# Patient Record
Sex: FEMALE | Race: BLACK OR AFRICAN AMERICAN | Employment: OTHER | URBAN - METROPOLITAN AREA
[De-identification: names, ages, dates, MRNs, and addresses within clinical notes are randomized per-mention and may not be internally consistent; named-entity substitution may affect disease eponyms.]

---

## 2017-05-23 ENCOUNTER — HOSPITAL ENCOUNTER (OUTPATIENT)
Dept: LAB | Age: 62
Discharge: HOME | End: 2017-05-23
Payer: COMMERCIAL

## 2017-05-23 LAB
ALBUMIN SERPL BCP-MCNC: 3.9 G/DL (ref 3.4–5)
ALBUMIN/GLOB SERPL: 1.2 {RATIO} (ref 0.8–1.7)
ALP SERPL-CCNC: 97 U/L (ref 45–117)
ALT SERPL-CCNC: 22 U/L (ref 13–56)
ANION GAP BLD CALC-SCNC: 7 MMOL/L (ref 3–18)
AST SERPL W P-5'-P-CCNC: 18 U/L (ref 15–37)
BASOPHILS # BLD AUTO: 0 K/UL (ref 0–0.06)
BASOPHILS # BLD: 0 % (ref 0–2)
BILIRUB DIRECT SERPL-MCNC: 0.1 MG/DL (ref 0–0.2)
BILIRUB SERPL-MCNC: 0.6 MG/DL (ref 0.2–1)
BUN SERPL-MCNC: 16 MG/DL (ref 7–18)
BUN/CREAT SERPL: 28 (ref 12–20)
CALCIUM SERPL-MCNC: 9 MG/DL (ref 8.5–10.1)
CHLORIDE SERPL-SCNC: 102 MMOL/L (ref 100–108)
CO2 SERPL-SCNC: 31 MMOL/L (ref 21–32)
CREAT SERPL-MCNC: 0.58 MG/DL (ref 0.6–1.3)
DIFFERENTIAL METHOD BLD: NORMAL
EOSINOPHIL # BLD: 0.2 K/UL (ref 0–0.4)
EOSINOPHIL NFR BLD: 5 % (ref 0–5)
ERYTHROCYTE [DISTWIDTH] IN BLOOD BY AUTOMATED COUNT: 14.4 % (ref 11.6–14.5)
EST. AVERAGE GLUCOSE BLD GHB EST-MCNC: 126 MG/DL
FERRITIN SERPL-MCNC: 114 NG/ML (ref 8–388)
FOLATE SERPL-MCNC: 19.4 NG/ML (ref 3.1–17.5)
GLOBULIN SER CALC-MCNC: 3.3 G/DL (ref 2–4)
GLUCOSE SERPL-MCNC: 81 MG/DL (ref 74–99)
HBA1C MFR BLD: 6 % (ref 4.2–5.6)
HCT VFR BLD AUTO: 39.2 % (ref 35–45)
HGB BLD-MCNC: 12.7 G/DL (ref 12–16)
IRON SERPL-MCNC: 74 UG/DL (ref 50–175)
LYMPHOCYTES # BLD AUTO: 47 % (ref 21–52)
LYMPHOCYTES # BLD: 2.3 K/UL (ref 0.9–3.6)
MAGNESIUM SERPL-MCNC: 2.3 MG/DL (ref 1.6–2.6)
MCH RBC QN AUTO: 27.4 PG (ref 24–34)
MCHC RBC AUTO-ENTMCNC: 32.4 G/DL (ref 31–37)
MCV RBC AUTO: 84.7 FL (ref 74–97)
MONOCYTES # BLD: 0.3 K/UL (ref 0.05–1.2)
MONOCYTES NFR BLD AUTO: 6 % (ref 3–10)
NEUTS SEG # BLD: 2.1 K/UL (ref 1.8–8)
NEUTS SEG NFR BLD AUTO: 42 % (ref 40–73)
PLATELET # BLD AUTO: 290 K/UL (ref 135–420)
PMV BLD AUTO: 11 FL (ref 9.2–11.8)
POTASSIUM SERPL-SCNC: 3.7 MMOL/L (ref 3.5–5.5)
PROT SERPL-MCNC: 7.2 G/DL (ref 6.4–8.2)
RBC # BLD AUTO: 4.63 M/UL (ref 4.2–5.3)
SODIUM SERPL-SCNC: 140 MMOL/L (ref 136–145)
VIT B12 SERPL-MCNC: 479 PG/ML (ref 211–911)
WBC # BLD AUTO: 5 K/UL (ref 4.6–13.2)

## 2017-05-23 PROCEDURE — 85025 COMPLETE CBC W/AUTO DIFF WBC: CPT | Performed by: NURSE PRACTITIONER

## 2017-05-23 PROCEDURE — 82172 ASSAY OF APOLIPOPROTEIN: CPT | Performed by: NURSE PRACTITIONER

## 2017-05-23 PROCEDURE — 82248 BILIRUBIN DIRECT: CPT | Performed by: NURSE PRACTITIONER

## 2017-05-23 PROCEDURE — 80061 LIPID PANEL: CPT | Performed by: NURSE PRACTITIONER

## 2017-05-23 PROCEDURE — 83036 HEMOGLOBIN GLYCOSYLATED A1C: CPT | Performed by: NURSE PRACTITIONER

## 2017-05-23 PROCEDURE — 82607 VITAMIN B-12: CPT | Performed by: NURSE PRACTITIONER

## 2017-05-23 PROCEDURE — 83735 ASSAY OF MAGNESIUM: CPT | Performed by: NURSE PRACTITIONER

## 2017-05-23 PROCEDURE — 80053 COMPREHEN METABOLIC PANEL: CPT | Performed by: NURSE PRACTITIONER

## 2017-05-23 PROCEDURE — 82728 ASSAY OF FERRITIN: CPT | Performed by: NURSE PRACTITIONER

## 2017-05-23 PROCEDURE — 83540 ASSAY OF IRON: CPT | Performed by: NURSE PRACTITIONER

## 2017-05-23 PROCEDURE — 36415 COLL VENOUS BLD VENIPUNCTURE: CPT | Performed by: NURSE PRACTITIONER

## 2017-05-23 PROCEDURE — 82550 ASSAY OF CK (CPK): CPT | Performed by: NURSE PRACTITIONER

## 2017-05-24 LAB
APO B SERPL-MCNC: 116 MG/DL (ref 54–133)
CHOLEST SERPL-MCNC: 200 MG/DL (ref 100–199)
HDLC SERPL-MCNC: 54 MG/DL
LDLC SERPL CALC-MCNC: 125 MG/DL (ref 0–99)
LDLC/HDLC SERPL: 2.3 RATIO UNITS (ref 0–3.2)
NONHDLC SERPL-MCNC: 146 MG/DL (ref 0–129)
TRIGL SERPL-MCNC: 107 MG/DL (ref 0–149)

## 2017-05-25 LAB
CK BB CFR SERPL ELPH: 0 %
CK MACRO1 CFR SERPL: 0 %
CK MACRO2 CFR SERPL: 0 %
CK MB CFR SERPL ELPH: 0 % (ref 0–3)
CK MM CFR SERPL ELPH: 100 % (ref 97–100)
CK SERPL-CCNC: 140 U/L (ref 24–173)

## 2017-12-01 ENCOUNTER — HOSPITAL ENCOUNTER (OUTPATIENT)
Dept: MAMMOGRAPHY | Age: 62
Discharge: HOME | End: 2017-12-01
Attending: PHYSICIAN ASSISTANT
Payer: COMMERCIAL

## 2017-12-01 DIAGNOSIS — Z12.31 VISIT FOR SCREENING MAMMOGRAM: ICD-10-CM

## 2017-12-01 PROCEDURE — 77067 SCR MAMMO BI INCL CAD: CPT

## 2018-01-25 ENCOUNTER — HOSPITAL ENCOUNTER (OUTPATIENT)
Dept: BONE DENSITY | Age: 63
Discharge: HOME | End: 2018-01-25
Attending: PHYSICIAN ASSISTANT
Payer: COMMERCIAL

## 2018-01-25 DIAGNOSIS — Z13.820 SCREENING FOR OSTEOPOROSIS: ICD-10-CM

## 2018-01-25 PROCEDURE — 77080 DXA BONE DENSITY AXIAL: CPT

## 2018-02-19 ENCOUNTER — HOSPITAL ENCOUNTER (OUTPATIENT)
Dept: ULTRASOUND IMAGING | Age: 63
Discharge: HOME | End: 2018-02-19
Attending: PHYSICIAN ASSISTANT
Payer: COMMERCIAL

## 2018-02-19 DIAGNOSIS — R79.89 ELEVATED TSH: ICD-10-CM

## 2018-02-19 PROCEDURE — 76536 US EXAM OF HEAD AND NECK: CPT

## 2018-03-22 ENCOUNTER — HOSPITAL ENCOUNTER (OUTPATIENT)
Dept: GENERAL RADIOLOGY | Age: 63
Discharge: HOME | End: 2018-03-22
Payer: COMMERCIAL

## 2018-03-22 DIAGNOSIS — M95.8 ACQUIRED DEFORMITY OF CLAVICLE: ICD-10-CM

## 2018-03-22 PROCEDURE — 73000 X-RAY EXAM OF COLLAR BONE: CPT

## 2018-03-22 PROCEDURE — 71046 X-RAY EXAM CHEST 2 VIEWS: CPT

## 2018-12-11 ENCOUNTER — HOSPITAL ENCOUNTER (OUTPATIENT)
Dept: MAMMOGRAPHY | Age: 63
Discharge: HOME | End: 2018-12-11
Attending: PHYSICIAN ASSISTANT
Payer: COMMERCIAL

## 2018-12-11 DIAGNOSIS — Z12.31 VISIT FOR SCREENING MAMMOGRAM: ICD-10-CM

## 2018-12-11 PROCEDURE — 77063 BREAST TOMOSYNTHESIS BI: CPT

## 2019-10-24 ENCOUNTER — OFFICE VISIT (OUTPATIENT)
Dept: ORTHOPEDIC SURGERY | Facility: CLINIC | Age: 64
End: 2019-10-24

## 2019-10-24 DIAGNOSIS — M25.462 EFFUSION OF LEFT KNEE: ICD-10-CM

## 2019-10-24 DIAGNOSIS — M25.662 DECREASED RANGE OF MOTION (ROM) OF LEFT KNEE: ICD-10-CM

## 2019-10-24 DIAGNOSIS — M25.562 CHRONIC PAIN OF LEFT KNEE: Primary | ICD-10-CM

## 2019-10-24 DIAGNOSIS — M23.8X2 CREPITUS OF JOINT OF LEFT KNEE: ICD-10-CM

## 2019-10-24 DIAGNOSIS — G89.29 CHRONIC PAIN OF LEFT KNEE: Primary | ICD-10-CM

## 2019-10-24 DIAGNOSIS — M17.12 ARTHRITIS OF LEFT KNEE: ICD-10-CM

## 2019-10-24 RX ORDER — TRIAMCINOLONE ACETONIDE 40 MG/ML
40 INJECTION, SUSPENSION INTRA-ARTICULAR; INTRAMUSCULAR ONCE
Qty: 1 ML | Refills: 0
Start: 2019-10-24 | End: 2019-10-24

## 2019-10-24 NOTE — LETTER
NOTIFICATION RETURN TO WORK / SCHOOL 
 
10/24/2019 3:26 PM 
 
Ms. Gonzalez Reis [address] [id number] To Whom It May Concern: 
 
Gonzalez Reis is currently under the care of [address] [place] [address]. She will return to work on Monday 10-28-19. If there are questions or concerns please have the patient contact our office.  
 
 
 
Sincerely, 
 
 
David Hall PA-C

## 2019-10-24 NOTE — PROGRESS NOTES
Patient: Aruna Fisher                MRN: [id number]       SSN: [id number]  YOB: 1955        AGE: 59 y.o. SEX: female          PCP: Rei Salomon NP  10/24/19    No chief complaint on file. HISTORY:  Aruna Fisher is a 59 y.o. female who presents to the office with a multiple year history of left knee pain. She is been seen previously under the care of Dr. Vinod shelby and Dr. Joesph Perrin she has received previous cortisone injections and also quite a while ago an aspiration injection of her left knee. .    Pain is worse when she stands for short periods walk short distances and climbs and since stairs. 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       Pain Assessment  10/3/2016   Location of Pain Knee   Location Modifiers Left;Right   Severity of Pain 0   Quality of Pain Dull   Duration of Pain -   Frequency of Pain -   Aggravating Factors -   Relieving Factors -   Result of Injury -           Lab Results   Component Value Date/Time    Hemoglobin A1c 6.0 (H) 05/23/2017 11:23 AM     Weight Metrics 10/3/2016 9/26/2016 9/19/2016 4/20/2016 2/19/2016 11/11/2014   Weight 216 lb 216 lb 216 lb 216 lb 9.6 oz 210 lb 197 lb   BMI 31.9 kg/m2 31.9 kg/m2 31.9 kg/m2 31.97 kg/m2 31 kg/m2 29.08 kg/m2            Problem List Items Addressed This Visit     None      Visit Diagnoses     Chronic pain of left knee    -  Primary    Relevant Medications    triamcinolone acetonide (KENALOG) 40 mg/mL injection    Other Relevant Orders    AMB POC XRAY, ANKLE; COMPLETE, 3+ VIE (Completed)    TRIAMCINOLONE ACETONIDE INJ    DRAIN/INJECT LARGE JOINT/BURSA    US GUIDE INJ/ASP/ARTHRO LG JNT/BURSA    Decreased range of motion (ROM) of left knee        Relevant Medications    triamcinolone acetonide (KENALOG) 40 mg/mL injection    Other Relevant Orders    TRIAMCINOLONE ACETONIDE INJ    US GUIDE INJ/ASP/ARTHRO LG JNT/BURSA    Arthritis of left knee        Relevant Medications    triamcinolone acetonide (KENALOG) 40 mg/mL injection    Other Relevant Orders    TRIAMCINOLONE ACETONIDE INJ    DRAIN/INJECT LARGE JOINT/BURSA    Crepitus of joint of left knee        Relevant Medications    triamcinolone acetonide (KENALOG) 40 mg/mL injection    Other Relevant Orders    TRIAMCINOLONE ACETONIDE INJ    DRAIN/INJECT LARGE JOINT/BURSA    Effusion of left knee              PAST MEDICAL HISTORY:   Past Medical History:   Diagnosis Date    Arthritis     Calcific Achilles tendonitis     Right; insertional    Hypercholesterolemia     Hypertension     Left knee pain     Pain of right heel     Sprain of right ring finger     Wears glasses        PAST SURGICAL HISTORY:   Past Surgical History:   Procedure Laterality Date    HX HEENT      Eye surgery    HX HERNIA REPAIR         ALLERGIES: No Known Allergies     CURRENT MEDICATIONS:  A list of medications prior to the time of admission include:  Prior to Admission medications    Medication Sig Start Date End Date Taking? Authorizing Provider   triamcinolone acetonide (KENALOG) 40 mg/mL injection 1 mL by IntraBURSal route once for 1 dose. 10/24/19 10/24/19 Yes Jennifer Lemon PA-C   neomycin-polymyxin-dexamethasone (DEXACIDIN, MAXITROL) 3.5mg/mL-10,000 unit/mL-0.1 % ophthalmic suspension  4/14/16   Provider, Historical   diclofenac (VOLTAREN) 0.1 % ophthalmic solution  3/20/16   Provider, Historical   simvastatin (ZOCOR) 20 mg tablet Take  by mouth nightly. Provider, Historical   diclofenac (VOLTAREN) 1 % gel Apply  to affected area four (4) times daily. Apply 4 g to affected area four times daily 2/19/16   Price Olmstead MD   HYDROcodone-acetaminophen [facility]) 5-325 mg per tablet Take 1 tablet by mouth every four (4) hours as needed for Pain. 11/11/14   JAMES Oshea   ibuprofen (MOTRIN) 600 mg tablet Take 1 tablet by mouth every six (6) hours as needed for Pain.  11/11/14   JAMES Oshea   escitalopram oxalate (LEXAPRO) 20 mg tablet Take 20 mg by mouth daily. Provider, Historical   olmesartan-hydrochlorothiazide (BENICAR HCT) 40-25 mg per tablet Take 1 tablet by mouth daily. Provider, Historical       FAMILY HISTORY:   Family History   Problem Relation Age of Onset    Breast Cancer Other     Arthritis-osteo Mother     Hypertension Mother     Hypertension Other         Grandmother    Other Other         Heart problems: Mother, grandmother    Diabetes Other         Grandmother       SOCIAL HISTORY:   Social History     Socioeconomic History    Marital status:      Spouse name: Not on file    Number of children: Not on file    Years of education: Not on file    Highest education level: Not on file   Tobacco Use    Smoking status: Former Smoker     Years: 15.00    Smokeless tobacco: Never Used   Substance and Sexual Activity    Alcohol use: Yes     Comment: Occasional    Drug use: No       ROS:No CP, No SOB, No fever/chills nor night sweats. No headaches, vision abnormalities to include double and oral loss of vision. No hearing abnormalities. Musculoskeletal pain per HPI. Pain is exacerbated positionally. Pt denies h/o spinal surgery, injections, or PT/chiropractor. Self treated with less than adequate relief on oral antiinflammatories. . Pt denies change in bowel or bladder habits. Pt denies fever, weight loss, or skin changes. EXAM:  Patient alert and oriented x 3,   CN II-XII grossly intact  Sitting comfortably in the exam room, interacting with conversation with pleasant affect. Breathing appears regular effortless with no visible usage of accessory muscles  Distal cap refill intact at 2/2 Cesar UE / LE. Neuro intact Cesar UE/LE to noxious stimuli    Examination to the left knee while patient is supine reveals a 2+ effusion. She has a valgus deformity. Her extension is -10 degrees to full up. There is flexion noted while supine to 90 degrees.   No calf tenderness or evidence of DVT distal sensation intact fully left lower extremity. DIAGNOSTIC IMAGING:    X-ray of the bilateral knees to include AP tunnel and left lateral reveals severe patellofemoral osteoarthritis as well as narrowing of the lateral joint space more so than the medial joint space. IMPRESSION:  1. Left knee effusion  2. Decreased range of motion left knee  3. Left knee osteoarthritis  4. Crepitation in the left knee  5. Chronic pain of the left knee    Medical Decision Making with Comprehensive Data Review:    The patients presenting problems have been discussed, and they/their family are in agreement with the care plan formulated and outlined with them. Treatment option(s) discussed to include, but cannot be limited to Physical / Occupational outpatient therapy, arthrocentesis, elective and or urgent surgical intervention of aforementioned patient medical condition. I have encouraged the patient / family to ask questions as they arise throughout their visit. The patient elected after all options discussed to treat the left knee with aspiration and cortisone injection. Procedural: Using sterile technique and verbal and written consent were obtained appropriate timeout performed patient laying supine left knee flexed to 20 degrees on a bolster 3 cc 1% lidocaine used to anesthetize a superior lateral interarticular approach. To follow 62 cc of yellow  blood tinted aspirate removed from the same portal.  To follow 1 cc of Kenalog 40 mg per male re-instilled. The Kenalog was mixed with 7 mils of Sensorcaine 0.75%. Please Note:    The above patient was treated with the assistance of the [employer] Ultrasound device. Image(s) captured, saved, printed, and copied to chart. 1.) Please continue current medications as prescribed for pain recommend Tylenol / Motrin per manufactures recommendations  2.) Please maintain current level of activity as discussed at visit today, implement the discussed RICE regimen.   3.) Provider will initiate and/or consider initiating physical therapy to assist in patient's safe recoveries. 4.) Ok to apply ice or a cold pack with a towel over the skin for 15 min on and 45 minutes off and may repeat up to 4 times within 24 hours. 5.) Provider discussed the risk of falls , mobility assisted device questions discussed thoroughly. 6.) Treat the   Problem List Items Addressed This Visit     None      Visit Diagnoses     Chronic pain of left knee    -  Primary    Relevant Medications    triamcinolone acetonide (KENALOG) 40 mg/mL injection    Other Relevant Orders    AMB POC XRAY, ANKLE; COMPLETE, 3+ VIE (Completed)    TRIAMCINOLONE ACETONIDE INJ    DRAIN/INJECT LARGE JOINT/BURSA    US GUIDE INJ/ASP/ARTHRO LG JNT/BURSA    Decreased range of motion (ROM) of left knee        Relevant Medications    triamcinolone acetonide (KENALOG) 40 mg/mL injection    Other Relevant Orders    TRIAMCINOLONE ACETONIDE INJ    US GUIDE INJ/ASP/ARTHRO LG JNT/BURSA    Arthritis of left knee        Relevant Medications    triamcinolone acetonide (KENALOG) 40 mg/mL injection    Other Relevant Orders    TRIAMCINOLONE ACETONIDE INJ    DRAIN/INJECT LARGE JOINT/BURSA    Crepitus of joint of left knee        Relevant Medications    triamcinolone acetonide (KENALOG) 40 mg/mL injection    Other Relevant Orders    TRIAMCINOLONE ACETONIDE INJ    DRAIN/INJECT LARGE JOINT/BURSA    Effusion of left knee           presenting medical problem(s) per protocol established on \"evidence based methods\". 7.) Provider recommended to the patient future action(s) that could limit the medical condition from re occurring, and or continue to support their recoveries. 8.) Establish a comprehensive follow up plan that is logical, and accessible for patient to follow with all pertinent medical providers, and or facilities in a timely fashion in order to      continue continuity of care.       Patient provided a reminder for a \"due or due soon\" health maintenance. I have asked the patient to schedule an appointment with their primary care provider for follow-up on general health maintenance concerns. Today all the patient's questions were answered to their satisfaction. Copies of x-rays reviewed if obtained this visit, and provided to patient. Please note: This document has been produced using voice recognition software. Unrecognized errors in transcription may be present. Angelita HUSSEIN, APC, MPAS, PA-C  [facility]

## 2019-10-25 DIAGNOSIS — G89.29 CHRONIC PAIN OF LEFT KNEE: ICD-10-CM

## 2019-10-25 DIAGNOSIS — M25.562 CHRONIC PAIN OF LEFT KNEE: ICD-10-CM

## 2019-10-25 DIAGNOSIS — M25.662 DECREASED RANGE OF MOTION (ROM) OF LEFT KNEE: ICD-10-CM

## 2019-10-28 ENCOUNTER — OFFICE VISIT (OUTPATIENT)
Dept: ORTHOPEDIC SURGERY | Facility: CLINIC | Age: 64
End: 2019-10-28

## 2019-10-28 VITALS
WEIGHT: 223.2 LBS | TEMPERATURE: 97.5 F | HEIGHT: 69 IN | OXYGEN SATURATION: 99 % | RESPIRATION RATE: 18 BRPM | DIASTOLIC BLOOD PRESSURE: 89 MMHG | BODY MASS INDEX: 33.06 KG/M2 | HEART RATE: 67 BPM | SYSTOLIC BLOOD PRESSURE: 187 MMHG

## 2019-10-28 DIAGNOSIS — M17.12 PRIMARY OSTEOARTHRITIS OF LEFT KNEE: Primary | ICD-10-CM

## 2019-10-28 DIAGNOSIS — M25.662 DECREASED RANGE OF MOTION (ROM) OF LEFT KNEE: ICD-10-CM

## 2019-10-28 DIAGNOSIS — M17.12 ARTHRITIS OF LEFT KNEE: ICD-10-CM

## 2019-10-28 DIAGNOSIS — M25.462 EFFUSION OF LEFT KNEE: ICD-10-CM

## 2019-10-28 DIAGNOSIS — M23.8X2 CREPITUS OF JOINT OF LEFT KNEE: ICD-10-CM

## 2019-10-28 RX ORDER — ASPIRIN 81 MG/1
TABLET ORAL DAILY
COMMUNITY
End: 2020-07-08

## 2019-10-28 RX ORDER — LISINOPRIL 20 MG/1
40 TABLET ORAL DAILY
COMMUNITY
End: 2020-07-01 | Stop reason: SDUPTHER

## 2019-10-28 RX ORDER — ATORVASTATIN CALCIUM 20 MG/1
40 TABLET, FILM COATED ORAL DAILY
Status: ON HOLD | COMMUNITY
End: 2020-07-15 | Stop reason: SDUPTHER

## 2019-10-28 RX ORDER — AMLODIPINE BESYLATE 10 MG/1
10 TABLET ORAL DAILY
Status: ON HOLD | COMMUNITY
End: 2020-07-15 | Stop reason: SDUPTHER

## 2019-10-28 RX ORDER — TRIAMCINOLONE ACETONIDE 40 MG/ML
40 INJECTION, SUSPENSION INTRA-ARTICULAR; INTRAMUSCULAR ONCE
Qty: 1 ML | Refills: 0
Start: 2019-10-28 | End: 2019-10-28

## 2019-10-28 RX ORDER — LEVOTHYROXINE SODIUM 75 UG/1
TABLET ORAL
COMMUNITY
End: 2020-07-02

## 2019-10-28 NOTE — LETTER
NOTIFICATION RETURN TO WORK  
 
10/28/2019 10:57 AM 
 
Ms. Arpita Downing [address] [facility] [address] [place] [id number] To Whom It May Concern: 
 
Arpita Downing is currently under the care of [address] [place] [address]. She will return to work on 10/30/2019. If there are questions or concerns please have the patient contact our office.  
 
 
 
Sincerely, 
 
 
Saul Yeh PA-C 
 
                                
 

(M4) withdraws from pain

## 2019-10-28 NOTE — PROGRESS NOTES
Patient: Esteban Andrade                MRN: [id number]       SSN: [id number]  YOB: 1955        AGE: 59 y.o. SEX: female          PCP: Mahsa Darden NP  10/28/19    Chief Complaint   Patient presents with    Knee Pain     Left       HISTORY:  Esteban Andrade is a 59 y.o. female who presents to the office with a recurrent left knee pain and swelling with decreased ROM. She was seen on 10-24-19 and aspirated 62 cc synovial fluid. Pain is worse when she stands for short periods walk short distances and climbs and since stairs. 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 Pain Assessment  10/28/2019   Location of Pain Knee   Location Modifiers Left   Severity of Pain 4   Quality of Pain Aching; Throbbing   Duration of Pain Persistent   Frequency of Pain Intermittent   Aggravating Factors Standing   Limiting Behavior Yes   Relieving Factors Rest;Elevation   Result of Injury No           Lab Results   Component Value Date/Time    Hemoglobin A1c 6.0 (H) 05/23/2017 11:23 AM     Weight Metrics 10/28/2019 10/3/2016 9/26/2016 9/19/2016 4/20/2016 2/19/2016 11/11/2014   Weight 223 lb 3.2 oz 216 lb 216 lb 216 lb 216 lb 9.6 oz 210 lb 197 lb   BMI 32.96 kg/m2 31.9 kg/m2 31.9 kg/m2 31.9 kg/m2 31.97 kg/m2 31 kg/m2 29.08 kg/m2            Problem List Items Addressed This Visit     None      Visit Diagnoses     Primary osteoarthritis of left knee    -  Primary          PAST MEDICAL HISTORY:   Past Medical History:   Diagnosis Date    Arthritis     Calcific Achilles tendonitis     Right; insertional    Hypercholesterolemia     Hypertension     Left knee pain     Pain of right heel     Sprain of right ring finger     Wears glasses        PAST SURGICAL HISTORY:   Past Surgical History:   Procedure Laterality Date    HX HEENT      Eye surgery    HX HERNIA REPAIR         ALLERGIES: No Known Allergies     CURRENT MEDICATIONS:  A list of medications prior to the time of admission include:  Prior to Admission medications    Medication Sig Start Date End Date Taking? Authorizing Provider   levothyroxine (SYNTHROID) 75 mcg tablet Take  by mouth Daily (before breakfast). Yes Provider, Historical   lisinopril (PRINIVIL, ZESTRIL) 20 mg tablet Take  by mouth daily. Yes Provider, Historical   amLODIPine (NORVASC) 10 mg tablet Take  by mouth daily. Yes Provider, Historical   atorvastatin (LIPITOR) 20 mg tablet Take  by mouth daily. Yes Provider, Historical   aspirin delayed-release 81 mg tablet Take  by mouth daily. Yes Provider, Historical   escitalopram oxalate (LEXAPRO) 20 mg tablet Take 20 mg by mouth daily. Yes Provider, Historical   neomycin-polymyxin-dexamethasone (Mildred Kinds) 3.5mg/mL-10,000 unit/mL-0.1 % ophthalmic suspension  4/14/16   Provider, Historical   diclofenac (VOLTAREN) 0.1 % ophthalmic solution  3/20/16   Provider, Historical   simvastatin (ZOCOR) 20 mg tablet Take  by mouth nightly. Provider, Historical   diclofenac (VOLTAREN) 1 % gel Apply  to affected area four (4) times daily. Apply 4 g to affected area four times daily 2/19/16   Goldy Miguel MD   HYDROcodone-acetaminophen [facility]) 5-325 mg per tablet Take 1 tablet by mouth every four (4) hours as needed for Pain. 11/11/14   JAMES Grace   ibuprofen (MOTRIN) 600 mg tablet Take 1 tablet by mouth every six (6) hours as needed for Pain. 11/11/14   JAMES Grace   olmesartan-hydrochlorothiazide (BENICAR HCT) 40-25 mg per tablet Take 1 tablet by mouth daily. Provider, Historical       FAMILY HISTORY:   Family History   Problem Relation Age of Onset    Breast Cancer Other     Arthritis-osteo Mother     Hypertension Mother     Hypertension Other         Grandmother    Other Other         Heart problems:  Mother, grandmother    Diabetes Other         Grandmother       SOCIAL HISTORY:   Social History     Socioeconomic History    Marital status:      Spouse name: Not on file    Number of children: Not on file    Years of education: Not on file    Highest education level: Not on file   Tobacco Use    Smoking status: Former Smoker     Years: 15.00    Smokeless tobacco: Never Used   Substance and Sexual Activity    Alcohol use: Yes     Comment: Occasional    Drug use: No       ROS:No CP, No SOB, No fever/chills nor night sweats. No headaches, vision abnormalities to include double and oral loss of vision. No hearing abnormalities. Musculoskeletal pain per HPI. Pain is exacerbated positionally. Pt denies h/o spinal surgery, injections, or PT/chiropractor. Self treated with less than adequate relief on oral antiinflammatories. . Pt denies change in bowel or bladder habits. Pt denies fever, weight loss, or skin changes. EXAM:  Patient alert and oriented x 3,   CN II-XII grossly intact  Sitting comfortably in the exam room, interacting with conversation with pleasant affect. Breathing appears regular effortless with no visible usage of accessory muscles  Distal cap refill intact at 2/2 Cesar UE / LE. Neuro intact Cesar UE/LE to noxious stimuli    Examination to the left knee while patient is supine reveals a 2+ effusion. She has a valgus deformity. Her extension is -10 degrees to full up. There is flexion noted while supine to 90 degrees. No calf tenderness or evidence of DVT distal sensation intact fully left lower extremity. DIAGNOSTIC IMAGING:HISTORICAL  X-ray of the bilateral knees to include AP tunnel and left lateral reveals severe patellofemoral osteoarthritis as well as narrowing of the lateral joint space more so than the medial joint space. IMPRESSION:  1. Recurrent Left knee effusion  2. Decreased range of motion left knee  3. Left knee osteoarthritis  4. Crepitation in the left knee  5.   Chronic pain of the left knee    Medical Decision Making with Comprehensive Data Review:    The patients presenting problems have been discussed, and they/their family are in agreement with the care plan formulated and outlined with them. Treatment option(s) discussed to include, but cannot be limited to Physical / Occupational outpatient therapy, arthrocentesis, elective and or urgent surgical intervention of aforementioned patient medical condition. I have encouraged the patient / family to ask questions as they arise throughout their visit. The patient elected after all options discussed to treat the left knee with repeat aspiration and 1/2 cc Kenalog at 40mg/ml cortisone injection. Procedural: Using sterile technique and verbal and written consent were obtained appropriate timeout performed patient laying supine left knee flexed to 20 degrees on a bolster 3 cc 1% lidocaine used to anesthetize a superior lateral interarticular approach. To follow 40 cc of yellow  blood tinted aspirate removed from the same portal.  To follow 1/2 cc of Kenalog 40 mg per male re-instilled. The Kenalog was mixed with 7 mils of Sensorcaine 0.75%. Please Note:    The above patient was treated with the assistance of the [employer] Ultrasound device. Image(s) captured, saved, printed, and copied to chart. 1.) Please continue current medications as prescribed for pain recommend Tylenol / Motrin per manufactures recommendations  2.) Please maintain current level of activity as discussed at visit today, implement the discussed RICE regimen. 3.) Provider will initiate and/or consider initiating physical therapy to assist in patient's safe recoveries. 4.) Ok to apply ice or a cold pack with a towel over the skin for 15 min on and 45 minutes off and may repeat up to 4 times within 24 hours. 5.) Provider discussed the risk of falls , mobility assisted device questions discussed thoroughly.   6.) Treat the   Problem List Items Addressed This Visit     None      Visit Diagnoses     Primary osteoarthritis of left knee    -  Primary       presenting medical problem(s) per protocol established on \"evidence based methods\". 7.) Provider recommended to the patient future action(s) that could limit the medical condition from re occurring, and or continue to support their recoveries. 8.) Establish a comprehensive follow up plan that is logical, and accessible for patient to follow with all pertinent medical providers, and or facilities in a timely fashion in order to      continue continuity of care. Patient provided a reminder for a \"due or due soon\" health maintenance. I have asked the patient to schedule an appointment with their primary care provider for follow-up on general health maintenance concerns. Today all the patient's questions were answered to their satisfaction. Copies of x-rays reviewed if obtained this visit, and provided to patient. Please note: This document has been produced using voice recognition software. Unrecognized errors in transcription may be present. Jeff HUSSEIN, APC, MPAS, PA-C  [facility]

## 2019-11-13 ENCOUNTER — HOSPITAL ENCOUNTER (OUTPATIENT)
Dept: LAB | Age: 64
Discharge: HOME | End: 2019-11-13
Payer: COMMERCIAL

## 2019-11-13 ENCOUNTER — HOSPITAL ENCOUNTER (OUTPATIENT)
Dept: ULTRASOUND IMAGING | Age: 64
Discharge: HOME | End: 2019-11-13
Attending: NURSE PRACTITIONER
Payer: COMMERCIAL

## 2019-11-13 DIAGNOSIS — N85.2 ENLARGED UTERUS: ICD-10-CM

## 2019-11-13 LAB
ALBUMIN SERPL-MCNC: 3.6 G/DL (ref 3.4–5)
ALBUMIN/GLOB SERPL: 1 {RATIO} (ref 0.8–1.7)
ALP SERPL-CCNC: 139 U/L (ref 45–117)
ALT SERPL-CCNC: 27 U/L (ref 13–56)
ANION GAP SERPL CALC-SCNC: 7 MMOL/L (ref 3–18)
AST SERPL-CCNC: 16 U/L (ref 10–38)
BASOPHILS # BLD: 0 K/UL (ref 0–0.1)
BASOPHILS NFR BLD: 0 % (ref 0–2)
BILIRUB SERPL-MCNC: 0.5 MG/DL (ref 0.2–1)
BUN SERPL-MCNC: 13 MG/DL (ref 7–18)
BUN/CREAT SERPL: 21 (ref 12–20)
CALCIUM SERPL-MCNC: 8.7 MG/DL (ref 8.5–10.1)
CHLORIDE SERPL-SCNC: 103 MMOL/L (ref 100–111)
CHOLEST SERPL-MCNC: 270 MG/DL
CO2 SERPL-SCNC: 30 MMOL/L (ref 21–32)
CREAT SERPL-MCNC: 0.62 MG/DL (ref 0.6–1.3)
DIFFERENTIAL METHOD BLD: ABNORMAL
EOSINOPHIL # BLD: 0.1 K/UL (ref 0–0.4)
EOSINOPHIL NFR BLD: 2 % (ref 0–5)
ERYTHROCYTE [DISTWIDTH] IN BLOOD BY AUTOMATED COUNT: 14.7 % (ref 11.6–14.5)
GLOBULIN SER CALC-MCNC: 3.7 G/DL (ref 2–4)
GLUCOSE SERPL-MCNC: 85 MG/DL (ref 74–99)
HCT VFR BLD AUTO: 38.8 % (ref 35–45)
HDLC SERPL-MCNC: 58 MG/DL (ref 40–60)
HDLC SERPL: 4.7 {RATIO} (ref 0–5)
HGB BLD-MCNC: 12.7 G/DL (ref 12–16)
LDLC SERPL CALC-MCNC: 190 MG/DL (ref 0–100)
LIPID PROFILE,FLP: ABNORMAL
LYMPHOCYTES # BLD: 2 K/UL (ref 0.9–3.6)
LYMPHOCYTES NFR BLD: 26 % (ref 21–52)
MCH RBC QN AUTO: 27.7 PG (ref 24–34)
MCHC RBC AUTO-ENTMCNC: 32.7 G/DL (ref 31–37)
MCV RBC AUTO: 84.7 FL (ref 74–97)
MONOCYTES # BLD: 0.3 K/UL (ref 0.05–1.2)
MONOCYTES NFR BLD: 4 % (ref 3–10)
NEUTS SEG # BLD: 5.2 K/UL (ref 1.8–8)
NEUTS SEG NFR BLD: 68 % (ref 40–73)
PLATELET # BLD AUTO: 356 K/UL (ref 135–420)
PMV BLD AUTO: 10.2 FL (ref 9.2–11.8)
POTASSIUM SERPL-SCNC: 3.7 MMOL/L (ref 3.5–5.5)
PROT SERPL-MCNC: 7.3 G/DL (ref 6.4–8.2)
RBC # BLD AUTO: 4.58 M/UL (ref 4.2–5.3)
SODIUM SERPL-SCNC: 140 MMOL/L (ref 136–145)
TRIGL SERPL-MCNC: 110 MG/DL (ref ?–150)
TSH SERPL DL<=0.05 MIU/L-ACNC: 2.61 UIU/ML (ref 0.36–3.74)
VLDLC SERPL CALC-MCNC: 22 MG/DL
WBC # BLD AUTO: 7.6 K/UL (ref 4.6–13.2)

## 2019-11-13 PROCEDURE — 93975 VASCULAR STUDY: CPT

## 2019-11-13 PROCEDURE — 85025 COMPLETE CBC W/AUTO DIFF WBC: CPT

## 2019-11-13 PROCEDURE — 84443 ASSAY THYROID STIM HORMONE: CPT

## 2019-11-13 PROCEDURE — 36415 COLL VENOUS BLD VENIPUNCTURE: CPT

## 2019-11-13 PROCEDURE — 80053 COMPREHEN METABOLIC PANEL: CPT

## 2019-11-13 PROCEDURE — 80061 LIPID PANEL: CPT

## 2019-11-21 ENCOUNTER — OFFICE VISIT (OUTPATIENT)
Dept: ORTHOPEDIC SURGERY | Facility: CLINIC | Age: 64
End: 2019-11-21

## 2019-11-21 ENCOUNTER — HOSPITAL ENCOUNTER (OUTPATIENT)
Dept: LAB | Age: 64
Discharge: HOME | End: 2019-11-21
Payer: COMMERCIAL

## 2019-11-21 VITALS
WEIGHT: 217 LBS | RESPIRATION RATE: 16 BRPM | BODY MASS INDEX: 32.14 KG/M2 | HEART RATE: 75 BPM | TEMPERATURE: 98.1 F | HEIGHT: 69 IN | SYSTOLIC BLOOD PRESSURE: 147 MMHG | DIASTOLIC BLOOD PRESSURE: 65 MMHG

## 2019-11-21 DIAGNOSIS — M17.12 PRIMARY OSTEOARTHRITIS OF LEFT KNEE: Primary | ICD-10-CM

## 2019-11-21 LAB
APPEARANCE FLD: CLEAR
BODY FLD TYPE: NORMAL
COLOR FLD: YELLOW
CRYSTALS FLD MICRO: NORMAL
NUC CELL # FLD: 4 /CU MM
RBC # FLD: 511 /CU MM
SPECIMEN SOURCE FLD: ABNORMAL

## 2019-11-21 PROCEDURE — 89050 BODY FLUID CELL COUNT: CPT

## 2019-11-21 PROCEDURE — 89060 EXAM SYNOVIAL FLUID CRYSTALS: CPT

## 2019-11-21 NOTE — PROGRESS NOTES
1. Have you been to the ER, urgent care clinic since your last visit? Hospitalized since your last visit? NO    2. Have you seen or consulted any other health care providers outside of the [address] [place] [address] since your last visit? Include any pap smears or colon screening.    NO

## 2019-11-21 NOTE — PROGRESS NOTES
Patient: Sheng Connors                MRN: [id number]       SSN: [id number]  YOB: 1955        AGE: 59 y.o. SEX: female          PCP: Jackie Rinne, NP  11/21/19    Chief Complaint   Patient presents with    Knee Pain     left knee pain, f/u appt. HISTORY:  Sheng Connors is a 59 y.o. female who presents to the office with a recurrent left knee pain and swelling with decreased ROM. She was seen on 10-28-19 and aspirated 40cc synovial fluid. Pain is worse when she stands for short periods walk short distances and climbs and since stairs. 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 Pain Assessment  11/21/2019   Location of Pain Knee   Location Modifiers Left   Severity of Pain 2   Quality of Pain Sharp;Popping   Duration of Pain A few minutes   Frequency of Pain Intermittent   Aggravating Factors Standing; Other (Comment)   Aggravating Factors Comment stepping down the wrong way   Limiting Behavior No   Relieving Factors (No Data)   Relieving Factors Comment n/a   Result of Injury No           Lab Results   Component Value Date/Time    Hemoglobin A1c 6.0 (H) 05/23/2017 11:23 AM     Weight Metrics 11/21/2019 10/28/2019 10/3/2016 9/26/2016 9/19/2016 4/20/2016 2/19/2016   Weight 217 lb 223 lb 3.2 oz 216 lb 216 lb 216 lb 216 lb 9.6 oz 210 lb   BMI 32.05 kg/m2 32.96 kg/m2 31.9 kg/m2 31.9 kg/m2 31.9 kg/m2 31.97 kg/m2 31 kg/m2            Problem List Items Addressed This Visit     None      Visit Diagnoses     Primary osteoarthritis of left knee    -  Primary    Relevant Orders    US GUIDE INJ/ASP/ARTHRO LG JNT/BURSA          PAST MEDICAL HISTORY:   Past Medical History:   Diagnosis Date    Arthritis     Calcific Achilles tendonitis     Right; insertional    Hypercholesterolemia     Hypertension     Left knee pain     Pain of right heel     Sprain of right ring finger     Wears glasses        PAST SURGICAL HISTORY:   Past Surgical History:   Procedure Laterality Date    HX HEENT      Eye surgery    HX HERNIA REPAIR         ALLERGIES: No Known Allergies     CURRENT MEDICATIONS:  A list of medications prior to the time of admission include:  Prior to Admission medications    Medication Sig Start Date End Date Taking? Authorizing Provider   levothyroxine (SYNTHROID) 75 mcg tablet Take  by mouth Daily (before breakfast). Yes Provider, Historical   lisinopril (PRINIVIL, ZESTRIL) 20 mg tablet Take  by mouth daily. Yes Provider, Historical   amLODIPine (NORVASC) 10 mg tablet Take  by mouth daily. Yes Provider, Historical   atorvastatin (LIPITOR) 20 mg tablet Take  by mouth daily. Yes Provider, Historical   aspirin delayed-release 81 mg tablet Take  by mouth daily. Yes Provider, Historical   neomycin-polymyxin-dexamethasone (Minus Barer) 3.5mg/mL-10,000 unit/mL-0.1 % ophthalmic suspension  4/14/16  Yes Provider, Historical   diclofenac (VOLTAREN) 0.1 % ophthalmic solution  3/20/16  Yes Provider, Historical   simvastatin (ZOCOR) 20 mg tablet Take  by mouth nightly. Yes Provider, Historical   ibuprofen (MOTRIN) 600 mg tablet Take 1 tablet by mouth every six (6) hours as needed for Pain. 11/11/14  Yes JAMES Miguel   escitalopram oxalate (LEXAPRO) 20 mg tablet Take 20 mg by mouth daily. Yes Provider, Historical   olmesartan-hydrochlorothiazide (BENICAR HCT) 40-25 mg per tablet Take 1 tablet by mouth daily. Yes Provider, Historical   diclofenac (VOLTAREN) 1 % gel Apply  to affected area four (4) times daily. Apply 4 g to affected area four times daily 2/19/16   Abraham Damian MD   HYDROcodone-acetaminophen [facility]) 5-325 mg per tablet Take 1 tablet by mouth every four (4) hours as needed for Pain.  11/11/14   JAMES Miguel       FAMILY HISTORY:   Family History   Problem Relation Age of Onset    Breast Cancer Other    Wing Gess Arthritis-osteo Mother     Hypertension Mother     Hypertension Other         Grandmother    Other Other         Heart problems: Mother, grandmother    Diabetes Other         Grandmother       SOCIAL HISTORY:   Social History     Socioeconomic History    Marital status:      Spouse name: Not on file    Number of children: Not on file    Years of education: Not on file    Highest education level: Not on file   Tobacco Use    Smoking status: Former Smoker     Years: 15.00    Smokeless tobacco: Never Used   Substance and Sexual Activity    Alcohol use: Yes     Comment: Occasional    Drug use: No       ROS:No CP, No SOB, No fever/chills nor night sweats. No headaches, vision abnormalities to include double and oral loss of vision. No hearing abnormalities. Musculoskeletal pain per HPI. Pain is exacerbated positionally. Pt denies h/o spinal surgery, injections, or PT/chiropractor. Self treated with less than adequate relief on oral antiinflammatories. . Pt denies change in bowel or bladder habits. Pt denies fever, weight loss, or skin changes. EXAM:  Patient alert and oriented x 3,   CN II-XII grossly intact  Sitting comfortably in the exam room, interacting with conversation with pleasant affect. Breathing appears regular effortless with no visible usage of accessory muscles  Distal cap refill intact at 2/2 Cesar UE / LE. Neuro intact Cesar UE/LE to noxious stimuli    Examination to the left knee while patient is supine reveals a 2+ effusion. She has a valgus deformity. Her extension is -10 degrees to full up. There is flexion noted while supine to 90 degrees. No calf tenderness or evidence of DVT distal sensation intact fully left lower extremity. DIAGNOSTIC IMAGING:HISTORICAL: Historical  X-ray of the bilateral knees to include AP tunnel and left lateral reveals severe patellofemoral osteoarthritis as well as narrowing of the lateral joint space more so than the medial joint space. IMPRESSION:  1. Recurrent Left knee effusion  2. Decreased range of motion left knee  3.   Left knee osteoarthritis  4. Crepitation in the left knee  5. Chronic pain of the left knee    Medical Decision Making with Comprehensive Data Review:    The patients presenting problems have been discussed, and they/their family are in agreement with the care plan formulated and outlined with them. Treatment option(s) discussed to include, but cannot be limited to Physical / Occupational outpatient therapy, arthrocentesis, elective and or urgent surgical intervention of aforementioned patient medical condition. I have encouraged the patient / family to ask questions as they arise throughout their visit. The patient elected after all options discussed to treat the left knee with repeat aspiration and 1/2 cc Kenalog at 40mg/ml cortisone injection. Procedural: Using sterile technique and verbal and written consent were obtained appropriate timeout performed patient laying supine left knee flexed to 20 degrees on a bolster 3 cc 1% lidocaine used to anesthetize a superior lateral interarticular approach. To follow 68 cc of yellow  blood tinted aspirate with floating write crystals removed from the same portal.  To follow 7 mils of Sensorcaine 0.75% injected. Please Note:    The above patient was treated with the assistance of the [employer] Ultrasound device. Image(s) captured, saved, printed, and copied to chart. 1.) Please continue current medications as prescribed for pain recommend Tylenol / Motrin per manufactures recommendations  2.) Please maintain current level of activity as discussed at visit today, implement the discussed RICE regimen. 3.) Provider will initiate and/or consider initiating physical therapy to assist in patient's safe recoveries. 4.) Ok to apply ice or a cold pack with a towel over the skin for 15 min on and 45 minutes off and may repeat up to 4 times within 24 hours.   5.) Provider discussed the risk of falls , mobility assisted device questions discussed thoroughly. 6.) Treat the   Problem List Items Addressed This Visit     None      Visit Diagnoses     Primary osteoarthritis of left knee    -  Primary    Relevant Orders    US GUIDE INJ/ASP/ARTHRO LG JNT/BURSA       presenting medical problem(s) per protocol established on \"evidence based methods\". 7.) Provider recommended to the patient future action(s) that could limit the medical condition from re occurring, and or continue to support their recoveries. 8.) Establish a comprehensive follow up plan that is logical, and accessible for patient to follow with all pertinent medical providers, and or facilities in a timely fashion in order to      continue continuity of care. Patient provided a reminder for a \"due or due soon\" health maintenance. I have asked the patient to schedule an appointment with their primary care provider for follow-up on general health maintenance concerns. Today all the patient's questions were answered to their satisfaction. Copies of x-rays reviewed if obtained this visit, and provided to patient. Please note: This document has been produced using voice recognition software. Unrecognized errors in transcription may be present. Jeff HUSSEIN, APC, MPAS, PA-C  [facility]

## 2019-11-25 NOTE — PROGRESS NOTES
Patient: Glenny Butler                MRN: [id number]       SSN: [id number]  YOB: 1955        AGE: 59 y.o. SEX: female          PCP: Carolyn Nettles NP  11/25/19    Chief Complaint   Patient presents with    Knee Pain     left knee pain, f/u appt. HISTORY:  Glenny Butler is a 59 y.o. female who presents to the office with a recurrent left knee pain and swelling with decreased ROM. She was seen on 10-28-19 and aspirated 40cc synovial fluid. Pain is worse when she stands for short periods walk short distances and climbs and since stairs. 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 Pain Assessment  11/21/2019   Location of Pain Knee   Location Modifiers Left   Severity of Pain 2   Quality of Pain Sharp;Popping   Duration of Pain A few minutes   Frequency of Pain Intermittent   Aggravating Factors Standing; Other (Comment)   Aggravating Factors Comment stepping down the wrong way   Limiting Behavior No   Relieving Factors (No Data)   Relieving Factors Comment n/a   Result of Injury No           Lab Results   Component Value Date/Time    Hemoglobin A1c 6.0 (H) 05/23/2017 11:23 AM     Weight Metrics 11/21/2019 10/28/2019 10/3/2016 9/26/2016 9/19/2016 4/20/2016 2/19/2016   Weight 217 lb 223 lb 3.2 oz 216 lb 216 lb 216 lb 216 lb 9.6 oz 210 lb   BMI 32.05 kg/m2 32.96 kg/m2 31.9 kg/m2 31.9 kg/m2 31.9 kg/m2 31.97 kg/m2 31 kg/m2            Problem List Items Addressed This Visit     None      Visit Diagnoses     Primary osteoarthritis of left knee    -  Primary    Relevant Orders    US GUIDE INJ/ASP/ARTHRO LG JNT/BURSA    CELL COUNT AND DIFF, BODY FLUID    CRYSTALS, SYNOVIAL FLUID          PAST MEDICAL HISTORY:   Past Medical History:   Diagnosis Date    Arthritis     Calcific Achilles tendonitis     Right; insertional    Hypercholesterolemia     Hypertension     Left knee pain     Pain of right heel     Sprain of right ring finger     Wears glasses PAST SURGICAL HISTORY:   Past Surgical History:   Procedure Laterality Date    HX HEENT      Eye surgery    HX HERNIA REPAIR         ALLERGIES: No Known Allergies     CURRENT MEDICATIONS:  A list of medications prior to the time of admission include:  Prior to Admission medications    Medication Sig Start Date End Date Taking? Authorizing Provider   levothyroxine (SYNTHROID) 75 mcg tablet Take  by mouth Daily (before breakfast). Yes Provider, Historical   lisinopril (PRINIVIL, ZESTRIL) 20 mg tablet Take  by mouth daily. Yes Provider, Historical   amLODIPine (NORVASC) 10 mg tablet Take  by mouth daily. Yes Provider, Historical   atorvastatin (LIPITOR) 20 mg tablet Take  by mouth daily. Yes Provider, Historical   aspirin delayed-release 81 mg tablet Take  by mouth daily. Yes Provider, Historical   neomycin-polymyxin-dexamethasone (Tia Covert) 3.5mg/mL-10,000 unit/mL-0.1 % ophthalmic suspension  4/14/16  Yes Provider, Historical   diclofenac (VOLTAREN) 0.1 % ophthalmic solution  3/20/16  Yes Provider, Historical   simvastatin (ZOCOR) 20 mg tablet Take  by mouth nightly. Yes Provider, Historical   ibuprofen (MOTRIN) 600 mg tablet Take 1 tablet by mouth every six (6) hours as needed for Pain. 11/11/14  Yes JAMES Lopez   escitalopram oxalate (LEXAPRO) 20 mg tablet Take 20 mg by mouth daily. Yes Provider, Historical   olmesartan-hydrochlorothiazide (BENICAR HCT) 40-25 mg per tablet Take 1 tablet by mouth daily. Yes Provider, Historical   diclofenac (VOLTAREN) 1 % gel Apply  to affected area four (4) times daily. Apply 4 g to affected area four times daily 2/19/16   Reji Mott MD   HYDROcodone-acetaminophen [facility]) 5-325 mg per tablet Take 1 tablet by mouth every four (4) hours as needed for Pain.  11/11/14   JAMES Lopez       FAMILY HISTORY:   Family History   Problem Relation Age of Onset    Breast Cancer Other     Arthritis-osteo Mother     Hypertension Mother    [facility] Hypertension Other         Grandmother    Other Other         Heart problems: Mother, grandmother    Diabetes Other         Grandmother       SOCIAL HISTORY:   Social History     Socioeconomic History    Marital status:      Spouse name: Not on file    Number of children: Not on file    Years of education: Not on file    Highest education level: Not on file   Tobacco Use    Smoking status: Former Smoker     Years: 15.00    Smokeless tobacco: Never Used   Substance and Sexual Activity    Alcohol use: Yes     Comment: Occasional    Drug use: No       ROS:No CP, No SOB, No fever/chills nor night sweats. No headaches, vision abnormalities to include double and oral loss of vision. No hearing abnormalities. Musculoskeletal pain per HPI. Pain is exacerbated positionally. Pt denies h/o spinal surgery, injections, or PT/chiropractor. Self treated with less than adequate relief on oral antiinflammatories. . Pt denies change in bowel or bladder habits. Pt denies fever, weight loss, or skin changes. EXAM:  Patient alert and oriented x 3,   CN II-XII grossly intact  Sitting comfortably in the exam room, interacting with conversation with pleasant affect. Breathing appears regular effortless with no visible usage of accessory muscles  Distal cap refill intact at 2/2 Cesar UE / LE. Neuro intact Cesar UE/LE to noxious stimuli    Examination to the left knee while patient is supine reveals a 2+ effusion. She has a valgus deformity. Her extension is -10 degrees to full up. There is flexion noted while supine to 90 degrees. No calf tenderness or evidence of DVT distal sensation intact fully left lower extremity. DIAGNOSTIC IMAGING:HISTORICAL: Historical  X-ray of the bilateral knees to include AP tunnel and left lateral reveals severe patellofemoral osteoarthritis as well as narrowing of the lateral joint space more so than the medial joint space. IMPRESSION:  1.   Recurrent Left knee effusion  2. Decreased range of motion left knee  3. Left knee osteoarthritis  4. Crepitation in the left knee  5. Chronic pain of the left knee    Medical Decision Making with Comprehensive Data Review:    The patients presenting problems have been discussed, and they/their family are in agreement with the care plan formulated and outlined with them. Treatment option(s) discussed to include, but cannot be limited to Physical / Occupational outpatient therapy, arthrocentesis, elective and or urgent surgical intervention of aforementioned patient medical condition. I have encouraged the patient / family to ask questions as they arise throughout their visit. The patient elected after all options discussed to treat the left knee with repeat aspiration. Procedural: Using sterile technique and verbal and written consent were obtained appropriate timeout performed patient laying supine left knee flexed to 20 degrees on a bolster 3 cc 1% lidocaine used to anesthetize a superior lateral interarticular approach. To follow 68 cc of yellow  blood tinted aspirate with floating write crystals removed from the same portal.  To follow 7 mils of Sensorcaine 0.75% injected. Please Note:    The above patient was treated with the assistance of the [employer] Ultrasound device. Image(s) captured, saved, printed, and copied to chart. 1.) Please continue current medications as prescribed for pain recommend Tylenol / Motrin per manufactures recommendations  2.) Please maintain current level of activity as discussed at visit today, implement the discussed RICE regimen. 3.) Provider will initiate and/or consider initiating physical therapy to assist in patient's safe recoveries. 4.) Ok to apply ice or a cold pack with a towel over the skin for 15 min on and 45 minutes off and may repeat up to 4 times within 24 hours.   5.) Provider discussed the risk of falls , mobility assisted device questions discussed thoroughly. 6.) Treat the   Problem List Items Addressed This Visit     None      Visit Diagnoses     Primary osteoarthritis of left knee    -  Primary    Relevant Orders    US GUIDE INJ/ASP/ARTHRO LG JNT/BURSA    CELL COUNT AND DIFF, BODY FLUID    CRYSTALS, SYNOVIAL FLUID       presenting medical problem(s) per protocol established on \"evidence based methods\". 7.) Provider recommended to the patient future action(s) that could limit the medical condition from re occurring, and or continue to support their recoveries. 8.) Establish a comprehensive follow up plan that is logical, and accessible for patient to follow with all pertinent medical providers, and or facilities in a timely fashion in order to      continue continuity of care. Patient provided a reminder for a \"due or due soon\" health maintenance. I have asked the patient to schedule an appointment with their primary care provider for follow-up on general health maintenance concerns. Today all the patient's questions were answered to their satisfaction. Copies of x-rays reviewed if obtained this visit, and provided to patient. Please note: This document has been produced using voice recognition software. Unrecognized errors in transcription may be present. Chloe HUSSEIN, APC, MPAS, PA-C  [facility]

## 2019-12-26 ENCOUNTER — HOSPITAL ENCOUNTER (OUTPATIENT)
Dept: MAMMOGRAPHY | Age: 64
Discharge: HOME | End: 2019-12-26
Attending: PHYSICIAN ASSISTANT
Payer: COMMERCIAL

## 2019-12-26 DIAGNOSIS — Z12.31 VISIT FOR SCREENING MAMMOGRAM: ICD-10-CM

## 2019-12-26 PROCEDURE — 77063 BREAST TOMOSYNTHESIS BI: CPT

## 2020-02-20 DIAGNOSIS — M17.12 PRIMARY OSTEOARTHRITIS OF LEFT KNEE: Primary | ICD-10-CM

## 2020-02-20 RX ORDER — DICLOFENAC SODIUM 75 MG/1
75 TABLET, DELAYED RELEASE ORAL 2 TIMES DAILY WITH MEALS
Qty: 60 TAB | Refills: 1 | Status: SHIPPED | OUTPATIENT
Start: 2020-02-20 | End: 2020-04-10

## 2020-02-20 NOTE — TELEPHONE ENCOUNTER
Patient called and asked if she could be prescribed something for pain. Patient stated that she's in a lot of pain .  Last seen 11/21     Patient tel: [phone]

## 2020-04-10 DIAGNOSIS — M17.12 PRIMARY OSTEOARTHRITIS OF LEFT KNEE: ICD-10-CM

## 2020-04-10 RX ORDER — DICLOFENAC SODIUM 75 MG/1
TABLET, DELAYED RELEASE ORAL
Qty: 60 TAB | Refills: 1 | Status: SHIPPED | OUTPATIENT
Start: 2020-04-10 | End: 2020-07-08

## 2020-06-01 ENCOUNTER — OFFICE VISIT (OUTPATIENT)
Dept: ORTHOPEDIC SURGERY | Facility: CLINIC | Age: 65
End: 2020-06-01

## 2020-06-01 VITALS
SYSTOLIC BLOOD PRESSURE: 143 MMHG | BODY MASS INDEX: 31.84 KG/M2 | WEIGHT: 215 LBS | DIASTOLIC BLOOD PRESSURE: 89 MMHG | HEART RATE: 67 BPM | HEIGHT: 69 IN | TEMPERATURE: 97.3 F

## 2020-06-01 DIAGNOSIS — M17.12 PRIMARY OSTEOARTHRITIS OF LEFT KNEE: Primary | ICD-10-CM

## 2020-06-01 RX ORDER — TRIAMCINOLONE ACETONIDE 40 MG/ML
40 INJECTION, SUSPENSION INTRA-ARTICULAR; INTRAMUSCULAR ONCE
Qty: 1 ML | Refills: 0
Start: 2020-06-01 | End: 2020-06-01

## 2020-06-01 RX ORDER — ASPIRIN 325 MG
TABLET, DELAYED RELEASE (ENTERIC COATED) ORAL
COMMUNITY
End: 2021-01-26

## 2020-06-01 NOTE — PROGRESS NOTES
Pt reports pain only 3/10 today, but it has been as bad as 10/10 as recently as this weekend. States that knee gives way and she has fallen twice already because of knee. 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

## 2020-06-01 NOTE — PROGRESS NOTES
Patient: Leeanne Mehta                MRN: [id number]       SSN: [id number]  YOB: 1955        AGE: 59 y.o. SEX: female          PCP: JAMES Javed  06/01/20    Chief Complaint   Patient presents with    Knee Pain     left       HISTORY:  Leeanne Mehta is a 59 y.o. female who presents to the office with a recurrent left knee pain and swelling with decreased ROM. She was seen on 11-21-19 and aspirated 468cc synovial fluid. Pain is worse when she stands for short periods walk short distances and climbs and since stairs. 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 Pain Assessment  6/1/2020   Location of Pain Knee   Location Modifiers Left   Severity of Pain 3   Quality of Pain Aching; Throbbing; Other (Comment); Popping   Duration of Pain Persistent   Frequency of Pain Constant   Aggravating Factors Standing   Aggravating Factors Comment -   Limiting Behavior Yes   Relieving Factors Nothing   Relieving Factors Comment -   Result of Injury -           Lab Results   Component Value Date/Time    Hemoglobin A1c 6.0 (H) 05/23/2017 11:23 AM     Weight Metrics 6/1/2020 11/21/2019 10/28/2019 10/3/2016 9/26/2016 9/19/2016 4/20/2016   Weight 215 lb 217 lb 223 lb 3.2 oz 216 lb 216 lb 216 lb 216 lb 9.6 oz   BMI 31.75 kg/m2 32.05 kg/m2 32.96 kg/m2 31.9 kg/m2 31.9 kg/m2 31.9 kg/m2 31.97 kg/m2            Problem List Items Addressed This Visit     None      Visit Diagnoses     Primary osteoarthritis of left knee    -  Primary    Relevant Medications    cholecalciferol (VITAMIN D3) (50,000 UNITS /1250 MCG) capsule    Other Relevant Orders    AMB POC X-RAY KNEE 3 VIEW (Completed)          PAST MEDICAL HISTORY:   Past Medical History:   Diagnosis Date    Arthritis     Calcific Achilles tendonitis     Right; insertional    Hypercholesterolemia     Hypertension     Left knee pain     Pain of right heel     Sprain of right ring finger     Wears glasses        PAST SURGICAL HISTORY:   Past Surgical History:   Procedure Laterality Date    HX HEENT      Eye surgery    HX HERNIA REPAIR         ALLERGIES: No Known Allergies     CURRENT MEDICATIONS:  A list of medications prior to the time of admission include:  Prior to Admission medications    Medication Sig Start Date End Date Taking? Authorizing Provider   cholecalciferol (VITAMIN D3) (50,000 UNITS /1250 MCG) capsule Take  by mouth every seven (7) days. Yes Provider, Historical   diclofenac EC (VOLTAREN) 75 mg EC tablet TAKE 1 TAB BY MOUTH (2) TIMES DAILY (WITH MEALS). INDICATIONS: JOINT DAMAGE CAUSING PAIN AND LOSS OF FUNCTION 4/10/20  Yes Rashawn Gutierrez PA-C   levothyroxine (SYNTHROID) 75 mcg tablet Take  by mouth Daily (before breakfast). Yes Provider, Historical   lisinopril (PRINIVIL, ZESTRIL) 20 mg tablet Take  by mouth daily. Yes Provider, Historical   amLODIPine (NORVASC) 10 mg tablet Take  by mouth daily. Yes Provider, Historical   atorvastatin (LIPITOR) 20 mg tablet Take  by mouth daily. Yes Provider, Historical   aspirin delayed-release 81 mg tablet Take  by mouth daily. Yes Provider, Historical   ibuprofen (MOTRIN) 600 mg tablet Take 1 tablet by mouth every six (6) hours as needed for Pain. 11/11/14  Yes JAMES Muñoz   neomycin-polymyxin-dexamethasone (DEXACIDIN, MAXITROL) 3.5mg/mL-10,000 unit/mL-0.1 % ophthalmic suspension  4/14/16   Provider, Historical   diclofenac (VOLTAREN) 0.1 % ophthalmic solution  3/20/16   Provider, Historical   simvastatin (ZOCOR) 20 mg tablet Take  by mouth nightly. Provider, Historical   diclofenac (VOLTAREN) 1 % gel Apply  to affected area four (4) times daily. Apply 4 g to affected area four times daily 2/19/16   Jerod Birch MD   HYDROcodone-acetaminophen [facility]) 5-325 mg per tablet Take 1 tablet by mouth every four (4) hours as needed for Pain. 11/11/14   JAMES Muñoz   escitalopram oxalate (LEXAPRO) 20 mg tablet Take 20 mg by mouth daily.     Provider, Historical   olmesartan-hydrochlorothiazide (BENICAR HCT) 40-25 mg per tablet Take 1 tablet by mouth daily. Provider, Historical       FAMILY HISTORY:   Family History   Problem Relation Age of Onset    Breast Cancer Other     Arthritis-osteo Mother     Hypertension Mother     Hypertension Other         Grandmother    Other Other         Heart problems: Mother, grandmother    Diabetes Other         Grandmother       SOCIAL HISTORY:   Social History     Socioeconomic History    Marital status:      Spouse name: Not on file    Number of children: Not on file    Years of education: Not on file    Highest education level: Not on file   Tobacco Use    Smoking status: Former Smoker     Years: 15.00    Smokeless tobacco: Never Used   Substance and Sexual Activity    Alcohol use: Yes     Comment: Occasional    Drug use: No       ROS:No CP, No SOB, No fever/chills nor night sweats. No headaches, vision abnormalities to include double and oral loss of vision. No hearing abnormalities. Musculoskeletal pain per HPI. Pain is exacerbated positionally. Pt denies h/o spinal surgery, injections, or PT/chiropractor. Self treated with less than adequate relief on oral antiinflammatories. . Pt denies change in bowel or bladder habits. Pt denies fever, weight loss, or skin changes. EXAM:  Patient alert and oriented x 3,   CN II-XII grossly intact  Sitting comfortably in the exam room, interacting with conversation with pleasant affect. Breathing appears regular effortless with no visible usage of accessory muscles  Distal cap refill intact at 2/2 Cesar UE / LE. Neuro intact Cesar UE/LE to noxious stimuli    Examination to the left knee while patient is supine reveals a 2+ effusion. She has a valgus deformity. Her extension is -10 degrees to full up. There is flexion noted while supine to 90 degrees.   No calf tenderness or evidence of DVT distal sensation intact fully left lower extremity. DIAGNOSTIC IMAGING:HISTORICAL: Historical  X-ray of the bilateral knees to include AP tunnel and left lateral reveals severe patellofemoral osteoarthritis as well as narrowing of the lateral joint space more so than the medial joint space. IMPRESSION:  1. Recurrent Left knee effusion  2. Decreased range of motion left knee  3. Left knee osteoarthritis  4. Crepitation in the left knee  5. Chronic pain of the left knee    Medical Decision Making with Comprehensive Data Review:    The patients presenting problems have been discussed, and they/their family are in agreement with the care plan formulated and outlined with them. Treatment option(s) discussed to include, but cannot be limited to Physical / Occupational outpatient therapy, arthrocentesis, elective and or urgent surgical intervention of aforementioned patient medical condition. I have encouraged the patient / family to ask questions as they arise throughout their visit. The patient elected after all options discussed to treat the left knee with repeat aspiration. Procedural: Using sterile technique and verbal and written consent were obtained appropriate timeout performed patient laying supine left knee flexed to 20 degrees on a bolster 3 cc 1% lidocaine used to anesthetize a superior lateral interarticular approach. To follow 60 cc of yellow  blood tinted aspiratel. Fluid Discarded, To follow 7 mils of Sensorcaine 0.75% injected. Chart reviewed for the following:  Hipolito MCNALLY PA-C, have reviewed the History, Physical and updated the Allergic reactions for@      TIME OUT performed immediately prior to start of procedure:  Hipolito MCNALLY PA-C, have performed the following reviews onThere are other unrelated non-urgent complaints, but due to the busy schedule and the amount of time I've already spent with her, time does not permit me to address these routine issues at today's visit.  I've requested another appointment to review these additional issues. Date of procedure June 1, 2020     Procedure performed by:  Shukri Lopez PA-C     Provider assisted by: (see medication administration)     How tolerated by patient: tolerated the procedure well with no complications     Comments: none        Please Note:    The above patient was treated with the assistance of the [employer] Ultrasound device. Image(s) captured, saved, printed, and copied to chart. 1.) Please continue current medications as prescribed for pain recommend Tylenol / Motrin per manufactures recommendations  2.) Please maintain current level of activity as discussed at visit today, implement the discussed RICE regimen. 3.) Provider will initiate and/or consider initiating physical therapy to assist in patient's safe recoveries. 4.) Ok to apply ice or a cold pack with a towel over the skin for 15 min on and 45 minutes off and may repeat up to 4 times within 24 hours. 5.) Provider discussed the risk of falls , mobility assisted device questions discussed thoroughly. 6.) Treat the   Problem List Items Addressed This Visit     None      Visit Diagnoses     Primary osteoarthritis of left knee    -  Primary    Relevant Medications    cholecalciferol (VITAMIN D3) (50,000 UNITS /1250 MCG) capsule    Other Relevant Orders    AMB POC X-RAY KNEE 3 VIEW (Completed)       presenting medical problem(s) per protocol established on \"evidence based methods\". 7.) Provider recommended to the patient future action(s) that could limit the medical condition from re occurring, and or continue to support their recoveries. 8.) Establish a comprehensive follow up plan that is logical, and accessible for patient to follow with all pertinent medical providers, and or facilities in a timely fashion in order to      continue continuity of care. Patient provided a reminder for a \"due or due soon\" health maintenance.  I have asked the patient to schedule an appointment with their primary care provider for follow-up on general health maintenance concerns. Today all the patient's questions were answered to their satisfaction. Copies of x-rays reviewed if obtained this visit, and provided to patient. Please note: This document has been produced using voice recognition software. Unrecognized errors in transcription may be present. Tayler HUSSEIN, APC, MPAS, PAMARY  [facility]

## 2020-06-25 ENCOUNTER — OFFICE VISIT (OUTPATIENT)
Dept: ORTHOPEDIC SURGERY | Facility: CLINIC | Age: 65
End: 2020-06-25

## 2020-06-25 VITALS
HEIGHT: 69 IN | BODY MASS INDEX: 31.43 KG/M2 | SYSTOLIC BLOOD PRESSURE: 171 MMHG | HEART RATE: 84 BPM | TEMPERATURE: 97.2 F | DIASTOLIC BLOOD PRESSURE: 103 MMHG | WEIGHT: 212.2 LBS | OXYGEN SATURATION: 98 % | RESPIRATION RATE: 16 BRPM

## 2020-06-25 DIAGNOSIS — Z01.818 PREOP EXAMINATION: Primary | ICD-10-CM

## 2020-06-25 DIAGNOSIS — M17.12 PRIMARY OSTEOARTHRITIS OF LEFT KNEE: Primary | ICD-10-CM

## 2020-06-25 DIAGNOSIS — M17.12 PRIMARY OSTEOARTHRITIS OF LEFT KNEE: ICD-10-CM

## 2020-06-25 NOTE — PROGRESS NOTES
Patient: Maria Verma               MRN: [id number]   SSN: [id number]  YOB: 1955       AGE: 59 y.o. SEX: female  Body mass index is Body mass index is 31.34 kg/m². PCP: JAMES Abdalla  06/25/20      HISTORY OF PRESENT ILLNESS:  We had the pleasure of reviewing Ms. Penny Joel in the office today for evaluation of left knee pain. Today, she describes moderate to severe pain in the knee. The pain is non-radicular and worse with activity. She has trouble with stairs and kneeling. She has had previous cortisone injections in the knee, which have had diminishing effectiveness for her. REVIEW OF SYSTEMS:  Twelve point review of systems performed today. Pertinent positives noted, all other systems reviewed negative. PHYSICAL EXAMINATION:  On examination today both hips rotate well. The left knee has well preserved motion but significant patellofemoral arthritis through terminal extension. She does have a few degree FFD in the left knee. Calf is non-tender, both feet are warm and well perfused. No cyanosis, peripheral edema, or clubbing. RADIOGRAPHS:  Independent review of previous x-rays reveal end stage patellofemoral arthritis, with significant osteophyte formation. IMPRESSION:  Ms. Penny Joel does have significant arthritis in the left knee, especially in the patellofemoral compartment. Cortisone injections are no longer efficacious for her. I do believe that we have maximized nonoperative management, and that surgery is an appropriate option for her at this time. We discussed risks and benefits of surgery, including infection, DVT, blood loss, stiffness, instability, anesthetic complication, pain, and implant longevity. We discussed therapy necessary to have a good outcome with surgery, including bending and straightening the knee at least ten times a day after surgery.  She is ready for surgery, I am confident that she will be a diligent patient and am hopeful for a good outcome. She will meet with my surgery scheduler today to get a date for surgery. We will see her back for a preoperative visit. REVIEW OF SYSTEMS  Review of Systems   Constitutional: Negative. HENT: Negative. Eyes: Negative. Respiratory: Negative. Cardiovascular: Negative. Gastrointestinal: Negative. Genitourinary: Negative. Musculoskeletal: Positive for joint pain. Skin: Negative. Neurological: Negative. Psychiatric/Behavioral: Negative. MEDICAL HISTORY  Past Medical History:   Diagnosis Date    Arthritis     Calcific Achilles tendonitis     Right; insertional    Hypercholesterolemia     Hypertension     Left knee pain     Pain of right heel     Sprain of right ring finger     Wears glasses        SURGICAL HISTORY  Past Surgical History:   Procedure Laterality Date    HX HEENT      Eye surgery    HX HERNIA REPAIR         CURRENT MEDICATIONS  Current Outpatient Medications   Medication Sig Dispense Refill    cholecalciferol (VITAMIN D3) (50,000 UNITS /1250 MCG) capsule Take  by mouth every seven (7) days.  diclofenac EC (VOLTAREN) 75 mg EC tablet TAKE 1 TAB BY MOUTH (2) TIMES DAILY (WITH MEALS). INDICATIONS: JOINT DAMAGE CAUSING PAIN AND LOSS OF FUNCTION 60 Tab 1    levothyroxine (SYNTHROID) 75 mcg tablet Take  by mouth Daily (before breakfast).  lisinopril (PRINIVIL, ZESTRIL) 20 mg tablet Take  by mouth daily.  amLODIPine (NORVASC) 10 mg tablet Take  by mouth daily.  atorvastatin (LIPITOR) 20 mg tablet Take  by mouth daily.  aspirin delayed-release 81 mg tablet Take  by mouth daily.  neomycin-polymyxin-dexamethasone (DEXACIDIN, MAXITROL) 3.5mg/mL-10,000 unit/mL-0.1 % ophthalmic suspension   0    diclofenac (VOLTAREN) 0.1 % ophthalmic solution   0    simvastatin (ZOCOR) 20 mg tablet Take  by mouth nightly.  diclofenac (VOLTAREN) 1 % gel Apply  to affected area four (4) times daily.  Apply 4 g to affected area four times daily 5 Each 5    HYDROcodone-acetaminophen (NORCO) 5-325 mg per tablet Take 1 tablet by mouth every four (4) hours as needed for Pain. 12 tablet 0    ibuprofen (MOTRIN) 600 mg tablet Take 1 tablet by mouth every six (6) hours as needed for Pain. 20 tablet 0    escitalopram oxalate (LEXAPRO) 20 mg tablet Take 20 mg by mouth daily.  olmesartan-hydrochlorothiazide (BENICAR HCT) 40-25 mg per tablet Take 1 tablet by mouth daily.          ALLERGIES  No Known Allergies    FAMILHY HISTORY  [id number]      SOCIAL HISTORY  Social History     Socioeconomic History    Marital status:      Spouse name: Not on file    Number of children: Not on file    Years of education: Not on file    Highest education level: Not on file   Tobacco Use    Smoking status: Former Smoker     Years: 15.00    Smokeless tobacco: Never Used   Substance and Sexual Activity    Alcohol use: Yes     Comment: Occasional    Drug use: No       VISIT VITALS  Visit Vitals  BP (!) 171/103   Pulse 84   Temp 97.2 °F (36.2 °C) (Oral)   Resp 16   Ht 5' 9\" (1.753 m)   Wt 212 lb 3.2 oz (96.3 kg)   SpO2 98%   BMI 31.34 kg/m²       Pain Assessment  6/25/2020   Location of Pain Knee   Location Modifiers Left   Severity of Pain 0   Quality of Pain -   Duration of Pain -   Frequency of Pain -   Aggravating Factors -   Aggravating Factors Comment -   Limiting Behavior -   Relieving Factors -   Relieving Factors Comment -   Result of Injury -         Written by Chivo Ruano as dictated by Reilly Estes MD.

## 2020-06-25 NOTE — PROGRESS NOTES
1. Have you been to the ER, urgent care clinic since your last visit? Hospitalized since your last visit? No    2. Have you seen or consulted any other health care providers outside of the [address] [place] [address] since your last visit? Include any pap smears or colon screening.  No

## 2020-06-26 ENCOUNTER — HOSPITAL ENCOUNTER (OUTPATIENT)
Dept: PREADMISSION TESTING | Age: 65
Discharge: HOME | End: 2020-06-26
Payer: COMMERCIAL

## 2020-06-26 ENCOUNTER — HOSPITAL ENCOUNTER (OUTPATIENT)
Dept: GENERAL RADIOLOGY | Age: 65
Discharge: HOME | End: 2020-06-26
Payer: COMMERCIAL

## 2020-06-26 DIAGNOSIS — M17.12 PRIMARY OSTEOARTHRITIS OF LEFT KNEE: ICD-10-CM

## 2020-06-26 DIAGNOSIS — Z01.818 PREOP EXAMINATION: ICD-10-CM

## 2020-06-26 LAB
ABO + RH BLD: NORMAL
ALBUMIN SERPL-MCNC: 4 G/DL (ref 3.4–5)
ANION GAP SERPL CALC-SCNC: 5 MMOL/L (ref 3–18)
APPEARANCE UR: CLEAR
APTT PPP: 32 SEC (ref 23–36.4)
ATRIAL RATE: 66 BPM
BACTERIA URNS QL MICRO: ABNORMAL /HPF
BASOPHILS # BLD: 0 K/UL (ref 0–0.1)
BASOPHILS NFR BLD: 0 % (ref 0–2)
BILIRUB UR QL: NEGATIVE
BLOOD GROUP ANTIBODIES SERPL: NORMAL
BUN SERPL-MCNC: 14 MG/DL (ref 7–18)
BUN/CREAT SERPL: 21 (ref 12–20)
CALCIUM SERPL-MCNC: 9.1 MG/DL (ref 8.5–10.1)
CALCULATED P AXIS, ECG09: 10 DEGREES
CALCULATED R AXIS, ECG10: 4 DEGREES
CALCULATED T AXIS, ECG11: 24 DEGREES
CAOX CRY URNS QL MICRO: ABNORMAL
CHLORIDE SERPL-SCNC: 105 MMOL/L (ref 100–111)
CO2 SERPL-SCNC: 32 MMOL/L (ref 21–32)
COLOR UR: YELLOW
CREAT SERPL-MCNC: 0.66 MG/DL (ref 0.6–1.3)
DIAGNOSIS, 93000: NORMAL
DIFFERENTIAL METHOD BLD: ABNORMAL
EOSINOPHIL # BLD: 0.1 K/UL (ref 0–0.4)
EOSINOPHIL NFR BLD: 1 % (ref 0–5)
EPITH CASTS URNS QL MICRO: ABNORMAL /LPF (ref 0–5)
ERYTHROCYTE [DISTWIDTH] IN BLOOD BY AUTOMATED COUNT: 15.1 % (ref 11.6–14.5)
EST. AVERAGE GLUCOSE BLD GHB EST-MCNC: 126 MG/DL
GLUCOSE SERPL-MCNC: 74 MG/DL (ref 74–99)
GLUCOSE UR STRIP.AUTO-MCNC: NEGATIVE MG/DL
HBA1C MFR BLD: 6 % (ref 4.2–5.6)
HCT VFR BLD AUTO: 40.3 % (ref 35–45)
HGB BLD-MCNC: 13 G/DL (ref 12–16)
HGB UR QL STRIP: ABNORMAL
INR PPP: 1 (ref 0.8–1.2)
KETONES UR QL STRIP.AUTO: NEGATIVE MG/DL
LEUKOCYTE ESTERASE UR QL STRIP.AUTO: ABNORMAL
LYMPHOCYTES # BLD: 2.3 K/UL (ref 0.9–3.6)
LYMPHOCYTES NFR BLD: 28 % (ref 21–52)
MCH RBC QN AUTO: 27.4 PG (ref 24–34)
MCHC RBC AUTO-ENTMCNC: 32.3 G/DL (ref 31–37)
MCV RBC AUTO: 85 FL (ref 74–97)
MONOCYTES # BLD: 0.5 K/UL (ref 0.05–1.2)
MONOCYTES NFR BLD: 5 % (ref 3–10)
MUCOUS THREADS URNS QL MICRO: ABNORMAL /LPF
NEUTS SEG # BLD: 5.4 K/UL (ref 1.8–8)
NEUTS SEG NFR BLD: 66 % (ref 40–73)
NITRITE UR QL STRIP.AUTO: NEGATIVE
P-R INTERVAL, ECG05: 186 MS
PH UR STRIP: 5.5 [PH] (ref 5–8)
PLATELET # BLD AUTO: 373 K/UL (ref 135–420)
PMV BLD AUTO: 10.3 FL (ref 9.2–11.8)
POTASSIUM SERPL-SCNC: 3.6 MMOL/L (ref 3.5–5.5)
PROT UR STRIP-MCNC: NEGATIVE MG/DL
PROTHROMBIN TIME: 13 SEC (ref 11.5–15.2)
Q-T INTERVAL, ECG07: 388 MS
QRS DURATION, ECG06: 80 MS
QTC CALCULATION (BEZET), ECG08: 406 MS
RBC # BLD AUTO: 4.74 M/UL (ref 4.2–5.3)
RBC #/AREA URNS HPF: NEGATIVE /HPF (ref 0–5)
SODIUM SERPL-SCNC: 142 MMOL/L (ref 136–145)
SP GR UR REFRACTOMETRY: 1.03 (ref 1–1.03)
SPECIMEN EXP DATE BLD: NORMAL
UROBILINOGEN UR QL STRIP.AUTO: 1 EU/DL (ref 0.2–1)
VENTRICULAR RATE, ECG03: 66 BPM
WBC # BLD AUTO: 8.3 K/UL (ref 4.6–13.2)
WBC URNS QL MICRO: ABNORMAL /HPF (ref 0–4)

## 2020-06-26 PROCEDURE — 85025 COMPLETE CBC W/AUTO DIFF WBC: CPT

## 2020-06-26 PROCEDURE — 83036 HEMOGLOBIN GLYCOSYLATED A1C: CPT

## 2020-06-26 PROCEDURE — 81001 URINALYSIS AUTO W/SCOPE: CPT

## 2020-06-26 PROCEDURE — 82040 ASSAY OF SERUM ALBUMIN: CPT

## 2020-06-26 PROCEDURE — 87086 URINE CULTURE/COLONY COUNT: CPT

## 2020-06-26 PROCEDURE — 36415 COLL VENOUS BLD VENIPUNCTURE: CPT

## 2020-06-26 PROCEDURE — 93005 ELECTROCARDIOGRAM TRACING: CPT

## 2020-06-26 PROCEDURE — 85730 THROMBOPLASTIN TIME PARTIAL: CPT

## 2020-06-26 PROCEDURE — 85610 PROTHROMBIN TIME: CPT

## 2020-06-26 PROCEDURE — 80048 BASIC METABOLIC PNL TOTAL CA: CPT

## 2020-06-26 PROCEDURE — 86900 BLOOD TYPING SEROLOGIC ABO: CPT

## 2020-06-26 PROCEDURE — 71046 X-RAY EXAM CHEST 2 VIEWS: CPT

## 2020-06-27 LAB
BACTERIA SPEC CULT: NORMAL
CC UR VC: NORMAL
SERVICE CMNT-IMP: NORMAL

## 2020-06-29 ENCOUNTER — OFFICE VISIT (OUTPATIENT)
Dept: ORTHOPEDIC SURGERY | Facility: CLINIC | Age: 65
End: 2020-06-29

## 2020-06-29 DIAGNOSIS — Z01.818 ENCOUNTER FOR PREOPERATIVE EXAMINATION FOR GENERAL SURGICAL PROCEDURE: Primary | ICD-10-CM

## 2020-06-29 DIAGNOSIS — Z01.818 PRE-OP TESTING: Primary | ICD-10-CM

## 2020-06-29 DIAGNOSIS — M17.12 PRIMARY OSTEOARTHRITIS OF LEFT KNEE: ICD-10-CM

## 2020-07-01 ENCOUNTER — OFFICE VISIT (OUTPATIENT)
Dept: ORTHOPEDIC SURGERY | Facility: CLINIC | Age: 65
End: 2020-07-01

## 2020-07-01 ENCOUNTER — OFFICE VISIT (OUTPATIENT)
Dept: CARDIOLOGY CLINIC | Age: 65
End: 2020-07-01

## 2020-07-01 VITALS
SYSTOLIC BLOOD PRESSURE: 150 MMHG | HEART RATE: 67 BPM | DIASTOLIC BLOOD PRESSURE: 80 MMHG | HEIGHT: 69 IN | TEMPERATURE: 96.6 F | WEIGHT: 212.8 LBS | BODY MASS INDEX: 31.52 KG/M2 | OXYGEN SATURATION: 98 % | RESPIRATION RATE: 15 BRPM

## 2020-07-01 VITALS
SYSTOLIC BLOOD PRESSURE: 162 MMHG | WEIGHT: 212 LBS | HEART RATE: 70 BPM | HEIGHT: 69 IN | TEMPERATURE: 97.4 F | BODY MASS INDEX: 31.4 KG/M2 | DIASTOLIC BLOOD PRESSURE: 65 MMHG

## 2020-07-01 DIAGNOSIS — Z01.810 PRE-OPERATIVE CARDIOVASCULAR EXAMINATION: ICD-10-CM

## 2020-07-01 DIAGNOSIS — I10 ESSENTIAL HYPERTENSION: Primary | ICD-10-CM

## 2020-07-01 DIAGNOSIS — R94.31 ABNORMAL EKG: ICD-10-CM

## 2020-07-01 DIAGNOSIS — Z01.818 PRE-OPERATIVE EXAM: ICD-10-CM

## 2020-07-01 DIAGNOSIS — M17.12 ARTHRITIS OF LEFT KNEE: Primary | ICD-10-CM

## 2020-07-01 DIAGNOSIS — E78.2 MIXED HYPERLIPIDEMIA: ICD-10-CM

## 2020-07-01 RX ORDER — LISINOPRIL 40 MG/1
40 TABLET ORAL DAILY
Qty: 90 TAB | Refills: 1 | Status: SHIPPED | OUTPATIENT
Start: 2020-07-01 | End: 2020-07-01

## 2020-07-01 RX ORDER — CELECOXIB 100 MG/1
400 CAPSULE ORAL ONCE
Status: CANCELLED | OUTPATIENT
Start: 2020-07-01 | End: 2020-07-01

## 2020-07-01 RX ORDER — NITROFURANTOIN 25; 75 MG/1; MG/1
100 CAPSULE ORAL 2 TIMES DAILY
Qty: 10 CAP | Refills: 0 | Status: SHIPPED | OUTPATIENT
Start: 2020-07-01 | End: 2020-07-01 | Stop reason: ALTCHOICE

## 2020-07-01 RX ORDER — LISINOPRIL 20 MG/1
20 TABLET ORAL 2 TIMES DAILY
Qty: 180 TAB | Refills: 3 | Status: SHIPPED | OUTPATIENT
Start: 2020-07-01 | End: 2020-07-15

## 2020-07-01 RX ORDER — PREGABALIN 25 MG/1
75 CAPSULE ORAL ONCE
Status: CANCELLED | OUTPATIENT
Start: 2020-07-01 | End: 2020-07-01

## 2020-07-01 RX ORDER — ACETAMINOPHEN 325 MG/1
1000 TABLET ORAL ONCE
Status: CANCELLED | OUTPATIENT
Start: 2020-07-01 | End: 2020-07-01

## 2020-07-01 NOTE — PATIENT INSTRUCTIONS
Increase lisinopril to 40 mg / day Schedule stress test for Tomorrow in [place] Heart-Healthy Diet: Care Instructions Your Care Instructions A heart-healthy diet has lots of vegetables, fruits, nuts, beans, and whole grains, and is low in salt. It limits foods that are high in saturated fat, such as meats, cheeses, and fried foods. It may be hard to change your diet, but even small changes can lower your risk of heart attack and heart disease. Follow-up care is a key part of your treatment and safety. Be sure to make and go to all appointments, and call your doctor if you are having problems. It's also a good idea to know your test results and keep a list of the medicines you take. How can you care for yourself at home? Watch your portions · Learn what a serving is. A \"serving\" and a \"portion\" are not always the same thing. Make sure that you are not eating larger portions than are recommended. For example, a serving of pasta is ½ cup. A serving size of meat is 2 to 3 ounces. A 3-ounce serving is about the size of a deck of cards. Measure serving sizes until you are good at [place]" them. Keep in mind that restaurants often serve portions that are 2 or 3 times the size of one serving. · To keep your energy level up and keep you from feeling hungry, eat often but in smaller portions. · Eat only the number of calories you need to stay at a healthy weight. If you need to lose weight, eat fewer calories than your body burns (through exercise and other physical activity). Eat more fruits and vegetables · Eat a variety of fruit and vegetables every day. Dark green, deep orange, red, or yellow fruits and vegetables are especially good for you. Examples include spinach, carrots, peaches, and berries. · Keep carrots, celery, and other veggies handy for snacks. Buy fruit that is in season and store it where you can see it so that you will be tempted to eat it. · Cook dishes that have a lot of veggies in them, such as stir-fries and soups. Limit saturated and trans fat · Read food labels, and try to avoid saturated and trans fats. They increase your risk of heart disease. · Use olive or canola oil when you cook. · Bake, broil, grill, or steam foods instead of frying them. · Choose lean meats instead of high-fat meats such as hot dogs and sausages. Cut off all visible fat when you prepare meat. · Eat fish, skinless poultry, and meat alternatives such as soy products instead of high-fat meats. Soy products, such as tofu, may be especially good for your heart. · Choose low-fat or fat-free milk and dairy products. Eat foods high in fiber · Eat a variety of grain products every day. Include whole-grain foods that have lots of fiber and nutrients. Examples of whole-grain foods include oats, whole wheat bread, and brown rice. · Buy whole-grain breads and cereals, instead of white bread or pastries. Limit salt and sodium · Limit how much salt and sodium you eat to help lower your blood pressure. · Taste food before you salt it. Add only a little salt when you think you need it. With time, your taste buds will adjust to less salt. · Eat fewer snack items, fast foods, and other high-salt, processed foods. Check food labels for the amount of sodium in packaged foods. · Choose low-sodium versions of canned goods (such as soups, vegetables, and beans). Limit sugar · Limit drinks and foods with added sugar. These include candy, desserts, and soda pop. Limit alcohol · Limit alcohol to no more than 2 drinks a day for men and 1 drink a day for women. Too much alcohol can cause health problems. When should you call for help? Watch closely for changes in your health, and be sure to contact your doctor if: 
· You would like help planning heart-healthy meals. Where can you learn more? Go to http://quentin.info/ Enter V137 in the search box to learn more about \"Heart-Healthy Diet: Care Instructions. \" Current as of: August 22, 2019               Content Version: 12.5 © 5298-9401 [employer]. Care instructions adapted under license by [employer] (which disclaims liability or warranty for this information). If you have questions about a medical condition or this instruction, always ask your healthcare professional. [place] [address] any warranty or liability for your use of this information.

## 2020-07-01 NOTE — PATIENT INSTRUCTIONS
Patient: Cory Marroquin                MRN: [id number]       SSN: [id number]  YOB: 1955        AGE: 59 y.o. SEX: female  Body mass index is 31.43 kg/m². 07/01/20    DO:  1:  Sit with the leg out straight 5-10 min every hour while awake. Keep the toes pointed       up towards the ritu. 2.  Bend your knee 5-10 min every hour. Bend it to the point of pain and hold it for 5-10       Min. 3.  ICE your knee 20 min every hour    DO NOT:    1. Do not place anything under your knee to prop it up  2. Do not sit in the recliner chair.

## 2020-07-01 NOTE — PROGRESS NOTES
HISTORY OF PRESENT ILLNESS  Esteban Andrade is a 59 y.o. female. Patient referred for pre-op evaluation due to abnormal EKG. New Patient   The history is provided by the patient and medical records. This is a chronic problem. Pertinent negatives include no chest pain and no shortness of breath. Hypertension   The history is provided by the patient and medical records. This is a chronic problem. The current episode started more than 1 week ago. Pertinent negatives include no chest pain and no shortness of breath. The symptoms are relieved by medications. Review of Systems   Constitutional: Negative for malaise/fatigue. HENT: Negative for congestion. Eyes: Negative for double vision and redness. Respiratory: Negative for cough, shortness of breath and wheezing. Cardiovascular: Negative for chest pain, palpitations, orthopnea, claudication, leg swelling and PND. Gastrointestinal: Negative for nausea and vomiting. Musculoskeletal: Positive for joint pain. Negative for falls. Neurological: Negative for dizziness. Endo/Heme/Allergies: Does not bruise/bleed easily. Psychiatric/Behavioral: The patient is nervous/anxious. Family History   Problem Relation Age of Onset    Breast Cancer Other     Arthritis-osteo Mother     Hypertension Mother     Heart Disease Mother     Hypertension Other         Grandmother    Other Other         Heart problems:  Mother, grandmother    Diabetes Other         Grandmother       Past Medical History:   Diagnosis Date    Arthritis     Calcific Achilles tendonitis     Right; insertional    Hypercholesterolemia     Hypertension     Left knee pain     Pain of right heel     Sprain of right ring finger     Wears glasses        Past Surgical History:   Procedure Laterality Date    HX CHOLECYSTECTOMY  1990    HX HEENT      Eye surgery    HX HERNIA REPAIR         Social History     Tobacco Use    Smoking status: Former Smoker     Years: 15.00    Smokeless tobacco: Never Used   Substance Use Topics    Alcohol use: Yes     Frequency: Monthly or less     Comment: Occasional       No Known Allergies    Prior to Admission medications    Medication Sig Start Date End Date Taking? Authorizing Provider   lisinopriL (PRINIVIL, ZESTRIL) 20 mg tablet Take 1 Tab by mouth two (2) times a day. 7/1/20  Yes Oumou Soares MD   cholecalciferol (VITAMIN D3) (50,000 UNITS /1250 MCG) capsule Take  by mouth every seven (7) days. Yes Provider, Historical   diclofenac EC (VOLTAREN) 75 mg EC tablet TAKE 1 TAB BY MOUTH (2) TIMES DAILY (WITH MEALS). INDICATIONS: JOINT DAMAGE CAUSING PAIN AND LOSS OF FUNCTION 4/10/20  Yes Ivonne Hernandez PA-C   levothyroxine (SYNTHROID) 75 mcg tablet Take  by mouth Daily (before breakfast). Yes Provider, Historical   amLODIPine (NORVASC) 10 mg tablet Take  by mouth daily. Yes Provider, Historical   atorvastatin (LIPITOR) 20 mg tablet Take  by mouth daily. Yes Provider, Historical   aspirin delayed-release 81 mg tablet Take  by mouth daily. Yes Provider, Historical   ibuprofen (MOTRIN) 600 mg tablet Take 1 tablet by mouth every six (6) hours as needed for Pain. 11/11/14  Yes JAMES Gallegos   escitalopram oxalate (LEXAPRO) 20 mg tablet Take 20 mg by mouth daily. Yes Provider, Historical   nitrofurantoin, macrocrystal-monohydrate, (MACROBID) 100 mg capsule Take 1 Cap by mouth two (2) times a day. 7/1/20 7/1/20  Carroll Prader, PA-C   lisinopriL (PRINIVIL, ZESTRIL) 40 mg tablet Take 1 Tab by mouth daily. 7/1/20 7/1/20  Jie Jaime NP   lisinopril (PRINIVIL, ZESTRIL) 20 mg tablet Take 40 mg by mouth daily. 7/1/20  Provider, Historical   neomycin-polymyxin-dexamethasone ([place]) 3.5mg/mL-10,000 unit/mL-0.1 % ophthalmic suspension  4/14/16 7/1/20  Provider, Historical   diclofenac (VOLTAREN) 0.1 % ophthalmic solution  3/20/16 7/1/20  Provider, Historical   simvastatin (ZOCOR) 20 mg tablet Take  by mouth nightly.   7/1/20 Provider, Historical   diclofenac (VOLTAREN) 1 % gel Apply  to affected area four (4) times daily. Apply 4 g to affected area four times daily 2/19/16 7/1/20  Mj Abrams MD   HYDROcodone-acetaminophen [facility]) 5-325 mg per tablet Take 1 tablet by mouth every four (4) hours as needed for Pain. 11/11/14 7/1/20  JAMES Gallegos   olmesartan-hydrochlorothiazide (BENICAR HCT) 40-25 mg per tablet Take 1 tablet by mouth daily. 7/1/20  Provider, Historical         Visit Vitals  /65   Pulse 70   Temp 97.4 °F (36.3 °C) (Temporal)   Ht 5' 9\" (1.753 m)   Wt 96.2 kg (212 lb)   BMI 31.31 kg/m²       Physical Exam   Constitutional: She is oriented to person, place, and time. She appears well-developed and well-nourished. Neck: No JVD present. Cardiovascular: Normal rate, regular rhythm, normal heart sounds and intact distal pulses. Exam reveals no gallop and no friction rub. No murmur heard. Pulmonary/Chest: Effort normal and breath sounds normal. No respiratory distress. She has no wheezes. She has no rales. She exhibits no tenderness. Abdominal: Soft. She exhibits no distension and no mass. There is no abdominal tenderness. Musculoskeletal: Normal range of motion. General: No edema. Neurological: She is alert and oriented to person, place, and time. Skin: Skin is warm and dry. Psychiatric: She has a normal mood and affect. Nursing note and vitals reviewed. ASSESSMENT and PLAN    Ms. Letha Acevedo has a reminder for a \"due or due soon\" health maintenance. I have asked that she contact her primary care provider for follow-up on this health maintenance. No flowsheet data found. Assessment     I have personally reviewed patients ekg done at other facility. I Have personally reviewed recent relevant labs available and discussed with patient      ICD-10-CM ICD-9-CM    1. Essential hypertension I10 401.9     B/P elevated, will add   2.  Pre-operative cardiovascular examination Z01.810 V72.81 NUCLEAR CARDIAC STRESS TEST    Stress test in AM   3. Abnormal EKG R94.31 794.31 NUCLEAR CARDIAC STRESS TEST    SR with T wave abnormalities   4. Mixed hyperlipidemia E78.2 272.2     Continue statin, labs with PCP     7/2020   Patient referred for pre-op evaluation due to abnormal EKG. SR with T wave abnormalities. She has history of HTN, HLD, and has not been active due to   Left knee pain. Will evaluate with stress test in AM.    B/P is not controlled will increase lisinopril to 20 mg/ BID. Medications Discontinued During This Encounter   Medication Reason    diclofenac (VOLTAREN) 1 % gel     HYDROcodone-acetaminophen (NORCO) 5-325 mg per tablet     nitrofurantoin, macrocrystal-monohydrate, (MACROBID) 100 mg capsule Therapy Completed    diclofenac (VOLTAREN) 0.1 % ophthalmic solution     neomycin-polymyxin-dexamethasone (DEXACIDIN, MAXITROL) 3.5mg/mL-10,000 unit/mL-0.1 % ophthalmic suspension     olmesartan-hydrochlorothiazide (BENICAR HCT) 40-25 mg per tablet     simvastatin (ZOCOR) 20 mg tablet     lisinopril (PRINIVIL, ZESTRIL) 20 mg tablet Reorder    lisinopriL (PRINIVIL, ZESTRIL) 40 mg tablet        Orders Placed This Encounter    DISCONTD: lisinopriL (PRINIVIL, ZESTRIL) 40 mg tablet     Sig: Take 1 Tab by mouth daily. Dispense:  90 Tab     Refill:  1    lisinopriL (PRINIVIL, ZESTRIL) 20 mg tablet     Sig: Take 1 Tab by mouth two (2) times a day. Dispense:  180 Tab     Refill:  3       Follow-up and Dispositions    · Return in about 1 month (around 8/1/2020), or if symptoms worsen or fail to improve. I have independently evaluated taken history and examined the patient. All relevant labs and testing data's are reviewed. Care plan discussed and updated after review.   Radha Wells MD

## 2020-07-01 NOTE — H&P
[address] [facility], [address] [facility]           HISTORY & PHYSICAL      Patient: Ewelina Bucio                MRN: [id number]       SSN: [id number]  YOB: 1955        AGE: 59 y.o. SEX: female  Body mass index is 31.43 kg/m². PCP: JAMES Daniels  07/01/20      CC: left knee end stage OA  Problem List Items Addressed This Visit     None      Visit Diagnoses     Arthritis of left knee    -  Primary    Pre-operative exam                HPI:  The patient is a pleasant 59 y.o. whom has end stage OA of their Left knee and has failed conservative treatment including but not limited to NSAIDS, cortisone injections, viscosupplementation, PT, and pain medicine. Due to the current findings and affected activities of daily living, surgical intervention is indicated. The alternatives, risks, complications, as well as expected outcome were discussed. These include but are not limited to infection, blood loss, need for blood transfusion, neurovascular damage, DVT, PE,  post-op stiffness and pain, leg length discrepancy, dislocation, anesthetic complications, prothesis longevity, need for more surgery, MI, stroke, and even death. The patient understands and wishes to proceed with surgery. Past Medical History:   Diagnosis Date    Arthritis     Calcific Achilles tendonitis     Right; insertional    Hypercholesterolemia     Hypertension     Left knee pain     Pain of right heel     Sprain of right ring finger     Wears glasses          Current Outpatient Medications:     nitrofurantoin, macrocrystal-monohydrate, (MACROBID) 100 mg capsule, Take 1 Cap by mouth two (2) times a day., Disp: 10 Cap, Rfl: 0    cholecalciferol (VITAMIN D3) (50,000 UNITS /1250 MCG) capsule, Take  by mouth every seven (7) days. , Disp: , Rfl:     diclofenac EC (VOLTAREN) 75 mg EC tablet, TAKE 1 TAB BY MOUTH (2) TIMES DAILY (WITH MEALS). INDICATIONS: JOINT DAMAGE CAUSING PAIN AND LOSS OF FUNCTION, Disp: 60 Tab, Rfl: 1    levothyroxine (SYNTHROID) 75 mcg tablet, Take  by mouth Daily (before breakfast). , Disp: , Rfl:     lisinopril (PRINIVIL, ZESTRIL) 20 mg tablet, Take  by mouth daily. , Disp: , Rfl:     amLODIPine (NORVASC) 10 mg tablet, Take  by mouth daily. , Disp: , Rfl:     atorvastatin (LIPITOR) 20 mg tablet, Take  by mouth daily. , Disp: , Rfl:     aspirin delayed-release 81 mg tablet, Take  by mouth daily. , Disp: , Rfl:     neomycin-polymyxin-dexamethasone (DEXACIDIN, MAXITROL) 3.5mg/mL-10,000 unit/mL-0.1 % ophthalmic suspension, , Disp: , Rfl: 0    diclofenac (VOLTAREN) 0.1 % ophthalmic solution, , Disp: , Rfl: 0    simvastatin (ZOCOR) 20 mg tablet, Take  by mouth nightly., Disp: , Rfl:     diclofenac (VOLTAREN) 1 % gel, Apply  to affected area four (4) times daily. Apply 4 g to affected area four times daily, Disp: 5 Each, Rfl: 5    HYDROcodone-acetaminophen (NORCO) 5-325 mg per tablet, Take 1 tablet by mouth every four (4) hours as needed for Pain., Disp: 12 tablet, Rfl: 0    ibuprofen (MOTRIN) 600 mg tablet, Take 1 tablet by mouth every six (6) hours as needed for Pain., Disp: 20 tablet, Rfl: 0    escitalopram oxalate (LEXAPRO) 20 mg tablet, Take 20 mg by mouth daily. , Disp: , Rfl:     olmesartan-hydrochlorothiazide (BENICAR HCT) 40-25 mg per tablet, Take 1 tablet by mouth daily. , Disp: , Rfl:     No Known Allergies    Social History     Socioeconomic History    Marital status:      Spouse name: Not on file    Number of children: Not on file    Years of education: Not on file    Highest education level: Not on file   Occupational History    Not on file   Social Needs    Financial resource strain: Not on file    Food insecurity     Worry: Not on file     Inability: Not on file    Transportation needs     Medical: Not on file     Non-medical: Not on file   Tobacco Use    Smoking status: Former Smoker     Years: 15.00    Smokeless tobacco: Never Used   Substance and Sexual Activity    Alcohol use: Yes     Comment: Occasional    Drug use: No    Sexual activity: Not on file   Lifestyle    Physical activity     Days per week: Not on file     Minutes per session: Not on file    Stress: Not on file   Relationships    Social connections     Talks on phone: Not on file     Gets together: Not on file     Attends [employer] service: Not on file     Active member of club or organization: Not on file     Attends meetings of clubs or organizations: Not on file     Relationship status: Not on file    Intimate partner violence     Fear of current or ex partner: Not on file     Emotionally abused: Not on file     Physically abused: Not on file     Forced sexual activity: Not on file   Other Topics Concern    Not on file   Social History Narrative    Not on file       Past Surgical History:   Procedure Laterality Date    HX HEENT      Eye surgery    HX HERNIA REPAIR         Family History:  Non-contributory. PE:  Visit Vitals  /80 (BP 1 Location: Left arm, BP Patient Position: Sitting)   Pulse 67   Temp (!) 96.6 °F (35.9 °C) (Oral)   Resp 15   Ht 5' 9\" (1.753 m)   Wt 212 lb 12.8 oz (96.5 kg)   SpO2 98%   BMI 31.43 kg/m²     A&O X3, NAD, well develop, well nourished  Heart: S1-S2, rrr  Lungs: CTA bilat  Abd: soft, nt, nt, + bs in all quadrants  Ext:  Pos distal pulses to DP, PT      X-ray: left knee shows end stage OA    Labs: labs were reviewed and wnl.  ua pos, treated with macrobid    A:  Left  knee end stage OA    P:  At this point we will move forward with surgery. Again, the alternatives, risks, complications, as well as expected outcome were discussed and the patient wishes to proceed with surgery. Pt has been instructed to stop aspirin, nsaids, rheumatologic medications and blood thinners.   They have also been instructed to continue on any heart and bp meds and to take them the morning of surgery with sips of water.   The patient will require in-patient admission. Admission as an in-patient is reasonable and necessary due to increased risk of surgery due to the factors indicated as well as the possible need for prolonged in-hospital or skilled post-acute care in order to improve this patient's functional ability. The patient was counseled at length about the risks of estevan Covid-19 during their perioperative period and any recovery window from their procedure. The patient was made aware that estevan Covid-19  may worsen their prognosis for recovering from their procedure and lend to a higher morbidity and/or mortality risk. All material risks, benefits, and reasonable alternatives including postponing the procedure were discussed. The patient does  wish to proceed with the procedure at this time.           Dariela Fernando

## 2020-07-01 NOTE — H&P (VIEW-ONLY)
[address] [facility], [address] [facility] [address] 
[phone] HISTORY & PHYSICAL Patient: Bella Sales                MRN: [id number]       SSN: [id number] YOB: 1955        AGE: 59 y.o. SEX: female Body mass index is 31.43 kg/m². PCP: JAMES Urban 
07/01/20 CC: left knee end stage OA Problem List Items Addressed This Visit None Visit Diagnoses Arthritis of left knee    -  Primary Pre-operative exam      
  
 
 
 
HPI:  The patient is a pleasant 59 y.o. whom has end stage OA of their Left knee and has failed conservative treatment including but not limited to NSAIDS, cortisone injections, viscosupplementation, PT, and pain medicine. Due to the current findings and affected activities of daily living, surgical intervention is indicated. The alternatives, risks, complications, as well as expected outcome were discussed. These include but are not limited to infection, blood loss, need for blood transfusion, neurovascular damage, DVT, PE,  post-op stiffness and pain, leg length discrepancy, dislocation, anesthetic complications, prothesis longevity, need for more surgery, MI, stroke, and even death. The patient understands and wishes to proceed with surgery. Past Medical History:  
Diagnosis Date  Arthritis  Calcific Achilles tendonitis Right; insertional  
 Hypercholesterolemia  Hypertension  Left knee pain  Pain of right heel  Sprain of right ring finger  Wears glasses Current Outpatient Medications:  
  nitrofurantoin, macrocrystal-monohydrate, (MACROBID) 100 mg capsule, Take 1 Cap by mouth two (2) times a day., Disp: 10 Cap, Rfl: 0 
  cholecalciferol (VITAMIN D3) (50,000 UNITS /1250 MCG) capsule, Take  by mouth every seven (7) days. , Disp: , Rfl:  
  diclofenac EC (VOLTAREN) 75 mg EC tablet, TAKE 1 TAB BY MOUTH (2) TIMES DAILY (WITH MEALS). INDICATIONS: JOINT DAMAGE CAUSING PAIN AND LOSS OF FUNCTION, Disp: 60 Tab, Rfl: 1 
  levothyroxine (SYNTHROID) 75 mcg tablet, Take  by mouth Daily (before breakfast). , Disp: , Rfl:  
  lisinopril (PRINIVIL, ZESTRIL) 20 mg tablet, Take  by mouth daily. , Disp: , Rfl:  
  amLODIPine (NORVASC) 10 mg tablet, Take  by mouth daily. , Disp: , Rfl:  
  atorvastatin (LIPITOR) 20 mg tablet, Take  by mouth daily. , Disp: , Rfl:  
  aspirin delayed-release 81 mg tablet, Take  by mouth daily. , Disp: , Rfl:  
  neomycin-polymyxin-dexamethasone (DEXACIDIN, MAXITROL) 3.5mg/mL-10,000 unit/mL-0.1 % ophthalmic suspension, , Disp: , Rfl: 0 
  diclofenac (VOLTAREN) 0.1 % ophthalmic solution, , Disp: , Rfl: 0 
  simvastatin (ZOCOR) 20 mg tablet, Take  by mouth nightly., Disp: , Rfl:  
  diclofenac (VOLTAREN) 1 % gel, Apply  to affected area four (4) times daily. Apply 4 g to affected area four times daily, Disp: 5 Each, Rfl: 5 
  HYDROcodone-acetaminophen (NORCO) 5-325 mg per tablet, Take 1 tablet by mouth every four (4) hours as needed for Pain., Disp: 12 tablet, Rfl: 0 
  ibuprofen (MOTRIN) 600 mg tablet, Take 1 tablet by mouth every six (6) hours as needed for Pain., Disp: 20 tablet, Rfl: 0 
  escitalopram oxalate (LEXAPRO) 20 mg tablet, Take 20 mg by mouth daily. , Disp: , Rfl:  
  olmesartan-hydrochlorothiazide (BENICAR HCT) 40-25 mg per tablet, Take 1 tablet by mouth daily. , Disp: , Rfl:  
 
No Known Allergies Social History Socioeconomic History  Marital status:  Spouse name: Not on file  Number of children: Not on file  Years of education: Not on file  Highest education level: Not on file Occupational History  Not on file Social Needs  Financial resource strain: Not on file  Food insecurity Worry: Not on file Inability: Not on file  Transportation needs Medical: Not on file Non-medical: Not on file Tobacco Use  
  Smoking status: Former Smoker Years: 15.00  Smokeless tobacco: Never Used Substance and Sexual Activity  Alcohol use: Yes Comment: Occasional  
 Drug use: No  
 Sexual activity: Not on file Lifestyle  Physical activity Days per week: Not on file Minutes per session: Not on file  Stress: Not on file Relationships  Social connections Talks on phone: Not on file Gets together: Not on file Attends Anabaptism service: Not on file Active member of club or organization: Not on file Attends meetings of clubs or organizations: Not on file Relationship status: Not on file  Intimate partner violence Fear of current or ex partner: Not on file Emotionally abused: Not on file Physically abused: Not on file Forced sexual activity: Not on file Other Topics Concern  Not on file Social History Narrative  Not on file Past Surgical History:  
Procedure Laterality Date  HX HEENT Eye surgery  HX HERNIA REPAIR Family History:  Non-contributory. PE: 
Visit Vitals /80 (BP 1 Location: Left arm, BP Patient Position: Sitting) Pulse 67 Temp (!) 96.6 °F (35.9 °C) (Oral) Resp 15 Ht 5' 9\" (1.753 m) Wt 212 lb 12.8 oz (96.5 kg) SpO2 98% BMI 31.43 kg/m² A&O X3, NAD, well develop, well nourished Heart: S1-S2, rrr 
Lungs: CTA bilat Abd: soft, nt, nt, + bs in all quadrants Ext:  Pos distal pulses to DP, PT 
 
 
X-ray: left knee shows end stage OA Labs: labs were reviewed and wnl.  ua pos, treated with macrobid A:  Left  knee end stage OA 
 
P:  At this point we will move forward with surgery. Again, the alternatives, risks, complications, as well as expected outcome were discussed and the patient wishes to proceed with surgery. Pt has been instructed to stop aspirin, nsaids, rheumatologic medications and blood thinners.   They have also been instructed to continue on any heart and bp meds and to take them the morning of surgery with sips of water. The patient will require in-patient admission. Admission as an in-patient is reasonable and necessary due to increased risk of surgery due to the factors indicated as well as the possible need for prolonged in-hospital or skilled post-acute care in order to improve this patient's functional ability. The patient was counseled at length about the risks of estevan Covid-19 during their perioperative period and any recovery window from their procedure. The patient was made aware that estevan Covid-19  may worsen their prognosis for recovering from their procedure and lend to a higher morbidity and/or mortality risk. All material risks, benefits, and reasonable alternatives including postponing the procedure were discussed. The patient does  wish to proceed with the procedure at this time. Jose Shepard

## 2020-07-02 ENCOUNTER — DOCUMENTATION ONLY (OUTPATIENT)
Dept: ORTHOPEDIC SURGERY | Age: 65
End: 2020-07-02

## 2020-07-02 ENCOUNTER — HOSPITAL ENCOUNTER (OUTPATIENT)
Dept: PREADMISSION TESTING | Age: 65
Discharge: HOME | End: 2020-07-02
Payer: COMMERCIAL

## 2020-07-02 ENCOUNTER — TELEPHONE (OUTPATIENT)
Dept: CARDIOLOGY CLINIC | Age: 65
End: 2020-07-02

## 2020-07-02 DIAGNOSIS — M17.12 PRIMARY OSTEOARTHRITIS OF LEFT KNEE: ICD-10-CM

## 2020-07-02 DIAGNOSIS — Z01.818 PRE-OP TESTING: ICD-10-CM

## 2020-07-02 PROCEDURE — 87635 SARS-COV-2 COVID-19 AMP PRB: CPT

## 2020-07-02 NOTE — PROGRESS NOTES
Jennifer Menendez \"Certification of Employee Medical Condition\" received and placed in the forms bin at [facility] on 7/2/20

## 2020-07-02 NOTE — TELEPHONE ENCOUNTER
Patient is cleared from cardiac standpoint for orthopedic surgery as planned with low cardiac risk patient had negative nuclear stress test

## 2020-07-03 LAB — SARS-COV-2, COV2NT: NOT DETECTED

## 2020-07-06 ENCOUNTER — ANESTHESIA EVENT (OUTPATIENT)
Dept: SURGERY | Age: 65
DRG: 470 | End: 2020-07-06
Payer: COMMERCIAL

## 2020-07-07 ENCOUNTER — APPOINTMENT (OUTPATIENT)
Dept: GENERAL RADIOLOGY | Age: 65
DRG: 470 | End: 2020-07-07
Attending: PHYSICIAN ASSISTANT
Payer: COMMERCIAL

## 2020-07-07 ENCOUNTER — ANESTHESIA (OUTPATIENT)
Dept: SURGERY | Age: 65
DRG: 470 | End: 2020-07-07
Payer: COMMERCIAL

## 2020-07-07 ENCOUNTER — APPOINTMENT (OUTPATIENT)
Dept: GENERAL RADIOLOGY | Age: 65
DRG: 470 | End: 2020-07-07
Attending: ORTHOPAEDIC SURGERY
Payer: COMMERCIAL

## 2020-07-07 ENCOUNTER — HOSPITAL ENCOUNTER (INPATIENT)
Age: 65
LOS: 1 days | Discharge: HOME HEALTH | DRG: 470 | End: 2020-07-08
Attending: ORTHOPAEDIC SURGERY | Admitting: ORTHOPAEDIC SURGERY
Payer: COMMERCIAL

## 2020-07-07 DIAGNOSIS — M17.10 ARTHRITIS OF KNEE: Primary | ICD-10-CM

## 2020-07-07 PROCEDURE — 76010000153 HC OR TIME 1.5 TO 2 HR: Performed by: ORTHOPAEDIC SURGERY

## 2020-07-07 PROCEDURE — C1776 JOINT DEVICE (IMPLANTABLE): HCPCS | Performed by: ORTHOPAEDIC SURGERY

## 2020-07-07 PROCEDURE — 0SRD0J9 REPLACEMENT OF LEFT KNEE JOINT WITH SYNTHETIC SUBSTITUTE, CEMENTED, OPEN APPROACH: ICD-10-PCS | Performed by: ORTHOPAEDIC SURGERY

## 2020-07-07 PROCEDURE — 65270000029 HC RM PRIVATE

## 2020-07-07 PROCEDURE — 77030003666 HC NDL SPINAL BD -A: Performed by: ORTHOPAEDIC SURGERY

## 2020-07-07 PROCEDURE — 97161 PT EVAL LOW COMPLEX 20 MIN: CPT

## 2020-07-07 PROCEDURE — 77030018836 HC SOL IRR NACL ICUM -A: Performed by: ORTHOPAEDIC SURGERY

## 2020-07-07 PROCEDURE — C9290 INJ, BUPIVACAINE LIPOSOME: HCPCS | Performed by: ORTHOPAEDIC SURGERY

## 2020-07-07 PROCEDURE — 74011000258 HC RX REV CODE- 258: Performed by: PHYSICIAN ASSISTANT

## 2020-07-07 PROCEDURE — 74011000258 HC RX REV CODE- 258: Performed by: ORTHOPAEDIC SURGERY

## 2020-07-07 PROCEDURE — 76060000034 HC ANESTHESIA 1.5 TO 2 HR: Performed by: ORTHOPAEDIC SURGERY

## 2020-07-07 PROCEDURE — 74011250636 HC RX REV CODE- 250/636: Performed by: NURSE ANESTHETIST, CERTIFIED REGISTERED

## 2020-07-07 PROCEDURE — 74011000250 HC RX REV CODE- 250: Performed by: ORTHOPAEDIC SURGERY

## 2020-07-07 PROCEDURE — 77030006812 HC BLD SAW RECIP STRY -B: Performed by: ORTHOPAEDIC SURGERY

## 2020-07-07 PROCEDURE — 77030000032 HC CUF TRNQT ZIMM -B: Performed by: ORTHOPAEDIC SURGERY

## 2020-07-07 PROCEDURE — 74011250637 HC RX REV CODE- 250/637: Performed by: NURSE ANESTHETIST, CERTIFIED REGISTERED

## 2020-07-07 PROCEDURE — 77030003029 HC SUT VCRL J&J -B: Performed by: ORTHOPAEDIC SURGERY

## 2020-07-07 PROCEDURE — 74011000250 HC RX REV CODE- 250: Performed by: NURSE ANESTHETIST, CERTIFIED REGISTERED

## 2020-07-07 PROCEDURE — 77030027138 HC INCENT SPIROMETER -A

## 2020-07-07 PROCEDURE — 77030008462 HC STPLR SKN PROX J&J -A: Performed by: ORTHOPAEDIC SURGERY

## 2020-07-07 PROCEDURE — 76942 ECHO GUIDE FOR BIOPSY: CPT | Performed by: ANESTHESIOLOGY

## 2020-07-07 PROCEDURE — 77030002933 HC SUT MCRYL J&J -A: Performed by: ORTHOPAEDIC SURGERY

## 2020-07-07 PROCEDURE — 74011250636 HC RX REV CODE- 250/636: Performed by: ANESTHESIOLOGY

## 2020-07-07 PROCEDURE — 77030002922 HC SUT FBRWRE ARTH -B: Performed by: ORTHOPAEDIC SURGERY

## 2020-07-07 PROCEDURE — 77030012890: Performed by: ORTHOPAEDIC SURGERY

## 2020-07-07 PROCEDURE — 64450 NJX AA&/STRD OTHER PN/BRANCH: CPT | Performed by: ANESTHESIOLOGY

## 2020-07-07 PROCEDURE — 74011250636 HC RX REV CODE- 250/636

## 2020-07-07 PROCEDURE — 74011250637 HC RX REV CODE- 250/637: Performed by: PHYSICIAN ASSISTANT

## 2020-07-07 PROCEDURE — 77030019557 HC ELECTRD VES SEAL MEDT -F: Performed by: ORTHOPAEDIC SURGERY

## 2020-07-07 PROCEDURE — 73560 X-RAY EXAM OF KNEE 1 OR 2: CPT

## 2020-07-07 PROCEDURE — 77030010785: Performed by: ORTHOPAEDIC SURGERY

## 2020-07-07 PROCEDURE — 77030013708 HC HNDPC SUC IRR PULS STRY –B: Performed by: ORTHOPAEDIC SURGERY

## 2020-07-07 PROCEDURE — 97116 GAIT TRAINING THERAPY: CPT

## 2020-07-07 PROCEDURE — 77030040922 HC BLNKT HYPOTHRM STRY -A: Performed by: ORTHOPAEDIC SURGERY

## 2020-07-07 PROCEDURE — 77030019605: Performed by: ORTHOPAEDIC SURGERY

## 2020-07-07 PROCEDURE — 77030031139 HC SUT VCRL2 J&J -A: Performed by: ORTHOPAEDIC SURGERY

## 2020-07-07 PROCEDURE — 74011000250 HC RX REV CODE- 250: Performed by: PHYSICIAN ASSISTANT

## 2020-07-07 PROCEDURE — 74011250637 HC RX REV CODE- 250/637: Performed by: ORTHOPAEDIC SURGERY

## 2020-07-07 PROCEDURE — 76210000016 HC OR PH I REC 1 TO 1.5 HR: Performed by: ORTHOPAEDIC SURGERY

## 2020-07-07 PROCEDURE — 77030006835 HC BLD SAW SAG STRY -B: Performed by: ORTHOPAEDIC SURGERY

## 2020-07-07 PROCEDURE — 74011250636 HC RX REV CODE- 250/636: Performed by: PHYSICIAN ASSISTANT

## 2020-07-07 PROCEDURE — 77030012935 HC DRSG AQUACEL BMS -B: Performed by: ORTHOPAEDIC SURGERY

## 2020-07-07 PROCEDURE — 74011250636 HC RX REV CODE- 250/636: Performed by: ORTHOPAEDIC SURGERY

## 2020-07-07 PROCEDURE — 77030027138 HC INCENT SPIROMETER -A: Performed by: ORTHOPAEDIC SURGERY

## 2020-07-07 DEVICE — CEMENT BNE 20ML 41GM FULL DOSE PMMA W/ TOBRA M VISC RADPQ: Type: IMPLANTABLE DEVICE | Site: KNEE | Status: FUNCTIONAL

## 2020-07-07 DEVICE — COMPNT FEM PS CEM TRIATHLN 4 L -- TRIATHLON: Type: IMPLANTABLE DEVICE | Site: KNEE | Status: FUNCTIONAL

## 2020-07-07 DEVICE — INSERT TIB PS SZ 4 9MM -- TRIATHLON X3: Type: IMPLANTABLE DEVICE | Site: KNEE | Status: FUNCTIONAL

## 2020-07-07 DEVICE — BASEPLT TIB UNIV TRIATHLN 4 --: Type: IMPLANTABLE DEVICE | Site: KNEE | Status: FUNCTIONAL

## 2020-07-07 DEVICE — PAT ASYM TRITHLON X3 32X10MM -- TRIATHLON ASYMMETRIC X3: Type: IMPLANTABLE DEVICE | Site: KNEE | Status: FUNCTIONAL

## 2020-07-07 RX ORDER — SODIUM CHLORIDE, SODIUM LACTATE, POTASSIUM CHLORIDE, CALCIUM CHLORIDE 600; 310; 30; 20 MG/100ML; MG/100ML; MG/100ML; MG/100ML
50 INJECTION, SOLUTION INTRAVENOUS CONTINUOUS
Status: DISCONTINUED | OUTPATIENT
Start: 2020-07-07 | End: 2020-07-07 | Stop reason: HOSPADM

## 2020-07-07 RX ORDER — ONDANSETRON 2 MG/ML
INJECTION INTRAMUSCULAR; INTRAVENOUS AS NEEDED
Status: DISCONTINUED | OUTPATIENT
Start: 2020-07-07 | End: 2020-07-07 | Stop reason: HOSPADM

## 2020-07-07 RX ORDER — HYDROMORPHONE HYDROCHLORIDE 2 MG/ML
INJECTION, SOLUTION INTRAMUSCULAR; INTRAVENOUS; SUBCUTANEOUS AS NEEDED
Status: DISCONTINUED | OUTPATIENT
Start: 2020-07-07 | End: 2020-07-07 | Stop reason: HOSPADM

## 2020-07-07 RX ORDER — SODIUM CHLORIDE, SODIUM LACTATE, POTASSIUM CHLORIDE, CALCIUM CHLORIDE 600; 310; 30; 20 MG/100ML; MG/100ML; MG/100ML; MG/100ML
INJECTION, SOLUTION INTRAVENOUS
Status: DISCONTINUED | OUTPATIENT
Start: 2020-07-07 | End: 2020-07-07 | Stop reason: HOSPADM

## 2020-07-07 RX ORDER — ASPIRIN 325 MG
325 TABLET, DELAYED RELEASE (ENTERIC COATED) ORAL 2 TIMES DAILY
Status: DISCONTINUED | OUTPATIENT
Start: 2020-07-08 | End: 2020-07-08 | Stop reason: HOSPADM

## 2020-07-07 RX ORDER — CEFAZOLIN SODIUM 2 G/50ML
2 SOLUTION INTRAVENOUS
Status: COMPLETED | OUTPATIENT
Start: 2020-07-07 | End: 2020-07-07

## 2020-07-07 RX ORDER — AMLODIPINE BESYLATE 10 MG/1
10 TABLET ORAL DAILY
Status: DISCONTINUED | OUTPATIENT
Start: 2020-07-07 | End: 2020-07-08 | Stop reason: HOSPADM

## 2020-07-07 RX ORDER — PROPOFOL 10 MG/ML
INJECTION, EMULSION INTRAVENOUS AS NEEDED
Status: DISCONTINUED | OUTPATIENT
Start: 2020-07-07 | End: 2020-07-07 | Stop reason: HOSPADM

## 2020-07-07 RX ORDER — ASPIRIN 325 MG
325 TABLET ORAL 2 TIMES DAILY
Qty: 60 TAB | Refills: 0 | Status: SHIPPED | OUTPATIENT
Start: 2020-07-07 | End: 2020-07-15

## 2020-07-07 RX ORDER — PROMETHAZINE HYDROCHLORIDE 25 MG/ML
INJECTION, SOLUTION INTRAMUSCULAR; INTRAVENOUS
Status: COMPLETED
Start: 2020-07-07 | End: 2020-07-07

## 2020-07-07 RX ORDER — NEOSTIGMINE METHYLSULFATE 1 MG/ML
INJECTION INTRAVENOUS AS NEEDED
Status: DISCONTINUED | OUTPATIENT
Start: 2020-07-07 | End: 2020-07-07 | Stop reason: HOSPADM

## 2020-07-07 RX ORDER — ZOLPIDEM TARTRATE 5 MG/1
5 TABLET ORAL
Status: DISCONTINUED | OUTPATIENT
Start: 2020-07-07 | End: 2020-07-08 | Stop reason: HOSPADM

## 2020-07-07 RX ORDER — VANCOMYCIN HYDROCHLORIDE 1 G/20ML
INJECTION, POWDER, LYOPHILIZED, FOR SOLUTION INTRAVENOUS AS NEEDED
Status: DISCONTINUED | OUTPATIENT
Start: 2020-07-07 | End: 2020-07-07 | Stop reason: HOSPADM

## 2020-07-07 RX ORDER — FENTANYL CITRATE 50 UG/ML
INJECTION, SOLUTION INTRAMUSCULAR; INTRAVENOUS AS NEEDED
Status: DISCONTINUED | OUTPATIENT
Start: 2020-07-07 | End: 2020-07-07 | Stop reason: HOSPADM

## 2020-07-07 RX ORDER — SODIUM CHLORIDE 9 MG/ML
100 INJECTION, SOLUTION INTRAVENOUS CONTINUOUS
Status: DISPENSED | OUTPATIENT
Start: 2020-07-07 | End: 2020-07-08

## 2020-07-07 RX ORDER — PROMETHAZINE HYDROCHLORIDE 25 MG/ML
12.5 INJECTION, SOLUTION INTRAMUSCULAR; INTRAVENOUS ONCE
Status: COMPLETED | OUTPATIENT
Start: 2020-07-07 | End: 2020-07-07

## 2020-07-07 RX ORDER — ACETAMINOPHEN 500 MG
1000 TABLET ORAL EVERY 6 HOURS
Status: DISCONTINUED | OUTPATIENT
Start: 2020-07-07 | End: 2020-07-08 | Stop reason: HOSPADM

## 2020-07-07 RX ORDER — DIPHENHYDRAMINE HYDROCHLORIDE 50 MG/ML
12.5 INJECTION, SOLUTION INTRAMUSCULAR; INTRAVENOUS
Status: DISCONTINUED | OUTPATIENT
Start: 2020-07-07 | End: 2020-07-07 | Stop reason: HOSPADM

## 2020-07-07 RX ORDER — ROPIVACAINE HYDROCHLORIDE 2 MG/ML
INJECTION, SOLUTION EPIDURAL; INFILTRATION; PERINEURAL
Status: COMPLETED | OUTPATIENT
Start: 2020-07-07 | End: 2020-07-07

## 2020-07-07 RX ORDER — POVIDONE-IODINE 10 %
SOLUTION, NON-ORAL TOPICAL AS NEEDED
Status: DISCONTINUED | OUTPATIENT
Start: 2020-07-07 | End: 2020-07-07 | Stop reason: HOSPADM

## 2020-07-07 RX ORDER — FENTANYL CITRATE 50 UG/ML
100 INJECTION, SOLUTION INTRAMUSCULAR; INTRAVENOUS
Status: DISCONTINUED | OUTPATIENT
Start: 2020-07-07 | End: 2020-07-07 | Stop reason: HOSPADM

## 2020-07-07 RX ORDER — CELECOXIB 200 MG/1
200 CAPSULE ORAL 2 TIMES DAILY
Qty: 60 CAP | Refills: 2 | Status: ON HOLD | OUTPATIENT
Start: 2020-07-07 | End: 2020-07-13 | Stop reason: SINTOL

## 2020-07-07 RX ORDER — DOCUSATE SODIUM 100 MG/1
100 CAPSULE, LIQUID FILLED ORAL 2 TIMES DAILY
Qty: 60 CAP | Refills: 2 | Status: SHIPPED | OUTPATIENT
Start: 2020-07-07 | End: 2020-09-11

## 2020-07-07 RX ORDER — CEFAZOLIN SODIUM 2 G/50ML
2 SOLUTION INTRAVENOUS EVERY 8 HOURS
Status: COMPLETED | OUTPATIENT
Start: 2020-07-07 | End: 2020-07-08

## 2020-07-07 RX ORDER — SODIUM CHLORIDE 0.9 % (FLUSH) 0.9 %
5-40 SYRINGE (ML) INJECTION EVERY 8 HOURS
Status: DISCONTINUED | OUTPATIENT
Start: 2020-07-07 | End: 2020-07-07 | Stop reason: HOSPADM

## 2020-07-07 RX ORDER — POLYMYXIN B 500000 [USP'U]/1
INJECTION, POWDER, LYOPHILIZED, FOR SOLUTION INTRAMUSCULAR; INTRATHECAL; INTRAVENOUS; OPHTHALMIC AS NEEDED
Status: DISCONTINUED | OUTPATIENT
Start: 2020-07-07 | End: 2020-07-07 | Stop reason: HOSPADM

## 2020-07-07 RX ORDER — LIDOCAINE HYDROCHLORIDE 10 MG/ML
0.1 INJECTION, SOLUTION EPIDURAL; INFILTRATION; INTRACAUDAL; PERINEURAL AS NEEDED
Status: DISCONTINUED | OUTPATIENT
Start: 2020-07-07 | End: 2020-07-07 | Stop reason: HOSPADM

## 2020-07-07 RX ORDER — OXYCODONE AND ACETAMINOPHEN 7.5; 325 MG/1; MG/1
1-2 TABLET ORAL
Qty: 56 TAB | Refills: 0 | Status: ON HOLD | OUTPATIENT
Start: 2020-07-07 | End: 2020-07-15

## 2020-07-07 RX ORDER — NALOXONE HYDROCHLORIDE 0.4 MG/ML
0.1 INJECTION, SOLUTION INTRAMUSCULAR; INTRAVENOUS; SUBCUTANEOUS AS NEEDED
Status: DISCONTINUED | OUTPATIENT
Start: 2020-07-07 | End: 2020-07-07 | Stop reason: HOSPADM

## 2020-07-07 RX ORDER — HYDROMORPHONE HYDROCHLORIDE 1 MG/ML
0.5 INJECTION, SOLUTION INTRAMUSCULAR; INTRAVENOUS; SUBCUTANEOUS
Status: DISCONTINUED | OUTPATIENT
Start: 2020-07-07 | End: 2020-07-07 | Stop reason: HOSPADM

## 2020-07-07 RX ORDER — ONDANSETRON 2 MG/ML
INJECTION INTRAMUSCULAR; INTRAVENOUS
Status: COMPLETED
Start: 2020-07-07 | End: 2020-07-07

## 2020-07-07 RX ORDER — DEXAMETHASONE SODIUM PHOSPHATE 4 MG/ML
INJECTION, SOLUTION INTRA-ARTICULAR; INTRALESIONAL; INTRAMUSCULAR; INTRAVENOUS; SOFT TISSUE AS NEEDED
Status: DISCONTINUED | OUTPATIENT
Start: 2020-07-07 | End: 2020-07-07 | Stop reason: HOSPADM

## 2020-07-07 RX ORDER — ACETAMINOPHEN 500 MG
1000 TABLET ORAL ONCE
Status: COMPLETED | OUTPATIENT
Start: 2020-07-07 | End: 2020-07-07

## 2020-07-07 RX ORDER — SODIUM CHLORIDE 0.9 % (FLUSH) 0.9 %
5-40 SYRINGE (ML) INJECTION EVERY 8 HOURS
Status: DISCONTINUED | OUTPATIENT
Start: 2020-07-07 | End: 2020-07-08 | Stop reason: HOSPADM

## 2020-07-07 RX ORDER — METOPROLOL TARTRATE 5 MG/5ML
INJECTION INTRAVENOUS AS NEEDED
Status: DISCONTINUED | OUTPATIENT
Start: 2020-07-07 | End: 2020-07-07 | Stop reason: HOSPADM

## 2020-07-07 RX ORDER — KETOROLAC TROMETHAMINE 15 MG/ML
INJECTION, SOLUTION INTRAMUSCULAR; INTRAVENOUS AS NEEDED
Status: DISCONTINUED | OUTPATIENT
Start: 2020-07-07 | End: 2020-07-07 | Stop reason: HOSPADM

## 2020-07-07 RX ORDER — FENTANYL CITRATE 50 UG/ML
50 INJECTION, SOLUTION INTRAMUSCULAR; INTRAVENOUS AS NEEDED
Status: DISCONTINUED | OUTPATIENT
Start: 2020-07-07 | End: 2020-07-07 | Stop reason: HOSPADM

## 2020-07-07 RX ORDER — NALOXONE HYDROCHLORIDE 0.4 MG/ML
0.4 INJECTION, SOLUTION INTRAMUSCULAR; INTRAVENOUS; SUBCUTANEOUS AS NEEDED
Status: DISCONTINUED | OUTPATIENT
Start: 2020-07-07 | End: 2020-07-08 | Stop reason: HOSPADM

## 2020-07-07 RX ORDER — MIDAZOLAM HYDROCHLORIDE 1 MG/ML
2 INJECTION, SOLUTION INTRAMUSCULAR; INTRAVENOUS
Status: DISCONTINUED | OUTPATIENT
Start: 2020-07-07 | End: 2020-07-07 | Stop reason: HOSPADM

## 2020-07-07 RX ORDER — OXYCODONE HYDROCHLORIDE 5 MG/1
5-15 TABLET ORAL
Status: DISCONTINUED | OUTPATIENT
Start: 2020-07-07 | End: 2020-07-08 | Stop reason: HOSPADM

## 2020-07-07 RX ORDER — FAMOTIDINE 20 MG/1
20 TABLET, FILM COATED ORAL ONCE
Status: COMPLETED | OUTPATIENT
Start: 2020-07-07 | End: 2020-07-07

## 2020-07-07 RX ORDER — ROPIVACAINE HYDROCHLORIDE 2 MG/ML
30 INJECTION, SOLUTION EPIDURAL; INFILTRATION; PERINEURAL
Status: COMPLETED | OUTPATIENT
Start: 2020-07-07 | End: 2020-07-07

## 2020-07-07 RX ORDER — LISINOPRIL 20 MG/1
20 TABLET ORAL 2 TIMES DAILY
Status: DISCONTINUED | OUTPATIENT
Start: 2020-07-07 | End: 2020-07-08

## 2020-07-07 RX ORDER — LANOLIN ALCOHOL/MO/W.PET/CERES
1 CREAM (GRAM) TOPICAL 2 TIMES DAILY WITH MEALS
Status: DISCONTINUED | OUTPATIENT
Start: 2020-07-07 | End: 2020-07-08 | Stop reason: HOSPADM

## 2020-07-07 RX ORDER — ROCURONIUM BROMIDE 10 MG/ML
INJECTION, SOLUTION INTRAVENOUS AS NEEDED
Status: DISCONTINUED | OUTPATIENT
Start: 2020-07-07 | End: 2020-07-07 | Stop reason: HOSPADM

## 2020-07-07 RX ORDER — PREGABALIN 25 MG/1
25 CAPSULE ORAL 2 TIMES DAILY
Status: DISCONTINUED | OUTPATIENT
Start: 2020-07-07 | End: 2020-07-08 | Stop reason: HOSPADM

## 2020-07-07 RX ORDER — EPHEDRINE SULFATE/0.9% NACL/PF 50 MG/5 ML
SYRINGE (ML) INTRAVENOUS AS NEEDED
Status: DISCONTINUED | OUTPATIENT
Start: 2020-07-07 | End: 2020-07-07 | Stop reason: HOSPADM

## 2020-07-07 RX ORDER — FLUMAZENIL 0.1 MG/ML
0.2 INJECTION INTRAVENOUS AS NEEDED
Status: DISCONTINUED | OUTPATIENT
Start: 2020-07-07 | End: 2020-07-08 | Stop reason: HOSPADM

## 2020-07-07 RX ORDER — SODIUM CHLORIDE 0.9 % (FLUSH) 0.9 %
5-40 SYRINGE (ML) INJECTION AS NEEDED
Status: DISCONTINUED | OUTPATIENT
Start: 2020-07-07 | End: 2020-07-07 | Stop reason: HOSPADM

## 2020-07-07 RX ORDER — LIDOCAINE HYDROCHLORIDE 20 MG/ML
INJECTION, SOLUTION EPIDURAL; INFILTRATION; INTRACAUDAL; PERINEURAL AS NEEDED
Status: DISCONTINUED | OUTPATIENT
Start: 2020-07-07 | End: 2020-07-07 | Stop reason: HOSPADM

## 2020-07-07 RX ORDER — SODIUM CHLORIDE 0.9 % (FLUSH) 0.9 %
5-40 SYRINGE (ML) INJECTION AS NEEDED
Status: DISCONTINUED | OUTPATIENT
Start: 2020-07-07 | End: 2020-07-08 | Stop reason: HOSPADM

## 2020-07-07 RX ORDER — ONDANSETRON 2 MG/ML
4 INJECTION INTRAMUSCULAR; INTRAVENOUS ONCE
Status: COMPLETED | OUTPATIENT
Start: 2020-07-07 | End: 2020-07-07

## 2020-07-07 RX ORDER — GLYCOPYRROLATE 0.2 MG/ML
INJECTION INTRAMUSCULAR; INTRAVENOUS AS NEEDED
Status: DISCONTINUED | OUTPATIENT
Start: 2020-07-07 | End: 2020-07-07 | Stop reason: HOSPADM

## 2020-07-07 RX ORDER — ONDANSETRON 2 MG/ML
4 INJECTION INTRAMUSCULAR; INTRAVENOUS
Status: DISCONTINUED | OUTPATIENT
Start: 2020-07-07 | End: 2020-07-08 | Stop reason: HOSPADM

## 2020-07-07 RX ORDER — CEPHALEXIN 500 MG/1
500 CAPSULE ORAL 4 TIMES DAILY
Qty: 12 CAP | Refills: 0 | Status: SHIPPED | OUTPATIENT
Start: 2020-07-07 | End: 2020-07-15

## 2020-07-07 RX ORDER — CELECOXIB 400 MG/1
400 CAPSULE ORAL ONCE
Status: COMPLETED | OUTPATIENT
Start: 2020-07-07 | End: 2020-07-07

## 2020-07-07 RX ORDER — ESCITALOPRAM OXALATE 20 MG/1
20 TABLET ORAL DAILY
Status: DISCONTINUED | OUTPATIENT
Start: 2020-07-07 | End: 2020-07-08 | Stop reason: HOSPADM

## 2020-07-07 RX ORDER — AMOXICILLIN 250 MG
1 CAPSULE ORAL 2 TIMES DAILY
Status: DISCONTINUED | OUTPATIENT
Start: 2020-07-07 | End: 2020-07-08 | Stop reason: HOSPADM

## 2020-07-07 RX ORDER — CELECOXIB 100 MG/1
200 CAPSULE ORAL 2 TIMES DAILY
Status: DISCONTINUED | OUTPATIENT
Start: 2020-07-07 | End: 2020-07-08 | Stop reason: HOSPADM

## 2020-07-07 RX ORDER — PREGABALIN 75 MG/1
75 CAPSULE ORAL ONCE
Status: COMPLETED | OUTPATIENT
Start: 2020-07-07 | End: 2020-07-07

## 2020-07-07 RX ADMIN — LIDOCAINE HYDROCHLORIDE 40 MG: 20 INJECTION, SOLUTION EPIDURAL; INFILTRATION; INTRACAUDAL; PERINEURAL at 07:32

## 2020-07-07 RX ADMIN — LIDOCAINE HYDROCHLORIDE 2 ML: 10 INJECTION, SOLUTION EPIDURAL; INFILTRATION; INTRACAUDAL; PERINEURAL at 07:02

## 2020-07-07 RX ADMIN — GLYCOPYRROLATE 0.4 MG: 0.2 INJECTION INTRAMUSCULAR; INTRAVENOUS at 08:56

## 2020-07-07 RX ADMIN — OXYCODONE HYDROCHLORIDE 5 MG: 5 TABLET ORAL at 23:10

## 2020-07-07 RX ADMIN — CELECOXIB 200 MG: 100 CAPSULE ORAL at 12:45

## 2020-07-07 RX ADMIN — Medication 10 ML: at 23:12

## 2020-07-07 RX ADMIN — CELECOXIB 400 MG: 400 CAPSULE ORAL at 06:53

## 2020-07-07 RX ADMIN — PROMETHAZINE HYDROCHLORIDE 12.5 MG: 25 INJECTION, SOLUTION INTRAMUSCULAR; INTRAVENOUS at 10:10

## 2020-07-07 RX ADMIN — PREGABALIN 25 MG: 25 CAPSULE ORAL at 20:37

## 2020-07-07 RX ADMIN — ONDANSETRON 4 MG: 2 INJECTION INTRAMUSCULAR; INTRAVENOUS at 08:56

## 2020-07-07 RX ADMIN — PROPOFOL 150 MG: 10 INJECTION, EMULSION INTRAVENOUS at 07:32

## 2020-07-07 RX ADMIN — ACETAMINOPHEN 1000 MG: 500 TABLET, FILM COATED ORAL at 06:53

## 2020-07-07 RX ADMIN — METOPROLOL TARTRATE 5 MG: 5 INJECTION, SOLUTION INTRAVENOUS at 08:01

## 2020-07-07 RX ADMIN — ROPIVACAINE HYDROCHLORIDE 60 MG: 2 INJECTION, SOLUTION EPIDURAL; INFILTRATION; PERINEURAL at 07:05

## 2020-07-07 RX ADMIN — PREGABALIN 75 MG: 75 CAPSULE ORAL at 06:53

## 2020-07-07 RX ADMIN — Medication 10 ML: at 15:32

## 2020-07-07 RX ADMIN — TRANEXAMIC ACID 1 G: 1 INJECTION, SOLUTION INTRAVENOUS at 07:41

## 2020-07-07 RX ADMIN — HYDROMORPHONE HYDROCHLORIDE 0.5 MG: 2 INJECTION, SOLUTION INTRAMUSCULAR; INTRAVENOUS; SUBCUTANEOUS at 08:01

## 2020-07-07 RX ADMIN — FERROUS SULFATE TAB 325 MG (65 MG ELEMENTAL FE) 325 MG: 325 (65 FE) TAB at 16:26

## 2020-07-07 RX ADMIN — HYDROMORPHONE HYDROCHLORIDE 0.5 MG: 2 INJECTION, SOLUTION INTRAMUSCULAR; INTRAVENOUS; SUBCUTANEOUS at 08:18

## 2020-07-07 RX ADMIN — CEFAZOLIN SODIUM 2 G: 2 SOLUTION INTRAVENOUS at 23:10

## 2020-07-07 RX ADMIN — MIDAZOLAM HYDROCHLORIDE 2 MG: 2 INJECTION, SOLUTION INTRAMUSCULAR; INTRAVENOUS at 06:59

## 2020-07-07 RX ADMIN — SODIUM CHLORIDE 100 ML/HR: 9 INJECTION, SOLUTION INTRAVENOUS at 12:49

## 2020-07-07 RX ADMIN — TRANEXAMIC ACID 1 G: 1 INJECTION, SOLUTION INTRAVENOUS at 08:57

## 2020-07-07 RX ADMIN — ONDANSETRON 4 MG: 2 INJECTION INTRAMUSCULAR; INTRAVENOUS at 09:50

## 2020-07-07 RX ADMIN — LISINOPRIL 20 MG: 20 TABLET ORAL at 20:37

## 2020-07-07 RX ADMIN — DOCUSATE SODIUM 50 MG AND SENNOSIDES 8.6 MG 1 TABLET: 8.6; 5 TABLET, FILM COATED ORAL at 20:37

## 2020-07-07 RX ADMIN — CELECOXIB 200 MG: 100 CAPSULE ORAL at 20:37

## 2020-07-07 RX ADMIN — ROCURONIUM BROMIDE 50 MG: 50 INJECTION INTRAVENOUS at 07:32

## 2020-07-07 RX ADMIN — Medication 30 MG: at 07:41

## 2020-07-07 RX ADMIN — SODIUM CHLORIDE, SODIUM LACTATE, POTASSIUM CHLORIDE, AND CALCIUM CHLORIDE: 600; 310; 30; 20 INJECTION, SOLUTION INTRAVENOUS at 07:20

## 2020-07-07 RX ADMIN — FENTANYL CITRATE 100 MCG: 50 INJECTION, SOLUTION INTRAMUSCULAR; INTRAVENOUS at 06:59

## 2020-07-07 RX ADMIN — Medication 20 MG: at 07:38

## 2020-07-07 RX ADMIN — ACETAMINOPHEN 1000 MG: 500 TABLET, FILM COATED ORAL at 12:45

## 2020-07-07 RX ADMIN — CEFAZOLIN 2 G: 10 INJECTION, POWDER, FOR SOLUTION INTRAVENOUS at 07:41

## 2020-07-07 RX ADMIN — ACETAMINOPHEN 1000 MG: 500 TABLET, FILM COATED ORAL at 17:51

## 2020-07-07 RX ADMIN — FENTANYL CITRATE 100 MCG: 50 INJECTION, SOLUTION INTRAMUSCULAR; INTRAVENOUS at 07:25

## 2020-07-07 RX ADMIN — PREGABALIN 25 MG: 25 CAPSULE ORAL at 12:46

## 2020-07-07 RX ADMIN — DOCUSATE SODIUM 50 MG AND SENNOSIDES 8.6 MG 1 TABLET: 8.6; 5 TABLET, FILM COATED ORAL at 12:46

## 2020-07-07 RX ADMIN — ONDANSETRON HYDROCHLORIDE 4 MG: 2 SOLUTION INTRAMUSCULAR; INTRAVENOUS at 12:55

## 2020-07-07 RX ADMIN — HYDROMORPHONE HYDROCHLORIDE 1 MG: 2 INJECTION, SOLUTION INTRAMUSCULAR; INTRAVENOUS; SUBCUTANEOUS at 07:59

## 2020-07-07 RX ADMIN — CEFAZOLIN SODIUM 2 G: 2 SOLUTION INTRAVENOUS at 15:32

## 2020-07-07 RX ADMIN — FAMOTIDINE 20 MG: 20 TABLET ORAL at 06:53

## 2020-07-07 RX ADMIN — GLYCOPYRROLATE 0.2 MG: 0.2 INJECTION INTRAMUSCULAR; INTRAVENOUS at 07:41

## 2020-07-07 RX ADMIN — ACETAMINOPHEN 1000 MG: 500 TABLET, FILM COATED ORAL at 23:10

## 2020-07-07 RX ADMIN — NEOSTIGMINE METHYLSULFATE 3 MG: 1 INJECTION, SOLUTION INTRAVENOUS at 08:56

## 2020-07-07 RX ADMIN — ESCITALOPRAM OXALATE 20 MG: 20 TABLET ORAL at 12:46

## 2020-07-07 RX ADMIN — KETOROLAC TROMETHAMINE 15 MG: 15 INJECTION, SOLUTION INTRAMUSCULAR; INTRAVENOUS at 08:56

## 2020-07-07 RX ADMIN — SODIUM CHLORIDE, SODIUM LACTATE, POTASSIUM CHLORIDE, AND CALCIUM CHLORIDE 50 ML/HR: 600; 310; 30; 20 INJECTION, SOLUTION INTRAVENOUS at 06:53

## 2020-07-07 RX ADMIN — DEXAMETHASONE SODIUM PHOSPHATE 8 MG: 4 INJECTION, SOLUTION INTRAMUSCULAR; INTRAVENOUS at 07:48

## 2020-07-07 RX ADMIN — SODIUM CHLORIDE, SODIUM LACTATE, POTASSIUM CHLORIDE, AND CALCIUM CHLORIDE: 600; 310; 30; 20 INJECTION, SOLUTION INTRAVENOUS at 08:29

## 2020-07-07 RX ADMIN — ROPIVACAINE HYDROCHLORIDE 40 MG: 2 INJECTION, SOLUTION EPIDURAL; INFILTRATION at 07:06

## 2020-07-07 NOTE — PROGRESS NOTES
Reason for Admission:  Osteoarthritis of left knee, unspecified osteoarthritis type [M17.12]  Arthritis of knee [M17.10]                 RRAT Score:    6%            Plan for utilizing home health:    Yes, freedom of choice signed for [facility]                      Likelihood of Readmission:   LOW                         Transition of Care Plan:       Return home       Initial assessment completed with patient. Cognitive status of patient: oriented to time, place, person and situation. Face sheet information confirmed:  yes. The patient designates spouse, Juliana Vega to participate in her discharge plan and to receive any needed information. This patient lives in a apartment with spouse. Patient is not able to navigate steps as needed. Prior to hospitalization, patient was considered to be independent with ADLs/IADLS : yes . Patient has a current ACP document on file: no  The spouse will be available to transport patient home upon discharge. The patient already has Himanshu Pandy, and shower chair available in the home. Patient is not currently active with home health. Patient has not stayed in a skilled nursing facility or rehab. This patient is on dialysis :no    List of available Home Health agencies were provided and reviewed with the patient prior to discharge. Freedom of choice signed: yes, for [facility]. Currently, the discharge plan is Home with [address] [place] [address] Brian Aguero. The patient states that she can obtain her medications from the pharmacy, and take her medications as directed. Patient's current insurance is BCBS       Care Management Interventions  PCP Verified by CM:  Yes  Mode of Transport at Discharge: Self  Transition of Care Consult (CM Consult): Discharge Planning, [address]: Yes  Current Support Network: Lives with Spouse  Confirm Follow Up Transport: Family  The Patient and/or Patient Representative was Provided with a Choice of Provider and Agrees with the Discharge Plan?: Yes  Freedom of Choice List was Provided with Basic Dialogue that Supports the Patient's Individualized Plan of Care/Goals, Treatment Preferences and Shares the Quality Data Associated with the Providers?: Yes  Discharge Location  Discharge Placement: Home with home health        Gautam Ramírez RN - Outcomes Manager  [phone]

## 2020-07-07 NOTE — OP NOTES
[address] [place] [address]   OPERATIVE REPORT    Name:  Mesfin Correa  MR#:   [id number]  :  1955  ACCOUNT #:  [de-identified]  DATE OF SERVICE:  2020    PREOPERATIVE DIAGNOSIS:  End-stage arthritis of the left knee with very severe end-stage patellofemoral disease with significant wear. POSTOPERATIVE DIAGNOSIS:  End-stage arthritis of the left knee with very severe end-stage patellofemoral disease with significant wear. PROCEDURE PERFORMED:  Left total knee replacement using the [place] Triathlon system, size 4 left posterior stabilized femoral component, size 4 tibia, size 4 9 tibial bearing insert posterior stabilized and a 32 asymmetric patella. SURGEON:  Michele Jensen MD    FIRST ASSISTANT:  Naren Barbosa    SECOND ASSISTANT:  Elidia Guevara    ANESTHESIOLOGIST:  Dr. Britton Freeman:  Preoperative femoral nerve block with light general.    COMPLICATIONS:  No complication. SPECIMENS REMOVED:  No specimen. IMPLANTS:  As above mentioned. ESTIMATED BLOOD LOSS:  Less than 50 mL. Naren Barbosa was the first assistant who assisted with all phases of surgery commencing with patient positioning, patient prep, patient drape, leg positioning during surgery, retracting, assisting with the surgery itself, closure, dressing placement, and transfer. DESCRIPTION OF PROCEDURE:  After the anesthetic was successfully induced, it was confirmed the patient did receive her antibiotics and a time-out was performed, midline incision, knee debrided in the usual fashion. Femoral canal aspirated, lavaged, and re-aspirated prior to instrumentation, cut for 5 degrees for the appropriate size. The crab claw was utilized to prevent undercutting. All cuts checked for trueness and squareness and all soft tissue structures including neurovascular bundle protected at all times. A modified gap balancing technique would be performed and a fairly conservative distal cut initially.   After making preliminary femoral cuts, we switched our attention to the tibia protecting the neurovascular bundle with a bent blunt Hohmann and subluxed the tibia anteriorly, modest medial release, used the external alignment guide with appropriate landmarks and resected enough to get a decent clean-up cut ensuring no scar. Removed posterior osteophytes and used  the Aquamantys and EXPAREL cocktail posteriorly while protecting neurovascular bundle. Modified gap balancing technique thus confirmed correct femoral rotation, we were nicely balanced between medial and lateral flexion and extension. We then did our femoral finishing followed by placement of the trial components to set our tibial rotation which was marked and later re-punched. We then had a good look at the patella. There were some areas that she was worn down to about 10 or 11 mm, especially inferiorly distally. With this in mind, I felt we had adequate bone to resurface the patella and applied appropriate jig and aimed for about 13.5 or so. I got a good clean-up cut and very stable and used a rongeur to debride the edges and take off some of lateral facet as well. With all the trial components in place, we checked the overall alignment, range of motion, soft tissue balance, patellar tracking, stability, alignment, all of which we were delighted with. Fashioned a bone plug for the femoral canal.  Further control of hemostasis. Cemented in the knee removing all extraneous cement, held the knee in full extension until the cement was fully cured. Further cement removal, pulse lavage, and trialing, I was happiest with the 9, locked in place again reducing the knee, let the tourniquet down, routine closure and we checked that to make sure the patella was tracking nice and smooth at the end. At the end of the case, instrument, sponge, and needle count was correct. No complications. The patient tolerated the procedure well. Blood loss less than 50.       Ulisses Bowden 45., MD [id number]  D:  07/07/2020 9:04  T:  07/07/2020 11:57  JOB #:  [id number]

## 2020-07-07 NOTE — PERIOP NOTES
TRANSFER - OUT REPORT:    Verbal report given to [facility]) on [employer]  being transferred to 2 Surgical(unit) for routine post - op       Report consisted of patients Situation, Background, Assessment and   Recommendations(SBAR). Information from the following report(s) SBAR, OR Summary, Procedure Summary, Intake/Output and MAR was reviewed with the receiving nurse. Lines:   Peripheral IV 07/07/20 Left Arm (Active)   Site Assessment Clean, dry, & intact 7/7/2020  9:21 AM   Phlebitis Assessment 0 7/7/2020  9:21 AM   Infiltration Assessment 0 7/7/2020  9:21 AM   Dressing Status Clean, dry, & intact 7/7/2020  9:21 AM   Dressing Type Transparent;Tape 7/7/2020  9:21 AM   Hub Color/Line Status Pink; Infusing 7/7/2020  9:21 AM        Opportunity for questions and clarification was provided.       Patient transported with:   O2 @ 3 liters  Tech

## 2020-07-07 NOTE — INTERVAL H&P NOTE
Update History & Physical 
 
The Patient's History and Physical of July 7, It was reviewed with the patient and I examined the patient. There was no change. The surgical site was confirmed by the patient and me. Plan:  The risk, benefits, expected outcome, and alternative to the recommended procedure have been discussed with the patient. Patient understands and wants to proceed with the procedure.  
 
Electronically signed by Paulo Hannon MD on 7/7/2020 at 6:52 AM

## 2020-07-07 NOTE — PROGRESS NOTES
conducted a pre-surgery visit with Gamal Casiano, who is a 59 y.o.,female. The  provided the following Interventions:  Initiated a relationship of care and support. Plan:  Chaplains will continue to follow and will provide pastoral care on an as needed/requested basis.  recommends bedside caregivers page  on duty if patient shows signs of acute spiritual or emotional distress.     [address]  [phone]

## 2020-07-07 NOTE — PROGRESS NOTES
End of Shift Note      Bedside and verbal shift change report given to Kylah RN (On coming nurse) by Pati Ragsdale RN (Off going nurse).

## 2020-07-07 NOTE — BRIEF OP NOTE
Brief Postoperative Note    Patient: Khris Pérez  YOB: 1955  MRN: [id number]    Date of Procedure: 7/7/2020     Pre-Op Diagnosis: Osteoarthritis of left knee, unspecified osteoarthritis type [M17.12]    Post-Op Diagnosis: Same as preoperative diagnosis. Procedure(s):  LEFT TOTAL KNEE ARTHROPLASTY    Surgeon(s):  Shalini Marin MD    Surgical Assistant: Physician Assistant: Karo Jose PA-C    Anesthesia: General     Estimated Blood Loss (mL): less than 50     Complications: None    Specimens: * No specimens in log *     Implants:   Implant Name Type Inv.  Item Serial No.  Lot No. LRB No. Used Action   CEMENT BONE SIMPLEX P 1/PACK - [id number]  CEMENT BONE SIMPLEX P 1/PACK  [employer] HOW [id number] Left 2 Implanted   COMPNT FEM PS NUZHAT TRIATHLN 4 L -- TRIATHLON - [id number]  COMPNT FEM PS NUZHAT TRIATHLN 4 L -- TRIATHLON  [employer] HOW [id number] Left 1 Implanted   BASEPLT TIB UNIV TRIATHLN 4 --  - [id number]  BASEPLT TIB UNIV TRIATHLN 4 --   [employer] HOW [id number] Left 1 Implanted   PAT ASYM TRITHLON X3 [id number] -- TRIATHLON ASYMMETRIC X3 - [id number]  PAT ASYM TRITHLON X3 [id number] -- TRIATHLON ASYMMETRIC X3  [employer] HOW [id number] Left 1 Implanted   INSERT TIB PS SZ 4 9MM -- TRIATHLON X3 - [id number]  INSERT TIB PS SZ 4 9MM -- TRIATHLON X3  [employer] HOW [id number] Left 1 Implanted       Drains: * No LDAs found *    Findings: same    Electronically Signed by Caren Wolf MD on 7/7/2020 at 8:54 AM

## 2020-07-07 NOTE — ANESTHESIA PREPROCEDURE EVALUATION
Relevant Problems   No relevant active problems       Anesthetic History   No history of anesthetic complications            Review of Systems / Medical History  Patient summary reviewed and pertinent labs reviewed    Pulmonary  Within defined limits                 Neuro/Psych   Within defined limits           Cardiovascular    Hypertension: well controlled              Exercise tolerance: >4 METS     GI/Hepatic/Renal                Endo/Other      Hypothyroidism: well controlled  Arthritis     Other Findings              Physical Exam    Airway  Mallampati: II  TM Distance: 4 - 6 cm  Neck ROM: normal range of motion   Mouth opening: Normal     Cardiovascular  Regular rate and rhythm,  S1 and S2 normal,  no murmur, click, rub, or gallop             Dental  No notable dental hx       Pulmonary  Breath sounds clear to auscultation               Abdominal  GI exam deferred       Other Findings            Anesthetic Plan    ASA: 2  Anesthesia type: general          Induction: Intravenous  Anesthetic plan and risks discussed with: Patient

## 2020-07-07 NOTE — DISCHARGE SUMMARY
7/7/2020  5:36 AM    7/7/2020, 9:23 AM    Primary Dx:left Orthopedic / Rheumatologic: Total Knee Replacement  Secondary Dx: Etiological Diagnoses: none    HPI:  Pt has end stage OA and had failed conservative treatment. Due to the current findings and affected activity of daily living surgical intervention is indicated. The alternatives, risks, complications as well as expected outcome were discussed, the patient understands and wishes to proceed with surgery    Past Medical History:   Diagnosis Date    Arthritis     Calcific Achilles tendonitis     Right; insertional    Hypercholesterolemia     Hypertension     Left knee pain     Pain of right heel     Sprain of right ring finger     Thyroid disease     Wears glasses        No current facility-administered medications for this encounter. Patient has no known allergies. Physical Exam:  General A&O x3 NAD, well developed, well nourished, normal affect  Heart: S1-S2, RRR  Lungs: CTA Bilat  Abd: soft NT, ND  Ext: n/v intact    Hospital Course:    Pt. Had leftOrthopedic / Rheumatologic: Total Knee Replacement    Post -op Course: The patient tolerated the procedure well. They were followed by internal medicine for help with medical management. Pt. Was place on Abx pre and post-op for prophylaxis against infection as well as coumadin pre and post-op for prophylaxis against DVT. Vitals signs remained stable, remained af. The wound wasclean, dry, no drainage. Pain was well controlled. Pt. Had negative calf tenderness or swelling, no evidence for DVT. Patient had PT/OT consult for evaluation and treatment.     CBC  Lab Results   Component Value Date/Time    WBC 8.3 06/26/2020 12:32 PM    RBC 4.74 06/26/2020 12:32 PM    HCT 40.3 06/26/2020 12:32 PM    MCV 85.0 06/26/2020 12:32 PM    MCH 27.4 06/26/2020 12:32 PM    MCHC 32.3 06/26/2020 12:32 PM    RDW 15.1 (H) 06/26/2020 12:32 PM     Coagulation  Lab Results   Component Value Date    INR 1.0 06/26/2020    APTT 32.0 06/26/2020      Basic Metabolic Profile  Lab Results   Component Value Date     06/26/2020    CO2 32 06/26/2020    BUN 14 06/26/2020       Discharge Meds:  Current Discharge Medication List      START taking these medications    Details   oxyCODONE-acetaminophen (Percocet) 7.5-325 mg per tablet Take 1-2 Tabs by mouth every six (6) hours as needed for Pain for up to 7 days. Max Daily Amount: 8 Tabs. Qty: 56 Tab, Refills: 0    Associated Diagnoses: Arthritis of knee      docusate sodium (Colace) 100 mg capsule Take 1 Cap by mouth two (2) times a day for 90 days. Qty: 60 Cap, Refills: 2    Associated Diagnoses: Arthritis of knee      cephALEXin (Keflex) 500 mg capsule Take 1 Cap by mouth four (4) times daily. Qty: 12 Cap, Refills: 0    Associated Diagnoses: Arthritis of knee      aspirin (ASPIRIN) 325 mg tablet Take 1 Tab by mouth two (2) times a day. Qty: 60 Tab, Refills: 0    Associated Diagnoses: Arthritis of knee      celecoxib (CeleBREX) 200 mg capsule Take 1 Cap by mouth two (2) times a day for 90 days. Qty: 60 Cap, Refills: 2    Associated Diagnoses: Arthritis of knee         CONTINUE these medications which have NOT CHANGED    Details   lisinopriL (PRINIVIL, ZESTRIL) 20 mg tablet Take 1 Tab by mouth two (2) times a day. Qty: 180 Tab, Refills: 3      amLODIPine (NORVASC) 10 mg tablet Take  by mouth daily. atorvastatin (LIPITOR) 20 mg tablet Take  by mouth daily. cholecalciferol (VITAMIN D3) (50,000 UNITS /1250 MCG) capsule Take  by mouth every seven (7) days. escitalopram oxalate (LEXAPRO) 20 mg tablet Take 20 mg by mouth daily.          STOP taking these medications       diclofenac EC (VOLTAREN) 75 mg EC tablet Comments:   Reason for Stopping:         aspirin delayed-release 81 mg tablet Comments:   Reason for Stopping:         ibuprofen (MOTRIN) 600 mg tablet Comments:   Reason for Stopping:               Discharge Plan:  The patient will be d/c'd to home, total knee protocol, WBAT. She will have [facility] PT and nursing. Total joint protocol. Pt safe for homebound transfer, sp Total joint replacement. A walker, bedside commode, and shower chair will be utilized for ADL's. Follow up with Dr. Juan R Reyes in 10-12 days. Call with any questions or concerns.

## 2020-07-07 NOTE — PROGRESS NOTES
Problem: Mobility Impaired (Adult and Pediatric)  Goal: *Acute Goals and Plan of Care (Insert Text)  Description: Physical Therapy Goals  Initiated 7/7/2020 and to be accomplished within 7 day(s)  1. Patient will move from supine to sit and sit to supine , scoot up and down and roll side to side in bed with modified independence. 2.  Patient will transfer from bed to chair and chair to bed with modified independence using the least restrictive device. 3.  Patient will perform sit to stand with modified independence. 4.  Patient will ambulate with modified independence for 250 feet with the least restrictive device. Prior Level of Function:   Patient was modified independence for all mobility including gait using single point cane. Patient lives with  in a 2nd floor apartment with an elevator. Pt states she has a rolling walker and a bedside commode to use at home. Outcome: Progressing Towards Goal   PHYSICAL THERAPY EVALUATION    Patient: Rick Castrejon (21 y.o. female)  Date: 7/7/2020  Primary Diagnosis: Osteoarthritis of left knee, unspecified osteoarthritis type [M17.12]  Arthritis of knee [M17.10]  Procedure(s) (LRB):  LEFT TOTAL KNEE ARTHROPLASTY (Left) Day of Surgery   Precautions:   Fall, WBAT    ASSESSMENT :  PT orders received and patient cleared by nursing to participate with therapy. Patient is a 59 y.o. female admitted to the hospital due to Left total knee arthroplasty Dr. Chandra Echeverria 7/7/2020. Patient consents to PT evaluation and treatment. Pt educated on safety, mobility, therex, towel under ankle only, and OOB 3-5 times with nursing assistance. Performed supine to sit with head of bed elevated standby assistance. Sit to stands from bed contact guard assistance and from toilet minimal assistance. Gait in room 10' x2 with rolling walker contact guard assistance/minimal assistance for balance and safety. Pt needing constant cues to stay closer to the walker and sequencing.  Pt having nausea throughout session with vomiting at end of session, nursing informed. Pt ended therapy sitting in recliner with all needs met and towel under ankle. Patient will benefit from skilled intervention to address the above impairments. Patient's rehabilitation potential is considered to be Good  Factors which may influence rehabilitation potential include:    []         None noted  []         Mental ability/status  [x]         Medical condition  []         Home/family situation and support systems  []         Safety awareness  []         Pain tolerance/management  []         Other:      PLAN :  Recommendations and Planned Interventions:    [x]           Bed Mobility Training             [x]    Neuromuscular Re-Education  [x]           Transfer Training                   []    Orthotic/Prosthetic Training  [x]           Gait Training                          [x]    Modalities  [x]           Therapeutic Exercises           [x]    Edema Management/Control  [x]           Therapeutic Activities            [x]    Family Training/Education  [x]           Patient Education  []           Other (comment):    Frequency/Duration: Patient will be followed by physical therapy 1-2 times per day/4-7 days per week to address goals. Discharge Recommendations: Home Health  Further Equipment Recommendations for Discharge: N/A     SUBJECTIVE:   Patient stated [place].     OBJECTIVE DATA SUMMARY:     Past Medical History:   Diagnosis Date    Arthritis     Calcific Achilles tendonitis     Right; insertional    Hypercholesterolemia     Hypertension     Left knee pain     Pain of right heel     Sprain of right ring finger     Thyroid disease     Wears glasses      Past Surgical History:   Procedure Laterality Date    HX CHOLECYSTECTOMY  1990    HX HEENT      Eye surgery    HX HERNIA REPAIR       Barriers to Learning/Limitations: yes;  altered mental status (i.e.Sedation, Confusion)  Compensate with: Visual Cues, Verbal Cues, and Tactile Cues  Home Situation:  Home Situation  Home Environment: Apartment  # Steps to Enter: (2nd floor with an elevator)  One/Two Story Residence: One story  Living Alone: No  Support Systems: Spouse/Significant Other/Partner  Patient Expects to be Discharged to[de-identified] Private residence  Current DME Used/Available at Home: Elias Yoder, straight, Walker, rolling, Commode, bedside  Critical Behavior:  Neurologic State: Alert; Anesthetized;Drowsy  Orientation Level: Oriented X4  Cognition: Follows commands  Safety/Judgement: Fall prevention  Psychosocial  Patient Behaviors: Calm; Cooperative  Purposeful Interaction: Yes                 B LE Strength:    Strength: Generally decreased, functional              B LE Tone & Sensation:   Tone: Normal          Sensation: Intact           B LE Range Of Motion:  AROM: (WFL except L knee 5-80 degrees)                 Posture:  Posture (WDL): Exceptions to WDL  Posture Assessment: Forward head  Functional Mobility:  Bed Mobility:     Supine to Sit: Stand-by assistance     Scooting: Stand-by assistance  Transfers:  Sit to Stand: Contact guard assistance(min A from low toilet)        Balance:   Sitting: Impaired; With support  Sitting - Static: Good (unsupported)  Sitting - Dynamic: Good (unsupported); Fair (occasional)  Standing: Impaired; With support  Standing - Static: Fair  Standing - Dynamic : Fair    Ambulation/Gait Training:  Distance (ft): 20 Feet (ft)(10'x2)  Assistive Device: Walker, rolling  Ambulation - Level of Assistance: Contact guard assistance;Minimal assistance  Gait Abnormalities: Decreased step clearance  Base of Support: Center of gravity altered  Speed/Yamilet: Slow  Step Length: Right shortened;Left shortened          Therapeutic Exercises:   Reviewed and performed ankle pumps, SLR, quad sets, and heel slides. Pain:  Pain level pre-treatment: 2/10 L knee  Pain level post-treatment: same  Pain Intervention(s) : Medication (see MAR);  Rest, Ice, Repositioning  Response to intervention: Nurse notified, See doc flow    Activity Tolerance:   fair  Please refer to the flowsheet for vital signs taken during this treatment. After treatment:   [x]         Patient left in no apparent distress sitting up in chair  []         Patient left in no apparent distress in bed  [x]         Call bell left within reach  [x]   Personal items in reach   [x]         Nursing notified Avery Lawrence   []         Caregiver present  []         Bed/chair alarm activated  []         SCDs applied     COMMUNICATION/EDUCATION:   [x]         Role of Physical Therapy in the acute care setting. [x]         Fall prevention education was provided and the patient/caregiver indicated understanding. [x]         Patient/family have participated as able in goal setting and plan of care. [x]         Patient/family agree to work toward stated goals and plan of care. []         Patient understands intent and goals of therapy, but is neutral about his/her participation. []         Patient is unable to participate in goal setting/plan of care: ongoing with therapy staff. [x]         Out of bed with nursing assistance 3-5 times a day. []         Other:     Thank you for this referral.  Poli Carney, PT, DPT   Time Calculation: 31 mins      Eval Complexity: History: MEDIUM  Complexity : 1-2 comorbidities / personal factors will impact the outcome/ POC Exam:HIGH Complexity : 4+ Standardized tests and measures addressing body structure, function, activity limitation and / or participation in recreation  Presentation: LOW Complexity : Stable, uncomplicated  Clinical Decision Making:Low Complexity    Overall Complexity:LOW

## 2020-07-07 NOTE — PROGRESS NOTES
Problem: Knee Replacement: Day of Surgery/Unit  Goal: Off Pathway (Use only if patient is Off Pathway)  Outcome: Progressing Towards Goal  Goal: Activity/Safety  Outcome: Progressing Towards Goal  Goal: Consults, if ordered  Outcome: Progressing Towards Goal  Goal: Diagnostic Test/Procedures  Outcome: Progressing Towards Goal  Goal: Nutrition/Diet  Outcome: Progressing Towards Goal  Goal: Medications  Outcome: Progressing Towards Goal  Goal: Respiratory  Outcome: Progressing Towards Goal  Goal: Treatments/Interventions/Procedures  Outcome: Progressing Towards Goal  Goal: Psychosocial  Outcome: Progressing Towards Goal  Goal: *Initiate mobility  Outcome: Progressing Towards Goal  Goal: *Optimal pain control at patient's stated goal  Outcome: Progressing Towards Goal  Goal: *Hemodynamically stable  Outcome: Progressing Towards Goal

## 2020-07-07 NOTE — ANESTHESIA POSTPROCEDURE EVALUATION
Procedure(s):  LEFT TOTAL KNEE ARTHROPLASTY. general    Anesthesia Post Evaluation      Multimodal analgesia: multimodal analgesia used between 6 hours prior to anesthesia start to PACU discharge  Patient location during evaluation: bedside  Patient participation: complete - patient participated  Level of consciousness: awake  Pain management: adequate  Airway patency: patent  Anesthetic complications: no  Cardiovascular status: stable  Respiratory status: acceptable  Hydration status: acceptable  Post anesthesia nausea and vomiting:  controlled      INITIAL Post-op Vital signs:   Vitals Value Taken Time   /54 7/7/2020 10:30 AM   Temp 36.8 °C (98.3 °F) 7/7/2020 10:30 AM   Pulse 61 7/7/2020 10:32 AM   Resp 13 7/7/2020 10:32 AM   SpO2 100 % 7/7/2020 10:32 AM   Vitals shown include unvalidated device data.

## 2020-07-07 NOTE — ANESTHESIA PROCEDURE NOTES
Peripheral Block    Start time: 7/7/2020 6:58 AM  End time: 7/7/2020 7:07 AM  Performed by: Dalila Montenegro MD  Authorized by: Dalila Montenegro MD       Pre-procedure: Indications: at surgeon's request, post-op pain management and procedure for pain    Preanesthetic Checklist: patient identified, risks and benefits discussed, site marked, timeout performed, anesthesia consent given and patient being monitored    Timeout Time: 06:58          Block Type:   Block Type: Adductor canal  Laterality:  Left  Monitoring:  Standard ASA monitoring, continuous pulse ox, frequent vital sign checks, oxygen, heart rate and responsive to questions  Injection Technique:  Single shot  Procedures: ultrasound guided and nerve stimulator    Patient Position: supine  Prep: chlorhexidine    Location:  Mid thigh  Needle Type:  Stimuplex  Needle Gauge:  21 G  Needle Localization:  Ultrasound guidance and nerve stimulator    Assessment:  Number of attempts:  1  Injection Assessment:  No paresthesia, incremental injection every 5 mL, ultrasound image on chart, no intravascular symptoms, negative aspiration for blood and local visualized surrounding nerve on ultrasound  Patient tolerance:  Patient tolerated the procedure well with no immediate complications  Location:  PREOP HOLDING    Patient given 2 mg IV Versed and 100 mcg IV Fentanyl for sedation.     7/7/2020     7:08 AM     Willy Garcia MD

## 2020-07-08 ENCOUNTER — DOCUMENTATION ONLY (OUTPATIENT)
Dept: ORTHOPEDIC SURGERY | Age: 65
End: 2020-07-08

## 2020-07-08 ENCOUNTER — HOME HEALTH ADMISSION (OUTPATIENT)
Dept: HOME HEALTH SERVICES | Facility: HOME HEALTH | Age: 65
End: 2020-07-08
Payer: COMMERCIAL

## 2020-07-08 VITALS
SYSTOLIC BLOOD PRESSURE: 170 MMHG | RESPIRATION RATE: 18 BRPM | OXYGEN SATURATION: 100 % | WEIGHT: 212.38 LBS | HEIGHT: 69 IN | TEMPERATURE: 97 F | HEART RATE: 62 BPM | DIASTOLIC BLOOD PRESSURE: 84 MMHG | BODY MASS INDEX: 31.45 KG/M2

## 2020-07-08 PROCEDURE — 74011250637 HC RX REV CODE- 250/637: Performed by: ORTHOPAEDIC SURGERY

## 2020-07-08 PROCEDURE — 97116 GAIT TRAINING THERAPY: CPT

## 2020-07-08 PROCEDURE — 97535 SELF CARE MNGMENT TRAINING: CPT

## 2020-07-08 PROCEDURE — 97165 OT EVAL LOW COMPLEX 30 MIN: CPT

## 2020-07-08 PROCEDURE — 74011250636 HC RX REV CODE- 250/636: Performed by: ORTHOPAEDIC SURGERY

## 2020-07-08 PROCEDURE — 74011250637 HC RX REV CODE- 250/637: Performed by: PHYSICIAN ASSISTANT

## 2020-07-08 RX ORDER — LISINOPRIL 40 MG/1
40 TABLET ORAL DAILY
Status: DISCONTINUED | OUTPATIENT
Start: 2020-07-08 | End: 2020-07-08 | Stop reason: HOSPADM

## 2020-07-08 RX ADMIN — CEFAZOLIN SODIUM 2 G: 2 SOLUTION INTRAVENOUS at 06:09

## 2020-07-08 RX ADMIN — PREGABALIN 25 MG: 25 CAPSULE ORAL at 09:10

## 2020-07-08 RX ADMIN — ACETAMINOPHEN 1000 MG: 500 TABLET, FILM COATED ORAL at 11:43

## 2020-07-08 RX ADMIN — ESCITALOPRAM OXALATE 20 MG: 20 TABLET ORAL at 09:09

## 2020-07-08 RX ADMIN — OXYCODONE HYDROCHLORIDE 10 MG: 5 TABLET ORAL at 09:10

## 2020-07-08 RX ADMIN — Medication 10 ML: at 06:09

## 2020-07-08 RX ADMIN — DOCUSATE SODIUM 50 MG AND SENNOSIDES 8.6 MG 1 TABLET: 8.6; 5 TABLET, FILM COATED ORAL at 09:10

## 2020-07-08 RX ADMIN — AMLODIPINE BESYLATE 10 MG: 10 TABLET ORAL at 09:10

## 2020-07-08 RX ADMIN — FERROUS SULFATE TAB 325 MG (65 MG ELEMENTAL FE) 325 MG: 325 (65 FE) TAB at 09:10

## 2020-07-08 RX ADMIN — ASPIRIN 325 MG: 325 TABLET, DELAYED RELEASE ORAL at 09:10

## 2020-07-08 RX ADMIN — LISINOPRIL 40 MG: 40 TABLET ORAL at 09:10

## 2020-07-08 RX ADMIN — CELECOXIB 200 MG: 100 CAPSULE ORAL at 09:10

## 2020-07-08 NOTE — DISCHARGE INSTRUCTIONS
Patient Education        Total Knee Replacement: What to Expect at the Hospital  What care can you expect in the hospital?     After knee replacement surgery, you will be taken to the intensive care unit or recovery room. In a few hours, you will go to your hospital room. You may see a metal triangle called a trapeze over your bed. You can use this to help move yourself around in bed. You will be very tired and will want to rest. Your nurse may also help turn you as you rest.  You will probably still have a tube that drains urine from your bladder (urinary catheter). And you will be getting fluids into your vein through an IV tube. You may also have a tube called a drain near the cut (incision) in your knee. You may not feel hungry. You may feel sick to your stomach or constipated for a couple of days. This is common. Your nurse may give you stool softeners or laxatives to help with constipation. You may have stockings that put pressure on your legs to prevent blood clots. Your nurse will also give you medicine and exercise instructions to help prevent clots. Most people get out of bed with help on the day of surgery or the next day. Your doctor will let you know if you will stay in the hospital or if you can go home the day of surgery. This care sheet gives you a general idea about how your recovery will begin in the hospital. Each person has a different experience and recovers at a different pace. What will happen in the hospital?  Pain and pain medicine  · You will receive medicine to help control pain. Some pain medicines are given through an IV. Others are taken by mouth. · Take it as needed, and remember that it is easier to prevent pain before it starts than to stop it after it has started. · If you are still in pain after you take your medicine, tell your nurse. You may need new medicine or to get the medicine in a different form.   Other medicines  · Your doctor will tell you if and when you can restart your medicines. He or she will also give you instructions about taking any new medicines. · If you take aspirin or some other blood thinner, ask your doctor if and when to start taking it again. Make sure that you understand exactly what your doctor wants you to do. · Your doctor will probably give you blood thinners to prevent blood clots in your leg. You take this medicine during your hospital stay and when you go home. Rehabilitation  · Your rehabilitation (rehab) will probably begin the day of your surgery. Your physical therapist will get you started. It may be painful to exercise at first, but your nurse will give you pain medicine if you need it. · Your physical therapist will help you walk, go up and down stairs, and get in and out of bed and chairs. He or she will help improve the movement (range of motion) and strength in your knee. You will learn positions and motions that will help prevent your knee from popping out (dislocating). This is a very important part of your therapy. · How quickly you regain strength and motion and do things on your own depends on how well you follow your physical therapy. Your physical therapist will teach you the exercises, but you must do them yourself. · An occupational therapist will work with you. He or she will teach you how to bathe, dress, and do daily activities. You may need tools to help with everyday activities. Tools include shower stools, shoehorns, and long-handled sponges. Diet  · You will get liquids at first, but you can start to eat your normal diet when you feel like it. If your stomach is upset, your nurse will probably bring you bland, low-fat foods like plain rice, broiled chicken, toast, and yogurt. · You may have more fiber added to your meals to prevent constipation. Incision care  · You will have a bandage over your incision. Your nurse will care for this.   Other instructions  · Your nurse or respiratory therapist will have you do breathing and coughing exercises to prevent problems such as pneumonia. Breathe in deeply through your nose, and slowly breathe out through your mouth. Do this 3 times, and then cough 2 times. · You may have a device (incentive spirometer) that you suck air through to help keep your lungs healthy. Use this as your nurse or therapist tells you to. Follow-up care is a key part of your treatment and safety. Be sure to make and go to all appointments, and call your doctor if you are having problems. It's also a good idea to know your test results and keep a list of the medicines you take. When should you call for help? · You have severe trouble breathing. · You have a cough, shortness of breath, or chest pain. · You are sick to your stomach or cannot keep fluids down. · You have signs of a blood clot, such as:  ? Pain in your calf, back of the knee, thigh, or groin. ? Redness and swelling in your leg or groin. · You are in pain or your pain does not get better after you take pain medicine. · Bright red blood has soaked through the bandage over your incision. · You have signs of infection, such as:  ? Increased pain, swelling, warmth, or redness. ? Red streaks leading from the incision. ? Pus draining from the incision. ? A fever. Where can you learn more? Go to http://sam-yelena.info/  Enter G695 in the search box to learn more about \"Total Knee Replacement: What to Expect at the Hospital.\"  Current as of: March 2, 2020               Content Version: 12.5  © 3838-3691 [employer], [employer]. Care instructions adapted under license by [employer] (which disclaims liability or warranty for this information). If you have questions about a medical condition or this instruction, always ask your healthcare professional. [place] [address] any warranty or liability for your use of this information.        MyChart Activation    Thank you for requesting access to [facility]. Please follow the instructions below to securely access and download your online medical record. [facility] allows you to send messages to your doctor, view your test results, renew your prescriptions, schedule appointments, and more. How Do I Sign Up? 1. In your internet browser, go to [email or web address]  2. Click on the First Time User? Click Here link in the Sign In box. You will be redirect to the New Member Sign Up page. 3. Enter your [employer] Access Code exactly as it appears below. You will not need to use this code after youve completed the sign-up process. If you do not sign up before the expiration date, you must request a new code. [employer] Access Code: [email or web address]  Expires: 2020  9:31 AM (This is the date your [employer] access code will )    4. Enter the last four digits of your Social Security Number (xxxx) and Date of Birth (mm/dd/yyyy) as indicated and click Submit. You will be taken to the next sign-up page. 5. Create a [facility] ID. This will be your [employer] login ID and cannot be changed, so think of one that is secure and easy to remember. 6. Create a [employer] [facility] password. You can change your password at any time. 7. Enter your Password Reset Question and Answer. This can be used at a later time if you forget your password. 8. Enter your e-mail address. You will receive e-mail notification when new information is available in [address] 9. Click Sign Up. You can now view and download portions of your medical record. 10. Click the Download Summary menu link to download a portable copy of your medical information. Additional Information    If you have questions, please visit the Frequently Asked Questions section of the [employer] website at [email or web address] Remember, [employer] [facility] is NOT to be used for urgent needs. For medical emergencies, dial 911.       Patient armband removed and shredded    DISCHARGE SUMMARY from Nurse    PATIENT INSTRUCTIONS:    After general anesthesia or intravenous sedation, for 24 hours or while taking prescription Narcotics:  · Limit your activities  · Do not drive and operate hazardous machinery  · Do not make important personal or business decisions  · Do  not drink alcoholic beverages  · If you have not urinated within 8 hours after discharge, please contact your surgeon on call. Report the following to your surgeon:  · Excessive pain, swelling, redness or odor of or around the surgical area  · Temperature over 100.5  · Nausea and vomiting lasting longer than 4 hours or if unable to take medications  · Any signs of decreased circulation or nerve impairment to extremity: change in color, persistent  numbness, tingling, coldness or increase pain  · Any questions    What to do at Home:  Recommended activity: Activity as tolerated, DO NOT PUT ANYTHING UNDER KNEE, walk as tolerated    If you experience any of the following symptoms nausea, vomiting, chest pain, shortness of breath, fever greater than 100.5, please follow up with PCP. *  Please give a list of your current medications to your Primary Care Provider. *  Please update this list whenever your medications are discontinued, doses are      changed, or new medications (including over-the-counter products) are added. *  Please carry medication information at all times in case of emergency situations. These are general instructions for a healthy lifestyle:    No smoking/ No tobacco products/ Avoid exposure to second hand smoke  Surgeon General's Warning:  Quitting smoking now greatly reduces serious risk to your health.     Obesity, smoking, and sedentary lifestyle greatly increases your risk for illness    A healthy diet, regular physical exercise & weight monitoring are important for maintaining a healthy lifestyle    You may be retaining fluid if you have a history of heart failure or if you experience any of the following symptoms:  Weight gain of 3 pounds or more overnight or 5 pounds in a week, increased swelling in our hands or feet or shortness of breath while lying flat in bed. Please call your doctor as soon as you notice any of these symptoms; do not wait until your next office visit. The discharge information has been reviewed with the patient. The patient verbalized understanding. Discharge medications reviewed with the patient and appropriate educational materials and side effects teaching were provided.   ___________________________________________________________________________________________________________________________________

## 2020-07-08 NOTE — PROGRESS NOTES
Problem: Self Care Deficits Care Plan (Adult)  Goal: *Acute Goals and Plan of Care (Insert Text)  Outcome: Resolved/Met     OCCUPATIONAL THERAPY EVALUATION/DISCHARGE    Patient: Emmie Colón (8 y.o. female)  Date: 7/8/2020  Primary Diagnosis: Osteoarthritis of left knee, unspecified osteoarthritis type [M17.12]  Arthritis of knee [M17.10]  Procedure(s) (LRB):  LEFT TOTAL KNEE ARTHROPLASTY (Left) 1 Day Post-Op   Precautions:  Fall, WBAT(LLE)  PLOF: Patient was independent with self-care and functional mobility PTA. ASSESSMENT AND RECOMMENDATIONS:  Upon entering the room, patient was in bathroom, alert, and agreeable to participate in OT evaluation with RN briefly present. Patient educated on weight-bearing status, importance of ice, and safety around the house/during this admission. Patient issued adaptive equipment (long handled sponge and reacher) this session to assist with self-care tasks at home. Patient is modified independent - independent with basic self-care, stand by assist with bed mobility and stand by assist with functional transfers. The patient presents with good static standing and fair+ dynamic standing balance, however will defer to PT for functional balance and functional mobility tasks. . Based on the objective data described below, the patient presents with no deficits that impede pt function with ADLs, functional transfers, and functional mobility. At this time patient is safe to d/c home with family support. OT to d/c from caseload at this time. Skilled occupational therapy is not indicated at this time.   Discharge Recommendations: Home Health  Further Equipment Recommendations for Discharge: shower chair to decrease the risk of falls     SUBJECTIVE:   Patient stated I want to be as independent as possible    OBJECTIVE DATA SUMMARY:     Past Medical History:   Diagnosis Date    Arthritis     Calcific Achilles tendonitis     Right; insertional    Hypercholesterolemia     Hypertension Left knee pain     Pain of right heel     Sprain of right ring finger     Thyroid disease     Wears glasses      Past Surgical History:   Procedure Laterality Date    HX CHOLECYSTECTOMY  1990    HX HEENT      Eye surgery    HX HERNIA REPAIR       Barriers to Learning/Limitations: None  Compensate with: visual, verbal, tactile, kinesthetic cues/model    Home Situation:   Home Situation  Home Environment: Apartment  # Steps to Enter: (2nd floor with an elevator)  One/Two Story Residence: One story  Living Alone: No  Support Systems: Spouse/Significant Other/Partner  Patient Expects to be Discharged to[de-identified] Private residence  Current DME Used/Available at Home: Cane, straight, Walker, rolling, Commode, bedside  [x]     Right hand dominant   []     Left hand dominant    Cognitive/Behavioral Status:  Neurologic State: Alert  Orientation Level: Oriented X4  Cognition: Follows commands  Safety/Judgement: Fall prevention    Skin: Intact  Edema: None noted    Vision/Perceptual:    Acuity: Within Defined Limits      Coordination: BUE  Fine Motor Skills-Upper: Left Intact; Right Intact    Gross Motor Skills-Upper: Left Intact; Right Intact    Balance:  Sitting: Intact  Sitting - Static: Good (unsupported)  Sitting - Dynamic: Good (unsupported)  Standing: Impaired; With support  Standing - Static: Good  Standing - Dynamic : Fair(+)    Strength: BUE  Strength:  Within functional limits    Tone & Sensation: BUE  Tone: Normal  Sensation: Intact    Range of Motion: BUE  AROM: Within functional limits    Functional Mobility and Transfers for ADLs:  Bed Mobility:  Sit to Supine: Stand-by assistance  Scooting: Stand-by assistance    Transfers:  Sit to Stand: Stand-by assistance  Stand to Sit: Stand-by assistance   Toilet Transfer : Stand-by assistance    ADL Assessment:  Feeding: Independent    Oral Facial Hygiene/Grooming: Independent    Bathing: Modified independent    Upper Body Dressing: Independent    Lower Body Dressing: Modified independent    Toileting: Modified independent    ADL Intervention:  Grooming  Grooming Assistance: Independent  Position Performed: Standing  Washing Hands: Independent    Upper Body Dressing Assistance  Dressing Assistance: [id number]: Independent    Lower Body Dressing Assistance  Dressing Assistance: Modified independent  Pants With Elastic Waist: Modified independent; Compensatory technique training(simulstion using gait belt; donning LLE first)  Socks: Modified independent  Leg Crossed Method Used: No  Position Performed: Seated edge of bed  Adaptive Equipment Used: (issued reacher to decrease time for doffing)    Toileting  Toileting Assistance: Modified independent  Bladder Hygiene: Modified independent  Clothing Management: Modified independent    Cognitive Retraining  Safety/Judgement: Fall prevention    Pain:  Pain level pre-treatment:4/10 , LLE  Pain level post-treatment: 4/10   Pain Intervention(s): Medication (see MAR); Response to intervention: Nurse notified, See doc flow    Activity Tolerance:   Good    Please refer to the flowsheet for vital signs taken during this treatment. After treatment:   []  Patient left in no apparent distress sitting up in chair  [x]  Patient left in no apparent distress in bed  [x]  Call bell left within reach  [x]  Nursing notified  []  Caregiver present  []  Bed alarm activated    COMMUNICATION/EDUCATION:   [x]      Role of Occupational Therapy in the acute care setting  [x]      Home safety education was provided and the patient/caregiver indicated understanding. [x]      Patient/family have participated as able and agree with findings and recommendations. []      Patient is unable to participate in plan of care at this time. Thank you for this referral.  Coretta Diaz OTR/L  Time Calculation: 23 mins      Eval Complexity: History: LOW Complexity : Brief history review ;    Examination: LOW Complexity : 1-3 performance deficits relating to physical, cognitive , or psychosocial skils that result in activity limitations and / or participation restrictions ;    Decision Making:LOW Complexity : No comorbidities that affect functional and no verbal or physical assistance needed to complete eval tasks

## 2020-07-08 NOTE — ROUTINE PROCESS
Bedside shift change report given to Angelo Parker (oncoming nurse) by Ewa Pollard RN (offgoing nurse). Report included the following information SBAR, Kardex, Procedure Summary, Intake/Output, MAR and Recent Results.

## 2020-07-08 NOTE — PROGRESS NOTES
Discharge instructions given and explained. Prescriptions given. Declined using pharmacy here at hospital. All questions and concerns addressed.

## 2020-07-08 NOTE — PROGRESS NOTES
Discharge order noted for today. Pt has been accepted to [facility] agency. Met with patient and she is agreeable to the transition plan today. Transport has been arranged through her family. Patient's discharge summary and home health  orders have been forwarded to [facility] home health  agency via [facility]. Updated bedside RN, Virginie Escalera,  to the transition plan.   Discharge information has been documented on the AVS.       Aura Lewis RN - Outcomes Manager  [phone]

## 2020-07-08 NOTE — HOME CARE
Received HH referral, Discharge order noted for today, [facility] will follow for SN,PT, Emir Knee protocol; pt states she has RW ,BSC and plans to purchase a shower chair ; [place] referral processed to [facility] central intake, [facility] will follow . JEANINE VERDE.

## 2020-07-08 NOTE — PROGRESS NOTES
Ortho    Pt. Seen and evaluated. Doing well, pain well controlled, progressed well with PT  Denies cp, sob, abd pain    Blood pressure 170/84, pulse 62, temperature 97 °F (36.1 °C), resp. rate 18, height 5' 8.5\" (1.74 m), weight 212 lb 6 oz (96.3 kg), SpO2 100 %. leftKnee woundclean, dry, no drainage  Sensory intact to LT  Motor intact  nv intact  Neg calf tenderness    Labs:  CBC  @  CBC:   Lab Results   Component Value Date/Time    WBC 8.3 06/26/2020 12:32 PM    RBC 4.74 06/26/2020 12:32 PM    HGB 13.0 06/26/2020 12:32 PM    HCT 40.3 06/26/2020 12:32 PM    PLATELET 620 39/26/4838 12:32 PM     BMP:   Lab Results   Component Value Date/Time    Glucose 74 06/26/2020 12:32 PM    Sodium 142 06/26/2020 12:32 PM    Potassium 3.6 06/26/2020 12:32 PM    Chloride 105 06/26/2020 12:32 PM    CO2 32 06/26/2020 12:32 PM    BUN 14 06/26/2020 12:32 PM    Creatinine 0.66 06/26/2020 12:32 PM    Calcium 9.1 06/26/2020 12:32 PM   @  Coagulation  Lab Results   Component Value Date    INR 1.0 06/26/2020    APTT 32.0 06/26/2020      Basic Metabolic Profile  Lab Results   Component Value Date     06/26/2020    CO2 32 06/26/2020    BUN 14 06/26/2020       Assesment: leftOrthopedic / Rheumatologic: Total Knee Replacement  Past Medical History:   Diagnosis Date    Arthritis     Calcific Achilles tendonitis     Right; insertional    Hypercholesterolemia     Hypertension     Left knee pain     Pain of right heel     Sprain of right ring finger     Thyroid disease     Wears glasses      ASA: 2    Status post joint replacement pt with risk of bleeding, blood clots, and infection. Plan: aspirin, PT, DC to home if cleared by PT and ok with medicine.

## 2020-07-08 NOTE — PROGRESS NOTES
Problem: Mobility Impaired (Adult and Pediatric)  Goal: *Acute Goals and Plan of Care (Insert Text)  Description: Physical Therapy Goals  Initiated 7/7/2020 and to be accomplished within 7 day(s)  1. Patient will move from supine to sit and sit to supine , scoot up and down and roll side to side in bed with modified independence. 2.  Patient will transfer from bed to chair and chair to bed with modified independence using the least restrictive device. 3.  Patient will perform sit to stand with modified independence. 4.  Patient will ambulate with modified independence for 250 feet with the least restrictive device. Prior Level of Function:   Patient was modified independence for all mobility including gait using single point cane. Patient lives with  in a 2nd floor apartment with an elevator. Pt states she has a rolling walker and a bedside commode to use at home. Outcome: Progressing Towards Goal     PHYSICAL THERAPY TREATMENT    Patient: Esteban Andrade (16 y.o. female)  Date: 7/8/2020  Diagnosis: Osteoarthritis of left knee, unspecified osteoarthritis type [M17.12]  Arthritis of knee [M17.10]   <principal problem not specified>  Procedure(s) (LRB):  LEFT TOTAL KNEE ARTHROPLASTY (Left) 1 Day Post-Op  Precautions: Fall, WBAT(LLE)    ASSESSMENT:  Pt received in bed, agreeable to PT tx session. Pt reports 4/10 pain and had towel roll under ankle at this time. Pt utilizes towel to help lift her leg on/off bed and once sitting EOB she is able to stand up to RW with SBA/sup. Pt amb approx 150 ft around unit this date exhibiting step through gait pattern and knee flexion during left swing phase, safe management of RW and no LOB noted. Pt also performed stair training x 4 steps with step to step pattern, despite not having steps at home. Pt returned to room and was educated on HEP per hand book, exhibited 7-103* of AAROM to the L knee at this time.  Pt left in bed with towel roll under ankle and all needs met, all questions answered. Pt to benefit from skilled PT in the [facility] setting upon discharge, no other needs noted at this time. Progression toward goals:   [x]      Improving appropriately and progressing toward goals  []      Improving slowly and progressing toward goals  []      Not making progress toward goals and plan of care will be adjusted     PLAN:  Patient continues to benefit from skilled intervention to address the above impairments. Continue treatment per established plan of care. Discharge Recommendations:  Home Health  Further Equipment Recommendations for Discharge:  N/A     SUBJECTIVE:   Patient stated did I get a good report? [place]    OBJECTIVE DATA SUMMARY:   Critical Behavior:  Neurologic State: Alert  Orientation Level: Oriented X4  Cognition: Follows commands  Safety/Judgement: Fall prevention  Functional Mobility Training:  Bed Mobility:     Supine to Sit: Supervision  Sit to Supine: Supervision  Scooting: Supervision         Transfers:  Sit to Stand: Supervision  Stand to Sit: Supervision       Balance:  Sitting: Intact  Sitting - Static: Good (unsupported)  Sitting - Dynamic: Good (unsupported)  Standing: Intact; With support  Standing - Static: Good  Standing - Dynamic : Fair(+)   Range Of Motion:   AROM: Within functional limits    7-103* AAROM L knee      Ambulation/Gait Training:  Distance (ft): 150 Feet (ft)  Assistive Device: Walker, rolling  Ambulation - Level of Assistance: Supervision        Gait Abnormalities: Decreased step clearance              Speed/Yamilet: Pace decreased (<100 feet/min)  Step Length: Right shortened;Left shortened    Stairs:  Number of Stairs Trained: 4  Stairs - Level of Assistance: Stand-by assistance  Rail Use: Both    Therapeutic Exercises:   Seated EOB and in bed, educated on HEP per total knee handbook         Pain:  Pain level pre-treatment: 4/10  Pain level post-treatment: 4/10   Pain Intervention(s): Medication (see MAR);  Rest, Ice, Repositioning  Response to intervention: Nurse notified    Activity Tolerance:   Good  Please refer to the flowsheet for vital signs taken during this treatment. After treatment:   [] Patient left in no apparent distress sitting up in chair  [x] Patient left in no apparent distress in bed  [x] Call bell left within reach  [x] Nursing notified  [] Caregiver present  [] Bed alarm activated  [] SCDs applied      COMMUNICATION/EDUCATION:   [x]         Role of Physical Therapy in the acute care setting. [x]         Fall prevention education was provided and the patient/caregiver indicated understanding. [x]         Patient/family have participated as able in working toward goals and plan of care. [x]         Patient/family agree to work toward stated goals and plan of care. []         Patient understands intent and goals of therapy, but is neutral about his/her participation.   []         Patient is unable to participate in stated goals/plan of care: ongoing with therapy staff.  []         Other:        Rivka Hernández   Time Calculation: 17 mins

## 2020-07-08 NOTE — PROGRESS NOTES
Entered referral in queue for [facility] via [facility]. Spoke with Valente Bernard to verify receipt.    Sissy Stoner RN - Outcomes Manager  [phone]

## 2020-07-09 ENCOUNTER — HOME CARE VISIT (OUTPATIENT)
Dept: HOME HEALTH SERVICES | Facility: HOME HEALTH | Age: 65
End: 2020-07-09
Payer: COMMERCIAL

## 2020-07-09 ENCOUNTER — HOME CARE VISIT (OUTPATIENT)
Dept: SCHEDULING | Facility: HOME HEALTH | Age: 65
End: 2020-07-09
Payer: COMMERCIAL

## 2020-07-09 ENCOUNTER — TELEPHONE (OUTPATIENT)
Dept: ORTHOPEDIC SURGERY | Age: 65
End: 2020-07-09

## 2020-07-09 VITALS
DIASTOLIC BLOOD PRESSURE: 60 MMHG | HEART RATE: 71 BPM | SYSTOLIC BLOOD PRESSURE: 98 MMHG | OXYGEN SATURATION: 98 % | TEMPERATURE: 98.7 F

## 2020-07-09 PROCEDURE — G0151 HHCP-SERV OF PT,EA 15 MIN: HCPCS

## 2020-07-09 PROCEDURE — 400013 HH SOC

## 2020-07-09 NOTE — TELEPHONE ENCOUNTER
Sterling Davies from [facility] is requesting we reduce patients therapy to 3x weekly due to a high copay. Sterling Davies feels patient is capable of doing exercises at home. Please advise Sterling Davies at [phone].

## 2020-07-10 ENCOUNTER — HOME CARE VISIT (OUTPATIENT)
Dept: SCHEDULING | Facility: HOME HEALTH | Age: 65
End: 2020-07-10
Payer: COMMERCIAL

## 2020-07-10 ENCOUNTER — HOME CARE VISIT (OUTPATIENT)
Dept: HOME HEALTH SERVICES | Facility: HOME HEALTH | Age: 65
End: 2020-07-10
Payer: COMMERCIAL

## 2020-07-10 VITALS
DIASTOLIC BLOOD PRESSURE: 68 MMHG | HEART RATE: 77 BPM | TEMPERATURE: 98.4 F | SYSTOLIC BLOOD PRESSURE: 128 MMHG | OXYGEN SATURATION: 99 %

## 2020-07-10 PROCEDURE — G0299 HHS/HOSPICE OF RN EA 15 MIN: HCPCS

## 2020-07-10 PROCEDURE — G0151 HHCP-SERV OF PT,EA 15 MIN: HCPCS

## 2020-07-10 PROCEDURE — A6213 FOAM DRG >16<=48 SQ IN W/BDR: HCPCS

## 2020-07-11 ENCOUNTER — HOME CARE VISIT (OUTPATIENT)
Dept: SCHEDULING | Facility: HOME HEALTH | Age: 65
End: 2020-07-11
Payer: COMMERCIAL

## 2020-07-11 VITALS
DIASTOLIC BLOOD PRESSURE: 72 MMHG | SYSTOLIC BLOOD PRESSURE: 144 MMHG | OXYGEN SATURATION: 100 % | TEMPERATURE: 97.9 F | HEART RATE: 97 BPM | RESPIRATION RATE: 18 BRPM

## 2020-07-11 PROCEDURE — G0157 HHC PT ASSISTANT EA 15: HCPCS

## 2020-07-11 NOTE — PROGRESS NOTES
Patient has been admitted to [facility] for PT and SN. PT frequency 3w3 has been confirmed.   Thank you for your referral.

## 2020-07-13 ENCOUNTER — HOME CARE VISIT (OUTPATIENT)
Dept: SCHEDULING | Facility: HOME HEALTH | Age: 65
End: 2020-07-13
Payer: COMMERCIAL

## 2020-07-13 ENCOUNTER — HOSPITAL ENCOUNTER (INPATIENT)
Age: 65
LOS: 2 days | Discharge: HOME HEALTH | DRG: 247 | End: 2020-07-15
Attending: EMERGENCY MEDICINE | Admitting: HOSPITALIST
Payer: COMMERCIAL

## 2020-07-13 ENCOUNTER — APPOINTMENT (OUTPATIENT)
Dept: GENERAL RADIOLOGY | Age: 65
DRG: 247 | End: 2020-07-13
Attending: EMERGENCY MEDICINE
Payer: COMMERCIAL

## 2020-07-13 VITALS
TEMPERATURE: 99 F | SYSTOLIC BLOOD PRESSURE: 112 MMHG | OXYGEN SATURATION: 97 % | DIASTOLIC BLOOD PRESSURE: 84 MMHG | HEART RATE: 67 BPM

## 2020-07-13 DIAGNOSIS — I21.3 ST ELEVATION (STEMI) MYOCARDIAL INFARCTION (HCC): ICD-10-CM

## 2020-07-13 DIAGNOSIS — M17.10 ARTHRITIS OF KNEE: ICD-10-CM

## 2020-07-13 LAB
ALBUMIN SERPL-MCNC: 3.2 G/DL (ref 3.4–5)
ALBUMIN/GLOB SERPL: 0.8 {RATIO} (ref 0.8–1.7)
ALP SERPL-CCNC: 124 U/L (ref 45–117)
ALT SERPL-CCNC: 41 U/L (ref 13–56)
ANION GAP SERPL CALC-SCNC: 5 MMOL/L (ref 3–18)
AST SERPL-CCNC: 47 U/L (ref 10–38)
BASOPHILS # BLD: 0 K/UL (ref 0–0.1)
BASOPHILS NFR BLD: 0 % (ref 0–2)
BILIRUB SERPL-MCNC: 0.7 MG/DL (ref 0.2–1)
BUN SERPL-MCNC: 15 MG/DL (ref 7–18)
BUN/CREAT SERPL: 26 (ref 12–20)
CALCIUM SERPL-MCNC: 9 MG/DL (ref 8.5–10.1)
CHLORIDE SERPL-SCNC: 103 MMOL/L (ref 100–111)
CO2 SERPL-SCNC: 31 MMOL/L (ref 21–32)
CREAT SERPL-MCNC: 0.58 MG/DL (ref 0.6–1.3)
DIFFERENTIAL METHOD BLD: ABNORMAL
EOSINOPHIL # BLD: 0.1 K/UL (ref 0–0.4)
EOSINOPHIL NFR BLD: 1 % (ref 0–5)
ERYTHROCYTE [DISTWIDTH] IN BLOOD BY AUTOMATED COUNT: 14.8 % (ref 11.6–14.5)
GLOBULIN SER CALC-MCNC: 3.9 G/DL (ref 2–4)
GLUCOSE SERPL-MCNC: 147 MG/DL (ref 74–99)
HCT VFR BLD AUTO: 32 % (ref 35–45)
HGB BLD-MCNC: 10.4 G/DL (ref 12–16)
LIPASE SERPL-CCNC: 46 U/L (ref 73–393)
LYMPHOCYTES # BLD: 2.3 K/UL (ref 0.9–3.6)
LYMPHOCYTES NFR BLD: 22 % (ref 21–52)
MAGNESIUM SERPL-MCNC: 2.2 MG/DL (ref 1.6–2.6)
MCH RBC QN AUTO: 27.5 PG (ref 24–34)
MCHC RBC AUTO-ENTMCNC: 32.5 G/DL (ref 31–37)
MCV RBC AUTO: 84.7 FL (ref 74–97)
MONOCYTES # BLD: 0.6 K/UL (ref 0.05–1.2)
MONOCYTES NFR BLD: 6 % (ref 3–10)
NEUTS SEG # BLD: 7.1 K/UL (ref 1.8–8)
NEUTS SEG NFR BLD: 71 % (ref 40–73)
PLATELET # BLD AUTO: 325 K/UL (ref 135–420)
PMV BLD AUTO: 9.9 FL (ref 9.2–11.8)
POTASSIUM SERPL-SCNC: 3.8 MMOL/L (ref 3.5–5.5)
PROT SERPL-MCNC: 7.1 G/DL (ref 6.4–8.2)
RBC # BLD AUTO: 3.78 M/UL (ref 4.2–5.3)
SODIUM SERPL-SCNC: 139 MMOL/L (ref 136–145)
TROPONIN I SERPL-MCNC: 2.43 NG/ML (ref 0–0.04)
WBC # BLD AUTO: 10.1 K/UL (ref 4.6–13.2)

## 2020-07-13 PROCEDURE — 99283 EMERGENCY DEPT VISIT LOW MDM: CPT

## 2020-07-13 PROCEDURE — C1887 CATHETER, GUIDING: HCPCS | Performed by: INTERNAL MEDICINE

## 2020-07-13 PROCEDURE — C1894 INTRO/SHEATH, NON-LASER: HCPCS | Performed by: INTERNAL MEDICINE

## 2020-07-13 PROCEDURE — 74011250636 HC RX REV CODE- 250/636: Performed by: INTERNAL MEDICINE

## 2020-07-13 PROCEDURE — C1760 CLOSURE DEV, VASC: HCPCS | Performed by: INTERNAL MEDICINE

## 2020-07-13 PROCEDURE — B2111ZZ FLUOROSCOPY OF MULTIPLE CORONARY ARTERIES USING LOW OSMOLAR CONTRAST: ICD-10-PCS | Performed by: INTERNAL MEDICINE

## 2020-07-13 PROCEDURE — C1725 CATH, TRANSLUMIN NON-LASER: HCPCS | Performed by: INTERNAL MEDICINE

## 2020-07-13 PROCEDURE — 84484 ASSAY OF TROPONIN QUANT: CPT

## 2020-07-13 PROCEDURE — 99153 MOD SED SAME PHYS/QHP EA: CPT | Performed by: INTERNAL MEDICINE

## 2020-07-13 PROCEDURE — 65620000000 HC RM CCU GENERAL

## 2020-07-13 PROCEDURE — 92928 PRQ TCAT PLMT NTRAC ST 1 LES: CPT | Performed by: INTERNAL MEDICINE

## 2020-07-13 PROCEDURE — G0300 HHS/HOSPICE OF LPN EA 15 MIN: HCPCS

## 2020-07-13 PROCEDURE — 74011250636 HC RX REV CODE- 250/636: Performed by: EMERGENCY MEDICINE

## 2020-07-13 PROCEDURE — 74011250637 HC RX REV CODE- 250/637: Performed by: INTERNAL MEDICINE

## 2020-07-13 PROCEDURE — 77030013797 HC KT TRNSDUC PRSSR EDWD -A: Performed by: INTERNAL MEDICINE

## 2020-07-13 PROCEDURE — 83735 ASSAY OF MAGNESIUM: CPT

## 2020-07-13 PROCEDURE — 85347 COAGULATION TIME ACTIVATED: CPT

## 2020-07-13 PROCEDURE — 77030004558 HC CATH ANGI DX SUPR TORQ CARD -A: Performed by: INTERNAL MEDICINE

## 2020-07-13 PROCEDURE — 74011636320 HC RX REV CODE- 636/320: Performed by: INTERNAL MEDICINE

## 2020-07-13 PROCEDURE — 74011000250 HC RX REV CODE- 250: Performed by: INTERNAL MEDICINE

## 2020-07-13 PROCEDURE — 83690 ASSAY OF LIPASE: CPT

## 2020-07-13 PROCEDURE — 77030012468 HC VLV BLEEDBK CNTRL ABBT -B: Performed by: INTERNAL MEDICINE

## 2020-07-13 PROCEDURE — 77030013519 HC DEV INFL BASIX MRTM -B: Performed by: INTERNAL MEDICINE

## 2020-07-13 PROCEDURE — 77030013744: Performed by: INTERNAL MEDICINE

## 2020-07-13 PROCEDURE — 80053 COMPREHEN METABOLIC PANEL: CPT

## 2020-07-13 PROCEDURE — 93454 CORONARY ARTERY ANGIO S&I: CPT

## 2020-07-13 PROCEDURE — C1769 GUIDE WIRE: HCPCS | Performed by: INTERNAL MEDICINE

## 2020-07-13 PROCEDURE — C1874 STENT, COATED/COV W/DEL SYS: HCPCS | Performed by: INTERNAL MEDICINE

## 2020-07-13 PROCEDURE — 4A023N7 MEASUREMENT OF CARDIAC SAMPLING AND PRESSURE, LEFT HEART, PERCUTANEOUS APPROACH: ICD-10-PCS | Performed by: INTERNAL MEDICINE

## 2020-07-13 PROCEDURE — B2151ZZ FLUOROSCOPY OF LEFT HEART USING LOW OSMOLAR CONTRAST: ICD-10-PCS | Performed by: INTERNAL MEDICINE

## 2020-07-13 PROCEDURE — 93005 ELECTROCARDIOGRAM TRACING: CPT

## 2020-07-13 PROCEDURE — 85025 COMPLETE CBC W/AUTO DIFF WBC: CPT

## 2020-07-13 PROCEDURE — 99152 MOD SED SAME PHYS/QHP 5/>YRS: CPT | Performed by: INTERNAL MEDICINE

## 2020-07-13 PROCEDURE — 027034Z DILATION OF CORONARY ARTERY, ONE ARTERY WITH DRUG-ELUTING INTRALUMINAL DEVICE, PERCUTANEOUS APPROACH: ICD-10-PCS | Performed by: INTERNAL MEDICINE

## 2020-07-13 DEVICE — XIENCE SIERRA™ EVEROLIMUS ELUTING CORONARY STENT SYSTEM 3.00 MM X 28 MM / RAPID-EXCHANGE
Type: IMPLANTABLE DEVICE | Site: CORONARY | Status: FUNCTIONAL
Brand: XIENCE SIERRA™

## 2020-07-13 RX ORDER — HEPARIN SODIUM 1000 [USP'U]/ML
4000 INJECTION, SOLUTION INTRAVENOUS; SUBCUTANEOUS ONCE
Status: COMPLETED | OUTPATIENT
Start: 2020-07-13 | End: 2020-07-13

## 2020-07-13 RX ORDER — LIDOCAINE HYDROCHLORIDE 10 MG/ML
INJECTION, SOLUTION EPIDURAL; INFILTRATION; INTRACAUDAL; PERINEURAL AS NEEDED
Status: DISCONTINUED | OUTPATIENT
Start: 2020-07-13 | End: 2020-07-13 | Stop reason: HOSPADM

## 2020-07-13 RX ORDER — DOPAMINE HYDROCHLORIDE 160 MG/100ML
INJECTION, SOLUTION INTRAVENOUS
Status: COMPLETED | OUTPATIENT
Start: 2020-07-13 | End: 2020-07-13

## 2020-07-13 RX ORDER — GUAIFENESIN 100 MG/5ML
LIQUID (ML) ORAL
Status: DISPENSED
Start: 2020-07-13 | End: 2020-07-14

## 2020-07-13 RX ORDER — HEPARIN SODIUM 5000 [USP'U]/ML
4000 INJECTION, SOLUTION INTRAVENOUS; SUBCUTANEOUS ONCE
Status: DISCONTINUED | OUTPATIENT
Start: 2020-07-13 | End: 2020-07-13 | Stop reason: CLARIF

## 2020-07-13 RX ORDER — HEPARIN SODIUM 1000 [USP'U]/ML
INJECTION, SOLUTION INTRAVENOUS; SUBCUTANEOUS AS NEEDED
Status: DISCONTINUED | OUTPATIENT
Start: 2020-07-13 | End: 2020-07-13 | Stop reason: HOSPADM

## 2020-07-13 RX ORDER — ONDANSETRON 2 MG/ML
INJECTION INTRAMUSCULAR; INTRAVENOUS AS NEEDED
Status: DISCONTINUED | OUTPATIENT
Start: 2020-07-13 | End: 2020-07-13 | Stop reason: HOSPADM

## 2020-07-13 RX ORDER — ONDANSETRON 2 MG/ML
4 INJECTION INTRAMUSCULAR; INTRAVENOUS
Status: COMPLETED | OUTPATIENT
Start: 2020-07-13 | End: 2020-07-13

## 2020-07-13 RX ORDER — FENTANYL CITRATE 50 UG/ML
INJECTION, SOLUTION INTRAMUSCULAR; INTRAVENOUS AS NEEDED
Status: DISCONTINUED | OUTPATIENT
Start: 2020-07-13 | End: 2020-07-13 | Stop reason: HOSPADM

## 2020-07-13 RX ORDER — GUAIFENESIN 100 MG/5ML
324 LIQUID (ML) ORAL
Status: DISPENSED | OUTPATIENT
Start: 2020-07-13 | End: 2020-07-14

## 2020-07-13 RX ORDER — EPTIFIBATIDE 2 MG/ML
INJECTION, SOLUTION INTRAVENOUS AS NEEDED
Status: DISCONTINUED | OUTPATIENT
Start: 2020-07-13 | End: 2020-07-13 | Stop reason: HOSPADM

## 2020-07-13 RX ORDER — MIDAZOLAM HYDROCHLORIDE 1 MG/ML
INJECTION, SOLUTION INTRAMUSCULAR; INTRAVENOUS AS NEEDED
Status: DISCONTINUED | OUTPATIENT
Start: 2020-07-13 | End: 2020-07-13 | Stop reason: HOSPADM

## 2020-07-13 RX ORDER — PHENYLEPHRINE HYDROCHLORIDE 10 MG/ML
INJECTION INTRAVENOUS AS NEEDED
Status: DISCONTINUED | OUTPATIENT
Start: 2020-07-13 | End: 2020-07-13 | Stop reason: HOSPADM

## 2020-07-13 RX ORDER — SODIUM CHLORIDE 9 MG/ML
INJECTION, SOLUTION INTRAVENOUS
Status: COMPLETED | OUTPATIENT
Start: 2020-07-13 | End: 2020-07-13

## 2020-07-13 RX ORDER — ATROPINE SULFATE 0.1 MG/ML
INJECTION INTRAVENOUS AS NEEDED
Status: DISCONTINUED | OUTPATIENT
Start: 2020-07-13 | End: 2020-07-13 | Stop reason: HOSPADM

## 2020-07-13 RX ORDER — DOPAMINE HYDROCHLORIDE 160 MG/100ML
0-20 INJECTION, SOLUTION INTRAVENOUS
Status: DISCONTINUED | OUTPATIENT
Start: 2020-07-13 | End: 2020-07-15

## 2020-07-13 RX ORDER — FAMOTIDINE 10 MG/ML
20 INJECTION INTRAVENOUS
Status: COMPLETED | OUTPATIENT
Start: 2020-07-13 | End: 2020-07-13

## 2020-07-13 RX ADMIN — ONDANSETRON 4 MG: 2 INJECTION INTRAMUSCULAR; INTRAVENOUS at 21:09

## 2020-07-13 RX ADMIN — HEPARIN SODIUM 4000 UNITS: 1000 INJECTION, SOLUTION INTRAVENOUS; SUBCUTANEOUS at 21:09

## 2020-07-13 RX ADMIN — FAMOTIDINE 20 MG: 10 INJECTION INTRAVENOUS at 21:09

## 2020-07-13 NOTE — Clinical Note
Bilateral groin prepped with ChloraPrep and draped. Wet prep solution applied at: 2125. Wet prep solution dried at: 2128. Wet prep elapsed drying time: 3 mins.

## 2020-07-13 NOTE — Clinical Note
Balloon balloon re-inflated. Balloon inflated using multiple inflations inflation technique. Lesion #1: Pressure = 17 alejandra; Duration = 40 sec. Inflation 2: Pressure = 12 alejandra; Duration = 22 sec.

## 2020-07-13 NOTE — Clinical Note
Balloon inserted. Balloon inflated using multiple inflations inflation technique. Lesion #1: Pressure = 9 alejandra; Duration = 19 sec. Inflation 2: Pressure = 18 alejandra; Duration = 16 sec.

## 2020-07-13 NOTE — Clinical Note
TRANSFER - OUT REPORT:     Verbal report given to: Yue Barron RN. Report consisted of patient's Situation, Background, Assessment and   Recommendations(SBAR). Opportunity for questions and clarification was provided. Patient transported with a Registered Nurse. Patient transported to: CVT ICU.

## 2020-07-13 NOTE — Clinical Note
Aspirin Registry Question:   Aspirin was given prior to the procedure.  [id number]

## 2020-07-13 NOTE — Clinical Note
TRANSFER - IN REPORT:     Verbal report received from: [address] [place] [address] , rn. Report consisted of patient's Situation, Background, Assessment and   Recommendations(SBAR). Opportunity for questions and clarification was provided. Assessment completed upon patient's arrival to unit and care assumed. Patient transported with a Registered Nurse and Monitor.

## 2020-07-13 NOTE — Clinical Note
Balloon inserted. Balloon inflated using multiple inflations inflation technique. Lesion #1: Pressure = 8 alejandra; Duration = 17 sec. Inflation 2: Pressure = 12 alejandra; Duration = 17 sec.

## 2020-07-14 ENCOUNTER — APPOINTMENT (OUTPATIENT)
Dept: NON INVASIVE DIAGNOSTICS | Age: 65
DRG: 247 | End: 2020-07-14
Attending: INTERNAL MEDICINE
Payer: COMMERCIAL

## 2020-07-14 PROBLEM — I10 ESSENTIAL HYPERTENSION: Chronic | Status: ACTIVE | Noted: 2020-07-13

## 2020-07-14 LAB
ACT BLD: 213 SECS (ref 79–138)
ANION GAP SERPL CALC-SCNC: 5 MMOL/L (ref 3–18)
ATRIAL RATE: 57 BPM
ATRIAL RATE: 58 BPM
ATRIAL RATE: 68 BPM
BUN SERPL-MCNC: 13 MG/DL (ref 7–18)
BUN/CREAT SERPL: 22 (ref 12–20)
CALCIUM SERPL-MCNC: 8.9 MG/DL (ref 8.5–10.1)
CALCULATED P AXIS, ECG09: 51 DEGREES
CALCULATED P AXIS, ECG09: 59 DEGREES
CALCULATED P AXIS, ECG09: 62 DEGREES
CALCULATED R AXIS, ECG10: 20 DEGREES
CALCULATED R AXIS, ECG10: 3 DEGREES
CALCULATED R AXIS, ECG10: 44 DEGREES
CALCULATED T AXIS, ECG11: -19 DEGREES
CALCULATED T AXIS, ECG11: 28 DEGREES
CALCULATED T AXIS, ECG11: 87 DEGREES
CHLORIDE SERPL-SCNC: 104 MMOL/L (ref 100–111)
CHOLEST SERPL-MCNC: 239 MG/DL
CO2 SERPL-SCNC: 30 MMOL/L (ref 21–32)
CREAT SERPL-MCNC: 0.6 MG/DL (ref 0.6–1.3)
DIAGNOSIS, 93000: NORMAL
GLUCOSE SERPL-MCNC: 116 MG/DL (ref 74–99)
HDLC SERPL-MCNC: 56 MG/DL (ref 40–60)
HDLC SERPL: 4.3 {RATIO} (ref 0–5)
LDLC SERPL CALC-MCNC: 163.6 MG/DL (ref 0–100)
LIPID PROFILE,FLP: ABNORMAL
P-R INTERVAL, ECG05: 192 MS
P-R INTERVAL, ECG05: 208 MS
P-R INTERVAL, ECG05: 216 MS
POTASSIUM SERPL-SCNC: 4.1 MMOL/L (ref 3.5–5.5)
Q-T INTERVAL, ECG07: 414 MS
Q-T INTERVAL, ECG07: 420 MS
Q-T INTERVAL, ECG07: 428 MS
QRS DURATION, ECG06: 80 MS
QRS DURATION, ECG06: 86 MS
QRS DURATION, ECG06: 90 MS
QTC CALCULATION (BEZET), ECG08: 408 MS
QTC CALCULATION (BEZET), ECG08: 420 MS
QTC CALCULATION (BEZET), ECG08: 440 MS
SODIUM SERPL-SCNC: 139 MMOL/L (ref 136–145)
TRIGL SERPL-MCNC: 97 MG/DL (ref ?–150)
TROPONIN I SERPL-MCNC: 12 NG/ML (ref 0–0.04)
TROPONIN I SERPL-MCNC: 22.9 NG/ML (ref 0–0.04)
VENTRICULAR RATE, ECG03: 57 BPM
VENTRICULAR RATE, ECG03: 58 BPM
VENTRICULAR RATE, ECG03: 68 BPM
VLDLC SERPL CALC-MCNC: 19.4 MG/DL

## 2020-07-14 PROCEDURE — 74011250637 HC RX REV CODE- 250/637: Performed by: INTERNAL MEDICINE

## 2020-07-14 PROCEDURE — 36415 COLL VENOUS BLD VENIPUNCTURE: CPT

## 2020-07-14 PROCEDURE — 77030037878 HC DRSG MEPILEX >48IN BORD MOLN -B

## 2020-07-14 PROCEDURE — 84484 ASSAY OF TROPONIN QUANT: CPT

## 2020-07-14 PROCEDURE — 94762 N-INVAS EAR/PLS OXIMTRY CONT: CPT

## 2020-07-14 PROCEDURE — 80061 LIPID PANEL: CPT

## 2020-07-14 PROCEDURE — 80048 BASIC METABOLIC PNL TOTAL CA: CPT

## 2020-07-14 PROCEDURE — 65660000004 HC RM CVT STEPDOWN

## 2020-07-14 PROCEDURE — 93321 DOPPLER ECHO F-UP/LMTD STD: CPT

## 2020-07-14 PROCEDURE — 93306 TTE W/DOPPLER COMPLETE: CPT

## 2020-07-14 PROCEDURE — 74011250636 HC RX REV CODE- 250/636: Performed by: INTERNAL MEDICINE

## 2020-07-14 PROCEDURE — 93005 ELECTROCARDIOGRAM TRACING: CPT

## 2020-07-14 RX ORDER — TEMAZEPAM 15 MG/1
15 CAPSULE ORAL
Status: DISPENSED | OUTPATIENT
Start: 2020-07-14 | End: 2020-07-15

## 2020-07-14 RX ORDER — ATROPINE SULFATE 1 MG/ML
0.5 INJECTION, SOLUTION INTRAVENOUS AS NEEDED
Status: DISCONTINUED | OUTPATIENT
Start: 2020-07-14 | End: 2020-07-15 | Stop reason: HOSPADM

## 2020-07-14 RX ORDER — AMLODIPINE BESYLATE 2.5 MG/1
2.5 TABLET ORAL DAILY
Status: DISCONTINUED | OUTPATIENT
Start: 2020-07-14 | End: 2020-07-15 | Stop reason: HOSPADM

## 2020-07-14 RX ORDER — CLOPIDOGREL BISULFATE 75 MG/1
75 TABLET ORAL DAILY
Status: DISCONTINUED | OUTPATIENT
Start: 2020-07-14 | End: 2020-07-15 | Stop reason: HOSPADM

## 2020-07-14 RX ORDER — SODIUM CHLORIDE 0.9 % (FLUSH) 0.9 %
5-40 SYRINGE (ML) INJECTION EVERY 8 HOURS
Status: DISCONTINUED | OUTPATIENT
Start: 2020-07-14 | End: 2020-07-15 | Stop reason: HOSPADM

## 2020-07-14 RX ORDER — SODIUM CHLORIDE 9 MG/ML
100 INJECTION, SOLUTION INTRAVENOUS CONTINUOUS
Status: DISPENSED | OUTPATIENT
Start: 2020-07-14 | End: 2020-07-14

## 2020-07-14 RX ORDER — CLOPIDOGREL BISULFATE 75 MG/1
75 TABLET ORAL DAILY
Status: DISCONTINUED | OUTPATIENT
Start: 2020-07-15 | End: 2020-07-14

## 2020-07-14 RX ORDER — HYDROMORPHONE HYDROCHLORIDE 1 MG/ML
0.5 INJECTION, SOLUTION INTRAMUSCULAR; INTRAVENOUS; SUBCUTANEOUS
Status: DISPENSED | OUTPATIENT
Start: 2020-07-14 | End: 2020-07-15

## 2020-07-14 RX ORDER — ACETAMINOPHEN 325 MG/1
650 TABLET ORAL
Status: DISCONTINUED | OUTPATIENT
Start: 2020-07-14 | End: 2020-07-15 | Stop reason: HOSPADM

## 2020-07-14 RX ORDER — ASPIRIN 81 MG/1
81 TABLET ORAL DAILY
Status: DISCONTINUED | OUTPATIENT
Start: 2020-07-14 | End: 2020-07-15 | Stop reason: HOSPADM

## 2020-07-14 RX ORDER — ATORVASTATIN CALCIUM 40 MG/1
80 TABLET, FILM COATED ORAL
Status: DISCONTINUED | OUTPATIENT
Start: 2020-07-14 | End: 2020-07-15 | Stop reason: HOSPADM

## 2020-07-14 RX ORDER — ONDANSETRON 2 MG/ML
4 INJECTION INTRAMUSCULAR; INTRAVENOUS
Status: ACTIVE | OUTPATIENT
Start: 2020-07-14 | End: 2020-07-15

## 2020-07-14 RX ORDER — SODIUM CHLORIDE 0.9 % (FLUSH) 0.9 %
5-40 SYRINGE (ML) INJECTION AS NEEDED
Status: DISCONTINUED | OUTPATIENT
Start: 2020-07-14 | End: 2020-07-15 | Stop reason: HOSPADM

## 2020-07-14 RX ADMIN — ASPIRIN 81 MG: 81 TABLET, COATED ORAL at 11:18

## 2020-07-14 RX ADMIN — CLOPIDOGREL BISULFATE 75 MG: 75 TABLET ORAL at 11:26

## 2020-07-14 RX ADMIN — SODIUM CHLORIDE 100 ML/HR: 900 INJECTION, SOLUTION INTRAVENOUS at 06:00

## 2020-07-14 RX ADMIN — ATORVASTATIN CALCIUM 80 MG: 40 TABLET, FILM COATED ORAL at 22:23

## 2020-07-14 RX ADMIN — Medication 10 ML: at 14:00

## 2020-07-14 RX ADMIN — AMLODIPINE BESYLATE 2.5 MG: 2.5 TABLET ORAL at 11:18

## 2020-07-14 RX ADMIN — Medication 10 ML: at 05:12

## 2020-07-14 RX ADMIN — Medication 10 ML: at 22:27

## 2020-07-14 NOTE — PROGRESS NOTES
Patient active with [facility] for [place] knee protocol, will need MICHAEL home health orders stating \"resume home health, skilled nursing and PT per [place] knee protocol\" prior to d/c.       Reason for Readmission:    STEMI (ST elevation myocardial infarction) ([facility] [address] [I21.3]         RUR Score and Risk Level:      10%     Level of Readmission:    1   (1-3)   (1 - First Readmission, 2- Second Readmission within 30 days or multiple admissions over previous 90 days, 3- Greater than two readmissions within 30 days or multiple admissions over previous 90 days)      Care Conference scheduled:   (consider for all patient's with readmission level of 2, should definitely be scheduled for all patients with readmission level of 3)       Resources/supports as identified by patient/family:         Top Challenges facing patient (as identified by patient/family and CM):     Home health co-pays are high with insurance. Medical complications post-op. Current Advanced Directive/Advance Care Plan:  No,  is NOK           Plan for utilizing home health:   Yes active with [facility]. Likelihood of additional readmission:                Transition of Care Plan:    Based on readmission, the patient's previous Plan of Care   has been evaluated and/or modified. The current Transition of Care Plan is:            Initial assessment completed with patient. Cognitive status of patient: oriented to time, place, person and situation. Face sheet information confirmed:  yes. The patient designates  to participate in her discharge plan and to receive any needed information. This patient lives in a single family home with . Patient is able to navigate steps as needed. Prior to hospitalization, patient was considered to be independent with ADLs/IADLS : yes .     Patient has a current ACP document on file: no,  NOK  The  will be available to transport patient home upon discharge. The patient already has [facility] [place],  medical equipment available in the home. Patient is currently active with home health. If active, agency name is riana caruso. Patient has not stayed in a skilled nursing facility or rehab. This patient is on dialysis :no      List of available Home Health agencies were provided and reviewed with the patient prior to discharge. Freedom of choice signed: yes, for riana caruso. Currently, the discharge plan is Home with [address] [place] [address] Brian Aguero. The patient states that she can obtain her medications from the pharmacy, and take her medications as directed. Patient's current insurance is [employer]. Care Management Interventions  PCP Verified by CM:  Yes  Palliative Care Criteria Met (RRAT>21 & CHF Dx)?: No  Mode of Transport at Discharge: Self  Transition of Care Consult (CM Consult): [address]: Yes  Discharge Durable Medical Equipment: No  Physical Therapy Consult: Yes  Occupational Therapy Consult: Yes  Speech Therapy Consult: No  Current Support Network: Lives with Spouse  Confirm Follow Up Transport: Family  The Patient and/or Patient Representative was Provided with a Choice of Provider and Agrees with the Discharge Plan?: Yes  Freedom of Choice List was Provided with Basic Dialogue that Supports the Patient's Individualized Plan of Care/Goals, Treatment Preferences and Shares the Quality Data Associated with the Providers?: Yes  Discharge Location  Discharge Placement: Home with home health        PAUL Cook  Case Management  [phone]

## 2020-07-14 NOTE — H&P
History & Physical    Patient: Dax Ramos MRN: [id number]  CSN: [id number]    YOB: 1955  Age: 59 y.o. Sex: female      DOA: 7/13/2020       HPI:     Dax Ramos is a 59 y.o. female with a history of hypertension and hyperlipidemia. She is postop from 7/7/2020 left total knee arthroplasty. On 7/13/2020 the patient developed increased abdominal fullness and nausea with diaphoresis and presented to [facility] emergency room. In the ED the triage EKG showed an acute inferior lateral STEMI. Troponin was 2.43. Creatinine 0.58, GFR greater than 60. Vital signs were stable with a heart rate of 66, /68, respiratory rate 18. Patient received aspirin and heparin in the emergency room was taken by Dr. Marlen Pagan to the Cath Lab. Past Medical History:   Diagnosis Date    Arthritis     Calcific Achilles tendonitis     Right; insertional    Hypercholesterolemia     Hypertension     Left knee pain     Pain of right heel     Sprain of right ring finger     Thyroid disease     Wears glasses        Past Surgical History:   Procedure Laterality Date    HX CHOLECYSTECTOMY  1990    HX HEENT      Eye surgery    HX HERNIA REPAIR         Family History   Problem Relation Age of Onset    Breast Cancer Other     Arthritis-osteo Mother     Hypertension Mother     Heart Disease Mother     Hypertension Other         Grandmother    Other Other         Heart problems:  Mother, grandmother    Diabetes Other         Grandmother       Social History     Socioeconomic History    Marital status:      Spouse name: Not on file    Number of children: Not on file    Years of education: Not on file    Highest education level: Not on file   Tobacco Use    Smoking status: Former Smoker     Years: 15.00    Smokeless tobacco: Never Used   Substance and Sexual Activity    Alcohol use: Yes     Frequency: Monthly or less     Comment: Occasional    Drug use: No       Prior to Admission medications    Medication Sig Start Date End Date Taking? Authorizing Provider   oxyCODONE-acetaminophen (Percocet) 7.5-325 mg per tablet Take 1-2 Tabs by mouth every six (6) hours as needed for Pain for up to 7 days. Max Daily Amount: 8 Tabs. 7/7/20 7/14/20  Freddie Douglass PA-C   docusate sodium (Colace) 100 mg capsule Take 1 Cap by mouth two (2) times a day for 90 days. 7/7/20 10/5/20  Freddie Douglass PA-C   cephALEXin (Keflex) 500 mg capsule Take 1 Cap by mouth four (4) times daily. 7/7/20   Freddie Douglass PA-C   aspirin (ASPIRIN) 325 mg tablet Take 1 Tab by mouth two (2) times a day. 7/7/20   Freddie Douglass PA-C   lisinopriL (PRINIVIL, ZESTRIL) 20 mg tablet Take 1 Tab by mouth two (2) times a day. Patient taking differently: Take 40 mg by mouth daily. 7/1/20   Jerod Mendez MD   cholecalciferol (VITAMIN D3) (50,000 UNITS /1250 MCG) capsule Take  by mouth every seven (7) days. Provider, Historical   amLODIPine (NORVASC) 10 mg tablet Take 10 mg by mouth daily. Provider, Historical   atorvastatin (LIPITOR) 20 mg tablet Take 40 mg by mouth daily. Provider, Historical   escitalopram oxalate (LEXAPRO) 20 mg tablet Take 20 mg by mouth daily.     Provider, Historical       No Known Allergies    Review of systems  9 point review of systems was performed and as stated in the HPI  Additionally patient states she had a stress test for preop clearance that was normal    Physical Exam:      Visit Vitals  /68 (BP 1 Location: Left arm, BP Patient Position: At rest)   Pulse 66   Temp 97.8 °F (36.6 °C)   Resp 18   SpO2 100%       Physical Exam:  GENERAL: fatigued, cooperative, mild distress  HEENT head is atraumatic, anicteric, conjunctiva clear, facial symmetry, neck is supple, negative bruits  Chest heart rate is regular, no murmur, lungs clear  Abdomen soft, bowel sounds positive, no tenderness elicited  Extremities left lower extremity with anterior dressing, calf is soft negative Homans positive pulses      Lab/Data Review:  Labs: Results:       Chemistry Recent Labs     07/13/20 2045   *      K 3.8      CO2 31   BUN 15   CREA 0.58*   CA 9.0   AGAP 5   BUCR 26*   *   TP 7.1   ALB 3.2*   GLOB 3.9   AGRAT 0.8      CBC w/Diff Recent Labs     07/13/20 2045   WBC 10.1   RBC 3.78*   HGB 10.4*   HCT 32.0*      GRANS 71   LYMPH 22   EOS 1      Coagulation No results for input(s): PTP, INR, APTT, INREXT in the last 72 hours. Iron/Ferritin No results for input(s): IRON in the last 72 hours. No lab exists for component: TIBCCALC   BNP No results for input(s): BNPP in the last 72 hours. Cardiac Enzymes No results for input(s): CPK, CKND1, TOSHIA in the last 72 hours.     No lab exists for component: CKRMB, TROIP   Liver Enzymes Recent Labs     07/13/20 2045   TP 7.1   ALB 3.2*   *      Thyroid Studies Lab Results   Component Value Date/Time    TSH 2.61 11/13/2019 08:50 AM          All Micro Results     None            Assessment:   Active Problems:    STEMI (ST elevation myocardial infarction) ([facility] [address] (7/13/2020)      Hypertension  Hyperlipidemia  Status post left knee arthroplasty 1 week      Plan:   Admit to CV CCU  Patient in Cath Lab with Dr. Enrrique Sorensen  Monitor vital signs control blood pressure  Trend troponin, EKG    Discussed advanced directives with the patient who wants full measures full code    Carol Heard DO  7/13/2020, 11:22 PM

## 2020-07-14 NOTE — PROGRESS NOTES
[facility]  Progress Note    Patient: Esteban Andrade Age: 59 y.o. : 1955 MR#: [id number] SSN: [id number]  Date/Time: 2020     Subjective:     Review of systems    No CP   NO NVD  No SOB  NO Cough     Assessment/Plan:   Patient with h/o recent TKR left knee , h/o HTN , Obesity , HLD admitted for STEMI , now s/o s/p PCI to prox/mid RCA with SAIGE    1. STEMI     - asymptomatic   Follow cardiology recs     2 Left knee TKR     - Ortho consulted , Post ACS meds - places patient on high risk of bleeding - On ASA + Plavix   - ? Suggestions regarding Post TKR care in current situation or continue Rehab ordered as before     3 HTN   - Optimal control desired   - BB( Not yet on it )  - defer to cardiology   - On CCB per card      4  Morbid ovbesity - Body mass index is 31.31 kg/m². - weight reduction with Lifestyle modification     Case discussed with:  [x]Patient  [] Family ( In room with patient )    []Nursing  []Case Management  DVT Prophylaxis:  []Lovenox  []Hep SQ  []SCDs  []Coumadin   []On Heparin gtt          Objective:   VS:   Visit Vitals  /74   Pulse 66   Temp 98.4 °F (36.9 °C)   Resp 15   Ht 5' 9\" (1.753 m)   Wt 96.2 kg (212 lb)   SpO2 95%   Breastfeeding Unknown   BMI 31.31 kg/m²      Tmax/24hrs: Temp (24hrs), Av °F (36.7 °C), Min:97.7 °F (36.5 °C), Max:98.4 °F (36.9 °C)  IOBRIEF    Intake/Output Summary (Last 24 hours) at 2020 1306  Last data filed at 2020 2667  Gross per 24 hour   Intake 630 ml   Output 300 ml   Net 330 ml       General:  Alert, cooperative, no acute distress   Cardiovascular: S1S2 - regular , No Murmur   Pulmonary: Equal expansion , No Use of accessory muscles , No Rales No Rhonchi    GI:  +BS in all four quadrants, soft, non-tender  Extremities:  left knee dressing clean   Neuro: Alert and oriented X 2.        Medications:   Current Facility-Administered Medications   Medication Dose Route Frequency    0.9% sodium chloride infusion  100 mL/hr IntraVENous CONTINUOUS    sodium chloride (NS) flush 5-40 mL  5-40 mL IntraVENous Q8H    sodium chloride (NS) flush 5-40 mL  5-40 mL IntraVENous PRN    HYDROmorphone (DILAUDID) syringe 0.5 mg  0.5 mg IntraVENous Q6H PRN    acetaminophen (TYLENOL) tablet 650 mg  650 mg Oral Q4H PRN    temazepam (RESTORIL) capsule 15 mg  15 mg Oral QHS PRN    ondansetron (ZOFRAN) injection 4 mg  4 mg IntraVENous Q4H PRN    atropine 1 mg/mL injection 0.5 mg  0.5 mg IntraVENous PRN    aspirin delayed-release tablet 81 mg  81 mg Oral DAILY    atorvastatin (LIPITOR) tablet 80 mg  80 mg Oral QHS    amLODIPine (NORVASC) tablet 2.5 mg  2.5 mg Oral DAILY    clopidogreL (PLAVIX) tablet 75 mg  75 mg Oral DAILY    DOPamine (INTROPIN) 800 mg in dextrose 5% 500 mL infusion  0-20 mcg/kg/min IntraVENous TITRATE       Labs:    Recent Labs     07/13/20  2045   WBC 10.1   HGB 10.4*   HCT 32.0*        Recent Labs     07/14/20  1108 07/13/20  2045    139   K 4.1 3.8    103   CO2 30 31   * 147*   BUN 13 15   CREA 0.60 0.58*   CA 8.9 9.0   MG  --  2.2   ALB  --  3.2*   ALT  --  41         Disclaimer: Sections of this note are dictated utilizing voice recognition software, which may have resulted in some phonetic based errors in grammar and contents. Even though attempts were made to correct all the mistakes, some may have been missed, and remained in the body of the document. If questions arise, please contact our department.     Signed By: Lynda Quesada MD     July 14, 2020

## 2020-07-14 NOTE — PROGRESS NOTES
2310 Pt arrived unit from cath lab via bed. Alert and oriented x4. Denies pain. VSS. On 2 liters of oxygen via NC. Right groin clean, dry and intact. Honey comb dressing left knee clean, dry and intact. Dopamine drip infusing as ordered. Rate verified. CHG bath complete. Mouth care performed by patient. Bed locked and in low position. Oriented to call bell,  room, and surrounding. Call bell in reach. Instruct to call for assistance. 0000 Asleep with no acute distress noted. 0400 Resting with eyes closed. No distress noted. Bedside shift change report given to Colt Puente RN (oncoming nurse) by Ryan Mcnally RN (offgoing nurse).  Report included the following information Sbar, mar, kardex and recent results

## 2020-07-14 NOTE — ED PROVIDER NOTES
EMERGENCY DEPARTMENT HISTORY AND PHYSICAL EXAM    8:47 PM  Date: 7/13/2020  Patient Name: Saurav Hoskins    History of Presenting Illness     Chief Complaint   Patient presents with    Dizziness    Nausea    Post OP Complication        History Provided By: Patient    HPI: Saurav Hoskins is a 59 y.o. female with history of hypertension and hypercholesteremia status post left knee replacement last week. Patient is presenting with epigastric pain and nausea that started around 9 AM.  She had an intense episode of the pain around 4 PM associated with diaphoresis. Denies chest pain or shortness of breath. No history of cough or fever. She has been taking a full dose aspirin after her knee replacement. She had a stress test 10 days ago as a screening process of her surgery which was negative. Location:  Severity:  Timing/course: Onset/Duration:     PCP: JAMES Tong    Past History     Past Medical History:  Past Medical History:   Diagnosis Date    Arthritis     Calcific Achilles tendonitis     Right; insertional    Hypercholesterolemia     Hypertension     Left knee pain     Pain of right heel     Sprain of right ring finger     Thyroid disease     Wears glasses        Past Surgical History:  Past Surgical History:   Procedure Laterality Date    HX CHOLECYSTECTOMY  1990    HX HEENT      Eye surgery    HX HERNIA REPAIR         Family History:  Family History   Problem Relation Age of Onset    Breast Cancer Other     Arthritis-osteo Mother     Hypertension Mother     Heart Disease Mother     Hypertension Other         Grandmother    Other Other         Heart problems: Mother, grandmother    Diabetes Other         Grandmother       Social History:  Social History     Tobacco Use    Smoking status: Former Smoker     Years: 15.00    Smokeless tobacco: Never Used   Substance Use Topics    Alcohol use: Yes     Frequency: Monthly or less     Comment: Occasional    Drug use:  No Allergies:  No Known Allergies    Review of Systems   Review of Systems   Constitutional: Positive for diaphoresis. Gastrointestinal: Positive for abdominal pain, nausea and vomiting. All other systems reviewed and are negative. Physical Exam     Patient Vitals for the past 12 hrs:   Temp Pulse Resp BP SpO2   07/13/20 2019 97.8 °F (36.6 °C) 66 18 107/68 100 %       Physical Exam  Vitals signs and nursing note reviewed. Constitutional:       Appearance: She is diaphoretic. HENT:      Head: Normocephalic and atraumatic. Eyes:      Extraocular Movements: Extraocular movements intact. Neck:      Musculoskeletal: Normal range of motion and neck supple. Cardiovascular:      Rate and Rhythm: Normal rate. Pulses: Normal pulses. Pulmonary:      Effort: Pulmonary effort is normal. No respiratory distress. Abdominal:      General: There is no distension. Palpations: Abdomen is soft. Tenderness: There is no abdominal tenderness. Musculoskeletal: Normal range of motion. Neurological:      General: No focal deficit present. Mental Status: She is alert and oriented to person, place, and time. Psychiatric:         Mood and Affect: Mood normal.         Behavior: Behavior normal.         Diagnostic Study Results     Labs -  No results found for this or any previous visit (from the past 12 hour(s)). Radiologic Studies -   No results found. Medical Decision Making     ED Course: Progress Notes, Reevaluation, and Consults:    8:47 PM Initial assessment performed. The patients presenting problems have been discussed, and they/their family are in agreement with the care plan formulated and outlined with them. I have encouraged them to ask questions as they arise throughout their visit.     8:48 PM  Dr Valorie Toussaint on the way    Provider Notes (Medical Decision Making): 27-year-old female history of hypertension and high cholesterol status post knee replacement presenting with epigastric pain and nausea and an episode of diaphoresis. Screening EKG was done in triage and shows inferior lateral STEMI. Patient was immediately brought back and STEMI code was activated. She had equal distal pulses. Screening labs were ordered and the patient received a full dose aspirin and a dose of heparin. Dr. Henri Chen came and escorted the patient to the Cath Lab for an angiogram.  Patient did not show signs of fluid overload on exam.    Case was also discussed with the hospitalist Dr. Rakesh Packer. Procedures:     Critical Care Time: Upon my evaluation, this patient had a high probability of imminent or life-threatening deterioration due to STEMI, which required my direct attention, intervention, and personal management. I have personally provided 30 minutes of critical care time exclusive of time spent on separately billable procedures. Time includes review of laboratory data, radiology results, discussion with consultants, and monitoring for potential decompensation. Interventions were performed as documented above. Karl Kitchen MD    7:24 AM        Vital Signs-Reviewed the patient's vital signs. Reviewed pt's pulse ox reading. EKG: Interpreted by the EP. Time Interpreted:    Rate:    Rhythm: Sinus bradycardia at 58 with first-degree AV block. Interpretation: ST elevation in the inferior lateral leads. Comparison: New ST elevations. Records Reviewed: Nursing Notes, Old Medical Records and Previous electrocardiograms (Time of Review: 8:47 PM)  -I am the first provider for this patient.  -I reviewed the vital signs, available nursing notes, past medical history, past surgical history, family history and social history.     Current Facility-Administered Medications   Medication Dose Route Frequency Provider Last Rate Last Dose    aspirin chewable tablet 324 mg  324 mg Oral NOW Karl Kitchen MD        famotidine (PF) (PEPCID) injection 20 mg  20 mg IntraVENous NOW Karl Kitchen MD  ondansetron (ZOFRAN) injection 4 mg  4 mg IntraVENous NOW Karl Kitchen MD         Current Outpatient Medications   Medication Sig Dispense Refill    oxyCODONE-acetaminophen (Percocet) 7.5-325 mg per tablet Take 1-2 Tabs by mouth every six (6) hours as needed for Pain for up to 7 days. Max Daily Amount: 8 Tabs. 56 Tab 0    docusate sodium (Colace) 100 mg capsule Take 1 Cap by mouth two (2) times a day for 90 days. 60 Cap 2    cephALEXin (Keflex) 500 mg capsule Take 1 Cap by mouth four (4) times daily. 12 Cap 0    aspirin (ASPIRIN) 325 mg tablet Take 1 Tab by mouth two (2) times a day. 60 Tab 0    celecoxib (CeleBREX) 200 mg capsule Take 1 Cap by mouth two (2) times a day for 90 days. 60 Cap 2    lisinopriL (PRINIVIL, ZESTRIL) 20 mg tablet Take 1 Tab by mouth two (2) times a day. (Patient taking differently: Take 40 mg by mouth daily. ) 180 Tab 3    cholecalciferol (VITAMIN D3) (50,000 UNITS /1250 MCG) capsule Take  by mouth every seven (7) days.  amLODIPine (NORVASC) 10 mg tablet Take 10 mg by mouth daily.  atorvastatin (LIPITOR) 20 mg tablet Take 40 mg by mouth daily.  escitalopram oxalate (LEXAPRO) 20 mg tablet Take 20 mg by mouth daily. Clinical Impression     Clinical Impression: No diagnosis found. Disposition: Admit      This note was dictated utilizing voice recognition software which may lead to typographical errors. I apologize in advance if the situation occurs. If questions arise please do not hesitate to contact me or call our department.     Jose C Beck MD  8:47 PM

## 2020-07-14 NOTE — PROGRESS NOTES
0800:  Report received, care assumed. Complete assessment performed. AAOx4. Denies CP, pressure, SOB, nausea or discomforts. NSR. VSS. Right groin c/d/i, no oozing, no hematoma. Reinforced post cardiac cath instructions; patient verbalizes understanding all info. 0830:  Dr. Vikas Bermeo on rounds. New order for transfer to [facility]T Stepdown noted. Patient aware and agreeable to plan of care. 0900:  Breakfast served. 1200:  Reassessed. No acute changes noted. 1230:  Critical Troponin level received by coworker RN, cardiology provider was notified, see critical result flowsheet. 1245:  Dr. Viral Summers attending provider on rounds. Ortho service to be consulted by attending provider. 1300:  Lunch served. Monitor. 1600: Bathed. Ambulated in halls using rollator, with RN and monitor at side. Tolerated well. Returned to recliner chair, legs elevated. 1700:  Dinner served. Talking on phone. Denies pain. NSR. VSS. Call bell and phone at side. Monitor. 1840:  Returned to bed with minimal assist. Denies pain or discomfort. NSR. Right groin and left knee incision remain c/d/i.   1900:  Bedside and Verbal shift change report given to [address] (oncoming nurse) by Delon Elliott RN(offgoing nurse). Report included the following information SBAR, Kardex, Procedure Summary, Intake/Output, MAR, Accordion, Recent Results, Cardiac Rhythm NSR. and Alarm Parameters .

## 2020-07-14 NOTE — PROGRESS NOTES
[facility]      CARDIOLOGY PROGRESS NOTE  RECS:  1. Acute inferior STEMI: Very atypical presentation with symptoms of nausea and uneasiness starting at 9 AM.s/p PCI to prox/mid RCA with SAIGE. 2. Hypertension: Controlled. Start low dose norvasc. .  3. Hyperlipidemia: Will increase the dose of atorvastatin. 4. Obesity with BMI of 31.8: Will reinforce the importance of weight loss and change of lifestyle. 5. Recent total knee replacement surgery 6 days ago. Will need to watch for any bleeding in the knee would request orthopedic follow-up. .    Transfer to [facility].   [facility] tomorrow if stable    EKG Results     Procedure 720 Value Units Date/Time    EKG, 12 LEAD, INITIAL [[id number]] Collected:  07/14/20 0456    Order Status:  Completed Updated:  07/14/20 0823     Ventricular Rate 57 BPM      Atrial Rate 57 BPM      P-R Interval 192 ms      QRS Duration 80 ms      Q-T Interval 420 ms      QTC Calculation (Bezet) 408 ms      Calculated P Axis 59 degrees      Calculated R Axis 3 degrees      Calculated T Axis -19 degrees      Diagnosis --     Sinus bradycardia  T wave abnormality, consider inferior ischemia  T wave abnormality, consider anterolateral ischemia  Abnormal ECG  When compared with ECG of 13-JUL-2020 21:09,  No significant change was found  Confirmed by Batsheva Villagran MD, Nancy Steele ([id number]) on 7/14/2020 8:23:16 AM      EKG, 12 LEAD, REPEAT [[id number]] Collected:  07/13/20 2109    Order Status:  Completed Updated:  07/14/20 6626     Ventricular Rate 68 BPM      Atrial Rate 68 BPM      P-R Interval 208 ms      QRS Duration 90 ms      Q-T Interval 414 ms      QTC Calculation (Bezet) 440 ms      Calculated P Axis 62 degrees      Calculated R Axis 44 degrees      Calculated T Axis 28 degrees      Diagnosis --     Normal sinus rhythm  T wave abnormality, consider inferior ischemia  T wave abnormality, consider anterolateral ischemia  Abnormal ECG    Confirmed by Batsheva Villagran MD, Nancy Steele ([id number]) on 7/14/2020 8:21:53 AM      EKG, 12 LEAD, INITIAL [[id number]] Collected:  07/13/20 2031    Order Status:  Completed Updated:  07/14/20 0821     Ventricular Rate 58 BPM      Atrial Rate 58 BPM      P-R Interval 216 ms      QRS Duration 86 ms      Q-T Interval 428 ms      QTC Calculation (Bezet) 420 ms      Calculated P Axis 51 degrees      Calculated R Axis 20 degrees      Calculated T Axis 87 degrees      Diagnosis --     Sinus bradycardia with 1st degree AV block  Possible Left atrial enlargement  ST & T wave abnormality, consider inferolateral ischemia  Abnormal ECG    Confirmed by Donald Gaitan MD, Dolph Rack ([id number]) on 7/14/2020 8:21:25 AM      EKG, 12 LEAD, SUBSEQUENT [[id number]]     Order Status:  Sent         XR Results (most recent):  Results from [place] encounter on 07/07/20   XR KNEE LT MAX 2 VWS    Narrative EXAM: KNEE LIMITED Two Views    CLINICAL HISTORY/INDICATION: s/p TKA    COMPARISON: None. TECHNIQUE: AP and lateral views obtained. FINDINGS:    Skin staples are demonstrated. A surgical drain projects in the region of the  suprapatellar bursa. Droplets of gas are present within the soft tissues  secondary to the recent surgery. A tricompartmental replacement is  demonstrated. There is no acute fracture or dislocation. Impression IMPRESSION:    Satisfactory appearance of the total knee replacement. 07/02/20   NUCLEAR CARDIAC STRESS TEST 07/02/2020 7/7/2020    Narrative · Baseline ECG: Normal sinus rhythm, non-specific ST-T wave abnormalities. · Negative stress test.  · Gated SPECT: Left ventricular function post-stress was normal.   Calculated ejection fraction is 90%. There is no evidence of transient   ischemic dilation (TID). The TID ratio is 0.95. · Left ventricular perfusion is normal.  · Negative myocardial perfusion imaging.  Myocardial perfusion imaging   supports a low risk stress test.        Signed by: Sally Fernández MD     07/13/20   CARDIAC PROCEDURE 07/13/2020 7/13/2020 Addendum  · Critical one-vessel coronary artery disease with 99% stenosis and  interval coronary thrombus at proximal/mid RCA. · Noncritical 50% mid LAD stenosis and diffuse luminal irregularities  throughout the coronary vasculature with moderate proximal calcification · Successful PCI of proximal/mid RCA deploying a drug-eluting stent with a  residual 0% stenosis and a very tortuous right coronary artery. · Procedure done via right femoral artery and 6 [place] Angio-Seal at the  end. Patient has diffuse luminal irregularities and now had a acute event. Aggressive medical treatment and risk factor modification should be done. Because of extensive thrombus burden, I used 2 doses of IV Integrilin  during the procedure and a bolus of ticagrelor postprocedure. Considering recent knee replacement we will follow it with Plavix instead  of Brilinta. Patient had very atypical presentation with primarily nausea and uneasy  feeling and so there was significant delay in presentation. Her symptoms  started at 9 AM and STEMI code was called around 9 PM.  She came by car  with her  to the emergency room. Jaden Monroy MD 7/13/2020 11:33 PM          Narrative · Critical one-vessel coronary artery disease with 99% stenosis and   interval coronary thrombus at proximal/mid RCA. · Noncritical 50% mid LAD stenosis and diffuse luminal irregularities   throughout the coronary vasculature with moderate proximal calcification  · Successful PCI of proximal/mid RCA deploying a drug-eluting stent with a   residual 0% stenosis and a very tortuous right coronary artery. · Procedure done via right femoral artery and 6 [place] Angio-Seal at the   end. Patient has diffuse luminal irregularities and now had a acute event. Aggressive medical treatment and risk factor modification should be done.     Because of extensive thrombus burden, I used 2 doses of IV Integrilin   during the procedure and a bolus of ticagrelor postprocedure. Considering recent knee replacement we will follow it with Plavix instead   of Brilinta. Patient had very atypical presentation with primarily nausea and uneasy   feeling and so there was significant delay in presentation. Her symptoms   started at 9 AM and STEMI code was called around 9 PM.  She came by car   with her  to the emergency room. Signed by: Balbina Massey MD         ASSESSMENT:  Hospital Problems  Date Reviewed: 7/1/2020          Codes Class Noted POA    STEMI (ST elevation myocardial infarction) [facility]) ICD-10-CM: I21.3  ICD-9-CM: 410.90  7/13/2020 Unknown                SUBJECTIVE:  No chest pain  No shortness of breath    OBJECTIVE:    VS:   Visit Vitals  /62   Pulse 61   Temp 98.4 °F (36.9 °C)   Resp 19   Ht 5' 9\" (1.753 m)   Wt 96.2 kg (212 lb)   SpO2 99%   Breastfeeding Unknown   BMI 31.31 kg/m²         Intake/Output Summary (Last 24 hours) at 7/14/2020 0847  Last data filed at 7/14/2020 0659  Gross per 24 hour   Intake 630 ml   Output 300 ml   Net 330 ml     TELE: normal sinus rhythm    General: alert and in no apparent distress  HENT: Normocephalic, atraumatic. Normal external eye. Neck :  no bruit, no JVD, JVD difficult to assess due to obesity  Cardiac:  regular rate and rhythm, S1, S2 normal, no murmur, click, rub or gallop  Chest/Lungs:chest clear, no wheezing, rales, normal symmetric air entry  Abdomen: Soft, nontender, no masses  Extremities:  No c/c/edema, peripheral pulses present. Groin exam does not reveal any hematoma or bruits post cath. Distal pulse is present.  No definite knee hematoma/bleed        Labs: Results:       Chemistry Recent Labs     07/13/20 2045   *      K 3.8      CO2 31   BUN 15   CREA 0.58*   CA 9.0   MG 2.2   AGAP 5   BUCR 26*   *   TP 7.1   ALB 3.2*   GLOB 3.9   AGRAT 0.8      CBC w/Diff Recent Labs     07/13/20 2045   WBC 10.1   RBC 3.78*   HGB 10.4*   HCT 32.0*      GRANS 71 LYMPH 22   EOS 1      Cardiac Enzymes No results for input(s): CPK, CKND1, TOSHIA in the last 72 hours. No lab exists for component: CKRMB, TROIP   Coagulation No results for input(s): PTP, INR, APTT, INREXT in the last 72 hours. Lipid Panel Lab Results   Component Value Date/Time    Cholesterol, total 270 (H) 11/13/2019 08:50 AM    HDL Cholesterol 58 11/13/2019 08:50 AM    LDL, calculated 190 (H) 11/13/2019 08:50 AM    VLDL, calculated 22 11/13/2019 08:50 AM    Triglyceride 110 11/13/2019 08:50 AM    CHOL/HDL Ratio 4.7 11/13/2019 08:50 AM      BNP No results for input(s): BNPP in the last 72 hours. Liver Enzymes Recent Labs     07/13/20 2045   TP 7.1   ALB 3.2*   *      Digoxin    Thyroid Studies Lab Results   Component Value Date/Time    TSH 2.61 11/13/2019 08:50 AM              Kinza Reyes MD     Contact numbers:   5 PM to 9 AM: Answering service at [phone]   9 AM to 5 PM: Pager [phone]; if no response, please call through answering service or office.   Office number is [phone] or [phone]

## 2020-07-14 NOTE — CONSULTS
[facility]      CARDIOLOGY CONSULT NOTE    Date of  Admission: 7/13/2020  8:14 PM   Primary Care Physician:  JAMES Tong  Consult requested by : Emergency room physician  Reason for consult: Inferior STEMI     Plan:     1. Acute inferior STEMI: Very atypical presentation with symptoms of nausea and uneasiness starting at 9 AM.  Late presentation to ER due to absence of significant chest pain. Will need emergent cardiac catheterization and coronary intervention as needed. 2. Hypertension: Controlled on present medications present BPs in the low 100s and will need to be watched closely. IV fluid bolus started as lungs are clear. 3. Hyperlipidemia: Will increase the dose of atorvastatin. 4. Obesity with BMI of 31.8: Will reinforce the importance of weight loss and change of lifestyle. 5. Recent total knee replacement surgery 6 days ago. Will need to watch for any bleeding in the knee would request orthopedic follow-up. .       Assessment:     Hospital Problems  Date Reviewed: 7/1/2020          Codes Class Noted POA    STEMI (ST elevation myocardial infarction) [facility]) ICD-10-CM: I21.3  ICD-9-CM: 410.90  7/13/2020 Unknown                   History of Present Illness: This is a 59 y.o. female admitted for STEMI (ST elevation myocardial infarction) ([facility] [address] [I21.3]. Moderately obese 69-year-old -American female with hypertension hyperlipidemia presenting with atypical symptoms and inferior STEMI. Heparin aspirin given in the emergency room and patient brought to Cath Lab for emergent cardiac catheterization and possible coronary intervention. Procedure explained along with risks including bleeding hematoma infection blood clotting stroke heart attack and mortality. Patient understands and is willing to proceed. No previous cardiac history. Had a stress test prior to recent knee replacement surgery which was reportedly normal.  She did have uneventful knee surgery.      Past Medical History:     Past Medical History:   Diagnosis Date    Arthritis     Calcific Achilles tendonitis     Right; insertional    Hypercholesterolemia     Hypertension     Left knee pain     Pain of right heel     Sprain of right ring finger     Thyroid disease     Wears glasses       Past Surgical History:   Procedure Laterality Date    HX CHOLECYSTECTOMY  1990    HX HEENT      Eye surgery    HX HERNIA REPAIR          Social History:     Social History     Socioeconomic History    Marital status:      Spouse name: Not on file    Number of children: Not on file    Years of education: Not on file    Highest education level: Not on file   Tobacco Use    Smoking status: Former Smoker     Years: 15.00    Smokeless tobacco: Never Used   Substance and Sexual Activity    Alcohol use: Yes     Frequency: Monthly or less     Comment: Occasional    Drug use: No        Family History:     Family History   Problem Relation Age of Onset    Breast Cancer Other     Arthritis-osteo Mother     Hypertension Mother     Heart Disease Mother     Hypertension Other         Grandmother    Other Other         Heart problems: Mother, grandmother    Diabetes Other         Grandmother        Medications:   No Known Allergies     Current Facility-Administered Medications   Medication Dose Route Frequency    aspirin chewable tablet 324 mg  324 mg Oral NOW    aspirin 81 mg chewable tablet        sodium chloride 0.9 % bolus infusion 500 mL  500 mL IntraVENous ONCE        Review Of Systems:     No stroke seizures bleeding disorders. No chronic lung liver or kidney disease. No recent fever cough cold vomiting diarrhea hematuria dysuria.      Physical Exam:     Visit Vitals  /68 (BP 1 Location: Left arm, BP Patient Position: At rest)   Pulse 66   Temp 97.8 °F (36.6 °C)   Resp 18   SpO2 100%     BP Readings from Last 3 Encounters:   07/13/20 107/68   07/11/20 112/84   07/10/20 128/68     Pulse Readings from Last 3 Encounters:   07/13/20 66   07/11/20 67   07/10/20 77     Wt Readings from Last 3 Encounters:   07/07/20 96.3 kg (212 lb 6 oz)   07/02/20 96.2 kg (212 lb 1.3 oz)   07/01/20 96.2 kg (212 lb)         No intake or output data in the 24 hours ending 07/13/20 2122      TELE: Sinus rhythm/sinus bradycardia    General: alert and in no apparent distress  HENT: Normocephalic, atraumatic. Normal external eye. Neck :  no bruit, JVD difficult to assess due to obesity  Cardiac:  regular rate and rhythm, S1, S2 normal, no murmur, click, rub or gallop  Chest/Lungs:chest clear, no wheezing, rales, normal symmetric air entry  Abdomen: Soft, nontender, no masses  Extremities:  No c/c/e, peripheral pulses present  Neurological: grossly intact. No focal abnormalities, moves all extremities well. Psychiatric : The patient is awake, alert and oriented x3. Anice [place] is interactive and appropriate.      Data Review:     Basic Metabolic Profile   @KKWAOKZY(UL:3,EJCPMNMXX:2,ZDGQYRBL:8,VC1:3,NDE:1,ZWTIC:8,WELKSDD:7,AMXRGRD:3,ALDZUXIZN:3,RGGHMSUPKA:3)@     CBC w/Diff    @LABRCNTR(WBC:3,HEMOGLOBIN:3,HCT:3,PLATELET:3)@ @WLXFWAFR(LPXJY:2,JKCC:1,YJQUGCFITQB:2)@     Cardiac Enzymes   @LABRCNTR(cpk:3,ckmb:3,ckmbindx:3,tropquant:3)@     Coagulation   @LABRCNTR(pt:3,inr:3,aptt:3)@     Recent Results (from the past 48 hour(s))   EKG, 12 LEAD, INITIAL    Collection Time: 07/13/20  8:31 PM   Result Value Ref Range    Ventricular Rate 58 BPM    Atrial Rate 58 BPM    P-R Interval 216 ms    QRS Duration 86 ms    Q-T Interval 428 ms    QTC Calculation (Bezet) 420 ms    Calculated P Axis 51 degrees    Calculated R Axis 20 degrees    Calculated T Axis 87 degrees    Diagnosis       Sinus bradycardia with 1st degree AV block  Possible Left atrial enlargement  ST & T wave abnormality, consider inferolateral ischemia  Abnormal ECG  When compared with ECG of 26-JUN-2020 13:02,  ID interval has increased  ST elevation now present in Inferior leads     CBC WITH AUTOMATED DIFF    Collection Time: 07/13/20  8:45 PM   Result Value Ref Range    WBC 10.1 4.6 - 13.2 K/uL    RBC 3.78 (L) 4.20 - 5.30 M/uL    HGB 10.4 (L) 12.0 - 16.0 g/dL    HCT 32.0 (L) 35.0 - 45.0 %    MCV 84.7 74.0 - 97.0 FL    MCH 27.5 24.0 - 34.0 PG    MCHC 32.5 31.0 - 37.0 g/dL    RDW 14.8 (H) 11.6 - 14.5 %    PLATELET 521 594 - 957 K/uL    MPV 9.9 9.2 - 11.8 FL    NEUTROPHILS 71 40 - 73 %    LYMPHOCYTES 22 21 - 52 %    MONOCYTES 6 3 - 10 %    EOSINOPHILS 1 0 - 5 %    BASOPHILS 0 0 - 2 %    ABS. NEUTROPHILS 7.1 1.8 - 8.0 K/UL    ABS. LYMPHOCYTES 2.3 0.9 - 3.6 K/UL    ABS. MONOCYTES 0.6 0.05 - 1.2 K/UL    ABS. EOSINOPHILS 0.1 0.0 - 0.4 K/UL    ABS. BASOPHILS 0.0 0.0 - 0.1 K/UL    DF AUTOMATED     METABOLIC PANEL, COMPREHENSIVE    Collection Time: 07/13/20  8:45 PM   Result Value Ref Range    Sodium 139 136 - 145 mmol/L    Potassium 3.8 3.5 - 5.5 mmol/L    Chloride 103 100 - 111 mmol/L    CO2 31 21 - 32 mmol/L    Anion gap 5 3.0 - 18 mmol/L    Glucose 147 (H) 74 - 99 mg/dL    BUN 15 7.0 - 18 MG/DL    Creatinine 0.58 (L) 0.6 - 1.3 MG/DL    BUN/Creatinine ratio 26 (H) 12 - 20      GFR est AA >60 >60 ml/min/1.73m2    GFR est non-AA >60 >60 ml/min/1.73m2    Calcium 9.0 8.5 - 10.1 MG/DL    Bilirubin, total PENDING MG/DL    ALT (SGPT) 41 13 - 56 U/L    AST (SGOT) 47 (H) 10 - 38 U/L    Alk.  phosphatase PENDING U/L    Protein, total PENDING g/dL    Albumin 3.2 (L) 3.4 - 5.0 g/dL    Globulin PENDING g/dL    A-G Ratio PENDING     MAGNESIUM    Collection Time: 07/13/20  8:45 PM   Result Value Ref Range    Magnesium 2.2 1.6 - 2.6 mg/dL   LIPASE    Collection Time: 07/13/20  8:45 PM   Result Value Ref Range    Lipase 46 (L) 73 - 393 U/L   EKG, 12 LEAD, SUBSEQUENT    Collection Time: 07/13/20  9:09 PM   Result Value Ref Range    Ventricular Rate 68 BPM    Atrial Rate 68 BPM    P-R Interval 208 ms    QRS Duration 90 ms    Q-T Interval 414 ms    QTC Calculation (Bezet) 440 ms    Calculated P Axis 62 degrees Calculated R Axis 44 degrees    Calculated T Axis 28 degrees    Diagnosis       Normal sinus rhythm  T wave abnormality, consider inferior ischemia  T wave abnormality, consider anterolateral ischemia  Abnormal ECG  When compared with ECG of 13-JUL-2020 20:31,  T wave inversion more evident in Inferior leads         No intake or output data in the 24 hours ending 07/13/20 2122      Cardiographics:     EKG Results     Procedure 720 Value Units Date/Time    EKG, 12 LEAD, REPEAT [[id number]] Collected:  07/13/20 2109    Order Status:  Completed Updated:  07/13/20 2111     Ventricular Rate 68 BPM      Atrial Rate 68 BPM      P-R Interval 208 ms      QRS Duration 90 ms      Q-T Interval 414 ms      QTC Calculation (Bezet) 440 ms      Calculated P Axis 62 degrees      Calculated R Axis 44 degrees      Calculated T Axis 28 degrees      Diagnosis --     Normal sinus rhythm  T wave abnormality, consider inferior ischemia  T wave abnormality, consider anterolateral ischemia  Abnormal ECG  When compared with ECG of 13-JUL-2020 20:31,  T wave inversion more evident in Inferior leads      EKG, 12 LEAD, INITIAL [[id number]] Collected:  07/13/20 2031    Order Status:  Completed Updated:  07/13/20 2051     Ventricular Rate 58 BPM      Atrial Rate 58 BPM      P-R Interval 216 ms      QRS Duration 86 ms      Q-T Interval 428 ms      QTC Calculation (Bezet) 420 ms      Calculated P Axis 51 degrees      Calculated R Axis 20 degrees      Calculated T Axis 87 degrees      Diagnosis --     Sinus bradycardia with 1st degree AV block  Possible Left atrial enlargement  ST & T wave abnormality, consider inferolateral ischemia  Abnormal ECG  When compared with ECG of 26-JUN-2020 13:02,  WV interval has increased  ST elevation now present in Inferior leads          XR Results (most recent):  Results from Hospital Encounter encounter on 07/07/20   XR KNEE LT MAX 2 VWS    Narrative EXAM: KNEE LIMITED Two Views    CLINICAL HISTORY/INDICATION: s/p TKA    COMPARISON: None. TECHNIQUE: AP and lateral views obtained. FINDINGS:    Skin staples are demonstrated. A surgical drain projects in the region of the  suprapatellar bursa. Droplets of gas are present within the soft tissues  secondary to the recent surgery. A tricompartmental replacement is  demonstrated. There is no acute fracture or dislocation. Impression IMPRESSION:    Satisfactory appearance of the total knee replacement. 07/02/20   NUCLEAR CARDIAC STRESS TEST 07/02/2020 7/7/2020    Narrative · Baseline ECG: Normal sinus rhythm, non-specific ST-T wave abnormalities. · Negative stress test.  · Gated SPECT: Left ventricular function post-stress was normal.   Calculated ejection fraction is 90%. There is no evidence of transient   ischemic dilation (TID). The TID ratio is 0.95. · Left ventricular perfusion is normal.  · Negative myocardial perfusion imaging.  Myocardial perfusion imaging   supports a low risk stress test.        Signed by: Tom Rider MD              Signed By: Nasir Conley MD     July 13, 2020

## 2020-07-14 NOTE — PROGRESS NOTES
responded to Code S for  JOSE Schultz, who is a 59 y.o.,female,     The  provided the following Interventions:  Provided crisis pastoral care and pastoral support. Offered prayers on behalf for the patient. Chart reviewed. The following outcomes were achieved:      Assessment:  There are no spiritual or Anglican issues which require intervention at this time. Plan:  Chaplains will continue to follow and will provide pastoral care on an as needed/requested basis.  recommends bedside caregivers page  on duty if patient shows signs of acute spiritual or emotional distress. Chaplain Viola MARSHALL  [address] [facility]   [phone]

## 2020-07-14 NOTE — ED TRIAGE NOTES
Pt had knee surgery, started feeling nauseated on Saturday and again today, had 2 episodes today where she felt dizzy and became diaphoretic with nausea, no vomiting, no CP, no SOB, pt has been having normal BM, last one today.

## 2020-07-15 ENCOUNTER — HOME CARE VISIT (OUTPATIENT)
Dept: HOME HEALTH SERVICES | Facility: HOME HEALTH | Age: 65
End: 2020-07-15
Payer: COMMERCIAL

## 2020-07-15 VITALS
DIASTOLIC BLOOD PRESSURE: 61 MMHG | BODY MASS INDEX: 31.4 KG/M2 | RESPIRATION RATE: 22 BRPM | SYSTOLIC BLOOD PRESSURE: 128 MMHG | TEMPERATURE: 98.2 F | HEART RATE: 64 BPM | OXYGEN SATURATION: 94 % | WEIGHT: 212 LBS | HEIGHT: 69 IN

## 2020-07-15 VITALS
RESPIRATION RATE: 20 BRPM | HEART RATE: 69 BPM | DIASTOLIC BLOOD PRESSURE: 72 MMHG | SYSTOLIC BLOOD PRESSURE: 127 MMHG | TEMPERATURE: 97.8 F | OXYGEN SATURATION: 97 %

## 2020-07-15 LAB
ECHO AO ROOT DIAM: 2.91 CM
ECHO LA AREA 4C: 13.43 CM2
ECHO LA VOL 2C: 67.24 ML (ref 22–52)
ECHO LA VOL 4C: 26.1 ML (ref 22–52)
ECHO LA VOL BP: 46.88 ML (ref 22–52)
ECHO LA VOL/BSA BIPLANE: 22.14 ML/M2 (ref 16–28)
ECHO LA VOLUME INDEX A2C: 31.76 ML/M2 (ref 16–28)
ECHO LA VOLUME INDEX A4C: 12.33 ML/M2 (ref 16–28)
ECHO LV E' LATERAL VELOCITY: 8.81 CM/S
ECHO LV E' SEPTAL VELOCITY: 6.64 CM/S
ECHO LV EDV TEICHHOLZ: 0.48 ML
ECHO LV ESV TEICHHOLZ: 0.15 ML
ECHO LV GLOBAL LONGITUDINAL STRAIN (GLS): -19.7 %
ECHO LV INTERNAL DIMENSION DIASTOLIC: 4.31 CM (ref 3.9–5.3)
ECHO LV INTERNAL DIMENSION SYSTOLIC: 2.67 CM
ECHO LV IVSD: 1.28 CM (ref 0.6–0.9)
ECHO LV MASS 2D: 201.4 G (ref 67–162)
ECHO LV MASS INDEX 2D: 95.1 G/M2 (ref 43–95)
ECHO LV POSTERIOR WALL DIASTOLIC: 1.26 CM (ref 0.6–0.9)
ECHO LVOT CARDIAC OUTPUT: 5.74 LITER/MINUTE
ECHO LVOT DIAM: 2.09 CM
ECHO LVOT PEAK GRADIENT: 7.37 MMHG
ECHO LVOT PEAK VELOCITY: 135.75 CM/S
ECHO LVOT SV: 87.3 ML
ECHO LVOT VTI: 25.53 CM
ECHO MV A VELOCITY: 95.39 CM/S
ECHO MV E DECELERATION TIME (DT): 0.2 S
ECHO MV E VELOCITY: 110.62 CM/S
ECHO MV E/A RATIO: 1.16
ECHO MV E/E' LATERAL: 12.56
ECHO MV E/E' RATIO (AVERAGED): 14.61
ECHO MV E/E' SEPTAL: 16.66
ECHO PVEIN A DURATION: 0.11 S
ECHO PVEIN A VELOCITY: 37.64 CM/S
ECHO RV TAPSE: 2.49 CM (ref 1.5–2)
ERYTHROCYTE [DISTWIDTH] IN BLOOD BY AUTOMATED COUNT: 15.1 % (ref 11.6–14.5)
HCT VFR BLD AUTO: 28.1 % (ref 35–45)
HGB BLD-MCNC: 9.1 G/DL (ref 12–16)
LVFS 2D: 37.96 %
LVOT MG: 3.37 MMHG
LVOT MV: 0.83 CM/S
LVSV (TEICH): 26.38 ML
MCH RBC QN AUTO: 27.4 PG (ref 24–34)
MCHC RBC AUTO-ENTMCNC: 32.4 G/DL (ref 31–37)
MCV RBC AUTO: 84.6 FL (ref 74–97)
MV DEC SLOPE: 5.45
PLATELET # BLD AUTO: 323 K/UL (ref 135–420)
PMV BLD AUTO: 9.7 FL (ref 9.2–11.8)
RBC # BLD AUTO: 3.32 M/UL (ref 4.2–5.3)
WBC # BLD AUTO: 9.5 K/UL (ref 4.6–13.2)

## 2020-07-15 PROCEDURE — 85027 COMPLETE CBC AUTOMATED: CPT

## 2020-07-15 PROCEDURE — 74011250637 HC RX REV CODE- 250/637: Performed by: HOSPITALIST

## 2020-07-15 PROCEDURE — 74011250637 HC RX REV CODE- 250/637: Performed by: NURSE PRACTITIONER

## 2020-07-15 PROCEDURE — 74011250636 HC RX REV CODE- 250/636: Performed by: INTERNAL MEDICINE

## 2020-07-15 PROCEDURE — 94762 N-INVAS EAR/PLS OXIMTRY CONT: CPT

## 2020-07-15 PROCEDURE — 74011250637 HC RX REV CODE- 250/637: Performed by: INTERNAL MEDICINE

## 2020-07-15 PROCEDURE — 36415 COLL VENOUS BLD VENIPUNCTURE: CPT

## 2020-07-15 RX ORDER — OXYCODONE AND ACETAMINOPHEN 7.5; 325 MG/1; MG/1
1-2 TABLET ORAL
Qty: 56 TAB | Refills: 0 | Status: SHIPPED
Start: 2020-07-15 | End: 2020-07-22

## 2020-07-15 RX ORDER — ASPIRIN 81 MG/1
81 TABLET ORAL DAILY
Qty: 30 TAB | Refills: 11 | Status: SHIPPED
Start: 2020-07-16 | End: 2020-07-15

## 2020-07-15 RX ORDER — OXYCODONE AND ACETAMINOPHEN 5; 325 MG/1; MG/1
1 TABLET ORAL
Status: DISCONTINUED | OUTPATIENT
Start: 2020-07-15 | End: 2020-07-15 | Stop reason: HOSPADM

## 2020-07-15 RX ORDER — CARVEDILOL 6.25 MG/1
6.25 TABLET ORAL 2 TIMES DAILY WITH MEALS
Status: DISCONTINUED | OUTPATIENT
Start: 2020-07-15 | End: 2020-07-15 | Stop reason: HOSPADM

## 2020-07-15 RX ORDER — CLOPIDOGREL BISULFATE 75 MG/1
75 TABLET ORAL DAILY
Qty: 30 TAB | Refills: 11 | Status: SHIPPED | OUTPATIENT
Start: 2020-07-16 | End: 2021-06-21 | Stop reason: SDUPTHER

## 2020-07-15 RX ORDER — ATORVASTATIN CALCIUM 80 MG/1
80 TABLET, FILM COATED ORAL DAILY
Qty: 30 TAB | Refills: 2 | Status: SHIPPED
Start: 2020-07-15 | End: 2020-07-15 | Stop reason: SDUPTHER

## 2020-07-15 RX ORDER — ATORVASTATIN CALCIUM 80 MG/1
80 TABLET, FILM COATED ORAL DAILY
Qty: 30 TAB | Refills: 2 | Status: SHIPPED | OUTPATIENT
Start: 2020-07-15

## 2020-07-15 RX ORDER — CARVEDILOL 6.25 MG/1
6.25 TABLET ORAL EVERY 12 HOURS
Qty: 60 TAB | Refills: 2 | Status: SHIPPED | OUTPATIENT
Start: 2020-07-15 | End: 2020-09-30 | Stop reason: SDUPTHER

## 2020-07-15 RX ORDER — ASPIRIN 81 MG/1
81 TABLET ORAL DAILY
Qty: 30 TAB | Refills: 11 | Status: SHIPPED | OUTPATIENT
Start: 2020-07-16 | End: 2022-02-24

## 2020-07-15 RX ORDER — AMLODIPINE BESYLATE 2.5 MG/1
2.5 TABLET ORAL DAILY
Qty: 30 TAB | Refills: 2 | Status: SHIPPED | OUTPATIENT
Start: 2020-07-15

## 2020-07-15 RX ORDER — CARVEDILOL 6.25 MG/1
6.25 TABLET ORAL EVERY 12 HOURS
Status: DISCONTINUED | OUTPATIENT
Start: 2020-07-15 | End: 2020-07-15

## 2020-07-15 RX ORDER — CLOPIDOGREL BISULFATE 75 MG/1
75 TABLET ORAL DAILY
Qty: 30 TAB | Refills: 11 | Status: SHIPPED
Start: 2020-07-16 | End: 2020-07-15 | Stop reason: SDUPTHER

## 2020-07-15 RX ORDER — AMLODIPINE BESYLATE 2.5 MG/1
2.5 TABLET ORAL DAILY
Qty: 30 TAB | Refills: 2 | Status: SHIPPED
Start: 2020-07-15 | End: 2020-07-15 | Stop reason: SDUPTHER

## 2020-07-15 RX ADMIN — ASPIRIN 81 MG: 81 TABLET, COATED ORAL at 09:20

## 2020-07-15 RX ADMIN — OXYCODONE HYDROCHLORIDE AND ACETAMINOPHEN 1 TABLET: 5; 325 TABLET ORAL at 09:37

## 2020-07-15 RX ADMIN — CARVEDILOL 6.25 MG: 6.25 TABLET, FILM COATED ORAL at 12:09

## 2020-07-15 RX ADMIN — AMLODIPINE BESYLATE 2.5 MG: 2.5 TABLET ORAL at 09:20

## 2020-07-15 RX ADMIN — CLOPIDOGREL BISULFATE 75 MG: 75 TABLET ORAL at 09:20

## 2020-07-15 RX ADMIN — HYDROMORPHONE HYDROCHLORIDE 0.5 MG: 1 INJECTION, SOLUTION INTRAMUSCULAR; INTRAVENOUS; SUBCUTANEOUS at 02:46

## 2020-07-15 RX ADMIN — ACETAMINOPHEN 650 MG: 325 TABLET ORAL at 02:46

## 2020-07-15 RX ADMIN — Medication 10 ML: at 07:17

## 2020-07-15 NOTE — PROGRESS NOTES
[facility]      CARDIOLOGY PROGRESS NOTE  RECS:      1. Acute inferior STEMI: Very atypical presentation with symptoms of nausea and uneasiness starting at 9 AM.s/p PCI to prox/mid RCA with SAIGE. Continue asa and Plavix. Echo report pending    2. Hypertension: Controlled. Continue  low dose norvasc. added low dose bb  3. Hyperlipidemia: continue  atorvastatin. 4. Obesity with BMI of 31.8: Will reinforce the importance of weight loss and change of lifestyle. 5. Recent total knee replacement surgery 6 days ago. Will need to watch for any bleeding in the knee would request orthopedic follow-up. .  6.   Patient is requiring lower doses of blood pressure medicines. I advised her to check her blood pressure twice a day at home as if it increases, medicines will need to be adjusted. Cardiac rehab will also be a good idea but she may not be able to do much exercises due to her knee surgery recently. We discussed diet food and exercise in detail. Stable hemodynamically.   Okay for discharge and follow in office 1-2 weeks with Dr. Louann Vargas     EKG Results     Procedure 720 Value Units Date/Time    EKG, 12 LEAD, INITIAL [[id number]] Collected:  07/14/20 0456    Order Status:  Completed Updated:  07/14/20 0823     Ventricular Rate 57 BPM      Atrial Rate 57 BPM      P-R Interval 192 ms      QRS Duration 80 ms      Q-T Interval 420 ms      QTC Calculation (Bezet) 408 ms      Calculated P Axis 59 degrees      Calculated R Axis 3 degrees      Calculated T Axis -19 degrees      Diagnosis --     Sinus bradycardia  T wave abnormality, consider inferior ischemia  T wave abnormality, consider anterolateral ischemia  Abnormal ECG  When compared with ECG of 13-JUL-2020 21:09,  No significant change was found  Confirmed by Nba Sanchez MD, Lashonda Perez [id number]) on 7/14/2020 8:23:16 AM      EKG, 12 LEAD, REPEAT [[id number]] Collected:  07/13/20 2109    Order Status:  Completed Updated:  07/14/20 0150     Ventricular Rate 68 BPM      Atrial Rate 68 BPM      P-R Interval 208 ms      QRS Duration 90 ms      Q-T Interval 414 ms      QTC Calculation (Bezet) 440 ms      Calculated P Axis 62 degrees      Calculated R Axis 44 degrees      Calculated T Axis 28 degrees      Diagnosis --     Normal sinus rhythm  T wave abnormality, consider inferior ischemia  T wave abnormality, consider anterolateral ischemia  Abnormal ECG    Confirmed by Birdie Alejandre MD, Kate Mott ([id number]) on 7/14/2020 8:21:53 AM      EKG, 12 LEAD, INITIAL [[id number]] Collected:  07/13/20 2031    Order Status:  Completed Updated:  07/14/20 0821     Ventricular Rate 58 BPM      Atrial Rate 58 BPM      P-R Interval 216 ms      QRS Duration 86 ms      Q-T Interval 428 ms      QTC Calculation (Bezet) 420 ms      Calculated P Axis 51 degrees      Calculated R Axis 20 degrees      Calculated T Axis 87 degrees      Diagnosis --     Sinus bradycardia with 1st degree AV block  Possible Left atrial enlargement  ST & T wave abnormality, consider inferolateral ischemia  Abnormal ECG    Confirmed by Birdie Alejandre MD, Kate Mott ([id number]) on 7/14/2020 8:21:25 AM      EKG, 12 LEAD, SUBSEQUENT [[id number]]     Order Status:  Sent         XR Results (most recent):  Results from [place] encounter on 07/07/20   XR KNEE LT MAX 2 VWS    Narrative EXAM: KNEE LIMITED Two Views    CLINICAL HISTORY/INDICATION: s/p TKA    COMPARISON: None. TECHNIQUE: AP and lateral views obtained. FINDINGS:    Skin staples are demonstrated. A surgical drain projects in the region of the  suprapatellar bursa. Droplets of gas are present within the soft tissues  secondary to the recent surgery. A tricompartmental replacement is  demonstrated. There is no acute fracture or dislocation. Impression IMPRESSION:    Satisfactory appearance of the total knee replacement.              07/02/20   NUCLEAR CARDIAC STRESS TEST 07/02/2020 7/7/2020    Narrative · Baseline ECG: Normal sinus rhythm, non-specific ST-T wave abnormalities. · Negative stress test.  · Gated SPECT: Left ventricular function post-stress was normal.   Calculated ejection fraction is 90%. There is no evidence of transient   ischemic dilation (TID). The TID ratio is 0.95. · Left ventricular perfusion is normal.  · Negative myocardial perfusion imaging. Myocardial perfusion imaging   supports a low risk stress test.        Signed by: Kim Allen MD     07/13/20   CARDIAC PROCEDURE 07/13/2020 7/13/2020    Addendum  · Critical one-vessel coronary artery disease with 99% stenosis and  interval coronary thrombus at proximal/mid RCA. · Noncritical 50% mid LAD stenosis and diffuse luminal irregularities  throughout the coronary vasculature with moderate proximal calcification · Successful PCI of proximal/mid RCA deploying a drug-eluting stent with a  residual 0% stenosis and a very tortuous right coronary artery. · Procedure done via right femoral artery and 6 Finnish Angio-Seal at the  end. Patient has diffuse luminal irregularities and now had a acute event. Aggressive medical treatment and risk factor modification should be done. Because of extensive thrombus burden, I used 2 doses of IV Integrilin  during the procedure and a bolus of ticagrelor postprocedure. Considering recent knee replacement we will follow it with Plavix instead  of Brilinta. Patient had very atypical presentation with primarily nausea and uneasy  feeling and so there was significant delay in presentation. Her symptoms  started at 9 AM and STEMI code was called around 9 PM.  She came by car  with her  to the emergency room. Gladis Lim MD 7/13/2020 11:33 PM          Narrative · Critical one-vessel coronary artery disease with 99% stenosis and   interval coronary thrombus at proximal/mid RCA.   · Noncritical 50% mid LAD stenosis and diffuse luminal irregularities   throughout the coronary vasculature with moderate proximal calcification  · Successful PCI of proximal/mid RCA deploying a drug-eluting stent with a   residual 0% stenosis and a very tortuous right coronary artery. · Procedure done via right femoral artery and 6 Nepali Angio-Seal at the   end. Patient has diffuse luminal irregularities and now had a acute event. Aggressive medical treatment and risk factor modification should be done. Because of extensive thrombus burden, I used 2 doses of IV Integrilin   during the procedure and a bolus of ticagrelor postprocedure. Considering recent knee replacement we will follow it with Plavix instead   of Brilinta. Patient had very atypical presentation with primarily nausea and uneasy   feeling and so there was significant delay in presentation. Her symptoms   started at 9 AM and STEMI code was called around 9 PM.  She came by car   with her  to the emergency room. Signed by: Kaylen Vivas MD         ASSESSMENT:  Hospital Problems  Date Reviewed: 7/14/2020          Codes Class Noted POA    * (Principal) STEMI (ST elevation myocardial infarction) ([facility] [address] ICD-10-CM: I21.3  ICD-9-CM: 410.90  7/13/2020 Yes        Essential hypertension (Chronic) ICD-10-CM: I10  ICD-9-CM: 401.9  7/13/2020 Yes                SUBJECTIVE:  No chest pain  No shortness of breath  Knee pain left post knee replacement as expected    OBJECTIVE:    VS:   Visit Vitals  /62 (BP 1 Location: Right arm, BP Patient Position: At rest)   Pulse 66   Temp 97.9 °F (36.6 °C)   Resp 18   Ht 5' 9\" (1.753 m)   Wt 96.2 kg (212 lb)   SpO2 94%   Breastfeeding Unknown   BMI 31.31 kg/m²         Intake/Output Summary (Last 24 hours) at 7/15/2020 0909  Last data filed at 7/15/2020 0419  Gross per 24 hour   Intake 490 ml   Output 2000 ml   Net -1510 ml     TELE: normal sinus rhythm    General: alert and in no apparent distress  HENT: Normocephalic, atraumatic. Normal external eye.   Neck :  no bruit, no JVD, JVD difficult to assess due to obesity  Cardiac:  regular rate and rhythm, S1, S2 normal, no murmur, click, rub or gallop  Chest/Lungs:chest clear, no wheezing, rales, normal symmetric air entry  Abdomen: Soft, nontender, no masses  Extremities:  No c/c/edema, peripheral pulses present. Groin exam does not reveal any hematoma or bruits post cath. Distal pulse is present. No definite knee hematoma/bleed        Labs: Results:       Chemistry Recent Labs     07/14/20  1108 07/13/20 2045   * 147*    139   K 4.1 3.8    103   CO2 30 31   BUN 13 15   CREA 0.60 0.58*   CA 8.9 9.0   MG  --  2.2   AGAP 5 5   BUCR 22* 26*   AP  --  124*   TP  --  7.1   ALB  --  3.2*   GLOB  --  3.9   AGRAT  --  0.8      CBC w/Diff Recent Labs     07/15/20  0553 07/13/20 2045   WBC 9.5 10.1   RBC 3.32* 3.78*   HGB 9.1* 10.4*   HCT 28.1* 32.0*    325   GRANS  --  71   LYMPH  --  22   EOS  --  1      Cardiac Enzymes No results for input(s): CPK, CKND1, TOSHIA in the last 72 hours. No lab exists for component: CKRMB, TROIP   Coagulation No results for input(s): PTP, INR, APTT, INREXT, INREXT in the last 72 hours. Lipid Panel Lab Results   Component Value Date/Time    Cholesterol, total 239 (H) 07/14/2020 11:08 AM    HDL Cholesterol 56 07/14/2020 11:08 AM    LDL, calculated 163.6 (H) 07/14/2020 11:08 AM    VLDL, calculated 19.4 07/14/2020 11:08 AM    Triglyceride 97 07/14/2020 11:08 AM    CHOL/HDL Ratio 4.3 07/14/2020 11:08 AM      BNP No results for input(s): BNPP in the last 72 hours. Liver Enzymes Recent Labs     07/13/20 2045   TP 7.1   ALB 3.2*   *      Digoxin    Thyroid Studies Lab Results   Component Value Date/Time    TSH 2.61 11/13/2019 08:50 AM              Elinor E Halecki, NP -C  I have independently evaluated and examined the patient. All relevant labs and testing data are reviewed. Care plan discussed and updated after review.   Nicole Castro MD

## 2020-07-15 NOTE — PROGRESS NOTES
Discharge order noted for today. Pt has been accepted to [facility] agency. Met with patient who is agreeable to the transition plan today. Transport has been arranged through family. Patient's discharge summary and home health  orders have been forwarded to [facility] home health  agency via que. Updated bedside RN Jhon Romero,  to the transition plan. Discharge information has been documented on the AVS.  Has all needed DME. Follow up appts scheduled.       Steph Alves, MSW  Case Management  [phone]

## 2020-07-15 NOTE — PROGRESS NOTES
0800: Assumed care of patient resting in bed. Breakfast provided, patient did not want to get OOB for breakfast. Denies chest pain or shortness of breath. R groin cath site C/D/I. Will continue to monitor patient. 0920: MOODY Alcantar NP at bedside, [facility] for patient to be discharged today. 4088: No pain medicine available other than Tylenol, patient reports she was taking Percocet at home for knee pain s/p knee replacement surgery. Called Dr Reece Newsome. MD also aware patient is OK for discharge. 1100: Dr Lorenzo Hicks at bedside to see patient.

## 2020-07-15 NOTE — DISCHARGE SUMMARY
Discharge Summary    Patient: Esteban Andrade MRN: [id number]  CSN: [id number]    YOB: 1955  Age: 59 y.o. Sex: female    DOA: 7/13/2020 LOS:  LOS: 2 days   Discharge Date:      Admission Diagnoses: STEMI (ST elevation myocardial infarction) ([facility].) [I21.3]    Discharge Diagnoses:    Problem List as of 7/15/2020 Date Reviewed: 7/14/2020          Codes Class Noted - Resolved    * (Principal) STEMI (ST elevation myocardial infarction) ([facility].) ICD-10-CM: I21.3  ICD-9-CM: 410.90  7/13/2020 - Present        Essential hypertension (Chronic) ICD-10-CM: I10  ICD-9-CM: 401.9  7/13/2020 - Present        Arthritis of knee ICD-10-CM: M17.10  ICD-9-CM: 716.96  7/7/2020 - Present              Reason for Admission  59 y.o. female admitted for STEMI. Moderately obese 66-year-old -American female with hypertension, hyperlipidemia presenting with atypical symptoms and inferior STEMI. Heparin, aspirin given in the emergency room and patient brought to Cath Lab for emergent cardiac catheterization and possible coronary intervention. Patient denies previous cardiac history. Had a stress test prior to recent knee replacement surgery which was reportedly normal.  She did have uneventful knee surgery. Discharge Condition: Good    PHYSICAL EXAM at discharge. Visit Vitals  /62 (BP 1 Location: Right arm, BP Patient Position: At rest)   Pulse 66   Temp 97.9 °F (36.6 °C)   Resp 18   Ht 5' 9\" (1.753 m)   Wt 96.2 kg (212 lb)   SpO2 94%   Breastfeeding Unknown   BMI 31.31 kg/m²     Obese, awake alert and oriented. Lying in bed nad  [place]. Perrl. RRR  cta b.l  Obese soft nt nd nabs  No edema. dp 2+ b.l  No focal defecit  No rash      Hospital Course:   1. Acute inferior STEMI: Very atypical presentation with symptoms of nausea and uneasiness starting at 9 AM.  Late presentation to ER due to absence of significant chest pain. s/p cath / stent. Okay for discharge per cardiology. On asa, plavix, beta blocker, statin. Patient denies tobacco or 2nd hand smoke exposure. 2. Hypertension - discharge on coreg and norvasc,  3. Dyslipidemia: atorvastatin dose increased. 3. Obesity Body mass index is 31.31 kg/m². importance of weight loss and change of lifestyle. 4. Recent total knee replacement surgery 6 days ago. per ortho surgery, doing well, outpatient f/u has been arranged. 5. Anemia normocytic normochromic  6. dvt prophylaxis was given in the form of heparin gtt. 7. Full code. Discharge to home with hh . Patient agrees, all questions answered to the best of my ability. I discussed the case with JAMES Robertson, and Dr. Kirstie Peacock.       Consults: Cardiology Dr. Kirstie Peacock    Procedures  Cardiac cath / Successful PCI of proximal/mid RCA deploying a drug-eluting stent Shweta Sanchez MD : 7/13/20      Significant Diagnostic Studies: labs:   Recent Results (from the past 24 hour(s))   METABOLIC PANEL, BASIC    Collection Time: 07/14/20 11:08 AM   Result Value Ref Range    Sodium 139 136 - 145 mmol/L    Potassium 4.1 3.5 - 5.5 mmol/L    Chloride 104 100 - 111 mmol/L    CO2 30 21 - 32 mmol/L    Anion gap 5 3.0 - 18 mmol/L    Glucose 116 (H) 74 - 99 mg/dL    BUN 13 7.0 - 18 MG/DL    Creatinine 0.60 0.6 - 1.3 MG/DL    BUN/Creatinine ratio 22 (H) 12 - 20      GFR est AA >60 >60 ml/min/1.73m2    GFR est non-AA >60 >60 ml/min/1.73m2    Calcium 8.9 8.5 - 10.1 MG/DL   LIPID PANEL    Collection Time: 07/14/20 11:08 AM   Result Value Ref Range    LIPID PROFILE          Cholesterol, total 239 (H) <200 MG/DL    Triglyceride 97 <150 MG/DL    HDL Cholesterol 56 40 - 60 MG/DL    LDL, calculated 163.6 (H) 0 - 100 MG/DL    VLDL, calculated 19.4 MG/DL    CHOL/HDL Ratio 4.3 0 - 5.0     TROPONIN I    Collection Time: 07/14/20 11:08 AM   Result Value Ref Range    Troponin-I, QT 22.90 () 0.0 - 0.045 NG/ML   TROPONIN I    Collection Time: 07/14/20  8:39 PM   Result Value Ref Range    Troponin-I, QT 12.00 () 0.0 - 0.045 NG/ML   CBC W/O DIFF    Collection Time: 07/15/20  5:53 AM   Result Value Ref Range    WBC 9.5 4.6 - 13.2 K/uL    RBC 3.32 (L) 4.20 - 5.30 M/uL    HGB 9.1 (L) 12.0 - 16.0 g/dL    HCT 28.1 (L) 35.0 - 45.0 %    MCV 84.6 74.0 - 97.0 FL    MCH 27.4 24.0 - 34.0 PG    MCHC 32.4 31.0 - 37.0 g/dL    RDW 15.1 (H) 11.6 - 14.5 %    PLATELET 952 091 - 425 K/uL    MPV 9.7 9.2 - 11.8 FL       IMAGING  XR Results (most recent):  Results from Hospital Encounter encounter on 07/07/20   XR KNEE LT MAX 2 VWS    Narrative EXAM: KNEE LIMITED Two Views    CLINICAL HISTORY/INDICATION: s/p TKA    COMPARISON: None. TECHNIQUE: AP and lateral views obtained. FINDINGS:    Skin staples are demonstrated. A surgical drain projects in the region of the  suprapatellar bursa. Droplets of gas are present within the soft tissues  secondary to the recent surgery. A tricompartmental replacement is  demonstrated. There is no acute fracture or dislocation. Impression IMPRESSION:    Satisfactory appearance of the total knee replacement.       EKG Results     Procedure 720 Value Units Date/Time    EKG, 12 LEAD, INITIAL [[id number]] Collected:  07/14/20 0456    Order Status:  Completed Updated:  07/14/20 0823     Ventricular Rate 57 BPM      Atrial Rate 57 BPM      P-R Interval 192 ms      QRS Duration 80 ms      Q-T Interval 420 ms      QTC Calculation (Bezet) 408 ms      Calculated P Axis 59 degrees      Calculated R Axis 3 degrees      Calculated T Axis -19 degrees      Diagnosis --     Sinus bradycardia  T wave abnormality, consider inferior ischemia  T wave abnormality, consider anterolateral ischemia  Abnormal ECG  When compared with ECG of 13-JUL-2020 21:09,  No significant change was found  Confirmed by Birdie Alejandre MD, Kate Mott ([id number]) on 7/14/2020 8:23:16 AM      EKG, 12 LEAD, REPEAT [[id number]] Collected:  07/13/20 2109    Order Status:  Completed Updated:  07/14/20 5643     Ventricular Rate 68 BPM      Atrial Rate 68 BPM      P-R Interval 208 ms      QRS Duration 90 ms      Q-T Interval 414 ms      QTC Calculation (Bezet) 440 ms      Calculated P Axis 62 degrees      Calculated R Axis 44 degrees      Calculated T Axis 28 degrees      Diagnosis --     Normal sinus rhythm  T wave abnormality, consider inferior ischemia  T wave abnormality, consider anterolateral ischemia  Abnormal ECG    Confirmed by Jamir Elkins MD, Rc Kennedy [id number]) on 7/14/2020 8:21:53 AM      EKG, 12 LEAD, INITIAL [[id number]] Collected:  07/13/20 2031    Order Status:  Completed Updated:  07/14/20 0821     Ventricular Rate 58 BPM      Atrial Rate 58 BPM      P-R Interval 216 ms      QRS Duration 86 ms      Q-T Interval 428 ms      QTC Calculation (Bezet) 420 ms      Calculated P Axis 51 degrees      Calculated R Axis 20 degrees      Calculated T Axis 87 degrees      Diagnosis --     Sinus bradycardia with 1st degree AV block  Possible Left atrial enlargement  ST & T wave abnormality, consider inferolateral ischemia  Abnormal ECG    Confirmed by Jamir Elkins MD, Rc Kennedy ([id number]) on 7/14/2020 8:21:25 AM      EKG, 12 LEAD, SUBSEQUENT [[id number]]     Order Status:  Sent             Discharge Medications:     Current Discharge Medication List      START taking these medications    Details   aspirin delayed-release 81 mg tablet Take 1 Tab by mouth daily. Qty: 30 Tab, Refills: 11      clopidogreL (PLAVIX) 75 mg tab Take 1 Tab by mouth daily. Qty: 30 Tab, Refills: 11   coreg 6.25 mg. 1 tab po twice daily with meals. #60. Refill: 2. CONTINUE these medications which have CHANGED    Details   amLODIPine (NORVASC) 2.5 mg tablet Take 1 Tab by mouth daily. Qty: 30 Tab, Refills: 2      atorvastatin (LIPITOR) 80 mg tablet Take 1 Tab by mouth daily. Qty: 30 Tab, Refills: 2      oxyCODONE-acetaminophen (Percocet) 7.5-325 mg per tablet Take 1-2 Tabs by mouth every six (6) hours as needed for Pain for up to 7 days. Max Daily Amount: 8 Tabs.   Qty: 56 Tab, Refills: 0    Associated Diagnoses: Arthritis of knee         CONTINUE these medications which have NOT CHANGED    Details   docusate sodium (Colace) 100 mg capsule Take 1 Cap by mouth two (2) times a day for 90 days. Qty: 60 Cap, Refills: 2    Associated Diagnoses: Arthritis of knee      cholecalciferol (VITAMIN D3) (50,000 UNITS /1250 MCG) capsule Take  by mouth every seven (7) days. escitalopram oxalate (LEXAPRO) 20 mg tablet Take 20 mg by mouth daily. STOP taking these medications       cephALEXin (Keflex) 500 mg capsule Comments:   Reason for Stopping:         aspirin (ASPIRIN) 325 mg tablet Comments:   Reason for Stopping:         celecoxib (CeleBREX) 200 mg capsule Comments:   Reason for Stopping:         lisinopriL (PRINIVIL, ZESTRIL) 20 mg tablet Comments:   Reason for Stopping:               Activity: PT/OT Eval and Treat    Diet: Cardiac Diet, low fat. Wound Care: As directed    Follow-up:   1. NP Celestina 1 week  2. Dr. Dominic Whyte 10 - 14 days. 3. Dr. Vernell Spangler 5 days (already scheduled).     Minutes spent on discharge: greater than 30

## 2020-07-15 NOTE — PROGRESS NOTES
1915-Received report/assumed care of pt.  2000-Pt assessment done. VS stable. Pt denies pain at this time but states left knee is sore when she moves it. Dressing to left knee intact. Pt denies nausea and denies needs at this time. 2100-Manny Hawkins (hospitalist) notified of troponin level, no new orders given. 2223-Pt resting in bed looking at TV. Scheduled med given. Pt denies need for pain med. 0000-Pt requested ice pack for left knee, states she uses ice at home to help with swelling and to keep pain down. Ice pack made and given to pt with towel to lay between knee and ice.  0246-Pt ambulated to toilet and complains of pain to left knee when she got back to bed. Pt requesting pain med. Pt visually very uncomfortable, states pain is about a 6/10. Pt sitting up in bed rubbing left knee, unable to lay down. Tylenol and Dilaudid given for pain. 0415-Pt resting in bed, states left knee feels so much better. 0600-Pt offered bath and refused, states she will do it later in the day.

## 2020-07-15 NOTE — DISCHARGE INSTRUCTIONS
Patient Education       DISCHARGE SUMMARY from Nurse    PATIENT INSTRUCTIONS:    After general anesthesia or intravenous sedation, for 24 hours or while taking prescription Narcotics:  · Limit your activities  · Do not drive and operate hazardous machinery  · Do not make important personal or business decisions  · Do  not drink alcoholic beverages  · If you have not urinated within 8 hours after discharge, please contact your surgeon on call. Report the following to your surgeon:  · Excessive pain, swelling, redness or odor of or around the surgical area  · Temperature over 100.5  · Nausea and vomiting lasting longer than 4 hours or if unable to take medications  · Any signs of decreased circulation or nerve impairment to extremity: change in color, persistent  numbness, tingling, coldness or increase pain  · Any questions    What to do at Home:  Recommended activity: Activity as tolerated, No driving while on analgesics and PT/OT per Home Health    If you experience any of the following symptoms chest pain, shortness of breath, dizziness, recurrence of symptoms please follow up with Dr Bernard Vicente. *  Please give a list of your current medications to your Primary Care Provider. *  Please update this list whenever your medications are discontinued, doses are      changed, or new medications (including over-the-counter products) are added. *  Please carry medication information at all times in case of emergency situations. These are general instructions for a healthy lifestyle:    No smoking/ No tobacco products/ Avoid exposure to second hand smoke  Surgeon General's Warning:  Quitting smoking now greatly reduces serious risk to your health.     Obesity, smoking, and sedentary lifestyle greatly increases your risk for illness    A healthy diet, regular physical exercise & weight monitoring are important for maintaining a healthy lifestyle    You may be retaining fluid if you have a history of heart failure or if you experience any of the following symptoms:  Weight gain of 3 pounds or more overnight or 5 pounds in a week, increased swelling in our hands or feet or shortness of breath while lying flat in bed. Please call your doctor as soon as you notice any of these symptoms; do not wait until your next office visit. The discharge information has been reviewed with the patient. The patient verbalized understanding. Discharge medications reviewed with the patient and appropriate educational materials and side effects teaching were provided. ___________________________________________________________________________________________________________________________________   Heart Attack: Care Instructions  Your Care Instructions     A heart attack (myocardial infarction, or MI) occurs when one or more of the coronary arteries, which supply the heart with oxygen-rich blood, is blocked. A blockage usually occurs when plaque inside the artery breaks open and a blood clot forms in the artery. After a heart attack, you may be worried about your future. Over the next several weeks, your heart will start to heal. Though it can be hard to break old habits, you can prevent another heart attack by making some lifestyle changes and by taking medicines. You may use this information for ideas about what to do at home to speed your recovery. Follow-up care is a key part of your treatment and safety. Be sure to make and go to all appointments, and call your doctor if you are having problems. It's also a good idea to know your test results and keep a list of the medicines you take. How can you care for yourself at home? Activity  · Until your doctor says it is okay, do not do strenuous exercise. And do not lift, pull, or push anything heavy. Ask your doctor what types of activities are safe for you.   · If your doctor has not set you up with a cardiac rehabilitation (rehab) program, talk to him or her about whether that is right for you. Cardiac rehab includes supervised exercise. It also includes help with diet and lifestyle changes and emotional support. It may reduce your risk of future heart problems. · Increase your activities slowly. Take short rest breaks when you get tired. · If your doctor recommends it, get more exercise. Walking is a good choice. Bit by bit, increase the amount you walk every day. Try for at least 30 minutes on most days of the week. You also may want to swim, bike, or do other activities. Talk with your doctor before you start an exercise program to make sure it is safe for you. · Ask your doctor when you can drive, go back to work, and do other daily activities again. · You can have sex as soon as you feel ready for it. Often this means when you can easily walk around or climb stairs. Talk with your doctor if you have any concerns. If you are taking nitroglycerin, do not take erection-enhancing medicine such as sildenafil (Viagra), tadalafil (Cialis), or vardenafil (Levitra) . Lifestyle changes  · Do not smoke. Smoking increases your risk of another heart attack. If you need help quitting, talk to your doctor about stop-smoking programs and medicines. These can increase your chances of quitting for good. · Eat a heart-healthy diet that is low in saturated fat and salt, and is full of fruits, vegetables and whole-grains. You may get more details about how to eat healthy. But these tips can help you get started. · Stay at a healthy weight, or lose weight if you need to. Medicines  · Be safe with medicines. Take your medicines exactly as prescribed. Call your doctor if you think you are having a problem with your medicine. You will get more details on the specific medicines your doctor prescribes. Do not stop taking your medicine unless your doctor tells you to. Not taking your medicine might raise your risk of having another heart attack.   · You may need several medicines to help lower your risk of another heart attack. These include:  ? Blood pressure medicines such as angiotensin-converting enzyme (ACE) inhibitors, ARBs (angiotensin II receptor blockers), and beta-blockers. ? Cholesterol medicine called statins. ? Aspirin and other blood thinners. These prevent blood clots that can cause a heart attack. · If your doctor has given you nitroglycerin, keep it with you at all times. If you have angina symptoms, such as chest pain or pressure, sit down and rest. Take the first dose of nitroglycerin as directed. If symptoms get worse or are not getting better within 5 minutes, call 911 right away. Stay on the phone. The emergency  will tell you what to do. · Do not take any over-the-counter medicines, vitamins, or herbal products without talking to your doctor first.  Staying healthy  · Manage other health conditions such as high blood pressure and diabetes. · Avoid colds and flu. Get a pneumococcal vaccine shot. If you have had one before, ask your doctor whether you need another dose. Get the flu vaccine every year. If you must be around people with colds or flu, wash your hands often. · Be sure to tell your doctor about any angina symptoms you have had, even if they went away. Pay attention to your angina symptoms. Know what is typical for you and learn how to control it. Know when to call for help. · Talk to your family, friends, or a counselor about your feelings. It is normal to feel frightened, angry, hopeless, helpless, and even guilty. Talking openly about bad feelings can help you cope. If you have symptoms of depression, talk to your doctor. When should you call for help? [id number] anytime you think you may need emergency care. For example, call if:  · You have symptoms of a heart attack. These may include:  ? Chest pain or pressure, or a strange feeling in the chest.  ? Sweating. ? Shortness of breath. ? Nausea or vomiting.   ? Pain, pressure, or a strange feeling in the back, neck, jaw, or upper belly or in one or both shoulders or arms. ? Lightheadedness or sudden weakness. ? A fast or irregular heartbeat. After you call 911, the  may tell you to chew 1 adult-strength or 2 to 4 low-dose aspirin. Wait for an ambulance. Do not try to drive yourself. · You have angina symptoms (such as chest pain or pressure) that do not go away with rest or are not getting better within 5 minutes after you take a dose of nitroglycerin. · You passed out (lost consciousness). · You feel like you are having another heart attack. Call your doctor now or seek immediate medical care if:  · You are having angina symptoms, such as chest pain or pressure, more often than usual, or the symptoms are different or worse than usual.  · You have new or increased shortness of breath. · You are dizzy or lightheaded, or you feel like you may faint. Watch closely for changes in your health, and be sure to contact your doctor if you have any problems. Where can you learn more? Go to http://sam-yelena.info/  Enter H564 in the search box to learn more about \"Heart Attack: Care Instructions. \"  Current as of: December 16, 2019               Content Version: 12.5  © 4519-9389 Healthwise, Inc[employer]. Care instructions adapted under license by [employer] (which disclaims liability or warranty for this information). If you have questions about a medical condition or this instruction, always ask your healthcare professional. [place] [id number] any warranty or liability for your use of this information. Patient Education        Percutaneous Coronary Intervention: What to Expect at Home  Your Recovery    Percutaneous coronary intervention (PCI) is the name for procedures that are used to open a narrowed or blocked coronary artery. The two most common PCI procedures are coronary angioplasty and coronary stent placement.   Your groin or arm may have a bruise and feel sore for a day or two after a percutaneous coronary intervention (PCI). You can do light activities around the house, but nothing strenuous for several days. This care sheet gives you a general idea about how long it will take for you to recover. But each person recovers at a different pace. Follow the steps below to get better as quickly as possible. How can you care for yourself at home? Activity  · If the doctor gave you a sedative:  ? For 24 hours, don't do anything that requires attention to detail, such as going to work, making important decisions, or signing any legal documents. It takes time for the medicine's effects to completely wear off.  ? For your safety, do not drive or operate any machinery that could be dangerous. Wait until the medicine wears off and you can think clearly and react easily. · Do not do strenuous exercise and do not lift, pull, or push anything heavy until your doctor says it is okay. This may be for a day or two. You can walk around the house and do light activity, such as cooking. · If the catheter was placed in your groin, try not to walk up stairs for the first couple of days. · If the catheter was placed in your arm near your wrist, do not bend your wrist deeply for the first couple of days. Be careful using your hand to get into and out of a chair or bed. · Carry your stent identification card with you at all times. · If your doctor recommends it, get more exercise. Walking is a good choice. Bit by bit, increase the amount you walk every day. Try for at least 30 minutes on most days of the week. Diet  · Drink plenty of fluids to help your body flush out the dye. If you have kidney, heart, or liver disease and have to limit fluids, talk with your doctor before you increase the amount of fluids you drink. · Keep eating a heart-healthy diet that has lots of fruits, vegetables, and whole grains. If you have not been eating this way, talk to your doctor.  You also may want to talk to a dietitian. This expert can help you to learn about healthy foods and plan meals. Medicines  · Your doctor will tell you if and when you can restart your medicines. He or she will also give you instructions about taking any new medicines. · If you take aspirin or some other blood thinner, ask your doctor if and when to start taking it again. Make sure that you understand exactly what your doctor wants you to do. · Your doctor will prescribe blood-thinning medicines. You will likely take aspirin plus another antiplatelet, such as clopidogrel (Plavix). It is very important that you take these medicines exactly as directed. These medicines help keep the coronary artery open and reduce your risk of a heart attack. · Call your doctor if you think you are having a problem with your medicine. Care of the catheter site  · For 1 or 2 days, keep a bandage over the spot where the catheter was inserted. The bandage probably will fall off in this time. · Put ice or a cold pack on the area for 10 to 20 minutes at a time to help with soreness or swelling. Put a thin cloth between the ice and your skin. · You may shower 24 to 48 hours after the procedure, if your doctor okays it. Pat the incision dry. · Do not soak the catheter site until it is healed. Don't take a bath for 1 week, or until your doctor tells you it is okay. · Watch for bleeding from the site. A small amount of blood (up to the size of a quarter) on the bandage can be normal.  · If you are bleeding, lie down and press on the area for 15 minutes to try to make it stop. If the bleeding does not stop, call your doctor or seek immediate medical care. Follow-up care is a key part of your treatment and safety. Be sure to make and go to all appointments, and call your doctor if you are having problems. It's also a good idea to know your test results and keep a list of the medicines you take. When should you call for help?    [id number] anytime you think you may need emergency care. For example, call if:  · You passed out (lost consciousness). · You have severe trouble breathing. · You have sudden chest pain and shortness of breath, or you cough up blood. · You have symptoms of a heart attack, such as:  ? Chest pain or pressure. ? Sweating. ? Shortness of breath. ? Nausea or vomiting. ? Pain that spreads from the chest to the neck, jaw, or one or both shoulders or arms. ? Dizziness or lightheadedness. ? A fast or uneven pulse. After calling 911, chew 1 adult-strength aspirin. Wait for an ambulance. Do not try to drive yourself. · You have been diagnosed with angina, and you have angina symptoms that do not go away with rest or are not getting better within 5 minutes after you take one dose of nitroglycerin. Call your doctor now or seek immediate medical care if:  · You are bleeding from the area where the catheter was put in your artery. · You have a fast-growing, painful lump at the catheter site. · You have signs of infection, such as:  ? Increased pain, swelling, warmth, or redness. ? Red streaks leading from the catheter site. ? Pus draining from the catheter site. ? A fever. · Your leg, arm, or hand is painful, looks blue, or feels cold, numb, or tingly. Watch closely for changes in your health, and be sure to contact your doctor if you have any problems. Where can you learn more? Go to http://sam-yelena.info/  Enter G017 in the search box to learn more about \"Percutaneous Coronary Intervention: What to Expect at Home. \"  Current as of: December 16, 2019               Content Version: 12.5  © 2547-4055 Healthwise, Incorporated. Care instructions adapted under license by [employer] (which disclaims liability or warranty for this information).  If you have questions about a medical condition or this instruction, always ask your healthcare professional. Edm[employer] Ma[employer] disclaims any warranty or liability for your use of this information. Patient Education        Total Knee Replacement: What to Expect at  [address] had a total knee replacement. The doctor replaced the worn ends of the bones that connect to your knee (thighbone and lower leg bone) with plastic and metal parts. When you leave the hospital, you should be able to move around with a walker or crutches. But you will need someone to help you at home for the next few weeks or until you have more energy and can move around better. If you need more extensive rehab, you may go to a specialized rehab center for more treatment. You will go home with a bandage and stitches, staples, tissue glue, or tape strips. Change the bandage as your doctor tells you to. If you have stitches or staples, your doctor will remove them 10 to 21 days after your surgery. Glue or tape strips will fall off on their own over time. You may still have some mild pain, and the area may be swollen for 3 to 6 months after surgery. Your knee will continue to improve for 6 to 12 months. You will probably use a walker for 1 to 3 weeks and then use crutches. When you are ready, you can use a cane. You will probably be able to walk on your own in 4 to 8 weeks. You will need to do months of physical rehabilitation (rehab) after a knee replacement. Rehab will help you strengthen the muscles of the knee and help you regain movement. After you recover, your artificial knee will allow you to do normal daily activities with less pain or no pain at all. You may be able to hike, dance, ride a bike, and play golf. Talk to your doctor about whether you can do more strenuous activities. Always tell your caregivers that you have an artificial knee. How long it will take to walk on your own, return to normal activities, and go back to work depends on your health and how well your rehabilitation (rehab) program goes.  The better you do with your rehab exercises, the quicker you will get your strength and movement back. This care sheet gives you a general idea about how long it will take for you to recover. But each person recovers at a different pace. Follow the steps below to get better as quickly as possible. How can you care for yourself at home? Activity  · Rest when you feel tired. You may take a nap, but don't stay in bed all day. When you sit, use a chair with arms. You can use the arms to help you stand up. · Work with your physical therapist to find the best way to exercise. What you can do as your knee heals will depend on whether your new knee is cemented or uncemented. You may not be able to do certain things for a while if your new knee is uncemented. · After your knee has healed enough, you can do more strenuous activities with caution. ? You can golf, but use a golf cart. And don't wear shoes with spikes. ? You can bike on a flat road or on a stationary bike. Avoid biking up hills. ? Your doctor may suggest that you stay away from activities that put stress on your knee. These include tennis, badminton, squash, racquetball, contact sports like football, jumping (such as in basketball), jogging, and running. ? Avoid activities where you might fall. These include horseback riding, skiing, and mountain biking. · Do not sit for more than 1 hour at a time. Get up and walk around for a while before you sit again. If you must sit for a long time, prop up your leg with a chair or footstool. This will help you avoid swelling. · Ask your doctor when you can drive again. It may take up to 8 weeks after knee replacement surgery before it's safe for you to drive. · When you get into a car, sit on the edge of the seat. Then pull in your legs, and turn to face the front. · You should be able to do many everyday activities 3 to 6 weeks after your surgery. You will probably need to take 4 to 16 weeks off from work.  When you can go back to work depends on the type of work you do and how you feel. · Ask your doctor when it is okay for you to have sex. · For 12 weeks, do not lift anything heavier than 10 pounds and do not lift weights. Diet  · By the time you leave the hospital, you should be eating your normal diet. If your stomach is upset, try bland, low-fat foods like plain rice, broiled chicken, toast, and yogurt. Your doctor may suggest that you take iron and vitamin supplements. · Drink plenty of fluids (unless your doctor tells you not to). · Eat healthy foods, and watch your portion sizes. Try to stay at your ideal weight. Too much weight puts more stress on your new knee. · You may notice that your bowel movements are not regular right after your surgery. This is common. Try to avoid constipation and straining with bowel movements. You may want to take a fiber supplement every day. If you have not had a bowel movement after a couple of days, ask your doctor about taking a mild laxative. Medicines  · Your doctor will tell you if and when you can restart your medicines. He or she will also give you instructions about taking any new medicines. · If you take aspirin or some other blood thinner, ask your doctor if and when to start taking it again. Make sure that you understand exactly what your doctor wants you to do. · Your doctor may give you a blood-thinning medicine to prevent blood clots. If you take a blood thinner, be sure you get instructions about how to take your medicine safely. Blood thinners can cause serious bleeding problems. This medicine could be in pill form or as a shot (injection). If a shot is needed, your doctor will tell you how to do this. · Be safe with medicines. Take pain medicines exactly as directed. ? If the doctor gave you a prescription medicine for pain, take it as prescribed. ? If you are not taking a prescription pain medicine, ask your doctor if you can take an over-the-counter medicine.   ? Plan to take your pain medicine 30 minutes before exercises. It is easier to prevent pain before it starts than to stop it after it has started. · If you think your pain medicine is making you sick to your stomach:  ? Take your medicine after meals (unless your doctor has told you not to). ? Ask your doctor for a different pain medicine. · If your doctor prescribed antibiotics, take them as directed. Do not stop taking them just because you feel better. You need to take the full course of antibiotics. Incision care  · If your doctor told you how to care for your cut (incision), follow your doctor's instructions. You will have a dressing over the cut. A dressing helps the incision heal and protects it. Your doctor will tell you how to take care of this. · If you did not get instructions, follow this general advice:  ? If you have strips of tape on the cut the doctor made, leave the tape on for a week or until it falls off.  ? If you have stitches or staples, your doctor will tell you when to come back to have them removed. ? If you have skin adhesive on the cut, leave it on until it falls off. Skin adhesive is also called glue or liquid stitches. ? Change the bandage every day. ? Wash the area daily with warm water, and pat it dry. Don't use hydrogen peroxide or alcohol. They can slow healing. ? You may cover the area with a gauze bandage if it oozes fluid or rubs against clothing. ? You may shower 24 to 48 hours after surgery. Pat the incision dry. Don't swim or take a bath for the first 2 weeks, or until your doctor tells you it is okay. Exercise  · Your rehab program will give you a number of exercises to do to help you get back your knee's range of motion and strength. Always do them as your therapist tells you. Ice  · For pain and swelling, put ice or a cold pack on the area for 10 to 20 minutes at a time. Put a thin cloth between the ice and your skin. Other instructions  · Keep wearing your support stockings as your doctor says. These help to prevent blood clots. How long you'll have to wear them depends on your activity level and the amount of swelling. · Carry a medical alert card that says you have an artificial joint. You have metal pieces in your knee. These may set off some airport metal detectors. Follow-up care is a key part of your treatment and safety. Be sure to make and go to all appointments, and call your doctor if you are having problems. It's also a good idea to know your test results and keep a list of the medicines you take. When should you call for help? [id number] anytime you think you may need emergency care. For example, call if:  · You passed out (lost consciousness). · You have severe trouble breathing. · You have sudden chest pain and shortness of breath, or you cough up blood. Call your doctor now or seek immediate medical care if:  · You have signs of infection, such as:  ? Increased pain, swelling, warmth, or redness. ? Red streaks leading from the incision. ? Pus draining from the incision. ? A fever. · You have signs of a blood clot, such as:  ? Pain in your calf, back of the knee, thigh, or groin. ? Redness and swelling in your leg or groin. · Your incision comes open and begins to bleed, or the bleeding increases. · You have pain that does not get better after you take pain medicine. Watch closely for changes in your health, and be sure to contact your doctor if:  · You do not have a bowel movement after taking a laxative. Where can you learn more? Go to http://sam-yelena.info/  Enter T054 in the search box to learn more about \"Total Knee Replacement: What to Expect at Home. \"  Current as of: March 2, 2020               Content Version: 12.5  © 0103-2108 Healthwise, Incorporated. Care instructions adapted under license by [employer] (which disclaims liability or warranty for this information).  If you have questions about a medical condition or this instruction, always ask your healthcare professional. [place] [address] any warranty or liability for your use of this information.

## 2020-07-16 ENCOUNTER — HOME CARE VISIT (OUTPATIENT)
Dept: SCHEDULING | Facility: HOME HEALTH | Age: 65
End: 2020-07-16
Payer: COMMERCIAL

## 2020-07-16 PROCEDURE — G0151 HHCP-SERV OF PT,EA 15 MIN: HCPCS

## 2020-07-17 ENCOUNTER — TELEPHONE (OUTPATIENT)
Dept: CARDIAC REHAB | Age: 65
End: 2020-07-17

## 2020-07-17 ENCOUNTER — HOSPITAL ENCOUNTER (EMERGENCY)
Age: 65
Discharge: HOME | End: 2020-07-17
Attending: EMERGENCY MEDICINE
Payer: COMMERCIAL

## 2020-07-17 ENCOUNTER — HOME CARE VISIT (OUTPATIENT)
Dept: HOME HEALTH SERVICES | Facility: HOME HEALTH | Age: 65
End: 2020-07-17
Payer: COMMERCIAL

## 2020-07-17 ENCOUNTER — HOME CARE VISIT (OUTPATIENT)
Dept: SCHEDULING | Facility: HOME HEALTH | Age: 65
End: 2020-07-17
Payer: COMMERCIAL

## 2020-07-17 ENCOUNTER — APPOINTMENT (OUTPATIENT)
Dept: GENERAL RADIOLOGY | Age: 65
End: 2020-07-17
Attending: EMERGENCY MEDICINE
Payer: COMMERCIAL

## 2020-07-17 VITALS
TEMPERATURE: 97.4 F | HEART RATE: 66 BPM | OXYGEN SATURATION: 94 % | SYSTOLIC BLOOD PRESSURE: 128 MMHG | RESPIRATION RATE: 20 BRPM | DIASTOLIC BLOOD PRESSURE: 80 MMHG

## 2020-07-17 VITALS
DIASTOLIC BLOOD PRESSURE: 59 MMHG | SYSTOLIC BLOOD PRESSURE: 105 MMHG | HEART RATE: 60 BPM | RESPIRATION RATE: 17 BRPM | OXYGEN SATURATION: 91 % | TEMPERATURE: 98.5 F

## 2020-07-17 VITALS
TEMPERATURE: 98.2 F | DIASTOLIC BLOOD PRESSURE: 65 MMHG | RESPIRATION RATE: 16 BRPM | HEART RATE: 65 BPM | SYSTOLIC BLOOD PRESSURE: 110 MMHG | OXYGEN SATURATION: 99 %

## 2020-07-17 DIAGNOSIS — F41.1 ANXIETY STATE: Primary | ICD-10-CM

## 2020-07-17 LAB
ALBUMIN SERPL-MCNC: 3.2 G/DL (ref 3.4–5)
ALBUMIN/GLOB SERPL: 0.8 {RATIO} (ref 0.8–1.7)
ALP SERPL-CCNC: 118 U/L (ref 45–117)
ALT SERPL-CCNC: 30 U/L (ref 13–56)
ANION GAP SERPL CALC-SCNC: 5 MMOL/L (ref 3–18)
AST SERPL-CCNC: 32 U/L (ref 10–38)
ATRIAL RATE: 60 BPM
ATRIAL RATE: 63 BPM
BASOPHILS # BLD: 0 K/UL (ref 0–0.1)
BASOPHILS NFR BLD: 0 % (ref 0–2)
BILIRUB SERPL-MCNC: 0.8 MG/DL (ref 0.2–1)
BUN SERPL-MCNC: 14 MG/DL (ref 7–18)
BUN/CREAT SERPL: 18 (ref 12–20)
CALCIUM SERPL-MCNC: 8.5 MG/DL (ref 8.5–10.1)
CALCULATED P AXIS, ECG09: 59 DEGREES
CALCULATED P AXIS, ECG09: 61 DEGREES
CALCULATED R AXIS, ECG10: 17 DEGREES
CALCULATED R AXIS, ECG10: 2 DEGREES
CALCULATED T AXIS, ECG11: -37 DEGREES
CALCULATED T AXIS, ECG11: -40 DEGREES
CHLORIDE SERPL-SCNC: 102 MMOL/L (ref 100–111)
CK MB CFR SERPL CALC: 1.1 % (ref 0–4)
CK MB SERPL-MCNC: 1.7 NG/ML (ref 5–25)
CK SERPL-CCNC: 156 U/L (ref 26–192)
CO2 SERPL-SCNC: 30 MMOL/L (ref 21–32)
CREAT SERPL-MCNC: 0.76 MG/DL (ref 0.6–1.3)
DIAGNOSIS, 93000: NORMAL
DIAGNOSIS, 93000: NORMAL
DIFFERENTIAL METHOD BLD: ABNORMAL
EOSINOPHIL # BLD: 0.2 K/UL (ref 0–0.4)
EOSINOPHIL NFR BLD: 2 % (ref 0–5)
ERYTHROCYTE [DISTWIDTH] IN BLOOD BY AUTOMATED COUNT: 15.1 % (ref 11.6–14.5)
GLOBULIN SER CALC-MCNC: 3.8 G/DL (ref 2–4)
GLUCOSE SERPL-MCNC: 105 MG/DL (ref 74–99)
HCT VFR BLD AUTO: 28.8 % (ref 35–45)
HGB BLD-MCNC: 9.2 G/DL (ref 12–16)
LYMPHOCYTES # BLD: 2.7 K/UL (ref 0.9–3.6)
LYMPHOCYTES NFR BLD: 34 % (ref 21–52)
MCH RBC QN AUTO: 27.2 PG (ref 24–34)
MCHC RBC AUTO-ENTMCNC: 31.9 G/DL (ref 31–37)
MCV RBC AUTO: 85.2 FL (ref 74–97)
MONOCYTES # BLD: 0.7 K/UL (ref 0.05–1.2)
MONOCYTES NFR BLD: 9 % (ref 3–10)
NEUTS SEG # BLD: 4.3 K/UL (ref 1.8–8)
NEUTS SEG NFR BLD: 55 % (ref 40–73)
P-R INTERVAL, ECG05: 194 MS
P-R INTERVAL, ECG05: 198 MS
PLATELET # BLD AUTO: 379 K/UL (ref 135–420)
PMV BLD AUTO: 9.7 FL (ref 9.2–11.8)
POTASSIUM SERPL-SCNC: 3.8 MMOL/L (ref 3.5–5.5)
PROT SERPL-MCNC: 7 G/DL (ref 6.4–8.2)
Q-T INTERVAL, ECG07: 396 MS
Q-T INTERVAL, ECG07: 402 MS
QRS DURATION, ECG06: 78 MS
QRS DURATION, ECG06: 80 MS
QTC CALCULATION (BEZET), ECG08: 402 MS
QTC CALCULATION (BEZET), ECG08: 405 MS
RBC # BLD AUTO: 3.38 M/UL (ref 4.2–5.3)
SODIUM SERPL-SCNC: 137 MMOL/L (ref 136–145)
TROPONIN I SERPL-MCNC: 5.76 NG/ML (ref 0–0.04)
TROPONIN I SERPL-MCNC: 6.82 NG/ML (ref 0–0.04)
VENTRICULAR RATE, ECG03: 60 BPM
VENTRICULAR RATE, ECG03: 63 BPM
WBC # BLD AUTO: 7.8 K/UL (ref 4.6–13.2)

## 2020-07-17 PROCEDURE — 93005 ELECTROCARDIOGRAM TRACING: CPT

## 2020-07-17 PROCEDURE — 85025 COMPLETE CBC W/AUTO DIFF WBC: CPT

## 2020-07-17 PROCEDURE — 82550 ASSAY OF CK (CPK): CPT

## 2020-07-17 PROCEDURE — 80053 COMPREHEN METABOLIC PANEL: CPT

## 2020-07-17 PROCEDURE — 99284 EMERGENCY DEPT VISIT MOD MDM: CPT

## 2020-07-17 PROCEDURE — 96374 THER/PROPH/DIAG INJ IV PUSH: CPT

## 2020-07-17 PROCEDURE — 74011250636 HC RX REV CODE- 250/636: Performed by: EMERGENCY MEDICINE

## 2020-07-17 PROCEDURE — G0299 HHS/HOSPICE OF RN EA 15 MIN: HCPCS

## 2020-07-17 PROCEDURE — 71045 X-RAY EXAM CHEST 1 VIEW: CPT

## 2020-07-17 PROCEDURE — G0157 HHC PT ASSISTANT EA 15: HCPCS

## 2020-07-17 RX ORDER — LORAZEPAM 2 MG/ML
1 INJECTION INTRAMUSCULAR
Status: COMPLETED | OUTPATIENT
Start: 2020-07-17 | End: 2020-07-17

## 2020-07-17 RX ADMIN — LORAZEPAM 1 MG: 2 INJECTION INTRAMUSCULAR; INTRAVENOUS at 05:53

## 2020-07-17 NOTE — ED TRIAGE NOTES
Patient A/O x 4, complaining of fullness to chest x less than hour ago. Patient has recently had STEMI x 7/13/20.  Patient denies chest pain, SOB, N/V.

## 2020-07-17 NOTE — ED PROVIDER NOTES
EMERGENCY DEPARTMENT HISTORY AND PHYSICAL EXAM      Date: 7/17/2020  Patient Name: Karen Yañez    History of Presenting Illness     Chief Complaint   Patient presents with    Other     chest fullness       History (Context): Karen Yañez is a 59 y.o. Vivian Kamron with anxiety, and a complicated set of active comorbid conditions, who had a STEMI 2 days ago, as noted below in the past medical history, who presents with subacute onset, persistent, severe chest pain, dyspnea, finger numbness and tingling, anxiety, worrying, numbness in the right arm, left hand. Pain started a few hours ago. No other associated symptoms or relieving features. On review of systems, the patient denies fever, chills, trauma, back pain, abdominal pain, nausea, vomiting, diaphoresis. PCP: JAMES Espinoza    Current Outpatient Medications   Medication Sig Dispense Refill    HYDROcodone-acetaminophen (Norco) 7.5-325 mg per tablet Take 1 Tab by mouth every six (6) hours as needed for Pain for up to 14 days. Max Daily Amount: 4 Tabs. 42 Tab 0    promethazine (PHENERGAN) 25 mg suppository Insert 1 Suppository into rectum every six (6) hours as needed for Nausea for up to 7 days. 12 Suppository 0    amLODIPine (NORVASC) 2.5 mg tablet Take 1 Tab by mouth daily. 30 Tab 2    aspirin delayed-release 81 mg tablet Take 1 Tab by mouth daily. 30 Tab 11    clopidogreL (PLAVIX) 75 mg tab Take 1 Tab by mouth daily. 30 Tab 11    carvediloL (COREG) 6.25 mg tablet Take 1 Tab by mouth every twelve (12) hours. 60 Tab 2    atorvastatin (LIPITOR) 80 mg tablet Take 1 Tab by mouth daily. 30 Tab 2    docusate sodium (Colace) 100 mg capsule Take 1 Cap by mouth two (2) times a day for 90 days. 60 Cap 2    cholecalciferol (VITAMIN D3) (50,000 UNITS /1250 MCG) capsule Take  by mouth every seven (7) days.  escitalopram oxalate (LEXAPRO) 20 mg tablet Take 20 mg by mouth daily.          Past History     Past Medical History:  Past Medical History: Diagnosis Date    Arthritis     Calcific Achilles tendonitis     Right; insertional    Essential hypertension 7/13/2020    Hypercholesterolemia     Hypertension     Left knee pain     Pain of right heel     Sprain of right ring finger     Thyroid disease     Wears glasses        Past Surgical History:  Past Surgical History:   Procedure Laterality Date    HX CHOLECYSTECTOMY  1990    HX HEENT      Eye surgery    HX HERNIA REPAIR      HX ORTHOPAEDIC Left 07/2020    left total knee arthroplasty       Family History:  Family History   Problem Relation Age of Onset    Breast Cancer Other     Arthritis-osteo Mother     Hypertension Mother     Heart Disease Mother     Hypertension Other         Grandmother    Other Other         Heart problems: Mother, grandmother    Diabetes Other         Grandmother       Social History:  Social History     Tobacco Use    Smoking status: Former Smoker     Years: 15.00    Smokeless tobacco: Never Used   Substance Use Topics    Alcohol use: Yes     Frequency: Monthly or less     Comment: Occasional    Drug use: No       Allergies:  No Known Allergies    PMH, PSH, family history, social history, allergies reviewed with the patient with significant items noted above. Review of Systems   As per HPI, otherwise reviewed and negative. Physical Exam     Vitals:    07/17/20 0335 07/17/20 0430 07/17/20 0530 07/17/20 0615   BP: 141/73 127/65 137/61 105/59   Pulse: 62 63 61 60   Resp: 20 15 14 17   Temp: 98.5 °F (36.9 °C)      SpO2: 95%  97% 91%       Gen: Anxious-appearing, in no acute distress   HEENT: Normocephalic, sclera anicteric  Cardiovascular: Normal rate, regular rhythm, no murmurs, rubs, gallops. Pulses intact and equal distally. Pulmonary: No respiratory distress. No stridor. Clear lungs. ABD: Soft, nontender, nondistended. Neuro: Alert. Normal speech. Normal mentation. Psych: Normal thought content and thought processes.   Anxiety present  : No CVA tenderness  EXT: No edema. Moves all extremities well. No cyanosis or clubbing. Skin: Warm and well-perfused. Other:        Diagnostic Study Results     Labs -     No results found for this or any previous visit (from the past 12 hour(s)). Radiologic Studies -   XR CHEST PORT   Final Result   IMPRESSION: Slightly prominent heart, no evidence of acute pulmonary edema or   consolidation. CT Results  (Last 48 hours)    None        CXR Results  (Last 48 hours)               07/23/20 1848  XR CHEST PA LAT Final result    Impression:  Impression:   1. No definite acute process. Narrative:      Examination: PA and lateral chest         History: Nausea       Comparison: July 17, 2020       Findings: Lungs are clear. Heart is mildly enlarged. No definite acute osseous   abnormality. Degenerative changes of the spine. Medical Decision Making   I am the first provider for this patient. I reviewed the vital signs, available nursing notes, past medical history, past surgical history, family history and social history. Vital Signs-Reviewed the patient's vital signs. EKG: Interpreted by myself. Records Reviewed: Personally, on initial evaluation    MDM:   Patient presents with substernal chest pain.   Exam significant for anxious appearing, otherwise normal exam.   DDX considered: ACS, unstable angina, pneumothorax, GERD, MSK chest pain, anxiety  DDX thought to be less likely but also considered due to high risk condition: Aortic dissection, PE, Boerhaave's, pericarditis, mediastinitis    Patient condition on initial evaluation: Stable    Plan:   Pain Control  Close Observation  Cardiac monitoring  As per orders noted below:  Orders Placed This Encounter    XR CHEST PORT    CBC WITH AUTOMATED DIFF    METABOLIC PANEL, COMPREHENSIVE    CARDIAC PANEL,(CK, CKMB & TROPONIN)    TROPONIN I    EKG, 12 LEAD, INITIAL    EKG, 12 LEAD, INITIAL    LORazepam (ATIVAN) injection 1 mg        ED Course:   ED Course as of Jul 24 2019 Fri Jul 17, 2020   [id number]. Patient continues to be stable. Troponin-I, Qt.(!!): 5.76 [DT] the troponin is still high undetectable from prior infarct      ED Course User Index  [DT] Micheal Silva MD          Patient condition at time of disposition: Stable  DISCHARGE NOTE:   Pt has been reexamined. Patient has no new complaints, changes, or physical findings. Care plan outlined and precautions discussed. Results were reviewed with the patient. All medications were reviewed with the patient; will d/c home with no changes in meds. All of pt's questions and concerns were addressed. Alarm symptoms and return precautions associated with chief complaint and evaluation were reviewed with the patient in detail. The patient demonstrated adequate understanding. Patient was instructed and agrees to follow up with PCP, as well as to return to the ED upon further deterioration. Patient is ready to go home. The patient is happy with this plan    Follow-up Information     Follow up With Specialties Details Why Contact Info    [facility] EMERGENCY DEPT Emergency Medicine  As needed, If symptoms worsen [address] [place] [address] [phone]    JAMES Parker Physician Assistant Call  [address] [place] [address],[address]  [address]  [phone]      Your cardiologist  Call today            Discharge Medication List as of 7/17/2020  7:01 AM          Procedures:  Procedures      Critical Care Time:       Diagnosis     Clinical Impression:   1. Anxiety state        Signed,  Marsha Vega MD  Emergency Physician  [facility]    As a voice dictation software was utilized to dictate this note, minor word transpositions can occur. I apologize for confusing wording and typographic errors. Please feel free to contact me for clarification.

## 2020-07-17 NOTE — TELEPHONE ENCOUNTER
[facility]iac [facility]hab called patient and spoke to her about the program. She stated that she is still in Home PT, but wants a call next week with her insurance benefits. Will follow up at that time.     Thank you,  Margy Gonzalez

## 2020-07-17 NOTE — DISCHARGE INSTRUCTIONS

## 2020-07-18 NOTE — PROGRESS NOTES
Resumption of care completed for patient 7/16/20, PT to resume care 2w1, 3w1, with anticipated plan to d/c 7/24/2020.   SN eval scheduled 7/17/2020  Thank you for your referral.

## 2020-07-19 ENCOUNTER — HOME CARE VISIT (OUTPATIENT)
Dept: SCHEDULING | Facility: HOME HEALTH | Age: 65
End: 2020-07-19
Payer: COMMERCIAL

## 2020-07-19 VITALS
OXYGEN SATURATION: 99 % | TEMPERATURE: 98.1 F | SYSTOLIC BLOOD PRESSURE: 134 MMHG | HEART RATE: 65 BPM | RESPIRATION RATE: 18 BRPM | DIASTOLIC BLOOD PRESSURE: 80 MMHG

## 2020-07-19 PROCEDURE — G0299 HHS/HOSPICE OF RN EA 15 MIN: HCPCS

## 2020-07-20 ENCOUNTER — HOME CARE VISIT (OUTPATIENT)
Dept: SCHEDULING | Facility: HOME HEALTH | Age: 65
End: 2020-07-20
Payer: COMMERCIAL

## 2020-07-20 PROCEDURE — G0157 HHC PT ASSISTANT EA 15: HCPCS

## 2020-07-21 VITALS
RESPIRATION RATE: 17 BRPM | OXYGEN SATURATION: 97 % | DIASTOLIC BLOOD PRESSURE: 82 MMHG | TEMPERATURE: 97 F | SYSTOLIC BLOOD PRESSURE: 124 MMHG | HEART RATE: 66 BPM

## 2020-07-22 ENCOUNTER — HOME CARE VISIT (OUTPATIENT)
Dept: SCHEDULING | Facility: HOME HEALTH | Age: 65
End: 2020-07-22
Payer: COMMERCIAL

## 2020-07-22 PROCEDURE — G0157 HHC PT ASSISTANT EA 15: HCPCS

## 2020-07-23 ENCOUNTER — HOSPITAL ENCOUNTER (EMERGENCY)
Age: 65
Discharge: HOME | End: 2020-07-23
Attending: EMERGENCY MEDICINE
Payer: COMMERCIAL

## 2020-07-23 ENCOUNTER — APPOINTMENT (OUTPATIENT)
Dept: GENERAL RADIOLOGY | Age: 65
End: 2020-07-23
Attending: NURSE PRACTITIONER
Payer: COMMERCIAL

## 2020-07-23 VITALS
HEART RATE: 64 BPM | DIASTOLIC BLOOD PRESSURE: 84 MMHG | RESPIRATION RATE: 18 BRPM | SYSTOLIC BLOOD PRESSURE: 154 MMHG | OXYGEN SATURATION: 99 % | TEMPERATURE: 98.4 F

## 2020-07-23 DIAGNOSIS — R11.2 NAUSEA AND VOMITING, INTRACTABILITY OF VOMITING NOT SPECIFIED, UNSPECIFIED VOMITING TYPE: Primary | ICD-10-CM

## 2020-07-23 LAB
ALBUMIN SERPL-MCNC: 3.7 G/DL (ref 3.4–5)
ALBUMIN/GLOB SERPL: 1 {RATIO} (ref 0.8–1.7)
ALP SERPL-CCNC: 134 U/L (ref 45–117)
ALT SERPL-CCNC: 24 U/L (ref 13–56)
ANION GAP SERPL CALC-SCNC: 7 MMOL/L (ref 3–18)
AST SERPL-CCNC: 16 U/L (ref 10–38)
BASOPHILS # BLD: 0 K/UL (ref 0–0.1)
BASOPHILS NFR BLD: 0 % (ref 0–2)
BILIRUB SERPL-MCNC: 0.8 MG/DL (ref 0.2–1)
BUN SERPL-MCNC: 10 MG/DL (ref 7–18)
BUN/CREAT SERPL: 13 (ref 12–20)
CALCIUM SERPL-MCNC: 8.9 MG/DL (ref 8.5–10.1)
CHLORIDE SERPL-SCNC: 103 MMOL/L (ref 100–111)
CO2 SERPL-SCNC: 29 MMOL/L (ref 21–32)
CREAT SERPL-MCNC: 0.75 MG/DL (ref 0.6–1.3)
DIFFERENTIAL METHOD BLD: ABNORMAL
EOSINOPHIL # BLD: 0.1 K/UL (ref 0–0.4)
EOSINOPHIL NFR BLD: 1 % (ref 0–5)
ERYTHROCYTE [DISTWIDTH] IN BLOOD BY AUTOMATED COUNT: 15.3 % (ref 11.6–14.5)
GLOBULIN SER CALC-MCNC: 3.8 G/DL (ref 2–4)
GLUCOSE SERPL-MCNC: 99 MG/DL (ref 74–99)
HCT VFR BLD AUTO: 33.3 % (ref 35–45)
HGB BLD-MCNC: 10.8 G/DL (ref 12–16)
LIPASE SERPL-CCNC: 73 U/L (ref 73–393)
LYMPHOCYTES # BLD: 2.3 K/UL (ref 0.9–3.6)
LYMPHOCYTES NFR BLD: 25 % (ref 21–52)
MCH RBC QN AUTO: 27.5 PG (ref 24–34)
MCHC RBC AUTO-ENTMCNC: 32.4 G/DL (ref 31–37)
MCV RBC AUTO: 84.7 FL (ref 74–97)
MONOCYTES # BLD: 0.7 K/UL (ref 0.05–1.2)
MONOCYTES NFR BLD: 7 % (ref 3–10)
NEUTS SEG # BLD: 6.1 K/UL (ref 1.8–8)
NEUTS SEG NFR BLD: 67 % (ref 40–73)
PLATELET # BLD AUTO: 521 K/UL (ref 135–420)
PMV BLD AUTO: 9.7 FL (ref 9.2–11.8)
POTASSIUM SERPL-SCNC: 3.7 MMOL/L (ref 3.5–5.5)
PROT SERPL-MCNC: 7.5 G/DL (ref 6.4–8.2)
RBC # BLD AUTO: 3.93 M/UL (ref 4.2–5.3)
SODIUM SERPL-SCNC: 139 MMOL/L (ref 136–145)
TROPONIN I SERPL-MCNC: 0.05 NG/ML (ref 0–0.04)
WBC # BLD AUTO: 9.2 K/UL (ref 4.6–13.2)

## 2020-07-23 PROCEDURE — 83690 ASSAY OF LIPASE: CPT

## 2020-07-23 PROCEDURE — 93005 ELECTROCARDIOGRAM TRACING: CPT

## 2020-07-23 PROCEDURE — 85025 COMPLETE CBC W/AUTO DIFF WBC: CPT

## 2020-07-23 PROCEDURE — 96374 THER/PROPH/DIAG INJ IV PUSH: CPT

## 2020-07-23 PROCEDURE — 84484 ASSAY OF TROPONIN QUANT: CPT

## 2020-07-23 PROCEDURE — 99282 EMERGENCY DEPT VISIT SF MDM: CPT

## 2020-07-23 PROCEDURE — 74011250636 HC RX REV CODE- 250/636: Performed by: EMERGENCY MEDICINE

## 2020-07-23 PROCEDURE — 80053 COMPREHEN METABOLIC PANEL: CPT

## 2020-07-23 PROCEDURE — 71046 X-RAY EXAM CHEST 2 VIEWS: CPT

## 2020-07-23 RX ORDER — ONDANSETRON 2 MG/ML
4 INJECTION INTRAMUSCULAR; INTRAVENOUS
Status: COMPLETED | OUTPATIENT
Start: 2020-07-23 | End: 2020-07-23

## 2020-07-23 RX ORDER — PROMETHAZINE HYDROCHLORIDE 25 MG/1
25 SUPPOSITORY RECTAL
Qty: 12 SUPPOSITORY | Refills: 0 | Status: SHIPPED | OUTPATIENT
Start: 2020-07-23 | End: 2020-07-30

## 2020-07-23 RX ADMIN — ONDANSETRON 4 MG: 2 INJECTION INTRAMUSCULAR; INTRAVENOUS at 19:32

## 2020-07-23 NOTE — ED TRIAGE NOTES
Patient c/o of nausea for the past 2 x. She states \"last time I felt like this, I had a heart attack. \" patient was seen in doctors office and prescribed zofran.

## 2020-07-23 NOTE — ED PROVIDER NOTES
EMERGENCY DEPARTMENT HISTORY AND PHYSICAL EXAM    6:45 PM      Date: 7/23/2020  Patient Name: Bella Sales    History of Presenting Illness     Chief Complaint   Patient presents with    Nausea         History Provided By: Patient    Additional History (Context): Bella Sales is a 59 y.o. female with hypertension who presents with nausea. Patient developed nausea 3 days ago. She was given Zofran by her PCP. She improved the following day. However today, she developed nausea. Patient denies chest pain. However, patient had a recent MI with nausea being the only symptom. Patient denies cough, fever, shortness of breath or diarrhea. Patient denies smoking, alcohol or recreational drug use. PCP: JAMES Urban        Current Facility-Administered Medications   Medication Dose Route Frequency Provider Last Rate Last Dose    sodium chloride 0.9 % bolus infusion 1,000 mL  1,000 mL IntraVENous ONCE Julia Jeffrey DO         Current Outpatient Medications   Medication Sig Dispense Refill    promethazine (PHENERGAN) 25 mg suppository Insert 1 Suppository into rectum every six (6) hours as needed for Nausea for up to 7 days. 12 Suppository 0    amLODIPine (NORVASC) 2.5 mg tablet Take 1 Tab by mouth daily. 30 Tab 2    aspirin delayed-release 81 mg tablet Take 1 Tab by mouth daily. 30 Tab 11    clopidogreL (PLAVIX) 75 mg tab Take 1 Tab by mouth daily. 30 Tab 11    carvediloL (COREG) 6.25 mg tablet Take 1 Tab by mouth every twelve (12) hours. 60 Tab 2    atorvastatin (LIPITOR) 80 mg tablet Take 1 Tab by mouth daily. 30 Tab 2    docusate sodium (Colace) 100 mg capsule Take 1 Cap by mouth two (2) times a day for 90 days. 60 Cap 2    cholecalciferol (VITAMIN D3) (50,000 UNITS /1250 MCG) capsule Take  by mouth every seven (7) days.  escitalopram oxalate (LEXAPRO) 20 mg tablet Take 20 mg by mouth daily.          Past History     Past Medical History:  Past Medical History:   Diagnosis Date    Arthritis  Calcific Achilles tendonitis     Right; insertional    Essential hypertension 7/13/2020    Hypercholesterolemia     Hypertension     Left knee pain     Pain of right heel     Sprain of right ring finger     Thyroid disease     Wears glasses        Past Surgical History:  Past Surgical History:   Procedure Laterality Date    HX CHOLECYSTECTOMY  1990    HX HEENT      Eye surgery    HX HERNIA REPAIR      HX ORTHOPAEDIC Left 07/2020    left total knee arthroplasty       Family History:  Family History   Problem Relation Age of Onset    Breast Cancer Other     Arthritis-osteo Mother     Hypertension Mother     Heart Disease Mother     Hypertension Other         Grandmother    Other Other         Heart problems: Mother, grandmother    Diabetes Other         Grandmother       Social History:  Social History     Tobacco Use    Smoking status: Former Smoker     Years: 15.00    Smokeless tobacco: Never Used   Substance Use Topics    Alcohol use: Yes     Frequency: Monthly or less     Comment: Occasional    Drug use: No       Allergies:  No Known Allergies      Review of Systems       Review of Systems   Constitutional: Negative. Negative for chills, diaphoresis and fever. HENT: Negative. Negative for congestion, rhinorrhea and sore throat. Eyes: Negative. Negative for pain, discharge and redness. Respiratory: Negative. Negative for cough, chest tightness, shortness of breath and wheezing. Cardiovascular: Negative. Negative for chest pain. Gastrointestinal: Positive for nausea. Negative for abdominal pain, constipation, diarrhea and vomiting. Genitourinary: Negative. Negative for dysuria, flank pain, frequency, hematuria and urgency. Musculoskeletal: Negative. Negative for back pain and neck pain. Skin: Negative. Negative for rash. Neurological: Negative. Negative for syncope, weakness, numbness and headaches. Psychiatric/Behavioral: Negative.     All other systems reviewed and are negative. Physical Exam     Visit Vitals  /84 (BP 1 Location: Left arm, BP Patient Position: At rest)   Pulse 64   Temp 98.4 °F (36.9 °C)   Resp 18   SpO2 99%         Physical Exam  Vitals signs and nursing note reviewed. Constitutional:       General: She is not in acute distress. Appearance: Normal appearance. She is well-developed. She is not ill-appearing, toxic-appearing or diaphoretic. HENT:      Head: Normocephalic and atraumatic. Mouth/Throat:      Pharynx: No oropharyngeal exudate. Eyes:      General: No scleral icterus. Conjunctiva/sclera: Conjunctivae normal.      Pupils: Pupils are equal, round, and reactive to light. Neck:      Musculoskeletal: Normal range of motion and neck supple. Thyroid: No thyromegaly. Vascular: No hepatojugular reflux or JVD. Trachea: No tracheal deviation. Cardiovascular:      Rate and Rhythm: Normal rate and regular rhythm. Pulses: Normal pulses. Radial pulses are 2+ on the right side and 2+ on the left side. Dorsalis pedis pulses are 2+ on the right side and 2+ on the left side. Heart sounds: Normal heart sounds, S1 normal and S2 normal. No murmur. No gallop. No S3 or S4 sounds. Pulmonary:      Effort: Pulmonary effort is normal. No respiratory distress. Breath sounds: Normal breath sounds. No decreased breath sounds, wheezing, rhonchi or rales. Abdominal:      General: Bowel sounds are normal. There is no distension. Palpations: Abdomen is soft. Abdomen is not rigid. There is no mass. Tenderness: There is no abdominal tenderness. There is no guarding or rebound. Negative signs include Solitario's sign and McBurney's sign. Musculoskeletal: Normal range of motion. Lymphadenopathy:      Head:      Right side of head: No submental, submandibular, preauricular or occipital adenopathy.       Left side of head: No submental, submandibular, preauricular or occipital adenopathy. Cervical: No cervical adenopathy. Upper Body:      Right upper body: No supraclavicular adenopathy. Left upper body: No supraclavicular adenopathy. Skin:     General: Skin is warm and dry. Findings: No rash. Neurological:      Mental Status: She is alert. She is not disoriented. GCS: GCS eye subscore is 4. GCS verbal subscore is 5. GCS motor subscore is 6. Cranial Nerves: No cranial nerve deficit. Sensory: No sensory deficit. Coordination: Coordination normal.      Gait: Gait normal.      Deep Tendon Reflexes: Reflexes are normal and symmetric. Psychiatric:         Speech: Speech normal.         Behavior: Behavior normal.         Thought Content: Thought content normal.         Judgment: Judgment normal.           Diagnostic Study Results     Labs -  Recent Results (from the past 12 hour(s))   CBC WITH AUTOMATED DIFF    Collection Time: 07/23/20  6:24 PM   Result Value Ref Range    WBC 9.2 4.6 - 13.2 K/uL    RBC 3.93 (L) 4.20 - 5.30 M/uL    HGB 10.8 (L) 12.0 - 16.0 g/dL    HCT 33.3 (L) 35.0 - 45.0 %    MCV 84.7 74.0 - 97.0 FL    MCH 27.5 24.0 - 34.0 PG    MCHC 32.4 31.0 - 37.0 g/dL    RDW 15.3 (H) 11.6 - 14.5 %    PLATELET 362 (H) 110 - 420 K/uL    MPV 9.7 9.2 - 11.8 FL    NEUTROPHILS 67 40 - 73 %    LYMPHOCYTES 25 21 - 52 %    MONOCYTES 7 3 - 10 %    EOSINOPHILS 1 0 - 5 %    BASOPHILS 0 0 - 2 %    ABS. NEUTROPHILS 6.1 1.8 - 8.0 K/UL    ABS. LYMPHOCYTES 2.3 0.9 - 3.6 K/UL    ABS. MONOCYTES 0.7 0.05 - 1.2 K/UL    ABS. EOSINOPHILS 0.1 0.0 - 0.4 K/UL    ABS.  BASOPHILS 0.0 0.0 - 0.1 K/UL    DF AUTOMATED     METABOLIC PANEL, COMPREHENSIVE    Collection Time: 07/23/20  6:24 PM   Result Value Ref Range    Sodium 139 136 - 145 mmol/L    Potassium 3.7 3.5 - 5.5 mmol/L    Chloride 103 100 - 111 mmol/L    CO2 29 21 - 32 mmol/L    Anion gap 7 3.0 - 18 mmol/L    Glucose 99 74 - 99 mg/dL    BUN 10 7.0 - 18 MG/DL    Creatinine 0.75 0.6 - 1.3 MG/DL    BUN/Creatinine ratio 13 12 - 20      GFR est AA >60 >60 ml/min/1.73m2    GFR est non-AA >60 >60 ml/min/1.73m2    Calcium 8.9 8.5 - 10.1 MG/DL    Bilirubin, total 0.8 0.2 - 1.0 MG/DL    ALT (SGPT) 24 13 - 56 U/L    AST (SGOT) 16 10 - 38 U/L    Alk. phosphatase 134 (H) 45 - 117 U/L    Protein, total 7.5 6.4 - 8.2 g/dL    Albumin 3.7 3.4 - 5.0 g/dL    Globulin 3.8 2.0 - 4.0 g/dL    A-G Ratio 1.0 0.8 - 1.7     TROPONIN I    Collection Time: 07/23/20  6:24 PM   Result Value Ref Range    Troponin-I, QT 0.05 (H) 0.0 - 0.045 NG/ML       Radiologic Studies -   XR CHEST PA LAT    (Results Pending)         Medical Decision Making   Provider Notes (Medical Decision Making):  MDM  Number of Diagnoses or Management Options  Nausea and vomiting, intractability of vomiting not specified, unspecified vomiting type:   Diagnosis management comments: DIFFERENTIAL DIAGNOSES/ MEDICAL DECISION MAKING:  Chest pain etiologies include acute cardiac events to include possible acute myocardial infarction, acute coronary syndrome, pneumonia, chest wall pain (myofascial/ musculoskeletal etiology), chronic obstructive pulmonary disease (copd), acute asthma exacerbation, congestive heart failure, acute bronchitis, pulmonary embolism, upper respiratory infection, referred abdominal pain, other etiologies, versus combination of the above. I am the first provider for this patient. I reviewed the vital signs, available nursing notes, past medical history, past surgical history, family history and social history. Vital Signs-Reviewed the patient's vital signs. Records Reviewed: Nursing Notes (Time of Review: 6:45 PM)    ED Course: Progress Notes, Reevaluation, and Consults:    Labs essentially normal. Troponin 0.05. Chest X-Ray showed No acute process. EKG showed NSR with rate of 68 bpm. With no ST elevations or depression and non specific T wave changes. 7:45 PM 7/23/2020        Diagnosis       I have reassessed the patient. Patient is feeling well. Patient will be prescribed Phenergan. Patient was discharged in stable condition. Patient is to return to emergency department if any new or worsening condition. Clinical Impression:   1. Nausea and vomiting, intractability of vomiting not specified, unspecified vomiting type        Disposition: Discharged home     Follow-up Information     Follow up With Specialties Details Why Contact Info    JAMES Covarrubias Physician Assistant In 2 days  [address] [place] [address]  [address] [place] [address],[address]  [address] [place] [address]  [phone]               Attestation        Provider Attestation:     I personally performed the services described in the documentation, reviewed the documentation and it accurately and completely records my words and actions utilizing the [address] [place] July 23, 2020 at 7:45 PM - [place], [address]. It is dictated using utilizing voice recognition software. Unfortunately this leads to occasional typographical errors. I apologize in advance if the situation occurs. If questions arise please do not hesitate to contact me or call our department.

## 2020-07-23 NOTE — DISCHARGE INSTRUCTIONS
Patient Education        Nausea and Vomiting: Care Instructions  Your Care Instructions     When you are nauseated, you may feel weak and sweaty and notice a lot of saliva in your mouth. Nausea often leads to vomiting. Most of the time you do not need to worry about nausea and vomiting, but they can be signs of other illnesses. Two common causes of nausea and vomiting are stomach flu and food poisoning. Nausea and vomiting from viral stomach flu will usually start to improve within 24 hours. Nausea and vomiting from food poisoning may last from 12 to 48 hours. The doctor has checked you carefully, but problems can develop later. If you notice any problems or new symptoms, get medical treatment right away. Follow-up care is a key part of your treatment and safety. Be sure to make and go to all appointments, and call your doctor if you are having problems. It's also a good idea to know your test results and keep a list of the medicines you take. How can you care for yourself at home? · To prevent dehydration, drink plenty of fluids, enough so that your urine is light yellow or clear like water. Choose water and other caffeine-free clear liquids until you feel better. If you have kidney, heart, or liver disease and have to limit fluids, talk with your doctor before you increase the amount of fluids you drink. · Rest in bed until you feel better. · When you are able to eat, try clear soups, mild foods, and liquids until all symptoms are gone for 12 to 48 hours. Other good choices include dry toast, crackers, cooked cereal, and gelatin dessert, such as Jell-O. When should you call for help? [id number] anytime you think you may need emergency care. For example, call if:  · You passed out (lost consciousness). Call your doctor now or seek immediate medical care if:  · You have symptoms of dehydration, such as:  ? Dry eyes and a dry mouth. ? Passing only a little dark urine. ?  Feeling thirstier than usual.  · You have new or worsening belly pain. · You have a new or higher fever. · You vomit blood or what looks like coffee grounds. Watch closely for changes in your health, and be sure to contact your doctor if:  · You have ongoing nausea and vomiting. · Your vomiting is getting worse. · Your vomiting lasts longer than 2 days. · You are not getting better as expected. Where can you learn more? Go to http://sam-yelena.info/  Enter H591 in the search box to learn more about \"Nausea and Vomiting: Care Instructions. \"  Current as of: June 26, 2019               Content Version: 12.5  © 3499-7980 Healthwise, [employer]. Care instructions adapted under license by [employer] (which disclaims liability or warranty for this information). If you have questions about a medical condition or this instruction, always ask your healthcare professional. [address] any warranty or liability for your use of this information.

## 2020-07-24 ENCOUNTER — TELEPHONE (OUTPATIENT)
Dept: ORTHOPEDIC SURGERY | Facility: CLINIC | Age: 65
End: 2020-07-24

## 2020-07-24 ENCOUNTER — OFFICE VISIT (OUTPATIENT)
Dept: ORTHOPEDIC SURGERY | Facility: CLINIC | Age: 65
End: 2020-07-24

## 2020-07-24 ENCOUNTER — HOME CARE VISIT (OUTPATIENT)
Dept: SCHEDULING | Facility: HOME HEALTH | Age: 65
End: 2020-07-24
Payer: COMMERCIAL

## 2020-07-24 ENCOUNTER — PATIENT OUTREACH (OUTPATIENT)
Dept: CASE MANAGEMENT | Age: 65
End: 2020-07-24

## 2020-07-24 ENCOUNTER — OFFICE VISIT (OUTPATIENT)
Dept: CARDIOLOGY CLINIC | Age: 65
End: 2020-07-24

## 2020-07-24 VITALS
SYSTOLIC BLOOD PRESSURE: 124 MMHG | TEMPERATURE: 97.3 F | HEART RATE: 59 BPM | DIASTOLIC BLOOD PRESSURE: 61 MMHG | HEIGHT: 69 IN | BODY MASS INDEX: 31.31 KG/M2 | WEIGHT: 211.4 LBS

## 2020-07-24 VITALS
HEART RATE: 56 BPM | TEMPERATURE: 97.8 F | OXYGEN SATURATION: 99 % | RESPIRATION RATE: 12 BRPM | SYSTOLIC BLOOD PRESSURE: 132 MMHG | DIASTOLIC BLOOD PRESSURE: 80 MMHG

## 2020-07-24 VITALS — TEMPERATURE: 96.6 F

## 2020-07-24 DIAGNOSIS — I10 ESSENTIAL HYPERTENSION: ICD-10-CM

## 2020-07-24 DIAGNOSIS — G89.18 ACUTE POST-OPERATIVE PAIN: Primary | ICD-10-CM

## 2020-07-24 DIAGNOSIS — Z96.652 S/P TOTAL KNEE ARTHROPLASTY, LEFT: Primary | ICD-10-CM

## 2020-07-24 DIAGNOSIS — I21.11 ST ELEVATION MYOCARDIAL INFARCTION INVOLVING RIGHT CORONARY ARTERY (HCC): Primary | ICD-10-CM

## 2020-07-24 DIAGNOSIS — E78.2 MIXED HYPERLIPIDEMIA: ICD-10-CM

## 2020-07-24 DIAGNOSIS — M17.10 ARTHRITIS OF KNEE: ICD-10-CM

## 2020-07-24 LAB
ATRIAL RATE: 68 BPM
CALCULATED P AXIS, ECG09: 52 DEGREES
CALCULATED R AXIS, ECG10: -16 DEGREES
CALCULATED T AXIS, ECG11: -58 DEGREES
DIAGNOSIS, 93000: NORMAL
P-R INTERVAL, ECG05: 184 MS
Q-T INTERVAL, ECG07: 410 MS
QRS DURATION, ECG06: 90 MS
QTC CALCULATION (BEZET), ECG08: 435 MS
VENTRICULAR RATE, ECG03: 68 BPM

## 2020-07-24 PROCEDURE — G0151 HHCP-SERV OF PT,EA 15 MIN: HCPCS

## 2020-07-24 RX ORDER — HYDROCODONE BITARTRATE AND ACETAMINOPHEN 7.5; 325 MG/1; MG/1
1 TABLET ORAL
Qty: 42 TAB | Refills: 0 | Status: SHIPPED | OUTPATIENT
Start: 2020-07-24 | End: 2020-08-07

## 2020-07-24 NOTE — PROGRESS NOTES
Brief Postoperative Note     Patient: Talat Vallecillo  YOB: 1955  MRN: [id number]     Date of Procedure: 7/7/2020      Pre-Op Diagnosis: Osteoarthritis of left knee, unspecified osteoarthritis type [M17.12]     Post-Op Diagnosis: Same as preoperative diagnosis.       Procedure(s):  LEFT TOTAL KNEE ARTHROPLASTY     Surgeon(s):  Lor Gamez MD     Surgical Assistant: Physician Assistant: Kimberly Trevizo PA-C     Anesthesia: General      Estimated Blood Loss (mL): less than 50       Ms. Lanning Simmonds returns to the office she is status post her above procedure date of surgery 7/7/2020. She is doing well. She is at home. She has had some difficulties however with oxycodone causing some nausea. She was recently seen through the emergency room for what was thought to be cardiac etiology but there was no findings consistent with new cardiac problems. Postoperatively she did have a cardiovascular event and is currently on aspirin for both thromboembolism prophylaxis and cardiopulmonary protection. Today she requests an alternative to oxycodone for pain control. She is continuing home-based therapies and advancing to outpatient within the next week. She is using a single post cane for ambulation assistance. On exam of the left anterior knee and a cranial/caudal orientation reveals a 19 cm surgical incision intact healing nicely wound borders are well approximated skin staples are noted. Soft tissue swelling bracketing the surgical site has improved since following surgery. Wound is clean and dry throughout. Her active range of motion against resistance today is excellent at 105 degrees of flexion -5 degrees to full extension. Patella tracks midline no instability on varus or valgus stressing. No calf tenderness or evidence of DVT. Procedural: Using clean technique all staples removed with no complications today. Patient's wound was bracketed with Steri-Strips.   Sterile top-cover was placed. Plan: Patient will advance to outpatient physical therapy services per protocol. Continue aspirin per protocol. She will follow back with our office in 2 to 3 weeks for reevaluation and x-ray. She is got a maintain full weightbearing of her right lower extremity and continue to advance her range of motion with attention to terminal flexion and extension. Norco 7.5mg p.o. every 6 hours #28 dispensed provided in lieu of her oxycodone. Today all of her questions answered to her satisfaction.

## 2020-07-24 NOTE — PROGRESS NOTES
HISTORY OF PRESENT ILLNESS  Alo Lawrence is a 59 y.o. female. Patient referred for pre-op evaluation due to abnormal EKG.    7/2020 Ms. Tania Diane s/p left total knee replacement on 7/7/2020.  - S/P STEMI on 7/13/2020 - s/p cardiac cath with SAIGE to RCA    Hypertension   The history is provided by the patient and medical records. This is a chronic problem. The current episode started more than 1 week ago. Pertinent negatives include no chest pain, no abdominal pain, no headaches and no shortness of breath. The symptoms are relieved by medications. Hospital Follow Up   The history is provided by the patient. Pertinent negatives include no chest pain, no abdominal pain, no headaches and no shortness of breath. Review of Systems   Constitutional: Negative for chills, fever and malaise/fatigue. HENT: Negative for congestion and nosebleeds. Eyes: Negative for blurred vision, double vision and redness. Respiratory: Negative for cough, hemoptysis, sputum production, shortness of breath and wheezing. Cardiovascular: Negative for chest pain, palpitations, orthopnea, claudication, leg swelling and PND. Gastrointestinal: Positive for nausea. Negative for abdominal pain, heartburn and vomiting. Musculoskeletal: Positive for joint pain. Negative for falls and myalgias. Skin: Negative for rash. Neurological: Negative for dizziness, weakness and headaches. Endo/Heme/Allergies: Does not bruise/bleed easily. Psychiatric/Behavioral: The patient is not nervous/anxious. Family History   Problem Relation Age of Onset    Breast Cancer Other     Arthritis-osteo Mother     Hypertension Mother     Heart Disease Mother     Hypertension Other         Grandmother    Other Other         Heart problems:  Mother, grandmother    Diabetes Other         Grandmother       Past Medical History:   Diagnosis Date    Arthritis     Calcific Achilles tendonitis     Right; insertional    Essential hypertension 7/13/2020    Hypercholesterolemia     Hypertension     Left knee pain     Pain of right heel     Sprain of right ring finger     Thyroid disease     Wears glasses        Past Surgical History:   Procedure Laterality Date    HX CHOLECYSTECTOMY  1990    HX HEENT      Eye surgery    HX HERNIA REPAIR      HX ORTHOPAEDIC Left 07/2020    left total knee arthroplasty       Social History     Tobacco Use    Smoking status: Former Smoker     Years: 15.00    Smokeless tobacco: Never Used   Substance Use Topics    Alcohol use: Yes     Frequency: Monthly or less     Comment: Occasional       No Known Allergies    Prior to Admission medications    Medication Sig Start Date End Date Taking? Authorizing Provider   HYDROcodone-acetaminophen (Norco) 7.5-325 mg per tablet Take 1 Tab by mouth every six (6) hours as needed for Pain for up to 14 days. Max Daily Amount: 4 Tabs. 7/24/20 8/7/20 Yes Jan Jones PA-C   promethazine (PHENERGAN) 25 mg suppository Insert 1 Suppository into rectum every six (6) hours as needed for Nausea for up to 7 days. 7/23/20 7/30/20 Yes Moncion, Tracie Collet, DO   amLODIPine (NORVASC) 2.5 mg tablet Take 1 Tab by mouth daily. 7/15/20  Yes Carol Maki MD   aspirin delayed-release 81 mg tablet Take 1 Tab by mouth daily. 7/16/20  Yes Carol Maki MD   clopidogreL (PLAVIX) 75 mg tab Take 1 Tab by mouth daily. 7/16/20  Yes Carol Maki MD   carvediloL (COREG) 6.25 mg tablet Take 1 Tab by mouth every twelve (12) hours. 7/15/20  Yes Carol Maki MD   atorvastatin (LIPITOR) 80 mg tablet Take 1 Tab by mouth daily. 7/15/20  Yes Carol Maki MD   docusate sodium (Colace) 100 mg capsule Take 1 Cap by mouth two (2) times a day for 90 days. 7/7/20 10/5/20 Yes Niurka SAHA PA-C   cholecalciferol (VITAMIN D3) (50,000 UNITS /1250 MCG) capsule Take  by mouth every seven (7) days. Yes Provider, Historical   escitalopram oxalate (LEXAPRO) 20 mg tablet Take 20 mg by mouth daily.    Yes Provider, Historical         Visit Vitals  /61 (BP 1 Location: Left arm, BP Patient Position: Sitting)   Pulse (!) 59   Temp 97.3 °F (36.3 °C) (Temporal)   Ht 5' 9\" (1.753 m)   Wt 95.9 kg (211 lb 6.4 oz)   BMI 31.22 kg/m²       Physical Exam   Constitutional: She is oriented to person, place, and time. She appears well-developed and well-nourished. Neck: No JVD present. Cardiovascular: Normal rate, regular rhythm, normal heart sounds and intact distal pulses. Exam reveals no gallop and no friction rub. No murmur heard. Pulmonary/Chest: Effort normal and breath sounds normal. No respiratory distress. She has no wheezes. She has no rales. She exhibits no tenderness. Abdominal: Soft. She exhibits no distension and no mass. There is no abdominal tenderness. Musculoskeletal: Normal range of motion. General: Edema present. Comments: Dressing to let knee intact   Neurological: She is alert and oriented to person, place, and time. Skin: Skin is warm and dry. Psychiatric: She has a normal mood and affect. Nursing note and vitals reviewed. Echo 7/2020  Interpretation Summary   · Tricuspid regurgitation is inadequate for estimation of right ventricular systolic pressure. · Normal cavity size and systolic function (ejection fraction normal). Mildly increased wall thickness. Estimated left ventricular ejection fraction is 55 - 60%. Visually measured ejection fraction. Abnormal left ventricular wall motion. Age-appropriate left ventricular diastolic function. · Mitral annular calcification. Trace mitral valve regurgitation is present. · The following segments are hypokinetic: basal inferior and mid inferior. Nuclear Stress Test 7/2/2020  Nuclear Cardiac Spect Rest then Gated Stress study. [place] was used as the stressing method and agent. ([place] given via a 10 - 20 sec injection.)   One day myocardial perfusion study. Date: 7/2/2020. Negative myocardial perfusion imaging. Myocardial perfusion imaging supports a low risk stress test.   There is no prior study available for comparison. .     Cardiac cath 7/13/2020  STEMI  · Critical one-vessel coronary artery disease with 99% stenosis and interval coronary thrombus at proximal/mid RCA. · Noncritical 50% mid LAD stenosis and diffuse luminal irregularities throughout the coronary vasculature with moderate proximal calcification  · Successful PCI of proximal/mid RCA deploying a drug-eluting stent with a residual 0% stenosis and a very tortuous right coronary artery. · Procedure done via right femoral artery and 6 [place] Angio-Seal at the end. ASSESSMENT and PLAN    Ms. Mauro Sykes has a reminder for a \"due or due soon\" health maintenance. I have asked that she contact her primary care provider for follow-up on this health maintenance. No flowsheet data found. Assessment     I have personally reviewed patients ekg done at other facility. I Have personally reviewed recent relevant labs available and discussed with patient      ICD-10-CM ICD-9-CM    1. ST elevation myocardial infarction involving right coronary artery (HCC)  I21.11 410.31     Continue DAPT, statin and BB  Referred to cardiac rehab   2. Essential hypertension  I10 401.9     B/P controlled with current medications   3. Mixed hyperlipidemia  E78.2 272.2     Continue statin, lab with PCP   4. Arthritis of knee  M17.10 716.96     F/U with ortho as planned, continue PT     7/2020   Patient referred for pre-op evaluation due to abnormal EKG. SR with T wave abnormalities. She has history of HTN, HLD, and has not been active due to   Left knee pain. Will evaluate with stress test in AM.    B/P is not controlled will increase lisinopril to 20 mg/ BID.  7/2020 - Pre-operative nuclear stress test was negative for ischemia. She underwent left knee replacement surgery on 7/7/20. She then presented to ER on 7/13 with nausea and generalized malaise, with STEMI.   S/p cardiac cath with SAIGE to RCA. She has continued DAPT therapy with statin and BB. She has had multiple ER visits due to nausea - which resolved with Zofran. This is thought to be related to Percocet and was d/c this AM. She has been referred to cardiac rehab and is in PT for left knee. There are no discontinued medications. No orders of the defined types were placed in this encounter. Follow-up and Dispositions    · Return in about 3 months (around 10/24/2020), or if symptoms worsen or fail to improve. I have independently evaluated taken history and examined the patient. All relevant labs and testing data's are reviewed. Care plan discussed and updated after review.   Flip Zamora MD

## 2020-07-24 NOTE — PROGRESS NOTES
Patient has been discharged from [address] [place] [address], goals met. A discharge summary is available for review. SN to complete final d/c on 7/27/2020. A referral to OPPT has been made by JAMES Samayoa, per Orthopedic follow up.   Thank you for your referral.

## 2020-07-24 NOTE — TELEPHONE ENCOUNTER
New orders placed for PT per Dr. Amy Del Cid TKA protocol. Informed Mine Burkett and she verbalized understanding.

## 2020-07-24 NOTE — PATIENT INSTRUCTIONS
Heart-Healthy Diet: Care Instructions  Your Care Instructions     A heart-healthy diet has lots of vegetables, fruits, nuts, beans, and whole grains, and is low in salt. It limits foods that are high in saturated fat, such as meats, cheeses, and fried foods. It may be hard to change your diet, but even small changes can lower your risk of heart attack and heart disease. Follow-up care is a key part of your treatment and safety. Be sure to make and go to all appointments, and call your doctor if you are having problems. It's also a good idea to know your test results and keep a list of the medicines you take. How can you care for yourself at home? Watch your portions  · Learn what a serving is. A \"serving\" and a \"portion\" are not always the same thing. Make sure that you are not eating larger portions than are recommended. For example, a serving of pasta is ½ cup. A serving size of meat is 2 to 3 ounces. A 3-ounce serving is about the size of a deck of cards. Measure serving sizes until you are good at [place]" them. Keep in mind that restaurants often serve portions that are 2 or 3 times the size of one serving. · To keep your energy level up and keep you from feeling hungry, eat often but in smaller portions. · Eat only the number of calories you need to stay at a healthy weight. If you need to lose weight, eat fewer calories than your body burns (through exercise and other physical activity). Eat more fruits and vegetables  · Eat a variety of fruit and vegetables every day. Dark green, deep orange, red, or yellow fruits and vegetables are especially good for you. Examples include spinach, carrots, peaches, and berries. · Keep carrots, celery, and other veggies handy for snacks. Buy fruit that is in season and store it where you can see it so that you will be tempted to eat it. · Cook dishes that have a lot of veggies in them, such as stir-fries and soups.   Limit saturated and trans fat  · Read food labels, and try to avoid saturated and trans fats. They increase your risk of heart disease. · Use olive or canola oil when you cook. · Bake, broil, grill, or steam foods instead of frying them. · Choose lean meats instead of high-fat meats such as hot dogs and sausages. Cut off all visible fat when you prepare meat. · Eat fish, skinless poultry, and meat alternatives such as soy products instead of high-fat meats. Soy products, such as tofu, may be especially good for your heart. · Choose low-fat or fat-free milk and dairy products. Eat foods high in fiber  · Eat a variety of grain products every day. Include whole-grain foods that have lots of fiber and nutrients. Examples of whole-grain foods include oats, whole wheat bread, and brown rice. · Buy whole-grain breads and cereals, instead of white bread or pastries. Limit salt and sodium  · Limit how much salt and sodium you eat to help lower your blood pressure. · Taste food before you salt it. Add only a little salt when you think you need it. With time, your taste buds will adjust to less salt. · Eat fewer snack items, fast foods, and other high-salt, processed foods. Check food labels for the amount of sodium in packaged foods. · Choose low-sodium versions of canned goods (such as soups, vegetables, and beans). Limit sugar  · Limit drinks and foods with added sugar. These include candy, desserts, and soda pop. Limit alcohol  · Limit alcohol to no more than 2 drinks a day for men and 1 drink a day for women. Too much alcohol can cause health problems. When should you call for help? Watch closely for changes in your health, and be sure to contact your doctor if:  · You would like help planning heart-healthy meals. Where can you learn more? Go to http://sam-yelena.info/  Enter V137 in the search box to learn more about \"Heart-Healthy Diet: Care Instructions. \"  Current as of: August 22, 2019               Content Version: 12.5  © 0094-5736 [employer]. Care instructions adapted under license by [employer] (which disclaims liability or warranty for this information). If you have questions about a medical condition or this instruction, always ask your healthcare professional. [address] any warranty or liability for your use of this information.

## 2020-07-24 NOTE — TELEPHONE ENCOUNTER
Monica Cervantes from [facility] Physical Therapy called stating that she was not sure if the patient is to continue home physical therapy or proceed to out patient physical therapy.  She stated if the patient is to continue with home therapy they need an updated referral. Please advise Blossom Christensen at [phone]

## 2020-07-24 NOTE — PROGRESS NOTES
Patient contacted regarding recent discharge and COVID-19 risk. Discussed COVID-19 related testing which was not done at this time. Test results were not done. Patient informed of results, if available? n/a    Care Transition Nurse/ Ambulatory Care Manager contacted the patient by telephone to perform post discharge assessment. Verified name and  with patient as identifiers. Patient has following risk factors of: no known risk factors. CTN/ACM reviewed discharge instructions, medical action plan and red flags related to discharge diagnosis. Reviewed and educated them on any new and changed medications related to discharge diagnosis. Advised obtaining a 90-day supply of all daily and as-needed medications. Advance Care Planning:   Does patient have an Advance Directive: not on file     Education provided regarding infection prevention, and signs and symptoms of COVID-19 and when to seek medical attention with patient who verbalized understanding. Discussed exposure protocols and quarantine from [address] you at higher risk for severe illness  and given an opportunity for questions and concerns. The patient agrees to contact the COVID-19 hotline [phone] or PCP office for questions related to their healthcare. CTN/ACM provided contact information for future reference. From CDC: Are you at higher risk for severe illness?  Wash your hands often.  Avoid close contact (6 feet, which is about two arm lengths) with people who are sick.  Put distance between yourself and other people if COVID-19 is spreading in your community.  Clean and disinfect frequently touched surfaces.  Avoid all cruise travel and non-essential air travel.  Call your healthcare professional if you have concerns about COVID-19 and your underlying condition or if you are sick.     For more information on steps you can take to protect yourself, see CDC's How to Protect Yourself      Patient/family/caregiver given information for [employer] [facility] and agrees to enroll no     Plan for follow-up call in 7-14 days based on severity of symptoms and risk factors. Patient reports nausea has resolved and she is feeling much better. Denies fever, sob, vomiting, diarrhea, cough.

## 2020-07-27 ENCOUNTER — HOME CARE VISIT (OUTPATIENT)
Dept: SCHEDULING | Facility: HOME HEALTH | Age: 65
End: 2020-07-27
Payer: COMMERCIAL

## 2020-07-27 ENCOUNTER — TELEPHONE (OUTPATIENT)
Dept: ORTHOPEDIC SURGERY | Facility: CLINIC | Age: 65
End: 2020-07-27

## 2020-07-27 ENCOUNTER — TELEPHONE (OUTPATIENT)
Dept: CARDIAC REHAB | Age: 65
End: 2020-07-27

## 2020-07-27 VITALS
OXYGEN SATURATION: 98 % | RESPIRATION RATE: 17 BRPM | SYSTOLIC BLOOD PRESSURE: 140 MMHG | TEMPERATURE: 98.3 F | TEMPERATURE: 98 F | OXYGEN SATURATION: 98 % | DIASTOLIC BLOOD PRESSURE: 86 MMHG | HEART RATE: 78 BPM | HEART RATE: 83 BPM | DIASTOLIC BLOOD PRESSURE: 88 MMHG | RESPIRATION RATE: 17 BRPM | SYSTOLIC BLOOD PRESSURE: 128 MMHG

## 2020-07-27 PROCEDURE — G0299 HHS/HOSPICE OF RN EA 15 MIN: HCPCS

## 2020-07-27 NOTE — TELEPHONE ENCOUNTER
[facility] from Cardiopulmonary rehab called asking if the patient needs to start physical therapy first or cardiopulmonary therapy.  Please advise [facility] at [phone]

## 2020-07-27 NOTE — TELEPHONE ENCOUNTER
Cardiac Rehab called patient and left a message about the program. Additional attempts at contact will be made.     Thank you,  Rosalio Cook

## 2020-07-28 NOTE — TELEPHONE ENCOUNTER
Spoke with Nick Hatch and notified her of JAMES Keating's message. She stated she will contact the patient to notify her.

## 2020-07-28 NOTE — TELEPHONE ENCOUNTER
Continue home exercise program for total knee protocol    Ok for cardiac rehab at this time. Encourage patient to do ROM activities, both flexion and extension on an hourly basis.

## 2020-07-28 NOTE — TELEPHONE ENCOUNTER
[facility] Client contacted and she said that insurance will only cover one at the time.  Which one should the patient do first?

## 2020-07-29 VITALS
TEMPERATURE: 97.5 F | DIASTOLIC BLOOD PRESSURE: 80 MMHG | OXYGEN SATURATION: 98 % | SYSTOLIC BLOOD PRESSURE: 130 MMHG | HEART RATE: 76 BPM | RESPIRATION RATE: 20 BRPM

## 2020-08-04 ENCOUNTER — HOSPITAL ENCOUNTER (OUTPATIENT)
Dept: CARDIAC REHAB | Age: 65
Discharge: HOME | End: 2020-08-04
Payer: COMMERCIAL

## 2020-08-04 VITALS — WEIGHT: 208 LBS | BODY MASS INDEX: 30.72 KG/M2

## 2020-08-04 PROCEDURE — 93798 PHYS/QHP OP CAR RHAB W/ECG: CPT

## 2020-08-04 RX ORDER — LEVOTHYROXINE SODIUM 25 UG/1
75 TABLET ORAL
COMMUNITY

## 2020-08-04 NOTE — PROGRESS NOTES
Cardiac Rehab Initial Treatment Note/Plan for review and signature    Shireen Bagley 72 y.o. presented to cardiac rehab for an intake and a six minute walk test today with a primary diagnosis of STEMI with stent. Patient's EF is 55-60%. Shireen Bagley has a history of Hypertension. Cardiac risk factors include thyroid dysfunction, hypercholesterolemia and these were reviewed with her. Shireen Bagley is  and lives with her spouse. PHQ-9, depression score, is 5 and this is considered to be high. The result was discussed with patient who confirms. Patient denied chest pain or SOB during 6 minute walk and the cardiac rhythm was in Sinus Rhythm. Shireen Bagley will attend exercise and educational sessions 2-3 days a week in cardiac rehab for 36 visits. Goals for Rehab:    Patient name: Shireen Bagley : 1955         Goals Comments   1. Today weight is 208lbs. [x] initial  [] met                  [] not met  [] progressing  Goal weight is 180lbs. Lose 1--3 lbs by next recert. 2. Build endurance. [x] initial  [] met                  [] not met  [] progressing Today's MET level is 1.7. Increase MET leve to 2.0 by next recert.       Cardiac ITP      CR INTAKE from 2020 in [facility] 1634    Treatment Diagnosis    Treatment Diagnosis 1  STEMI    STEMI Date  20    Referral Date  20    Significant Cardiovascular History  --  [HTN, hypercholesterolemia]    Co-morbidities  --  [thyroid disease]    Individual Treatment Plan    ITP Visit Type  Initial Assessment    1st Date of Exercise   --  [To be scheduled]    ITP Next Review Date  20    Visit #/Total Visits      EF %  55 %    Risk Stratification  Moderate    ITP  Exercise, Psychosocial, Tobacco, Nutrition, Education    Exercise     Stages of Change  Preparation    DASI Total Score  13.45    Assisted Devices  Cane  [S/P Left knee replacement]    Total Score  1    Test  Six minute walk test      Exercise Prescription    Mode Treadmill, Bike, Stepper, Ergometer, Elliptical    Frequency per week  2--3    Duration per session  35--50    Intensity  METS        1.7--3.0    RPE  11--13    Progression  As tolerated    Symptoms with Exercise  --  [Left knee tightness]    Target Heart Rate  93--109    Resistance Training  Yes      Exercise Blood Pressures    Resting BP  119/72    Peak BP  127/73    Is BP WDL?   Yes    Exercise Activity at Home    Type  walking    Frequency  daily    Duration  varies    Resistance Training  No    Exercise Education    Education  Self pulse, Exercise safety, Signs/Symptoms to report, RPE scale, Equipment orientation, Warm up/Cool down, Physically active  [Good for your heart book given]    Exercise Target Goal    Target Goal(s)  Individual exercise RX    Patient Stated Exercise Goals  Build endurance    Psychosocial    Stages of Change  Preparation    [facility] Total Score  27    PHQ 9 Score  5    Psychosocial Intervention    Interventions  PCP notified    Consults    [Order for CSW sent]    Currently Taking Psychotropic Meds  No    Psychosocial Education    Education  Impact self care behaviors on health    Psychosocial Target Goals    Target Goal(s)  Engages in self-care behaviors    Nutrition    Stages of Change  Preparation    Diabetes  No    Lipids    Date Lipids Drawn  07/14/20    Total  239    HDL  56    LDL  163.6    Triglycerides  97    Lipid Med(s)  Lipitor 80    Weight Management    Weight   94.3 kg (208 lb)    Height   5' 9\" (1.753 m)    BMI  30.78    Weight Goal  180lbs    Waist Circumference   41 inches    Alcohol  Special    Rate Your Plate Total Score  54    Nutrition Intervention    Dietitian Consult  Yes  [Educational videos discussed]    Nurse/Patient Discussion  Yes    Nutrition Class  Yes    Diabetes Education Referral  No    Lipid Clinic Referral  No    Weight Management Referral  No    Nutrition Education    Education  DM & CAD relationship, Healthy eating  [Good for your heart book given]    Nutrition Target Goals    Target Goals  BMI less than 25    Education    Learning Barrier  Ready to learn    Education Intervention    Education Schedule Given  Yes  [educational videos discussed]    Patient Education     Education  CAD, Risk factors, Med Compliance    Hypertension  Yes    Hypertension Controlled  Yes    Is BP WDL? Yes    BP Meds  Norvasc    Education Target Goals    Target Goals  Medication compliance    Physician Response      Exercise      CR INTAKE from 8/4/2020 in [address] [facility]    Any problems changes since your last visit  --  [Initial visit]    Any symptoms while exercising  --  [Left knee tightness]    Psychosocial/Stress Level  0    Resting EKG rhythm  SR    Tobacco Use  None    Enter O2 Saturation and Liter Flow  98    ITP Next Review Date  09/04/20    Visit Number/Total Visits  1/36    What is plan for next session  --  [Begin exercise RX]    On Call Medical Director Immediately Available  Junie    Exercise Treatment Log    Target Heart Rate(Range)  93--109    Resting HR  58    Resting BP  112/68    Recovery HR  62    Recovery BP  119/72    Weight  94.3 kg (208 lb)    Exercise EKG Rhythm  SR    Exercise Duration  --  [Initial 6 minute walk]    Peak HR  77    Peak BP  127/73    Peak RPE  11    Peak Mets  1.7    Asymptomatic  yes    O2 Saturation  99      Outpatient Behavioral Health Evaluation Order    Debora Cantrell is a 72y.o. year old female with ST elevation (STEMI) myocardial infarction of unspecified site [I21.3]. She is enrolled in cardiac rehabilitation and the treating clinician believes the patient would benefit from a  consult. Cindy Hathaway's admitting PHQ-9 depression score was 5 which is considered to be elevated. Please co-sign this note if you would like your patient to be evaluated by a LCSW in our clinic. Thank you.     Benito Sanchez RN, BSN

## 2020-08-07 ENCOUNTER — HOSPITAL ENCOUNTER (OUTPATIENT)
Dept: CARDIAC REHAB | Age: 65
Discharge: HOME | End: 2020-08-07
Payer: COMMERCIAL

## 2020-08-07 ENCOUNTER — PATIENT OUTREACH (OUTPATIENT)
Dept: CASE MANAGEMENT | Age: 65
End: 2020-08-07

## 2020-08-07 PROCEDURE — 90791 PSYCH DIAGNOSTIC EVALUATION: CPT

## 2020-08-07 NOTE — PROGRESS NOTES
Patient resolved from Transition of Care episode on 8/7/20  Discussed COVID-19 related testing which was not done at this time. Test results were not done. Patient informed of results, if available? n/a     Patient/family has been provided the following resources and education related to COVID-19:                         Signs, symptoms and red flags related to COVID-19            [facility] exposure and quarantine guidelines            [employer] exposure contact - [phone]            Contact for their local Department of Health                 Patient currently reports that the following symptoms have improved:  no new symptoms and no worsening symptoms. No further outreach scheduled with this CTN/ACM/LPN/HC/ MA. Episode of Care resolved. Patient has this CTN/ACM/LPN/HC/MA contact information if future needs arise.

## 2020-08-07 NOTE — BH NOTES
[address] [facility]  Outpatient Cardiopulmonary Rehab  [address] , [address], [address]    Patient Name:  Clark Galicia   Contact Length:   []   30   [x]   45   []   60   []  90  (minutes)    Assessment Date:  8/7/2020             Type:   [x]  Virtual Visit            []   Telephonic  Limits of confidentiality explained and client acknowledges understanding:    [x]   Yes    Clark Galicia was informed of the inherent limitations of a virtual visit,  and has consented to a virtual therapy visit on 8/7/2020. Information regarding emergency contact information for this patient during this visit is to contact:  Randell Owen () ([phone] in addition to calling 911. The patient was informed that at any time during the virtual visit, they can decide to stop the virtual visit. The patient verified that they are physically located in the [facility] for this virtual visit. Virtual visit. Patient is at home at [address] [facility], [address]    Presenting Problem:  Patient is post heart attack, referred due to [facility] of 5 on intake at Cardiopulmonary Rehab program  Past Treatment:  [x]  N/A     Patient denies any past treatment  Family History Behavioral Health:  [x]  N/A    No family hx behavioral health to her knowledge    Additional concerns:    []  Another's Addiction    []  Relationship   []  Eating Disorder  []  Poly-Substance    []  Alcohol    []  Academic  [x]  Medical     []  Trauma    []  Financial  []  Family      []  Loss/Grief    []  Marital    []  Drugs      [x] Emotional      []  Intimate Partner Violence   []  Anger Management    []  Job Stress     []  Legal    Details: Patient adjusting to recent traumatic medical emergency. She had a cardiac emergency shortly after having knee replacement.    She is adjusting to lifestyle changes including diet change, improved management of medication, and a good bit of anxiety post medical trauma. Suicidal Risk:     Homicidal Risk:  [x]  Client Denies                [x]  Client Denies  []   Ideation       []  Ideation  []   Intent     []  Intent  []   Plan/Means       []  Plans/Means          []   Prior Attempt           []  Prior Attempt  Details / Action taken:  Patient denies SI, reports no past SI or attempt. Denies HI or history of HI  Past Legal Charges:  [x]  N/A       Trauma History:   [x]   N/A      Substance Abuse:    [x]  Denies Abuse or Dependence             []    Abstinent               []   Active Dependence   []  Active Abuse of                 [] Family History      [] 12-step support   []   Previous Treatment     Details: Patient reports no substances used beyond prescribed medications taken as RX. She has never used substances much. Symptoms:    Agitation/Anger:. IOP DEGREE OF SEVERITY: None                 Social Functioning:IOP DEGREE OF SEVERITY: Low  Irritability:{IOP DEGREE OF SEVERITY None                            Activities of Daily Living:IOP DEGREE OF SEVERITY: Low  Depressed Mood:IOP DEGREE OF SEVERITY: Low               Anxious/Mood:IOP DEGREE OF SEVERITY: Moderate  Sleep Disturbance:IOP DEGREE OF SEVERITY: None             Tearfulness: IOP DEGREE OF SEVERITY: None   Appetite Disturbance:. IOP DEGREE OF SEVERITY: None         Medical:IOP DEGREE OF SEVERITY: Moderate          Weight Loss/Gain:IOP DEGREE OF SEVERITY: Moderate             Job/School Performance:IOP DEGREE OF SEVERITY: Moderate                Counseling goals:                                                                                            1. Critical incident debriefing around traumatic medical emergency  2. Improved self-care including dietary changes, taking medications as prescribed, accessing supports  3.      Improved management of anxiety    Supports:  [] Job/Coworkers  [x]  Partner  []  Family    [x]  Friends  []  Shinto  []  Activities/hobbies/ interests:     Case Summary Note: Presentation, Steps taken, Response:  Assessment completed. Debriefing around medical emergency. Beginning problem solving and eduction around adjusting to dietary changes, taking medication as RX, management of anxiety. Explored unhelpful cogntions. ICD-10- Assessment  __F43___. _23__           ______.___  Primary Dx            Secondary Dx    Emmie Colón is a 72 y.o. female being evaluated by a Virtual Visit (video visit) encounter to address concerns as mentioned above. A caregiver was present when appropriate. Due to this being a TeleHealth encounter (During [facility]- public health emergency), evaluation of the following areas was limited: face-to-face consultation. Pursuant to the emergency declaration under the [address] [place] [address], [address] [place] [address] authority and the [employer] and [employer] Act, this Virtual Visit was conducted with patient's (and/or legal guardian's) consent, to reduce the risk of exposure to COVID-19 and provide necessary medical care. Services were provided through a video synchronous discussion virtually to substitute for in-person encounter. --Arti Oakley LCSW on 8/7/2020 at 12:47 PM    An electronic signature was used to authenticate this note.

## 2020-08-10 ENCOUNTER — HOSPITAL ENCOUNTER (OUTPATIENT)
Dept: CARDIAC REHAB | Age: 65
Discharge: HOME | End: 2020-08-10
Payer: COMMERCIAL

## 2020-08-10 VITALS — WEIGHT: 206.7 LBS | BODY MASS INDEX: 30.52 KG/M2

## 2020-08-10 PROCEDURE — 93798 PHYS/QHP OP CAR RHAB W/ECG: CPT

## 2020-08-13 ENCOUNTER — OFFICE VISIT (OUTPATIENT)
Dept: ORTHOPEDIC SURGERY | Facility: CLINIC | Age: 65
End: 2020-08-13

## 2020-08-13 ENCOUNTER — HOSPITAL ENCOUNTER (OUTPATIENT)
Dept: CARDIAC REHAB | Age: 65
Discharge: HOME | End: 2020-08-13
Payer: COMMERCIAL

## 2020-08-13 VITALS
RESPIRATION RATE: 18 BRPM | SYSTOLIC BLOOD PRESSURE: 119 MMHG | OXYGEN SATURATION: 97 % | BODY MASS INDEX: 30.66 KG/M2 | DIASTOLIC BLOOD PRESSURE: 70 MMHG | HEIGHT: 69 IN | WEIGHT: 207 LBS | TEMPERATURE: 96.8 F | HEART RATE: 66 BPM

## 2020-08-13 DIAGNOSIS — Z96.652 S/P TOTAL KNEE ARTHROPLASTY, LEFT: Primary | ICD-10-CM

## 2020-08-13 DIAGNOSIS — M25.562 LEFT KNEE PAIN, UNSPECIFIED CHRONICITY: ICD-10-CM

## 2020-08-13 PROCEDURE — 93798 PHYS/QHP OP CAR RHAB W/ECG: CPT

## 2020-08-13 NOTE — PROGRESS NOTES
[address] [place] [address]  [phone]           Patient: Alo Lawrence                MRN: [id number]       SSN: [id number]  YOB: 1955        AGE: 72 y.o. SEX: female  Body mass index is 30.57 kg/m². PCP: JAMES Patel  08/13/20      This office note has been dictated. REVIEW OF SYSTEMS:  Constitutional: Negative for fever, chills, weight loss and malaise/fatigue. HENT: Negative. Eyes: Negative. Respiratory: Negative. Cardiovascular: Negative. Gastrointestinal: No bowel incontinence or constipation. Genitourinary: No bladder incontinence or saddle anesthesia. Skin: Negative. Neurological: Negative. Endo/Heme/Allergies: Negative. Psychiatric/Behavioral: Negative. Musculoskeletal: As per HPI above. Past Medical History:   Diagnosis Date    Arthritis     Calcific Achilles tendonitis     Right; insertional    Essential hypertension 7/13/2020    Hypercholesterolemia     Hypertension     Left knee pain     Pain of right heel     Sprain of right ring finger     Thyroid disease     Wears glasses          Current Outpatient Medications:     levothyroxine (SYNTHROID) 25 mcg tablet, Take 75 mcg by mouth Daily (before breakfast). , Disp: , Rfl:     amLODIPine (NORVASC) 2.5 mg tablet, Take 1 Tab by mouth daily. , Disp: 30 Tab, Rfl: 2    aspirin delayed-release 81 mg tablet, Take 1 Tab by mouth daily. , Disp: 30 Tab, Rfl: 11    clopidogreL (PLAVIX) 75 mg tab, Take 1 Tab by mouth daily. , Disp: 30 Tab, Rfl: 11    carvediloL (COREG) 6.25 mg tablet, Take 1 Tab by mouth every twelve (12) hours. , Disp: 60 Tab, Rfl: 2    atorvastatin (LIPITOR) 80 mg tablet, Take 1 Tab by mouth daily. , Disp: 30 Tab, Rfl: 2    docusate sodium (Colace) 100 mg capsule, Take 1 Cap by mouth two (2) times a day for 90 days. , Disp: 60 Cap, Rfl: 2    cholecalciferol (VITAMIN D3) (50,000 UNITS /1250 MCG) capsule, Take  by mouth every seven (7) days. , Disp: , Rfl:     No Known Allergies    Social History     Socioeconomic History    Marital status:      Spouse name: Not on file    Number of children: Not on file    Years of education: Not on file    Highest education level: Not on file   Occupational History    Not on file   Social Needs    Financial resource strain: Not on file    Food insecurity     Worry: Not on file     Inability: Not on file    Transportation needs     Medical: Not on file     Non-medical: Not on file   Tobacco Use    Smoking status: Former Smoker     Years: 15.00    Smokeless tobacco: Never Used   Substance and Sexual Activity    Alcohol use: Yes     Frequency: Monthly or less     Comment: Occasional    Drug use: No    Sexual activity: Not on file   Lifestyle    Physical activity     Days per week: Not on file     Minutes per session: Not on file    Stress: Not on file   Relationships    Social connections     Talks on phone: Not on file     Gets together: Not on file     Attends Episcopalian service: Not on file     Active member of club or organization: Not on file     Attends meetings of clubs or organizations: Not on file     Relationship status: Not on file    Intimate partner violence     Fear of current or ex partner: Not on file     Emotionally abused: Not on file     Physically abused: Not on file     Forced sexual activity: Not on file   Other Topics Concern    Not on file   Social History Narrative    Not on file       Past Surgical History:   Procedure Laterality Date    HX CHOLECYSTECTOMY  1990    HX HEENT      Eye surgery    HX HERNIA REPAIR      HX ORTHOPAEDIC Left 07/2020    left total knee arthroplasty           Patient seen and evaluated today for her left knee. She is now 5 weeks status post left total knee replacement surgery. She done very well with her knee replacement. She did unfortunately have an MI status post knee replacement.   She progressing well from this. She is currently in cardiac rehab. She is also working on her knee range of motion and strengthening activities. Pain is well controlled. Patient denies recent fevers, chills, chest pain, SOB, or injuries. No recent systemic changes noted. A 12-point review of systems is performed today. Pertinent positives are noted. All other systems reviewed and otherwise are negative. Physical exam: General: Alert and oriented x3, nad.  well-developed, well nourished. normal affect, AF. NC/AT, EOMI, neck supple, trachea midline, no JVD present. Breathing is non-labored. Examination of left knee reveals skin intact. There is no erythema, ecchymosis, warmth. There are no signs of infection or cellulitis present. Range of motion is -3 -120 degrees. Patella tracks nicely without rubs or crepitus noted. Negative calf tenderness. Negative Homans. No signs of DVT present. Radiographs obtained in office today including AP, lateral, skyline of the left knee shows a total knee components to be well fixed without any loosening or fracture. Films done 8/13/2020 at the [facility] location. Assessment: Status post left total knee replacement    Plan: At this point, the patient will continue activities as tolerated. She is doing quite well with her knee replacement. We will see her back in 4 weeks time for evaluation, range of motion check. She will call with any questions or concerns that shall arise.             JR Rishabh ELIZABETH PA-C, ATC

## 2020-08-14 ENCOUNTER — HOSPITAL ENCOUNTER (OUTPATIENT)
Dept: CARDIAC REHAB | Age: 65
Discharge: HOME | End: 2020-08-14
Payer: COMMERCIAL

## 2020-08-14 VITALS — BODY MASS INDEX: 30.42 KG/M2 | WEIGHT: 206 LBS

## 2020-08-14 PROCEDURE — 93798 PHYS/QHP OP CAR RHAB W/ECG: CPT

## 2020-08-14 PROCEDURE — 90834 PSYTX W PT 45 MINUTES: CPT

## 2020-08-14 NOTE — BH NOTES
Patient's Name:  Job Hutchinson                  Date:  8/14/2020    Job Hutchinson was informed of the inherent limitations of a virtual visit,  and has consented to a virtual therapy visit on 8/14/2020. Information regarding emergency contact information for this patient during this visit is to contact:  Jonh Levy (spouse) [phone] in addition to calling 911. The patient was informed that at any time during the virtual visit, they can decide to stop the virtual visit. The patient verified that they are physically located in the [facility] for this virtual visit. Behavior        Patient still experiencing a good bit of anxiety and hyper awareness of health. She began Cardiopulmonary Rehab Program.    Intervention              CBT around anxiety, education on anxiety management strategies. Problem solving regarding healthy eating, health coping strategies / supports, and anxiety management. Response              Patient processed her anxiety around exercising with treatment provider at CPR program and on the second day there she was able to increase her exercising with less fear. Her spouse is supportive and as they both work in  and spouse is a  client has made dietary changes and lost 2 lb. Since our last session. She discussed with spouse her plan to retire at age 77 and he is supportive. She is now 72 and on medical leave   Plan              Patient rescheduled for further support in recovering emotionally from traumatic medical emergency and improving overall health. Job Hutchinson is a 72 y.o. female being evaluated by a Virtual Visit (video visit) encounter to address concerns as mentioned above. A caregiver was present when appropriate. Due to this being a TeleHealth encounter (During [facility] public health emergency), evaluation of the following areas was limited: face-to-face consultation.  Pursuant to the emergency declaration under the [employer] and the [employer] Act, [address] [place] [employer] waiver authority and the Coronavirus Preparedness and [employer] Act, this Virtual Visit was conducted with patient's (and/or legal guardian's) consent, to reduce the risk of exposure to COVID-19 and provide necessary medical care. Services were provided through a video synchronous discussion virtually to substitute for in-person encounter. --Keturah Campos LCSW on 8/14/2020 at 11:37 AM    An electronic signature was used to authenticate this note.      Keturah Campos LCSW                                       8/14/2020                             11:37 AM

## 2020-08-17 ENCOUNTER — HOSPITAL ENCOUNTER (OUTPATIENT)
Dept: CARDIAC REHAB | Age: 65
Discharge: HOME | End: 2020-08-17
Payer: COMMERCIAL

## 2020-08-17 VITALS — WEIGHT: 210 LBS | BODY MASS INDEX: 31.01 KG/M2

## 2020-08-17 PROCEDURE — 93798 PHYS/QHP OP CAR RHAB W/ECG: CPT

## 2020-08-17 PROCEDURE — 93797 PHYS/QHP OP CAR RHAB WO ECG: CPT

## 2020-08-19 ENCOUNTER — HOSPITAL ENCOUNTER (OUTPATIENT)
Dept: CARDIAC REHAB | Age: 65
Discharge: HOME | End: 2020-08-19
Payer: COMMERCIAL

## 2020-08-19 VITALS — WEIGHT: 205 LBS | BODY MASS INDEX: 30.27 KG/M2

## 2020-08-19 PROCEDURE — 93798 PHYS/QHP OP CAR RHAB W/ECG: CPT

## 2020-08-19 PROCEDURE — 93797 PHYS/QHP OP CAR RHAB WO ECG: CPT

## 2020-08-21 ENCOUNTER — HOSPITAL ENCOUNTER (OUTPATIENT)
Dept: CARDIAC REHAB | Age: 65
Discharge: HOME | End: 2020-08-21
Payer: COMMERCIAL

## 2020-08-21 VITALS — WEIGHT: 205.7 LBS | BODY MASS INDEX: 30.38 KG/M2

## 2020-08-21 PROCEDURE — 93798 PHYS/QHP OP CAR RHAB W/ECG: CPT

## 2020-08-24 ENCOUNTER — HOSPITAL ENCOUNTER (OUTPATIENT)
Dept: CARDIAC REHAB | Age: 65
Discharge: HOME | End: 2020-08-24
Payer: COMMERCIAL

## 2020-08-24 VITALS — BODY MASS INDEX: 30.07 KG/M2 | WEIGHT: 203.6 LBS

## 2020-08-24 PROCEDURE — 93797 PHYS/QHP OP CAR RHAB WO ECG: CPT

## 2020-08-24 PROCEDURE — 93798 PHYS/QHP OP CAR RHAB W/ECG: CPT

## 2020-08-26 ENCOUNTER — HOSPITAL ENCOUNTER (OUTPATIENT)
Dept: CARDIAC REHAB | Age: 65
Discharge: HOME | End: 2020-08-26
Payer: COMMERCIAL

## 2020-08-26 VITALS — WEIGHT: 203.9 LBS | BODY MASS INDEX: 30.11 KG/M2

## 2020-08-26 PROCEDURE — 93798 PHYS/QHP OP CAR RHAB W/ECG: CPT

## 2020-08-28 ENCOUNTER — HOSPITAL ENCOUNTER (OUTPATIENT)
Dept: CARDIAC REHAB | Age: 65
Discharge: HOME | End: 2020-08-28
Payer: COMMERCIAL

## 2020-08-28 VITALS — WEIGHT: 204.3 LBS | BODY MASS INDEX: 30.17 KG/M2

## 2020-08-28 PROCEDURE — 93798 PHYS/QHP OP CAR RHAB W/ECG: CPT

## 2020-08-31 ENCOUNTER — HOSPITAL ENCOUNTER (OUTPATIENT)
Dept: CARDIAC REHAB | Age: 65
Discharge: HOME | End: 2020-08-31
Payer: COMMERCIAL

## 2020-08-31 VITALS — BODY MASS INDEX: 30.2 KG/M2 | WEIGHT: 204.5 LBS

## 2020-08-31 PROCEDURE — 93798 PHYS/QHP OP CAR RHAB W/ECG: CPT

## 2020-09-02 ENCOUNTER — HOSPITAL ENCOUNTER (OUTPATIENT)
Dept: CARDIAC REHAB | Age: 65
Discharge: HOME | End: 2020-09-02
Payer: COMMERCIAL

## 2020-09-02 VITALS — BODY MASS INDEX: 30.13 KG/M2 | WEIGHT: 204 LBS

## 2020-09-02 PROCEDURE — 93798 PHYS/QHP OP CAR RHAB W/ECG: CPT

## 2020-09-03 NOTE — PROGRESS NOTES
CARDIAC ITP REASSESSMENT FOR REVIEW AND SIGNATURE       Patient name: Randie Castleman : 1955     Visits from Start of Care: 15      Reporting Period:  to 9/3    Subjective Reports: progressing well, no complaints, trying to get left knee stronger after knee replacement        Goals Comments   1. Current weight is 204lbs.    []? initial  []? met                      []? not met  [x]? progressing  Patient has lost 4lb. Goal weight is 180lbs. Lose 1--3 lbs by next recert. 2. Build endurance.    []? initial  []? met                      []? not met  [x]? progressing Current peak MET level is 4.3. Increase MET level to 4.5 by next recert       Key functional changes: Can use 2lb ankle weights to do leg exercises       Problems/ barriers to goal attainment: Left knee replacement     Assessment / Recommendations:Continue w/ cardiac rehab Rx    Lobo Westbrook RN 9/3/2020 4:17 PM    Cardiac ITP      CR PHASE II from 2020 in [place] [address]    Treatment Diagnosis 1  STEMI    STEMI Date  20    Referral Date  20    Significant Cardiovascular History  --  [HTN, hypercholesterolemia]    ITP Visit Type  Re-Assessment    1st Date of Exercise   --  [To be scheduled]    ITP Next Review Date  10/03/20    Visit #/Total Visits  15/36    EF %  55 %    Risk Stratification  Moderate    ITP  Exercise, Psychosocial, Tobacco, Nutrition, Education    Stages of Change  Maintenance    Assisted Devices  Cane  [S/P Left knee replacement]    Test  Six minute walk test    Mode  Treadmill, Bike, Stepper, Ergometer, Elliptical    Frequency per week  2--3    Duration per session  35--50    Intensity  METS        1.7--4.3    RPE  11--13    Progression  As tolerated    Symptoms with Exercise  --  [Left knee tightness]    Target Heart Rate  93--109    Resistance Training  Yes    Resting BP  130/77    Peak BP  130/77    Is BP WDL?   Yes    Type  walking    Frequency  daily    Duration  varies    Resistance Training  No    Education Self pulse, Exercise safety, Signs/Symptoms to report, RPE scale, Equipment orientation, Warm up/Cool down, Physically active  [Good for your heart book given]    Target Goal(s)  Individual exercise RX    Patient Stated Exercise Goals  Build endurance    Stages of Change  Maintenance    Education  Impact self care behaviors on health    Target Goal(s)  Engages in self-care behaviors    Stages of Change  Maintenance    Diabetes  No    Date Lipids Drawn  07/14/20    Total  239    HDL  56    LDL  163.6    Triglycerides  97    Lipid Med(s)  Lipitor 80    Weight   92.5 kg (204 lb)    Height   5' 9\" (1.753 m)    BMI  30.19    Weight Goal  180lbs    Waist Circumference   41 inches    Alcohol  Special    Dietitian Consult  Yes  [Educational videos discussed]    Nurse/Patient Discussion  Yes    Nutrition Class  Yes    Diabetes Education Referral  No    Lipid Clinic Referral  No    Weight Management Referral  No    Target Goals  BMI less than 25    Learning Barrier  Ready to learn    Education Schedule Given  Yes  [educational videos discussed]    Education  CAD, Risk factors, Med Compliance    Hypertension  Yes    Hypertension Controlled  Yes    Is BP WDL?   Yes    BP Meds  Norvasc    Target Goals  Medication compliance    Exercise      CR PHASE II from 9/2/2020 in [facility]    Any problems changes since your last visit  Denies    Any symptoms while exercising  Denies    Psychosocial/Stress Level  0    Resting EKG rhythm  SR    Tobacco Use  None    ITP Next Review Date  10/03/20    Visit Number/Total Visits  15/36    On Call Medical Director Immediately Available  Sa[place]    Target Heart Rate(Range)      Resting HR  63    Resting BP  130/77    Recovery HR  60    Recovery BP  112/67    Weight  92.5 kg (204 lb)    Exercise EKG Rhythm  SR    Exercise Duration  40    Peak HR  80    Peak RPE  15    Peak Mets  4.3    Asymptomatic  yes    Total Minutes  50

## 2020-09-04 ENCOUNTER — HOSPITAL ENCOUNTER (OUTPATIENT)
Dept: CARDIAC REHAB | Age: 65
Discharge: HOME | End: 2020-09-04
Payer: COMMERCIAL

## 2020-09-04 VITALS — BODY MASS INDEX: 30.27 KG/M2 | WEIGHT: 205 LBS

## 2020-09-04 PROCEDURE — 93798 PHYS/QHP OP CAR RHAB W/ECG: CPT

## 2020-09-08 ENCOUNTER — HOSPITAL ENCOUNTER (OUTPATIENT)
Dept: CARDIAC REHAB | Age: 65
Discharge: HOME | End: 2020-09-08
Payer: COMMERCIAL

## 2020-09-08 VITALS — BODY MASS INDEX: 30.02 KG/M2 | WEIGHT: 203.3 LBS

## 2020-09-08 PROCEDURE — 93798 PHYS/QHP OP CAR RHAB W/ECG: CPT

## 2020-09-08 PROCEDURE — 93797 PHYS/QHP OP CAR RHAB WO ECG: CPT

## 2020-09-09 ENCOUNTER — HOSPITAL ENCOUNTER (OUTPATIENT)
Dept: CARDIAC REHAB | Age: 65
Discharge: HOME | End: 2020-09-09
Payer: COMMERCIAL

## 2020-09-09 VITALS — BODY MASS INDEX: 30.13 KG/M2 | WEIGHT: 204 LBS

## 2020-09-09 PROCEDURE — 93798 PHYS/QHP OP CAR RHAB W/ECG: CPT

## 2020-09-10 ENCOUNTER — OFFICE VISIT (OUTPATIENT)
Dept: ORTHOPEDIC SURGERY | Facility: CLINIC | Age: 65
End: 2020-09-10

## 2020-09-10 ENCOUNTER — HOSPITAL ENCOUNTER (OUTPATIENT)
Dept: PREADMISSION TESTING | Age: 65
Discharge: HOME | End: 2020-09-10
Payer: COMMERCIAL

## 2020-09-10 VITALS — WEIGHT: 205.6 LBS | TEMPERATURE: 96.5 F | HEIGHT: 69 IN | BODY MASS INDEX: 30.45 KG/M2

## 2020-09-10 DIAGNOSIS — M25.562 LEFT KNEE PAIN, UNSPECIFIED CHRONICITY: ICD-10-CM

## 2020-09-10 DIAGNOSIS — Z01.818 PREOPERATIVE TESTING: Primary | ICD-10-CM

## 2020-09-10 DIAGNOSIS — Z96.652 S/P TOTAL KNEE ARTHROPLASTY, LEFT: Primary | ICD-10-CM

## 2020-09-10 DIAGNOSIS — Z01.818 PREOPERATIVE TESTING: ICD-10-CM

## 2020-09-10 PROCEDURE — 87635 SARS-COV-2 COVID-19 AMP PRB: CPT

## 2020-09-10 RX ORDER — OXYCODONE AND ACETAMINOPHEN 7.5; 325 MG/1; MG/1
1-2 TABLET ORAL
Qty: 42 TAB | Refills: 0 | Status: CANCELLED | OUTPATIENT
Start: 2020-09-10 | End: 2020-09-17

## 2020-09-10 RX ORDER — HYDROCODONE BITARTRATE AND ACETAMINOPHEN 7.5; 325 MG/1; MG/1
1-2 TABLET ORAL
Qty: 42 TAB | Refills: 0 | Status: SHIPPED | OUTPATIENT
Start: 2020-09-10 | End: 2020-09-17

## 2020-09-10 NOTE — PROGRESS NOTES
[address] [place] [address]  [phone]           Patient: Shireen Bagley                MRN: [id number]       SSN: [id number]  YOB: 1955        AGE: 72 y.o. SEX: female  Body mass index is 30.36 kg/m². PCP: JAMES Harvey  09/10/20      This office note has been dictated. REVIEW OF SYSTEMS:  Constitutional: Negative for fever, chills, weight loss and malaise/fatigue. HENT: Negative. Eyes: Negative. Respiratory: Negative. Cardiovascular: Negative. Gastrointestinal: No bowel incontinence or constipation. Genitourinary: No bladder incontinence or saddle anesthesia. Skin: Negative. Neurological: Negative. Endo/Heme/Allergies: Negative. Psychiatric/Behavioral: Negative. Musculoskeletal: As per HPI above. Past Medical History:   Diagnosis Date    Arthritis     Calcific Achilles tendonitis     Right; insertional    Essential hypertension 7/13/2020    Hypercholesterolemia     Hypertension     Left knee pain     Pain of right heel     Sprain of right ring finger     Thyroid disease     Wears glasses          Current Outpatient Medications:     levothyroxine (SYNTHROID) 25 mcg tablet, Take 75 mcg by mouth Daily (before breakfast). , Disp: , Rfl:     amLODIPine (NORVASC) 2.5 mg tablet, Take 1 Tab by mouth daily. , Disp: 30 Tab, Rfl: 2    aspirin delayed-release 81 mg tablet, Take 1 Tab by mouth daily. , Disp: 30 Tab, Rfl: 11    clopidogreL (PLAVIX) 75 mg tab, Take 1 Tab by mouth daily. , Disp: 30 Tab, Rfl: 11    carvediloL (COREG) 6.25 mg tablet, Take 1 Tab by mouth every twelve (12) hours. , Disp: 60 Tab, Rfl: 2    atorvastatin (LIPITOR) 80 mg tablet, Take 1 Tab by mouth daily. , Disp: 30 Tab, Rfl: 2    docusate sodium (Colace) 100 mg capsule, Take 1 Cap by mouth two (2) times a day for 90 days. , Disp: 60 Cap, Rfl: 2    cholecalciferol (VITAMIN D3) (50,000 UNITS /1250 MCG) capsule, Take  by mouth every seven (7) days. , Disp: , Rfl:     No Known Allergies    Social History     Socioeconomic History    Marital status:      Spouse name: Not on file    Number of children: Not on file    Years of education: Not on file    Highest education level: Not on file   Occupational History    Not on file   Social Needs    Financial resource strain: Not on file    Food insecurity     Worry: Not on file     Inability: Not on file    Transportation needs     Medical: Not on file     Non-medical: Not on file   Tobacco Use    Smoking status: Former Smoker     Years: 15.00    Smokeless tobacco: Never Used   Substance and Sexual Activity    Alcohol use: Yes     Frequency: Monthly or less     Comment: Occasional    Drug use: No    Sexual activity: Not on file   Lifestyle    Physical activity     Days per week: Not on file     Minutes per session: Not on file    Stress: Not on file   Relationships    Social connections     Talks on phone: Not on file     Gets together: Not on file     Attends Islam service: Not on file     Active member of club or organization: Not on file     Attends meetings of clubs or organizations: Not on file     Relationship status: Not on file    Intimate partner violence     Fear of current or ex partner: Not on file     Emotionally abused: Not on file     Physically abused: Not on file     Forced sexual activity: Not on file   Other Topics Concern    Not on file   Social History Narrative    Not on file       Past Surgical History:   Procedure Laterality Date    HX CHOLECYSTECTOMY  1990    HX HEENT      Eye surgery    HX HERNIA REPAIR      HX ORTHOPAEDIC Left 07/2020    left total knee arthroplasty           Patient seen evaluate today for her left knee replacement. She is now approaching 8 weeks status post surgery. Did unfortunately have an MI soon after the knee replacement. Her therapy has been limited due to this. She is in cardiac therapy now. The knee itself is improving. She has been working on her range of motion at home. Unfortunately her insurance will only pay for either cardiac therapy or PT for the knee replacement. Patient denies recent fevers, chills, chest pain, SOB, or injuries. No recent systemic changes noted. A 12-point review of systems is performed today. Pertinent positives are noted. All other systems reviewed and otherwise are negative. Physical exam: General: Alert and oriented x3, nad.  well-developed, well nourished. normal affect, AF. NC/AT, EOMI, neck supple, trachea midline, no JVD present. Breathing is non-labored. Examination of left knee reveal skin intact. The surgical wounds healed nicely. There is no erythema, ecchymosis, warmth. There are no signs of infection or signs present. She is missing about 12 to 15 degrees on extension. She has good flexion approximately 110. Patella tracks nicely without rubs or crepitus noted. She has negative calf tenderness. Negative Homans. No signs of DVT present. Assessment: #1 status post left knee replacement, #2 arthrofibrosis left knee    Plan: At this point we discussed treatment options. We will get her scheduled for manipulation under anesthesia. This was discussed with the patient in office. She will continue with home excise program and range activities. We will refill her pain medicine. I reiterated the importance of sitting with her leg out straight at home. We will see her back approximate 2 weeks after the manipulation.           JR Rishabh ELIZABETH, DAVON, ATC

## 2020-09-10 NOTE — H&P (VIEW-ONLY)
[address] [place] [address], [address] [place] [address] 
[phone] Patient: Khris Pérez                MRN: [id number]       SSN: [id number] YOB: 1955        AGE: 72 y.o. SEX: female Body mass index is 30.36 kg/m². PCP: JAMES Hdez 
09/10/20 This office note has been dictated. REVIEW OF SYSTEMS: 
Constitutional: Negative for fever, chills, weight loss and malaise/fatigue. HENT: Negative. Eyes: Negative. Respiratory: Negative. Cardiovascular: Negative. Gastrointestinal: No bowel incontinence or constipation. Genitourinary: No bladder incontinence or saddle anesthesia. Skin: Negative. Neurological: Negative. Endo/Heme/Allergies: Negative. Psychiatric/Behavioral: Negative. Musculoskeletal: As per HPI above. Past Medical History:  
Diagnosis Date  Arthritis  Calcific Achilles tendonitis Right; insertional  
 Essential hypertension 7/13/2020  Hypercholesterolemia  Hypertension  Left knee pain  Pain of right heel  Sprain of right ring finger  Thyroid disease  Wears glasses Current Outpatient Medications:  
  levothyroxine (SYNTHROID) 25 mcg tablet, Take 75 mcg by mouth Daily (before breakfast). , Disp: , Rfl:  
  amLODIPine (NORVASC) 2.5 mg tablet, Take 1 Tab by mouth daily. , Disp: 30 Tab, Rfl: 2 
  aspirin delayed-release 81 mg tablet, Take 1 Tab by mouth daily. , Disp: 30 Tab, Rfl: 11 
  clopidogreL (PLAVIX) 75 mg tab, Take 1 Tab by mouth daily. , Disp: 30 Tab, Rfl: 11 
  carvediloL (COREG) 6.25 mg tablet, Take 1 Tab by mouth every twelve (12) hours. , Disp: 60 Tab, Rfl: 2 
  atorvastatin (LIPITOR) 80 mg tablet, Take 1 Tab by mouth daily. , Disp: 30 Tab, Rfl: 2 
  docusate sodium (Colace) 100 mg capsule, Take 1 Cap by mouth two (2) times a day for 90 days. , Disp: 60 Cap, Rfl: 2   cholecalciferol (VITAMIN D3) (50,000 UNITS /1250 MCG) capsule, Take  by mouth every seven (7) days. , Disp: , Rfl:  
 
No Known Allergies Social History Socioeconomic History  Marital status:  Spouse name: Not on file  Number of children: Not on file  Years of education: Not on file  Highest education level: Not on file Occupational History  Not on file Social Needs  Financial resource strain: Not on file  Food insecurity Worry: Not on file Inability: Not on file  Transportation needs Medical: Not on file Non-medical: Not on file Tobacco Use  Smoking status: Former Smoker Years: 15.00  Smokeless tobacco: Never Used Substance and Sexual Activity  Alcohol use: Yes Frequency: Monthly or less Comment: Occasional  
 Drug use: No  
 Sexual activity: Not on file Lifestyle  Physical activity Days per week: Not on file Minutes per session: Not on file  Stress: Not on file Relationships  Social connections Talks on phone: Not on file Gets together: Not on file Attends Holiness service: Not on file Active member of club or organization: Not on file Attends meetings of clubs or organizations: Not on file Relationship status: Not on file  Intimate partner violence Fear of current or ex partner: Not on file Emotionally abused: Not on file Physically abused: Not on file Forced sexual activity: Not on file Other Topics Concern  Not on file Social History Narrative  Not on file Past Surgical History:  
Procedure Laterality Date  HX CHOLECYSTECTOMY  1990  
 HX HEENT Eye surgery  HX HERNIA REPAIR    
 HX ORTHOPAEDIC Left 07/2020  
 left total knee arthroplasty Patient seen evaluate today for her left knee replacement. She is now approaching 8 weeks status post surgery.   Did unfortunately have an MI soon after the knee replacement. Her therapy has been limited due to this. She is in cardiac therapy now. The knee itself is improving. She has been working on her range of motion at home. Unfortunately her insurance will only pay for either cardiac therapy or PT for the knee replacement. Patient denies recent fevers, chills, chest pain, SOB, or injuries. No recent systemic changes noted. A 12-point review of systems is performed today. Pertinent positives are noted. All other systems reviewed and otherwise are negative. Physical exam: General: Alert and oriented x3, nad.  well-developed, well nourished. normal affect, AF. NC/AT, EOMI, neck supple, trachea midline, no JVD present. Breathing is non-labored. Examination of left knee reveal skin intact. The surgical wounds healed nicely. There is no erythema, ecchymosis, warmth. There are no signs of infection or signs present. She is missing about 12 to 15 degrees on extension. She has good flexion approximately 110. Patella tracks nicely without rubs or crepitus noted. She has negative calf tenderness. Negative Homans. No signs of DVT present. Assessment: #1 status post left knee replacement, #2 arthrofibrosis left knee Plan: At this point we discussed treatment options. We will get her scheduled for manipulation under anesthesia. This was discussed with the patient in office. She will continue with home excise program and range activities. We will refill her pain medicine. I reiterated the importance of sitting with her leg out straight at home. We will see her back approximate 2 weeks after the manipulation.  
 
 
 
 
JR Rishabh ELIZABETH, PANelC, ATC

## 2020-09-11 LAB — SARS-COV-2, COV2NT: NOT DETECTED

## 2020-09-14 ENCOUNTER — ANESTHESIA EVENT (OUTPATIENT)
Dept: SURGERY | Age: 65
End: 2020-09-14
Payer: COMMERCIAL

## 2020-09-14 ENCOUNTER — HOSPITAL ENCOUNTER (OUTPATIENT)
Dept: CARDIAC REHAB | Age: 65
Discharge: HOME | End: 2020-09-14
Payer: COMMERCIAL

## 2020-09-14 VITALS — WEIGHT: 201.2 LBS | BODY MASS INDEX: 29.71 KG/M2

## 2020-09-14 PROCEDURE — 93798 PHYS/QHP OP CAR RHAB W/ECG: CPT

## 2020-09-15 ENCOUNTER — HOSPITAL ENCOUNTER (OUTPATIENT)
Age: 65
Setting detail: OUTPATIENT SURGERY
Discharge: HOME | End: 2020-09-15
Attending: ORTHOPAEDIC SURGERY | Admitting: ORTHOPAEDIC SURGERY
Payer: COMMERCIAL

## 2020-09-15 ENCOUNTER — ANESTHESIA (OUTPATIENT)
Dept: SURGERY | Age: 65
End: 2020-09-15
Payer: COMMERCIAL

## 2020-09-15 VITALS
SYSTOLIC BLOOD PRESSURE: 175 MMHG | RESPIRATION RATE: 16 BRPM | HEART RATE: 52 BPM | HEIGHT: 69 IN | TEMPERATURE: 97.6 F | OXYGEN SATURATION: 97 % | DIASTOLIC BLOOD PRESSURE: 88 MMHG | WEIGHT: 202.25 LBS | BODY MASS INDEX: 29.96 KG/M2

## 2020-09-15 PROCEDURE — 76010000154 HC OR TIME FIRST 0.5 HR: Performed by: ORTHOPAEDIC SURGERY

## 2020-09-15 PROCEDURE — 76060000031 HC ANESTHESIA FIRST 0.5 HR: Performed by: ORTHOPAEDIC SURGERY

## 2020-09-15 PROCEDURE — 74011250636 HC RX REV CODE- 250/636: Performed by: NURSE ANESTHETIST, CERTIFIED REGISTERED

## 2020-09-15 PROCEDURE — 74011000250 HC RX REV CODE- 250: Performed by: NURSE ANESTHETIST, CERTIFIED REGISTERED

## 2020-09-15 PROCEDURE — 74011250637 HC RX REV CODE- 250/637: Performed by: NURSE ANESTHETIST, CERTIFIED REGISTERED

## 2020-09-15 PROCEDURE — 74011250636 HC RX REV CODE- 250/636

## 2020-09-15 PROCEDURE — 76210000006 HC OR PH I REC 0.5 TO 1 HR: Performed by: ORTHOPAEDIC SURGERY

## 2020-09-15 PROCEDURE — 77030040922 HC BLNKT HYPOTHRM STRY -A: Performed by: ORTHOPAEDIC SURGERY

## 2020-09-15 PROCEDURE — 77030012890: Performed by: ORTHOPAEDIC SURGERY

## 2020-09-15 PROCEDURE — 77030040361 HC SLV COMPR DVT MDII -B: Performed by: ORTHOPAEDIC SURGERY

## 2020-09-15 PROCEDURE — 76210000021 HC REC RM PH II 0.5 TO 1 HR: Performed by: ORTHOPAEDIC SURGERY

## 2020-09-15 RX ORDER — PROPOFOL 10 MG/ML
INJECTION, EMULSION INTRAVENOUS AS NEEDED
Status: DISCONTINUED | OUTPATIENT
Start: 2020-09-15 | End: 2020-09-15 | Stop reason: HOSPADM

## 2020-09-15 RX ORDER — LIDOCAINE HYDROCHLORIDE 20 MG/ML
INJECTION, SOLUTION EPIDURAL; INFILTRATION; INTRACAUDAL; PERINEURAL AS NEEDED
Status: DISCONTINUED | OUTPATIENT
Start: 2020-09-15 | End: 2020-09-15 | Stop reason: HOSPADM

## 2020-09-15 RX ORDER — ONDANSETRON 2 MG/ML
4 INJECTION INTRAMUSCULAR; INTRAVENOUS ONCE
Status: DISCONTINUED | OUTPATIENT
Start: 2020-09-15 | End: 2020-09-15 | Stop reason: HOSPADM

## 2020-09-15 RX ORDER — FAMOTIDINE 20 MG/1
20 TABLET, FILM COATED ORAL ONCE
Status: COMPLETED | OUTPATIENT
Start: 2020-09-15 | End: 2020-09-15

## 2020-09-15 RX ORDER — SODIUM CHLORIDE, SODIUM LACTATE, POTASSIUM CHLORIDE, CALCIUM CHLORIDE 600; 310; 30; 20 MG/100ML; MG/100ML; MG/100ML; MG/100ML
75 INJECTION, SOLUTION INTRAVENOUS CONTINUOUS
Status: DISCONTINUED | OUTPATIENT
Start: 2020-09-15 | End: 2020-09-15 | Stop reason: HOSPADM

## 2020-09-15 RX ORDER — SODIUM CHLORIDE 0.9 % (FLUSH) 0.9 %
5-40 SYRINGE (ML) INJECTION EVERY 8 HOURS
Status: DISCONTINUED | OUTPATIENT
Start: 2020-09-15 | End: 2020-09-15 | Stop reason: HOSPADM

## 2020-09-15 RX ORDER — SODIUM CHLORIDE 0.9 % (FLUSH) 0.9 %
5-40 SYRINGE (ML) INJECTION AS NEEDED
Status: DISCONTINUED | OUTPATIENT
Start: 2020-09-15 | End: 2020-09-15 | Stop reason: HOSPADM

## 2020-09-15 RX ORDER — FENTANYL CITRATE 50 UG/ML
INJECTION, SOLUTION INTRAMUSCULAR; INTRAVENOUS AS NEEDED
Status: DISCONTINUED | OUTPATIENT
Start: 2020-09-15 | End: 2020-09-15 | Stop reason: HOSPADM

## 2020-09-15 RX ORDER — MIDAZOLAM HYDROCHLORIDE 1 MG/ML
INJECTION, SOLUTION INTRAMUSCULAR; INTRAVENOUS AS NEEDED
Status: DISCONTINUED | OUTPATIENT
Start: 2020-09-15 | End: 2020-09-15 | Stop reason: HOSPADM

## 2020-09-15 RX ORDER — ONDANSETRON 2 MG/ML
INJECTION INTRAMUSCULAR; INTRAVENOUS AS NEEDED
Status: DISCONTINUED | OUTPATIENT
Start: 2020-09-15 | End: 2020-09-15 | Stop reason: HOSPADM

## 2020-09-15 RX ORDER — SUCCINYLCHOLINE CHLORIDE 20 MG/ML
INJECTION INTRAMUSCULAR; INTRAVENOUS AS NEEDED
Status: DISCONTINUED | OUTPATIENT
Start: 2020-09-15 | End: 2020-09-15 | Stop reason: HOSPADM

## 2020-09-15 RX ORDER — SODIUM CHLORIDE, SODIUM LACTATE, POTASSIUM CHLORIDE, CALCIUM CHLORIDE 600; 310; 30; 20 MG/100ML; MG/100ML; MG/100ML; MG/100ML
25 INJECTION, SOLUTION INTRAVENOUS CONTINUOUS
Status: DISCONTINUED | OUTPATIENT
Start: 2020-09-15 | End: 2020-09-15 | Stop reason: HOSPADM

## 2020-09-15 RX ORDER — FENTANYL CITRATE 50 UG/ML
25 INJECTION, SOLUTION INTRAMUSCULAR; INTRAVENOUS
Status: DISCONTINUED | OUTPATIENT
Start: 2020-09-15 | End: 2020-09-15 | Stop reason: HOSPADM

## 2020-09-15 RX ADMIN — SUCCINYLCHOLINE CHLORIDE 120 MG: 20 INJECTION, SOLUTION INTRAMUSCULAR; INTRAVENOUS at 13:07

## 2020-09-15 RX ADMIN — FENTANYL CITRATE 50 MCG: 50 INJECTION, SOLUTION INTRAMUSCULAR; INTRAVENOUS at 13:04

## 2020-09-15 RX ADMIN — MIDAZOLAM HYDROCHLORIDE 2 MG: 2 INJECTION, SOLUTION INTRAMUSCULAR; INTRAVENOUS at 13:00

## 2020-09-15 RX ADMIN — SODIUM CHLORIDE, SODIUM LACTATE, POTASSIUM CHLORIDE, AND CALCIUM CHLORIDE 25 ML/HR: 600; 310; 30; 20 INJECTION, SOLUTION INTRAVENOUS at 11:35

## 2020-09-15 RX ADMIN — LIDOCAINE HYDROCHLORIDE 50 MG: 20 INJECTION, SOLUTION EPIDURAL; INFILTRATION; INTRACAUDAL; PERINEURAL at 13:07

## 2020-09-15 RX ADMIN — ONDANSETRON 4 MG: 2 INJECTION INTRAMUSCULAR; INTRAVENOUS at 13:02

## 2020-09-15 RX ADMIN — PROPOFOL 100 MG: 10 INJECTION, EMULSION INTRAVENOUS at 13:07

## 2020-09-15 RX ADMIN — FAMOTIDINE 20 MG: 20 TABLET ORAL at 11:35

## 2020-09-15 NOTE — ANESTHESIA PREPROCEDURE EVALUATION
Relevant Problems   No relevant active problems       Anesthetic History   No history of anesthetic complications            Review of Systems / Medical History  Patient summary reviewed, nursing notes reviewed and pertinent labs reviewed    Pulmonary  Within defined limits                 Neuro/Psych   Within defined limits           Cardiovascular    Hypertension          Past MI and CAD    Exercise tolerance: >4 METS  Comments: Had MI 10 days post op, had critical one vessel dz. Preserved LV fxn.  Stent x 1,   Cardiac rehab thus far without problems,    GI/Hepatic/Renal                Endo/Other      Hypothyroidism: well controlled  Obesity and arthritis     Other Findings              Physical Exam    Airway  Mallampati: I  TM Distance: 4 - 6 cm  Neck ROM: normal range of motion   Mouth opening: Normal     Cardiovascular    Rhythm: regular  Rate: normal         Dental  No notable dental hx    Comments: Has upper canine crown   Pulmonary  Breath sounds clear to auscultation               Abdominal  GI exam deferred       Other Findings            Anesthetic Plan    ASA: 3  Anesthesia type: general  LMA         Induction: Intravenous  Anesthetic plan and risks discussed with: Patient

## 2020-09-15 NOTE — ANESTHESIA POSTPROCEDURE EVALUATION
Procedure(s):  MANIPULATION LEFT KNEE. general    Anesthesia Post Evaluation      Multimodal analgesia: multimodal analgesia used between 6 hours prior to anesthesia start to PACU discharge  Patient location during evaluation: bedside  Patient participation: complete - patient participated  Level of consciousness: awake  Pain management: adequate  Airway patency: patent  Anesthetic complications: no  Cardiovascular status: acceptable  Respiratory status: acceptable  Hydration status: acceptable  Post anesthesia nausea and vomiting:  controlled  Final Post Anesthesia Temperature Assessment:  Normothermia (36.0-37.5 degrees C)      INITIAL Post-op Vital signs:   Vitals Value Taken Time   /66 9/15/2020  1:42 PM   Temp 36.3 °C (97.4 °F) 9/15/2020  1:22 PM   Pulse 56 9/15/2020  1:43 PM   Resp 16 9/15/2020  1:43 PM   SpO2 100 % 9/15/2020  1:41 PM   Vitals shown include unvalidated device data.

## 2020-09-15 NOTE — INTERVAL H&P NOTE
Update History & Physical 
 
The Patient's History and Physical of September 15, It was reviewed with the patient and I examined the patient. There was no change. The surgical site was confirmed by the patient and me. Plan:  The risk, benefits, expected outcome, and alternative to the recommended procedure have been discussed with the patient. Patient understands and wants to proceed with the procedure.  
 
Electronically signed by Rosendo De Leon MD on 9/15/2020 at 12:58 PM

## 2020-09-16 NOTE — OP NOTES
[facility]  OPERATIVE REPORT    Name:  Yang Adair  MR#:   [id number]  :  1955  ACCOUNT #:  [de-identified]  DATE OF SERVICE:  09/15/2020    PREOPERATIVE DIAGNOSIS:  Arthrofibrosis status post left total knee replacement. POSTOPERATIVE DIAGNOSIS:  Arthrofibrosis status post left total knee replacement. PROCEDURE PERFORMED:  Gentle manipulation under anesthesia. SURGEON:  Shagufta Yañez. Ximena Wright MD    ASSISTANT:  Kyle Li    ANESTHETIST:  Madeleine Corona MD    ANESTHESIA:  General.    COMPLICATIONS:  None. SPECIMENS REMOVED:  None. IMPLANTS:  None. ESTIMATED BLOOD LOSS:  None. INDICATIONS:  The patient is a very pleasant lady who ended up having a myocardial infarction some time after her knee replacement and ended up not getting formalized physical therapy. She got cardiac rehab, but not physical therapy for the knee itself and additional factors include sitting in a recliner chair. She ended up with fixed flexion deformity of the knee, but with good flexion. DESCRIPTION OF PROCEDURE:  After informed consent with time-out performed, after anesthetic was successfully given, the knee was examined. She bent very nicely to 120 degrees and she was missing about 8-10 degrees of extension. A well-padded roll was placed under the distal tibia and blankets over the anterior aspect of the knee and gentle extension force applied with good result and again the leg would sit actually quite flat on the bed after the manipulation. No complications. The patient awoke and taken to recovery room in stable condition without complications.         Babatunde Santillan MD [id number]  D:  09/15/2020 13:20  T:  09/15/2020 22:10  JOB #:  [id number]

## 2020-09-21 ENCOUNTER — HOSPITAL ENCOUNTER (OUTPATIENT)
Dept: CARDIAC REHAB | Age: 65
Discharge: HOME | End: 2020-09-21
Payer: COMMERCIAL

## 2020-09-21 VITALS — BODY MASS INDEX: 30.08 KG/M2 | WEIGHT: 203.7 LBS

## 2020-09-21 PROCEDURE — 93797 PHYS/QHP OP CAR RHAB WO ECG: CPT

## 2020-09-21 PROCEDURE — 93798 PHYS/QHP OP CAR RHAB W/ECG: CPT

## 2020-09-23 ENCOUNTER — HOSPITAL ENCOUNTER (OUTPATIENT)
Dept: CARDIAC REHAB | Age: 65
Discharge: HOME | End: 2020-09-23
Payer: MEDICARE

## 2020-09-23 ENCOUNTER — HOSPITAL ENCOUNTER (OUTPATIENT)
Dept: CARDIAC REHAB | Age: 65
Discharge: HOME | End: 2020-09-23
Payer: COMMERCIAL

## 2020-09-23 VITALS — WEIGHT: 202.7 LBS | BODY MASS INDEX: 29.93 KG/M2

## 2020-09-23 PROCEDURE — 90834 PSYTX W PT 45 MINUTES: CPT

## 2020-09-23 PROCEDURE — 93798 PHYS/QHP OP CAR RHAB W/ECG: CPT

## 2020-09-23 NOTE — BH NOTES
Patient's Name:  Kobe Liu                  Date:  9/23/2020    Kobe Liu was informed of the inherent limitations of a virtual visit,  and has consented to a virtual therapy visit on 9/23/2020. Information regarding emergency contact information for this patient during this visit is to contact:  Marga Bautista (spouse) [phone] in addition to calling 911. The patient was informed that at any time during the virtual visit, they can decide to stop the virtual visit. The patient verified that they are physically located in the [facility] for this virtual visit. Behavior         Patient had a \"knee manipulation\" procedure since our last session. Intervention               Processed coping with procedure and recovery. Patient reports the procedure went well, no pain medications needed. Explored cognitions behind increased anxiety about her health and long-term prognosis that was triggered by a comment made prior to the procedure about her cardiac health issues. Problem solving regarding continued health improvement. Response               Patient reports she is back in the Cardiopulmonary Rehab program post knee surgery and is making good progress. People are commenting on how nice she looks since losing 10 lbs. She continues to make dietary changes to improver her health. Plan               Patient rescheduled for further support. Kobe Liu is a 72 y.o. female being evaluated by a Virtual Visit (video visit) encounter to address concerns as mentioned above. A caregiver was present when appropriate. Due to this being a TeleHealth encounter (During [facility]- public health emergency), evaluation of the following areas was limited: face-to-face consultation.  Pursuant to the emergency declaration under the [facility] waiver authority and the [employer] Appropriations Act, this Virtual Visit was conducted with patient's (and/or legal guardian's) consent, to reduce the risk of exposure to COVID-19 and provide necessary medical care. Services were provided through a video synchronous discussion virtually to substitute for in-person encounter. --Oliver Bailey LCSW on 9/23/2020 at 5:58 PM    An electronic signature was used to authenticate this note.      Oliver Bailey LCSW                                       9/23/2020                             5:58 PM

## 2020-09-25 ENCOUNTER — HOSPITAL ENCOUNTER (OUTPATIENT)
Dept: CARDIAC REHAB | Age: 65
Discharge: HOME | End: 2020-09-25
Payer: COMMERCIAL

## 2020-09-25 VITALS — BODY MASS INDEX: 30.05 KG/M2 | WEIGHT: 203.5 LBS

## 2020-09-25 PROCEDURE — 93798 PHYS/QHP OP CAR RHAB W/ECG: CPT

## 2020-09-28 ENCOUNTER — HOSPITAL ENCOUNTER (OUTPATIENT)
Dept: CARDIAC REHAB | Age: 65
Discharge: HOME | End: 2020-09-28
Payer: COMMERCIAL

## 2020-09-28 VITALS — BODY MASS INDEX: 29.98 KG/M2 | WEIGHT: 203 LBS

## 2020-09-28 PROCEDURE — 93798 PHYS/QHP OP CAR RHAB W/ECG: CPT

## 2020-09-28 PROCEDURE — 93797 PHYS/QHP OP CAR RHAB WO ECG: CPT

## 2020-09-30 RX ORDER — CARVEDILOL 6.25 MG/1
6.25 TABLET ORAL EVERY 12 HOURS
Qty: 180 TAB | Refills: 3 | Status: SHIPPED | OUTPATIENT
Start: 2020-09-30

## 2020-10-01 ENCOUNTER — OFFICE VISIT (OUTPATIENT)
Dept: ORTHOPEDIC SURGERY | Age: 65
End: 2020-10-01
Payer: COMMERCIAL

## 2020-10-01 ENCOUNTER — DOCUMENTATION ONLY (OUTPATIENT)
Dept: ORTHOPEDIC SURGERY | Age: 65
End: 2020-10-01

## 2020-10-01 ENCOUNTER — HOSPITAL ENCOUNTER (OUTPATIENT)
Dept: CARDIAC REHAB | Age: 65
Discharge: HOME | End: 2020-10-01
Payer: COMMERCIAL

## 2020-10-01 VITALS
DIASTOLIC BLOOD PRESSURE: 71 MMHG | WEIGHT: 204 LBS | RESPIRATION RATE: 18 BRPM | TEMPERATURE: 96.8 F | HEIGHT: 69 IN | SYSTOLIC BLOOD PRESSURE: 133 MMHG | HEART RATE: 56 BPM | BODY MASS INDEX: 30.21 KG/M2 | OXYGEN SATURATION: 96 %

## 2020-10-01 VITALS — BODY MASS INDEX: 30.27 KG/M2 | WEIGHT: 205 LBS

## 2020-10-01 DIAGNOSIS — Z98.890 S/P SURGICAL MANIPULATION OF KNEE JOINT: ICD-10-CM

## 2020-10-01 DIAGNOSIS — Z96.652 S/P TOTAL KNEE ARTHROPLASTY, LEFT: Primary | ICD-10-CM

## 2020-10-01 DIAGNOSIS — M25.562 LEFT KNEE PAIN, UNSPECIFIED CHRONICITY: ICD-10-CM

## 2020-10-01 PROCEDURE — 93798 PHYS/QHP OP CAR RHAB W/ECG: CPT

## 2020-10-01 PROCEDURE — 99024 POSTOP FOLLOW-UP VISIT: CPT | Performed by: PHYSICIAN ASSISTANT

## 2020-10-01 RX ORDER — ACETAMINOPHEN AND CODEINE PHOSPHATE 300; 30 MG/1; MG/1
1-2 TABLET ORAL EVERY 8 HOURS
Qty: 42 TAB | Refills: 0 | Status: SHIPPED | OUTPATIENT
Start: 2020-10-01 | End: 2020-10-08

## 2020-10-01 NOTE — LETTER
NOTIFICATION RETURN TO WORK 
 
10/1/2020 10:57 AM 
 
Ms. Jaycob Arora'YIFAN'Maite Mohr [address]   [phone] [facility] [phone] To Whom It May Concern: 
 
Jaycob Mohr ('R) is currently under the care of [address] [place] [address]. She is expected to return to work on Monday November 2, 2020. If there are questions or concerns please have the patient contact our office.  
 
 
 
Sincerely, 
 
 
Marylen Maffucci, PA-C

## 2020-10-01 NOTE — PROGRESS NOTES
[address] [place] [id number]  [phone]           Patient: Nick Pearson                MRN: [id number]       SSN: [id number]  YOB: 1955        AGE: 72 y.o. SEX: female  Body mass index is 30.13 kg/m². PCP: JAMES Mai  10/01/20      This office note has been dictated. REVIEW OF SYSTEMS:  Constitutional: Negative for fever, chills, weight loss and malaise/fatigue. HENT: Negative. Eyes: Negative. Respiratory: Negative. Cardiovascular: Negative. Gastrointestinal: No bowel incontinence or constipation. Genitourinary: No bladder incontinence or saddle anesthesia. Skin: Negative. Neurological: Negative. Endo/Heme/Allergies: Negative. Psychiatric/Behavioral: Negative. Musculoskeletal: As per HPI above. Past Medical History:   Diagnosis Date    Arthritis     CAD (coronary artery disease)     Calcific Achilles tendonitis     Right; insertional    Essential hypertension 7/13/2020    Hypercholesterolemia     Hypertension     Left knee pain     Pain of right heel     Sprain of right ring finger     Thyroid disease     Wears glasses          Current Outpatient Medications:     carvediloL (COREG) 6.25 mg tablet, Take 1 Tab by mouth every twelve (12) hours. , Disp: 180 Tab, Rfl: 3    levothyroxine (SYNTHROID) 25 mcg tablet, Take 75 mcg by mouth Daily (before breakfast). , Disp: , Rfl:     amLODIPine (NORVASC) 2.5 mg tablet, Take 1 Tab by mouth daily. , Disp: 30 Tab, Rfl: 2    aspirin delayed-release 81 mg tablet, Take 1 Tab by mouth daily. , Disp: 30 Tab, Rfl: 11    clopidogreL (PLAVIX) 75 mg tab, Take 1 Tab by mouth daily. , Disp: 30 Tab, Rfl: 11    atorvastatin (LIPITOR) 80 mg tablet, Take 1 Tab by mouth daily. , Disp: 30 Tab, Rfl: 2    cholecalciferol (VITAMIN D3) (50,000 UNITS /1250 MCG) capsule, Take  by mouth every seven (7) days. , Disp: , Rfl:     No Known Allergies    Social History     Socioeconomic History    Marital status:      Spouse name: Not on file    Number of children: Not on file    Years of education: Not on file    Highest education level: Not on file   Occupational History    Not on file   Social Needs    Financial resource strain: Not on file    Food insecurity     Worry: Not on file     Inability: Not on file    Transportation needs     Medical: Not on file     Non-medical: Not on file   Tobacco Use    Smoking status: Former Smoker     Years: 15.00    Smokeless tobacco: Never Used   Substance and Sexual Activity    Alcohol use: Yes     Frequency: Monthly or less     Comment: Occasional    Drug use: No    Sexual activity: Not on file   Lifestyle    Physical activity     Days per week: Not on file     Minutes per session: Not on file    Stress: Not on file   Relationships    Social connections     Talks on phone: Not on file     Gets together: Not on file     Attends Scientology service: Not on file     Active member of club or organization: Not on file     Attends meetings of clubs or organizations: Not on file     Relationship status: Not on file    Intimate partner violence     Fear of current or ex partner: Not on file     Emotionally abused: Not on file     Physically abused: Not on file     Forced sexual activity: Not on file   Other Topics Concern    Not on file   Social History Narrative    Not on file       Past Surgical History:   Procedure Laterality Date    CARDIAC SURG PROCEDURE UNLIST      stent july 2020    HX CHOLECYSTECTOMY  1990    HX HEENT      Eye surgery    HX HERNIA REPAIR      HX ORTHOPAEDIC Left 07/2020    left total knee arthroplasty         Patient seen evaluate today for her left knee. She is status post left knee replacement back in July. She did suffer an MI which affected her physical therapy. Unfortunately insurance will only pay for cardiac or physical therapy.   She has been doing cardiac therapy. She just underwent a manipulation under anesthesia for the left knee for arthrofibrosis on the 15th. She is progressing well from this. She does continue to work on range of motion to was on her own. She had no troubles the wound. Pain is well controlled. Patient denies recent fevers, chills, chest pain, SOB, or injuries. No recent systemic changes noted. A 12-point review of systems is performed today. Pertinent positives are noted. All other systems reviewed and otherwise are negative. Physical exam: General: Alert and oriented x3, nad.  well-developed, well nourished. normal affect, AF. NC/AT, EOMI, neck supple, trachea midline, no JVD present. Breathing is non-labored. Examination of lower extremities reveals pain-free range of motion the hips. There is no pain to palpation the trochanteric bursa. Negative straight leg raise. Negative calf tenderness but no Homans per no signs of DVT present. Left knee of a skin intact. There is no erythema, ecchymosis, warmth. There are no signs of infection or cellulitis present. Range of motion is -5 to 118 degrees. Patella tracks nicely without rubs or crepitus noted. Assessment: #1 status post left knee replacement, #2 left knee arthrofibrosis improved    Plan: At this point, she will continue with home excise program.  She will also continue with a cardiac therapy. We will plan to see her back in the office in 3 weeks time for evaluation. We will check her range of motion at that time. Expected return to work is 11/2/2020 which we will reevaluate upon her follow-up visit. A prescription for Tylenol 3 was provided today.               JR Rishabh ELIZABETH, PA-C, ATC

## 2020-10-02 ENCOUNTER — HOSPITAL ENCOUNTER (OUTPATIENT)
Dept: CARDIAC REHAB | Age: 65
Discharge: HOME | End: 2020-10-02
Payer: COMMERCIAL

## 2020-10-02 VITALS — BODY MASS INDEX: 30.16 KG/M2 | WEIGHT: 204.2 LBS

## 2020-10-02 PROCEDURE — 93798 PHYS/QHP OP CAR RHAB W/ECG: CPT

## 2020-10-02 NOTE — PROGRESS NOTES
CARDIAC ITP REASSESSMENT FOR REVIEW AND SIGNATURE         Patient name: Catrachito Gloria : 1955      Visits from Start of Care: 28                                                  Reporting Period: 9/3 to 10/2     Subjective Reports: progressing well, no complaints, very happy with her progress               Goals Comments   1. Current weight is 204lbs.    []?? initial  []?? met                      []?? not met  [x]? ? progressing  Patient has lost 4lb. Goal weight is 180lbs. Lose 1--3 lbs by next recert. 2. Build endurance.    []?? initial  []?? met                      []?? not met  [x]? ? progressing Current peak MET level is 4.3. Increase MET level to 4.5 by next recert         Key functional changes: left knee improving, patient is able to walk on the treadmill and has consistently increased her speed       Problems/ barriers to goal attainment: Left knee replacement      Assessment / Recommendations:Continue w/ cardiac rehab Rx     Mohinder Mena RN 10/2/2020 10:50 AM    Cardiac ITP      CR PHASE II from 10/2/2020 in [facility]    Treatment Diagnosis 1  STEMI    STEMI Date  20    Referral Date  20    Significant Cardiovascular History  --  [HTN, hypercholesterolemia]    ITP Visit Type  Re-Assessment    1st Date of Exercise   --  [To be scheduled]    ITP Next Review Date  20    Visit #/Total Visits      EF %  55 %    Risk Stratification  Moderate    ITP  Exercise, Psychosocial, Tobacco, Nutrition, Education    Stages of Change  Maintenance    Assisted Devices  None  [S/P Left knee replacement]    Test  Six minute walk test    Mode  Treadmill, Bike, Stepper, Ergometer, Elliptical    Frequency per week  2--3    Duration per session  35--50    Intensity  METS        1.7--4.3    RPE  11--13    Progression  As tolerated    Symptoms with Exercise  --  [Left knee tightness]    Target Heart Rate  93--109    Resistance Training  Yes    Resting BP  140/84    Peak BP  140/84    Is BP WDL? Yes    Type  walking    Frequency  daily    Duration  varies    Resistance Training  No    Education  Self pulse, Exercise safety, Signs/Symptoms to report, RPE scale, Equipment orientation, Warm up/Cool down, Physically active  [Good for your heart book given]    Target Goal(s)  Individual exercise RX    Patient Stated Exercise Goals  Build endurance    Stages of Change  Maintenance    Education  Impact self care behaviors on health    Target Goal(s)  Engages in self-care behaviors    Stages of Change  Maintenance    Diabetes  No    Date Lipids Drawn  07/14/20    Total  239    HDL  56    LDL  163.6    Triglycerides  97    Lipid Med(s)  Lipitor 80    Weight   92.6 kg (204 lb 3.2 oz)    Height   5' 9\" (1.753 m)    BMI  30.22    Weight Goal  180lbs    Waist Circumference   41 inches    Alcohol  Special    Dietitian Consult  Yes  [Educational videos discussed]    Nurse/Patient Discussion  Yes    Nutrition Class  Yes    Diabetes Education Referral  No    Lipid Clinic Referral  No    Weight Management Referral  No    Target Goals  BMI less than 25    Learning Barrier  Ready to learn    Education Schedule Given  Yes  [educational videos discussed]    Education  CAD, Risk factors, Med Compliance    Hypertension  Yes    Hypertension Controlled  Yes    Is BP WDL?   Yes    BP Meds  Norvasc    Target Goals  Medication compliance    Exercise      CR PHASE II from 10/2/2020 in [facility]   Most recent reading at 10/2/2020 10:50 AM    Any problems changes since your last visit  Denies    Any symptoms while exercising  Denies    Psychosocial/Stress Level  0    Resting EKG rhythm  SR    Tobacco Use  None    ITP Next Review Date  11/01/20    Visit Number/Total Visits  28/36    On Call Medical Director Immediately Available  Junie    Target Heart Rate(Range)      Resting HR  61    Resting BP  140/84    Recovery HR  63    Recovery BP  125/72    Weight  92.6 kg (204 lb 3.2 oz)    Exercise EKG Rhythm  SR    Exercise Duration  45    Peak HR  112    Peak RPE  16    Peak Mets  4.3    Asymptomatic  yes    Total Minutes  55

## 2020-10-06 ENCOUNTER — TELEPHONE (OUTPATIENT)
Dept: ORTHOPEDIC SURGERY | Age: 65
End: 2020-10-06

## 2020-10-06 NOTE — TELEPHONE ENCOUNTER
Nurse  Carlos Tobias is calling for disability purposes to obtain information. Carlos Tobias needs clarification of CPT codes/work status/and if patient is attending physical therapy.     [facility] Carlos Comp can be reached at p#[phone]

## 2020-10-07 ENCOUNTER — HOSPITAL ENCOUNTER (OUTPATIENT)
Dept: CARDIAC REHAB | Age: 65
Discharge: HOME | End: 2020-10-07
Payer: COMMERCIAL

## 2020-10-07 VITALS — BODY MASS INDEX: 29.98 KG/M2 | WEIGHT: 203 LBS

## 2020-10-07 PROCEDURE — 93798 PHYS/QHP OP CAR RHAB W/ECG: CPT

## 2020-10-07 PROCEDURE — 90834 PSYTX W PT 45 MINUTES: CPT

## 2020-10-07 NOTE — TELEPHONE ENCOUNTER
I spoke with the patient to make her aware that Tobias Hernández was calling asking for this information. Per the patient, \"it is ok to tell her that due to another health issue the patient is a rehab program and insurance will not pay for that one and therapy for her knee\". Tobias Hernández was contacted and told this and she asked what kind of health issue it was and if I could fax her the patients last office note and I explained to her that since this was not a workmans comp issue that she had to contact the patient herself and request this information because I cannot give it to her.

## 2020-10-07 NOTE — BH NOTES
Patient's Name:  Elie Sanchez                  Date:  10/7/2020    Elie Sanchez was informed of the inherent limitations of a virtual visit,  and has consented to a virtual therapy visit on 10/7/2020. Information regarding emergency contact information for this patient during this visit is to contact:  Hugh Anel (spouse) [phone] in addition to calling 911. The patient was informed that at any time during the virtual visit, they can decide to stop the virtual visi5t. The patient verified that they are physically located in the [facility] for this virtual visit. Behavior         Patient reports she is no longer ruminating on anxious thought from last session. She reports aprox. 3 weeks and she will graduate from the Cardiopulmonary Rehab Program.   She has decided to retire shortly. Intervention               Problem solving regarding self-care and maintaining healthy lifestyle on completion of program and adjustment to half-way. Explored unhelpful cognitions behind anxiety on half-way despite she and spouse has evaluated finances and are confident they can manage financially. Response              Patient will look into the hours of the exercise room at her apartment complex and will also explore the [facility] [address] program at the local [facility]. Patient continues to explore healthy cooking recipes and will do thought stopping when she starts to scare herself with anxious thoughts that are not productive around half-way. Plan               Patient rescheduled for further support     Elie Sanchez is a 72 y.o. female being evaluated by a Virtual Visit (video visit) encounter to address concerns as mentioned above. A caregiver was present when appropriate. Due to this being a TeleHealth encounter (During [facility] public health emergency), evaluation of the following areas was limited: face-to-face consultation.  Pursuant to the emergency declaration under the [address] and the [employer] Act, [facility] waiver authority and the Coronavirus Preparedness and [employer] Act, this Virtual Visit was conducted with patient's (and/or legal guardian's) consent, to reduce the risk of exposure to COVID-19 and provide necessary medical care. Services were provided through a video synchronous discussion virtually to substitute for in-person encounter. --Teddy Pichardo LCSW on 10/7/2020 at 3:28 PM    An electronic signature was used to authenticate this note.      Teddy Pichardo LCSW                                       10/7/2020                             3:28 PM

## 2020-10-09 ENCOUNTER — HOSPITAL ENCOUNTER (OUTPATIENT)
Dept: CARDIAC REHAB | Age: 65
Discharge: HOME | End: 2020-10-09
Payer: COMMERCIAL

## 2020-10-09 VITALS — WEIGHT: 203.4 LBS | BODY MASS INDEX: 30.04 KG/M2

## 2020-10-09 PROCEDURE — 93798 PHYS/QHP OP CAR RHAB W/ECG: CPT

## 2020-10-10 ENCOUNTER — HOSPITAL ENCOUNTER (EMERGENCY)
Age: 65
Discharge: HOME | End: 2020-10-11
Attending: EMERGENCY MEDICINE
Payer: COMMERCIAL

## 2020-10-10 DIAGNOSIS — R42 DIZZINESS: Primary | ICD-10-CM

## 2020-10-10 PROCEDURE — 84484 ASSAY OF TROPONIN QUANT: CPT

## 2020-10-10 PROCEDURE — 99285 EMERGENCY DEPT VISIT HI MDM: CPT

## 2020-10-10 PROCEDURE — 93005 ELECTROCARDIOGRAM TRACING: CPT

## 2020-10-10 PROCEDURE — 85025 COMPLETE CBC W/AUTO DIFF WBC: CPT

## 2020-10-10 PROCEDURE — 80053 COMPREHEN METABOLIC PANEL: CPT

## 2020-10-10 PROCEDURE — 83690 ASSAY OF LIPASE: CPT

## 2020-10-10 RX ORDER — ASPIRIN 325 MG
325 TABLET ORAL
Status: COMPLETED | OUTPATIENT
Start: 2020-10-10 | End: 2020-10-11

## 2020-10-11 VITALS
TEMPERATURE: 97 F | WEIGHT: 203 LBS | OXYGEN SATURATION: 100 % | DIASTOLIC BLOOD PRESSURE: 62 MMHG | RESPIRATION RATE: 21 BRPM | HEART RATE: 59 BPM | SYSTOLIC BLOOD PRESSURE: 138 MMHG | BODY MASS INDEX: 29.98 KG/M2

## 2020-10-11 LAB
ALBUMIN SERPL-MCNC: 3.5 G/DL (ref 3.4–5)
ALBUMIN/GLOB SERPL: 1 {RATIO} (ref 0.8–1.7)
ALP SERPL-CCNC: 104 U/L (ref 45–117)
ALT SERPL-CCNC: 19 U/L (ref 13–56)
ANION GAP SERPL CALC-SCNC: 2 MMOL/L (ref 3–18)
AST SERPL-CCNC: 16 U/L (ref 10–38)
ATRIAL RATE: 55 BPM
BASOPHILS # BLD: 0 K/UL (ref 0–0.1)
BASOPHILS NFR BLD: 0 % (ref 0–2)
BILIRUB SERPL-MCNC: 0.4 MG/DL (ref 0.2–1)
BUN SERPL-MCNC: 16 MG/DL (ref 7–18)
BUN/CREAT SERPL: 26 (ref 12–20)
CALCIUM SERPL-MCNC: 8.5 MG/DL (ref 8.5–10.1)
CALCULATED P AXIS, ECG09: 49 DEGREES
CALCULATED R AXIS, ECG10: -6 DEGREES
CALCULATED T AXIS, ECG11: -49 DEGREES
CHLORIDE SERPL-SCNC: 107 MMOL/L (ref 100–111)
CO2 SERPL-SCNC: 31 MMOL/L (ref 21–32)
CREAT SERPL-MCNC: 0.62 MG/DL (ref 0.6–1.3)
DIAGNOSIS, 93000: NORMAL
DIFFERENTIAL METHOD BLD: ABNORMAL
EOSINOPHIL # BLD: 0.2 K/UL (ref 0–0.4)
EOSINOPHIL NFR BLD: 3 % (ref 0–5)
ERYTHROCYTE [DISTWIDTH] IN BLOOD BY AUTOMATED COUNT: 14.7 % (ref 11.6–14.5)
GLOBULIN SER CALC-MCNC: 3.4 G/DL (ref 2–4)
GLUCOSE SERPL-MCNC: 97 MG/DL (ref 74–99)
HCT VFR BLD AUTO: 36.8 % (ref 35–45)
HGB BLD-MCNC: 12.2 G/DL (ref 12–16)
LIPASE SERPL-CCNC: 103 U/L (ref 73–393)
LYMPHOCYTES # BLD: 3.7 K/UL (ref 0.9–3.6)
LYMPHOCYTES NFR BLD: 47 % (ref 21–52)
MCH RBC QN AUTO: 27.2 PG (ref 24–34)
MCHC RBC AUTO-ENTMCNC: 33.2 G/DL (ref 31–37)
MCV RBC AUTO: 82 FL (ref 74–97)
MONOCYTES # BLD: 0.7 K/UL (ref 0.05–1.2)
MONOCYTES NFR BLD: 9 % (ref 3–10)
NEUTS SEG # BLD: 3.1 K/UL (ref 1.8–8)
NEUTS SEG NFR BLD: 41 % (ref 40–73)
P-R INTERVAL, ECG05: 214 MS
PLATELET # BLD AUTO: 305 K/UL (ref 135–420)
PMV BLD AUTO: 9.8 FL (ref 9.2–11.8)
POTASSIUM SERPL-SCNC: 3.1 MMOL/L (ref 3.5–5.5)
PROT SERPL-MCNC: 6.9 G/DL (ref 6.4–8.2)
Q-T INTERVAL, ECG07: 448 MS
QRS DURATION, ECG06: 84 MS
QTC CALCULATION (BEZET), ECG08: 428 MS
RBC # BLD AUTO: 4.49 M/UL (ref 4.2–5.3)
SODIUM SERPL-SCNC: 140 MMOL/L (ref 136–145)
TROPONIN I BLD-MCNC: <0.04 NG/ML (ref 0–0.08)
TROPONIN I SERPL-MCNC: <0.02 NG/ML (ref 0–0.04)
VENTRICULAR RATE, ECG03: 55 BPM
WBC # BLD AUTO: 7.7 K/UL (ref 4.6–13.2)

## 2020-10-11 PROCEDURE — 84484 ASSAY OF TROPONIN QUANT: CPT

## 2020-10-11 PROCEDURE — 74011250637 HC RX REV CODE- 250/637: Performed by: EMERGENCY MEDICINE

## 2020-10-11 RX ORDER — POTASSIUM CHLORIDE 20 MEQ/1
40 TABLET, EXTENDED RELEASE ORAL
Status: COMPLETED | OUTPATIENT
Start: 2020-10-11 | End: 2020-10-11

## 2020-10-11 RX ADMIN — POTASSIUM CHLORIDE 40 MEQ: 1500 TABLET, EXTENDED RELEASE ORAL at 02:33

## 2020-10-11 RX ADMIN — ASPIRIN 325 MG ORAL TABLET 325 MG: 325 PILL ORAL at 00:15

## 2020-10-11 NOTE — ED PROVIDER NOTES
EMERGENCY DEPARTMENT HISTORY AND PHYSICAL EXAM    11:55 PM      Date: 10/10/2020  Patient Name: Elena Eden    History of Presenting Illness     Chief Complaint   Patient presents with    Nausea    Dizziness         History Provided By: patient    Additional History (Context): Elena Eden is a 72 y.o. female presents with about 30 minutes of vague epigastric fullness or discomfort, had an episode of sweating and vague nausea now subsiding, lasted about 40 minutes, was at rest when it started. This is concerning because about a year ago had similar symptoms which proved to be a heart attack and she had stents placed. She does not have chest pain or shortness of breath no vomiting no change in her bowel habits. Has high blood pressure thyroid disorder and is compliant with her medications and took them today. Shelley Conn PCP: JAMES Melissa    Chief Complaint:   Duration:    Timing:    Location:   Quality:   Severity:   Modifying Factors:   Associated Symptoms:       Current Facility-Administered Medications   Medication Dose Route Frequency Provider Last Rate Last Dose    aspirin tablet 325 mg  325 mg Oral NOW Evelyn Watt MD         Current Outpatient Medications   Medication Sig Dispense Refill    carvediloL (COREG) 6.25 mg tablet Take 1 Tab by mouth every twelve (12) hours. 180 Tab 3    levothyroxine (SYNTHROID) 25 mcg tablet Take 75 mcg by mouth Daily (before breakfast).  amLODIPine (NORVASC) 2.5 mg tablet Take 1 Tab by mouth daily. 30 Tab 2    aspirin delayed-release 81 mg tablet Take 1 Tab by mouth daily. 30 Tab 11    clopidogreL (PLAVIX) 75 mg tab Take 1 Tab by mouth daily. 30 Tab 11    atorvastatin (LIPITOR) 80 mg tablet Take 1 Tab by mouth daily. 30 Tab 2    cholecalciferol (VITAMIN D3) (50,000 UNITS /1250 MCG) capsule Take  by mouth every seven (7) days.          Past History     Past Medical History:  Past Medical History:   Diagnosis Date    Arthritis     CAD (coronary artery disease)     Calcific Achilles tendonitis     Right; insertional    Essential hypertension 7/13/2020    Hypercholesterolemia     Hypertension     Left knee pain     Pain of right heel     Sprain of right ring finger     Thyroid disease     Wears glasses        Past Surgical History:  Past Surgical History:   Procedure Laterality Date    CARDIAC SURG PROCEDURE UNLIST      stent july 2020    HX CHOLECYSTECTOMY  1990    HX HEENT      Eye surgery    HX HERNIA REPAIR      HX ORTHOPAEDIC Left 07/2020    left total knee arthroplasty       Family History:  Family History   Problem Relation Age of Onset    Breast Cancer Other     Arthritis-osteo Mother     Hypertension Mother     Heart Disease Mother     Hypertension Other         Grandmother    Other Other         Heart problems: Mother, grandmother    Diabetes Other         Grandmother       Social History:  Social History     Tobacco Use    Smoking status: Former Smoker     Years: 15.00    Smokeless tobacco: Never Used   Substance Use Topics    Alcohol use: Yes     Frequency: Monthly or less     Comment: Occasional    Drug use: No       Allergies:  No Known Allergies      Review of Systems     Review of Systems   Constitutional: Positive for diaphoresis. Negative for fever. HENT: Negative for congestion and sore throat. Eyes: Negative for pain and itching. Respiratory: Negative for cough and shortness of breath. Cardiovascular: Negative for chest pain and palpitations. Gastrointestinal: Negative for abdominal pain and diarrhea. Endocrine: Negative for polydipsia and polyuria. Genitourinary: Negative for dysuria and hematuria. Musculoskeletal: Negative for arthralgias and myalgias. Skin: Negative for rash and wound. Neurological: Negative for seizures and syncope. Hematological: Does not bruise/bleed easily. Psychiatric/Behavioral: Negative for agitation and hallucinations.          Physical Exam       Patient Vitals for the past 12 hrs:   Temp Pulse Resp BP SpO2   10/10/20 2339 97 °F (36.1 °C) (!) 53 14 (!) 144/74 100 %       Physical Exam  Vitals signs and nursing note reviewed. Constitutional:       General: She is not in acute distress. Appearance: She is well-developed. She is obese. She is not toxic-appearing. HENT:      Head: Normocephalic and atraumatic. Eyes:      General: No scleral icterus. Conjunctiva/sclera: Conjunctivae normal.   Neck:      Musculoskeletal: Normal range of motion and neck supple. Vascular: No JVD. Cardiovascular:      Rate and Rhythm: Normal rate and regular rhythm. Heart sounds: Normal heart sounds. Comments: 4 intact extremity pulses  Pulmonary:      Effort: Pulmonary effort is normal.      Breath sounds: Normal breath sounds. Abdominal:      Palpations: Abdomen is soft. There is no mass. Tenderness: There is no abdominal tenderness. Musculoskeletal: Normal range of motion. Lymphadenopathy:      Cervical: No cervical adenopathy. Skin:     General: Skin is warm and dry. Neurological:      Mental Status: She is alert. Diagnostic Study Results   Labs -  Recent Results (from the past 12 hour(s))   EKG, 12 LEAD, INITIAL    Collection Time: 10/10/20 11:46 PM   Result Value Ref Range    Ventricular Rate 55 BPM    Atrial Rate 55 BPM    P-R Interval 214 ms    QRS Duration 84 ms    Q-T Interval 448 ms    QTC Calculation (Bezet) 428 ms    Calculated P Axis 49 degrees    Calculated R Axis -6 degrees    Calculated T Axis -49 degrees    Diagnosis       Sinus bradycardia with 1st degree AV block  T wave abnormality, consider inferior ischemia  Abnormal ECG  When compared with ECG of 23-JUL-2020 18:34,  IN interval has increased         Radiologic Studies -   No orders to display     No results found.     Medications ordered:   Medications   aspirin tablet 325 mg (has no administration in time range)         Medical Decision Making   Initial Medical Decision Making and DDx:  Possibly epigastric discomfort related to the GI system. Will evaluate for cardiac equivalent. Doubt PE, pneumonia, pneumothorax. Benign appearance at initial evaluation. EKG no acute findings, chronic inferior T wave inversion when compared to the last.    ED Course: Progress Notes, Reevaluation, and Consults:  ED Course as of Oct 15 0707   Sun Oct 11, 2020   0229 Potassium repleted. Remainder of labs nonactionable. Reassessed the patient she is feeling much better totally asymptomatic nontoxic stable vitals at this time. She will follow-up with primary care. We will repeat a second troponin if this is negative she will be discharged is happy with this plan    [CB]      ED Course User Index  [CB] Rina Odell MD         I am the first provider for this patient. I reviewed the vital signs, available nursing notes, past medical history, past surgical history, family history and social history. Patient Vitals for the past 12 hrs:   Temp Pulse Resp BP SpO2   10/10/20 2339 97 °F (36.1 °C) (!) 53 14 (!) 144/74 100 %       Vital Signs-Reviewed the patient's vital signs. Pulse Oximetry Analysis, Cardiac Monitor, 12 lead ekg: No hypoxia on room air  Interpreted by the EP. Records Reviewed: Nursing notes reviewed (Time of Review: 11:55 PM)    Procedures:   Critical Care Time:   Aspirin: (was aspirin given for stroke?)    Diagnosis     Clinical Impression:   1. Dizziness        Disposition:       Follow-up Information    None          Patient's Medications   Start Taking    No medications on file   Continue Taking    AMLODIPINE (NORVASC) 2.5 MG TABLET    Take 1 Tab by mouth daily. ASPIRIN DELAYED-RELEASE 81 MG TABLET    Take 1 Tab by mouth daily. ATORVASTATIN (LIPITOR) 80 MG TABLET    Take 1 Tab by mouth daily. CARVEDILOL (COREG) 6.25 MG TABLET    Take 1 Tab by mouth every twelve (12) hours.     CHOLECALCIFEROL (VITAMIN D3) (50,000 UNITS /1250 MCG) CAPSULE    Take by mouth every seven (7) days. CLOPIDOGREL (PLAVIX) 75 MG TAB    Take 1 Tab by mouth daily. LEVOTHYROXINE (SYNTHROID) 25 MCG TABLET    Take 75 mcg by mouth Daily (before breakfast).    These Medications have changed    No medications on file   Stop Taking    No medications on file     _______________________________    Notes:    Kevin Hassan MD using Dragon dictation      _______________________________

## 2020-10-11 NOTE — ED NOTES
Patient ambulated to [facility] and returned to [facility]

## 2020-10-11 NOTE — ED TRIAGE NOTES
C/o diaphoresis, nausea, and dizziness sudden onset 30min pta. States it feels the same as when she had a heart attack in July 2020.   Denies cp/sob

## 2020-10-11 NOTE — DISCHARGE INSTRUCTIONS

## 2020-10-14 ENCOUNTER — HOSPITAL ENCOUNTER (OUTPATIENT)
Dept: CARDIAC REHAB | Age: 65
Discharge: HOME | End: 2020-10-14
Payer: COMMERCIAL

## 2020-10-14 VITALS — WEIGHT: 199.4 LBS | BODY MASS INDEX: 29.45 KG/M2

## 2020-10-14 PROCEDURE — 93798 PHYS/QHP OP CAR RHAB W/ECG: CPT

## 2020-10-16 ENCOUNTER — OFFICE VISIT (OUTPATIENT)
Dept: CARDIOLOGY CLINIC | Age: 65
End: 2020-10-16
Payer: COMMERCIAL

## 2020-10-16 VITALS
HEART RATE: 59 BPM | HEIGHT: 69 IN | DIASTOLIC BLOOD PRESSURE: 55 MMHG | BODY MASS INDEX: 29.62 KG/M2 | OXYGEN SATURATION: 97 % | WEIGHT: 200 LBS | TEMPERATURE: 97.9 F | SYSTOLIC BLOOD PRESSURE: 119 MMHG

## 2020-10-16 DIAGNOSIS — I25.10 CORONARY ARTERY DISEASE INVOLVING NATIVE CORONARY ARTERY OF NATIVE HEART WITHOUT ANGINA PECTORIS: Primary | ICD-10-CM

## 2020-10-16 DIAGNOSIS — E78.2 MIXED HYPERLIPIDEMIA: ICD-10-CM

## 2020-10-16 DIAGNOSIS — Z95.5 HISTORY OF CORONARY ARTERY STENT PLACEMENT: ICD-10-CM

## 2020-10-16 DIAGNOSIS — I10 ESSENTIAL HYPERTENSION: ICD-10-CM

## 2020-10-16 PROCEDURE — 99214 OFFICE O/P EST MOD 30 MIN: CPT | Performed by: INTERNAL MEDICINE

## 2020-10-16 RX ORDER — POTASSIUM CHLORIDE 1500 MG/1
20 TABLET, FILM COATED, EXTENDED RELEASE ORAL DAILY
COMMUNITY
End: 2022-07-19 | Stop reason: ALTCHOICE

## 2020-10-16 RX ORDER — HYDROXYZINE 25 MG/1
TABLET, FILM COATED ORAL AS NEEDED
COMMUNITY
End: 2021-11-17

## 2020-10-16 RX ORDER — ESCITALOPRAM OXALATE 20 MG/1
20 TABLET ORAL DAILY
COMMUNITY
End: 2021-05-26

## 2020-10-16 NOTE — PROGRESS NOTES
1. Have you been to the ER, urgent care clinic since your last visit? Hospitalized since your last visit? Yes When: 10/10/20 Where: [facility] Reason for visit: dizziness    2. Have you seen or consulted any other health care providers outside of the [address] [place] [address] since your last visit? Include any pap smears or colon screening.  No

## 2020-10-16 NOTE — PROGRESS NOTES
HISTORY OF PRESENT ILLNESS  Twin Ford is a 72 y.o. female. Patient referred for pre-op evaluation due to abnormal EKG.    7/2020 Ms. Sergio Culp s/p left total knee replacement on 7/7/2020.  - S/P STEMI on 7/13/2020 - s/p cardiac cath with SAIGE to RCA  10/2020  Patient seen today for follow-up. She was in emergency room few days ago with episode of severe dizziness nausea and diarrhea. She had hypokalemia that was supplemented MI ruled out no acute EKG changes. She is feeling better since then. Diarrhea has resolved. Patient had gone out to eat on Friday night and diarrhea started on Saturday morning and stayed all day long like that. No anginal quality chest pain. Stable since then    Hospital Follow Up   The history is provided by the patient. Pertinent negatives include no chest pain, no abdominal pain, no headaches and no shortness of breath. Hypertension   The history is provided by the patient and medical records. This is a chronic problem. The current episode started more than 1 week ago. Pertinent negatives include no chest pain, no abdominal pain, no headaches and no shortness of breath. The symptoms are relieved by medications. Review of Systems   Constitutional: Negative for chills, fever and malaise/fatigue. HENT: Negative for congestion and nosebleeds. Eyes: Negative for blurred vision, double vision and redness. Respiratory: Negative for cough, hemoptysis, sputum production, shortness of breath and wheezing. Cardiovascular: Negative for chest pain, palpitations, orthopnea, claudication, leg swelling and PND. Gastrointestinal: Positive for nausea. Negative for abdominal pain, heartburn and vomiting. Musculoskeletal: Positive for joint pain. Negative for falls and myalgias. Skin: Negative for rash. Neurological: Negative for dizziness, weakness and headaches. Endo/Heme/Allergies: Does not bruise/bleed easily. Psychiatric/Behavioral: The patient is not nervous/anxious. Family History   Problem Relation Age of Onset    Breast Cancer Other     Arthritis-osteo Mother     Hypertension Mother     Heart Disease Mother     Hypertension Other         Grandmother    Other Other         Heart problems: Mother, grandmother    Diabetes Other         Grandmother       Past Medical History:   Diagnosis Date    Arthritis     CAD (coronary artery disease)     Calcific Achilles tendonitis     Right; insertional    Essential hypertension 7/13/2020    Hypercholesterolemia     Hypertension     Left knee pain     Pain of right heel     Sprain of right ring finger     Thyroid disease     Wears glasses        Past Surgical History:   Procedure Laterality Date    CARDIAC SURG PROCEDURE UNLIST      stent july 2020    HX CHOLECYSTECTOMY  1990    HX HEENT      Eye surgery    HX HERNIA REPAIR      HX ORTHOPAEDIC Left 07/2020    left total knee arthroplasty       Social History     Tobacco Use    Smoking status: Former Smoker     Years: 15.00    Smokeless tobacco: Never Used   Substance Use Topics    Alcohol use: Yes     Frequency: Monthly or less     Comment: Occasional       No Known Allergies    Prior to Admission medications    Medication Sig Start Date End Date Taking? Authorizing Provider   potassium chloride (KAON 10%) 20 mEq/15 mL solution Take  by mouth daily. 15 ml daily   Yes Provider, Historical   escitalopram oxalate (Lexapro) 20 mg tablet Take 20 mg by mouth daily. Yes Provider, Historical   hydrOXYzine HCL (ATARAX) 25 mg tablet Take  by mouth as needed. Yes Provider, Historical   carvediloL (COREG) 6.25 mg tablet Take 1 Tab by mouth every twelve (12) hours. 9/30/20  Yes Jie Jaime NP   levothyroxine (SYNTHROID) 25 mcg tablet Take 75 mcg by mouth Daily (before breakfast). Yes Provider, Historical   amLODIPine (NORVASC) 2.5 mg tablet Take 1 Tab by mouth daily.  7/15/20  Yes Best Howard MD   aspirin delayed-release 81 mg tablet Take 1 Tab by mouth daily. 7/16/20  Yes Walter Drummond MD   clopidogreL (PLAVIX) 75 mg tab Take 1 Tab by mouth daily. 7/16/20  Yes Walter Drummond MD   atorvastatin (LIPITOR) 80 mg tablet Take 1 Tab by mouth daily. 7/15/20  Yes Walter Drummond MD   cholecalciferol (VITAMIN D3) (50,000 UNITS /1250 MCG) capsule Take  by mouth every seven (7) days. Yes Provider, Historical         Visit Vitals  BP (!) 119/55 (BP 1 Location: Left arm, BP Patient Position: Sitting)   Pulse (!) 59   Temp 97.9 °F (36.6 °C) (Temporal)   Ht 5' 9\" (1.753 m)   Wt 90.7 kg (200 lb)   SpO2 97%   BMI 29.53 kg/m²       Physical Exam   Constitutional: She is oriented to person, place, and time. She appears well-developed and well-nourished. Neck: No JVD present. Cardiovascular: Normal rate, regular rhythm, normal heart sounds and intact distal pulses. Exam reveals no gallop and no friction rub. No murmur heard. Pulmonary/Chest: Effort normal and breath sounds normal. No respiratory distress. She has no wheezes. She has no rales. She exhibits no tenderness. Abdominal: Soft. She exhibits no distension and no mass. There is no abdominal tenderness. Musculoskeletal: Normal range of motion. General: Edema present. Comments: Dressing to let knee intact   Neurological: She is alert and oriented to person, place, and time. Skin: Skin is warm and dry. Psychiatric: She has a normal mood and affect. Nursing note and vitals reviewed. Echo 7/2020  Interpretation Summary   · Tricuspid regurgitation is inadequate for estimation of right ventricular systolic pressure. · Normal cavity size and systolic function (ejection fraction normal). Mildly increased wall thickness. Estimated left ventricular ejection fraction is 55 - 60%. Visually measured ejection fraction. Abnormal left ventricular wall motion. Age-appropriate left ventricular diastolic function. · Mitral annular calcification. Trace mitral valve regurgitation is present.   · The following segments are hypokinetic: basal inferior and mid inferior. Nuclear Stress Test 7/2/2020  Nuclear Cardiac Spect Rest then Gated Stress study. [place] was used as the stressing method and agent. ([place] given via a 10 - 20 sec injection.)   One day myocardial perfusion study. Date: 7/2/2020. Negative myocardial perfusion imaging. Myocardial perfusion imaging supports a low risk stress test.   There is no prior study available for comparison. .     Cardiac cath 7/13/2020  STEMI  · Critical one-vessel coronary artery disease with 99% stenosis and interval coronary thrombus at proximal/mid RCA. · Noncritical 50% mid LAD stenosis and diffuse luminal irregularities throughout the coronary vasculature with moderate proximal calcification  · Successful PCI of proximal/mid RCA deploying a drug-eluting stent with a residual 0% stenosis and a very tortuous right coronary artery. · Procedure done via right femoral artery and 6 [place] Angio-Seal at the end. ASSESSMENT and PLAN    Ms. Lou Ortega has a reminder for a \"due or due soon\" health maintenance. I have asked that she contact her primary care provider for follow-up on this health maintenance. No flowsheet data found. Assessment     I have personally reviewed patients ekg done at other facility. I Have personally reviewed recent relevant labs available and discussed with patient      ICD-10-CM ICD-9-CM    1. Coronary artery disease involving native coronary artery of native heart without angina pectoris  I25.10 414.01     Stable continue treatment monitor   2. Essential hypertension  I10 401.9     Stable continue treatment   3. Mixed hyperlipidemia  E78.2 272.2     Continue treatment lab with PCP   4. History of coronary artery stent placement  Z95.5 V45.82     For STEMI. SAIGE in RCA.  7/2020 7/2020   Patient referred for pre-op evaluation due to abnormal EKG. SR with T wave abnormalities.   She has history of HTN, HLD, and has not been active due to   Left knee pain. Will evaluate with stress test in AM.    B/P is not controlled will increase lisinopril to 20 mg/ BID.  7/2020 - Pre-operative nuclear stress test was negative for ischemia. She underwent left knee replacement surgery on 7/7/20. She then presented to ER on 7/13 with nausea and generalized malaise, with STEMI. S/p cardiac cath with SAIGE to RCA. She has continued DAPT therapy with statin and BB. She has had multiple ER visits due to nausea - which resolved with Zofran. This is thought to be related to Percocet and was d/c this AM. She has been referred to cardiac rehab and is in PT for left knee. 10/2020  Cardiac status stable. Recent ER visit with nausea dizziness and diarrhea. Diarrhea likely related to food. Resolved since then. Mild hypokalemia being supplemented. Episode does not appear cardiac in nature. Continue current cardiac therapy including aspirin and Plavix  There are no discontinued medications. No orders of the defined types were placed in this encounter. Follow-up and Dispositions    · Return in about 4 months (around 2/16/2021).          Kenisha Buchanan MD

## 2020-10-19 ENCOUNTER — HOSPITAL ENCOUNTER (OUTPATIENT)
Dept: CARDIAC REHAB | Age: 65
Discharge: HOME | End: 2020-10-19
Payer: COMMERCIAL

## 2020-10-19 VITALS — BODY MASS INDEX: 29.45 KG/M2 | WEIGHT: 199.4 LBS

## 2020-10-19 PROCEDURE — 93798 PHYS/QHP OP CAR RHAB W/ECG: CPT

## 2020-10-21 ENCOUNTER — HOSPITAL ENCOUNTER (OUTPATIENT)
Dept: CARDIAC REHAB | Age: 65
Discharge: HOME | End: 2020-10-21
Payer: COMMERCIAL

## 2020-10-21 PROCEDURE — 90832 PSYTX W PT 30 MINUTES: CPT

## 2020-10-21 NOTE — BH NOTES
Patient's Name:  Estevan Chang                  Date:  10/21/2020    Estevan Chang was informed of the inherent limitations of a virtual visit,  and has consented to a virtual therapy visit on 10/21/2020. Information regarding emergency contact information for this patient during this visit is to contact:  Jn Gibbs (spouse) [phone] in addition to calling 911. The patient was informed that at any time during the virtual visit, they can decide to stop the virtual visit. The patient verified that they are physically located in the [facility] for this virtual visit. Behavior        Patient asked her doctor about how bad the damage to her heart was as she had been worried about that. She reports she will graduate from the Cardiopulmonary Rehab Program next week. Intervention               Processed feeling and content around the \"check out\" she did with her doctor to explore negative self talk. Problem solving regarding maintaining a healthy lifestyle after graduating program.   Response               Patient reports her doctor explained in detail and showed her visual information to help her understand the level of damage to her heart and that it is repaired. Patient reports she is relieved and trusts that her doctor told her that her heart is repaired she now needs to focus on maintaining her health:  Taking medications as RX, continuing to exercise, eating healthy. She is exploring when the gym in her complex will be available for use as it is being renovated. [employer] reportedly has a $10 monthly fee she could pay to exercise there also. Patient identified some people she might exercise and enjoy activities with. Plan              We met for 30 min b/c patient has to pick her sister up from an outpatient procedure. She rescheduled for further support in adjustment to graduating the program and maintaining healthy practices.      Estevan Chang is a 72 y.o. female being evaluated by a Virtual Visit (video visit) encounter to address concerns as mentioned above. A caregiver was present when appropriate. Due to this being a TeleHealth encounter (During SXCYZ-62 public health emergency), evaluation of the following areas was limited: face-to-face consultation. Pursuant to the emergency declaration under the [address] [place] [address] and the [employer] and [employer] Act, this Virtual Visit was conducted with patient's (and/or legal guardian's) consent, to reduce the risk of exposure to COVID-19 and provide necessary medical care. Services were provided through a video synchronous discussion virtually to substitute for in-person encounter. --Monica Grey LCSW on 10/21/2020 at 3:12 PM    An electronic signature was used to authenticate this note.      Monica Grey LCSW                                       10/21/2020                             3:12 PM

## 2020-10-22 ENCOUNTER — HOSPITAL ENCOUNTER (OUTPATIENT)
Dept: CARDIAC REHAB | Age: 65
Discharge: HOME | End: 2020-10-22
Payer: COMMERCIAL

## 2020-10-22 ENCOUNTER — OFFICE VISIT (OUTPATIENT)
Dept: ORTHOPEDIC SURGERY | Age: 65
End: 2020-10-22
Payer: COMMERCIAL

## 2020-10-22 VITALS
RESPIRATION RATE: 20 BRPM | DIASTOLIC BLOOD PRESSURE: 73 MMHG | HEIGHT: 69 IN | SYSTOLIC BLOOD PRESSURE: 120 MMHG | TEMPERATURE: 97.1 F | OXYGEN SATURATION: 98 % | HEART RATE: 54 BPM | BODY MASS INDEX: 29.62 KG/M2 | WEIGHT: 200 LBS

## 2020-10-22 VITALS — WEIGHT: 198.4 LBS | BODY MASS INDEX: 29.3 KG/M2

## 2020-10-22 DIAGNOSIS — Z96.652 S/P TOTAL KNEE ARTHROPLASTY, LEFT: Primary | ICD-10-CM

## 2020-10-22 DIAGNOSIS — M25.562 LEFT KNEE PAIN, UNSPECIFIED CHRONICITY: ICD-10-CM

## 2020-10-22 PROCEDURE — 99214 OFFICE O/P EST MOD 30 MIN: CPT | Performed by: PHYSICIAN ASSISTANT

## 2020-10-22 PROCEDURE — 93798 PHYS/QHP OP CAR RHAB W/ECG: CPT

## 2020-10-22 NOTE — PROGRESS NOTES
[address] [place] [address]  [phone]           Patient: Rakan Richmond                MRN: [id number]       SSN: [id number]  YOB: 1955        AGE: 72 y.o. SEX: female  Body mass index is 29.53 kg/m². PCP: JAMES Spence  10/22/20      This office note has been dictated. REVIEW OF SYSTEMS:  Constitutional: Negative for fever, chills, weight loss and malaise/fatigue. HENT: Negative. Eyes: Negative. Respiratory: Negative. Cardiovascular: Negative. Gastrointestinal: No bowel incontinence or constipation. Genitourinary: No bladder incontinence or saddle anesthesia. Skin: Negative. Neurological: Negative. Endo/Heme/Allergies: Negative. Psychiatric/Behavioral: Negative. Musculoskeletal: As per HPI above. Past Medical History:   Diagnosis Date    Arthritis     CAD (coronary artery disease)     Calcific Achilles tendonitis     Right; insertional    Essential hypertension 7/13/2020    Hypercholesterolemia     Hypertension     Left knee pain     Pain of right heel     Sprain of right ring finger     Thyroid disease     Wears glasses          Current Outpatient Medications:     potassium chloride (KAON 10%) 20 mEq/15 mL solution, Take  by mouth daily. 15 ml daily, Disp: , Rfl:     escitalopram oxalate (Lexapro) 20 mg tablet, Take 20 mg by mouth daily. , Disp: , Rfl:     hydrOXYzine HCL (ATARAX) 25 mg tablet, Take  by mouth as needed. , Disp: , Rfl:     carvediloL (COREG) 6.25 mg tablet, Take 1 Tab by mouth every twelve (12) hours. , Disp: 180 Tab, Rfl: 3    levothyroxine (SYNTHROID) 25 mcg tablet, Take 75 mcg by mouth Daily (before breakfast). , Disp: , Rfl:     amLODIPine (NORVASC) 2.5 mg tablet, Take 1 Tab by mouth daily. , Disp: 30 Tab, Rfl: 2    aspirin delayed-release 81 mg tablet, Take 1 Tab by mouth daily. , Disp: 30 Tab, Rfl: 11    clopidogreL (PLAVIX) 75 mg tab, Take 1 Tab by mouth daily. , Disp: 30 Tab, Rfl: 11    atorvastatin (LIPITOR) 80 mg tablet, Take 1 Tab by mouth daily. , Disp: 30 Tab, Rfl: 2    cholecalciferol (VITAMIN D3) (50,000 UNITS /1250 MCG) capsule, Take  by mouth every seven (7) days. , Disp: , Rfl:     No Known Allergies    Social History     Socioeconomic History    Marital status:      Spouse name: Not on file    Number of children: Not on file    Years of education: Not on file    Highest education level: Not on file   Occupational History    Not on file   Social Needs    Financial resource strain: Not on file    Food insecurity     Worry: Not on file     Inability: Not on file    Transportation needs     Medical: Not on file     Non-medical: Not on file   Tobacco Use    Smoking status: Former Smoker     Years: 15.00    Smokeless tobacco: Never Used   Substance and Sexual Activity    Alcohol use: Yes     Frequency: Monthly or less     Comment: Occasional    Drug use: No    Sexual activity: Not on file   Lifestyle    Physical activity     Days per week: Not on file     Minutes per session: Not on file    Stress: Not on file   Relationships    Social connections     Talks on phone: Not on file     Gets together: Not on file     Attends Rastafari service: Not on file     Active member of club or organization: Not on file     Attends meetings of clubs or organizations: Not on file     Relationship status: Not on file    Intimate partner violence     Fear of current or ex partner: Not on file     Emotionally abused: Not on file     Physically abused: Not on file     Forced sexual activity: Not on file   Other Topics Concern    Not on file   Social History Narrative    Not on file       Past Surgical History:   Procedure Laterality Date    CARDIAC SURG PROCEDURE UNLIST      stent july 2020    HX CHOLECYSTECTOMY  1990    HX HEENT      Eye surgery    HX HERNIA REPAIR      HX ORTHOPAEDIC Left 07/2020    left total knee arthroplasty Patient seen evaluate today for her left knee. She is now about 4 months status post left total knee replacement surgery. Unfortunately she had an MI postoperatively. She did get the physical therapy at that she required. 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 She is progressed well from this. She does continue to work on range activities at home as well as in cardiac rehab. She denies any chest pain or shortness of breath. No fevers or chills. Patient denies recent fevers, chills, chest pain, SOB, or injuries. No recent systemic changes noted. A 12-point review of systems is performed today. Pertinent positives are noted. All other systems reviewed and otherwise are negative. Physical exam: General: Alert and oriented x3, nad.  well-developed, well nourished. normal affect, AF. NC/AT, EOMI, neck supple, trachea midline, no JVD present. Breathing is non-labored. Examination lower extremities reveals pain-free range of motion hips. There is no pain to palpation the trochanteric bursa. Neck straight leg raise. Negative calf tenderness. Negative Homans. No signs of DVT present. The left knee reveal skin intact. The surgical wound healed nicely. There is no erythema, ecchymosis, warmth. There are no signs of infection. Range of motion is -4-115 degrees. Patella tracks nicely without rubs or crepitus noted. Assessment: Status post left total replacement, status post left knee manipulation under anesthesia, arthrofibrosis    Plan: At this point, the patient will continue with her rehab program as well as home exercise program.  We will see her back in the office in about 3 months time for evaluation. She will call with any questions or concerns that shall arise.         JR Rishabh ELIZABETH, PANelC, ATC

## 2020-10-23 ENCOUNTER — HOSPITAL ENCOUNTER (OUTPATIENT)
Dept: CARDIAC REHAB | Age: 65
Discharge: HOME | End: 2020-10-23
Payer: COMMERCIAL

## 2020-10-23 VITALS — BODY MASS INDEX: 29.43 KG/M2 | WEIGHT: 199.3 LBS

## 2020-10-23 PROCEDURE — 93798 PHYS/QHP OP CAR RHAB W/ECG: CPT

## 2020-10-26 ENCOUNTER — HOSPITAL ENCOUNTER (OUTPATIENT)
Dept: CARDIAC REHAB | Age: 65
Discharge: HOME | End: 2020-10-26
Payer: COMMERCIAL

## 2020-10-26 VITALS — BODY MASS INDEX: 29.2 KG/M2 | WEIGHT: 197.7 LBS

## 2020-10-26 PROCEDURE — 93798 PHYS/QHP OP CAR RHAB W/ECG: CPT

## 2020-10-28 ENCOUNTER — HOSPITAL ENCOUNTER (OUTPATIENT)
Dept: CARDIAC REHAB | Age: 65
Discharge: HOME | End: 2020-10-28
Payer: COMMERCIAL

## 2020-10-28 VITALS — WEIGHT: 199.3 LBS | BODY MASS INDEX: 29.43 KG/M2

## 2020-10-28 PROCEDURE — 93798 PHYS/QHP OP CAR RHAB W/ECG: CPT

## 2020-10-28 PROCEDURE — 90834 PSYTX W PT 45 MINUTES: CPT

## 2020-10-28 NOTE — PROGRESS NOTES
COMPLETE CARDIAC REHAB DISCHARGE NOTE    Nicholas Eden  Completed phase II cardiac rehab and attended 36 sessions from 8/4/2020-10/28/2020. Nicholas Eden is interested in maintaining optimal health and will work with Dr. Kwame Mars has improved her endurance and stamina through regular exercise, lost 9 lbs. Blood pressure is 135/78 and is WNL. She has also improved her nutrition, [employer] and depression scores and these were reviewed with patient. Nicholas Eden plans to continue exercising at home, at a gym and has set revised goals that include cardio equipment, light weights and walking 3 to 5 times a week for at least 30 minutes. Patient has reached her goal of increasing her endurance. Patient improved her 6 minute walk test speed from . 9 mph to 3.1 mph. John March RN  10/28/2020      Cardiac ITP      CR PHASE II from 10/28/2020 in [place] [address]    Treatment Diagnosis 1  STEMI    STEMI Date  07/14/20    Referral Date  07/29/20    Significant Cardiovascular History  --  [HTN, hypercholesterolemia]    Co-morbidities  --  [thyroid disease]    ITP Visit Type  Discharge, completed program    1st Date of Exercise   --  [To be scheduled]    ITP Next Review Date  11/01/20    Visit #/Total Visits  36/36    EF %  55 %    Risk Stratification  Moderate    ITP  Exercise, Psychosocial, Tobacco, Nutrition, Education    Stages of Change  Maintenance    DASI Total Score  53.7    Assisted Devices  None  [S/P Left knee replacement]    Test  Six minute walk test    Mode  Treadmill, Bike, Stepper, Ergometer, Elliptical    Frequency per week  2--3    Duration per session  35--50    Intensity  METS        1.7--4.3    RPE  11--13    Progression  As tolerated    Symptoms with Exercise  --  [Left knee tightness]    Target Heart Rate  93--109    Resistance Training  Yes    Resting BP  140/79    Peak BP  156/80    Is BP WDL?   No    Type  walking    Frequency  daily    Duration  varies    Resistance Training No    Education  Self pulse, Exercise safety, Signs/Symptoms to report, RPE scale, Equipment orientation, Warm up/Cool down, Physically active  [Good for your heart book given]    Target Goal(s)  Individual exercise RX    Patient Stated Exercise Goals  Build endurance    Stages of Change  Action    [facility] Total Score  22    PHQ 9 Score  2    Interventions  No intervention indicated    Currently Taking Psychotropic Meds  No    Education  Impact self care behaviors on health    Target Goal(s)  Engages in self-care behaviors    Stages of Change  Action    Diabetes  No    Date Lipids Drawn  07/14/20    Total  239    HDL  56    LDL  163.6    Triglycerides  97    Lipid Med(s)  Lipitor 80    Weight   90.4 kg (199 lb 4.8 oz)    Height   5' 9\" (1.753 m)    BMI  29.49    Weight Goal  180lbs    Waist Circumference   41 inches    Alcohol  Special    Rate Your Plate Total Score  64    Dietitian Consult  Yes  [Educational videos discussed]    Nurse/Patient Discussion  Yes    Nutrition Class  Yes    Diabetes Education Referral  No    Lipid Clinic Referral  No    Weight Management Referral  No    Target Goals  BMI less than 25    Learning Barrier  Ready to learn    Education Schedule Given  Yes  [educational videos discussed]    Education  CAD, Risk factors, Med Compliance    Hypertension  Yes    Hypertension Controlled  Yes    Is BP WDL?   Yes    BP Meds  Norvasc    Target Goals  Medication compliance    Exercise      CR PHASE II from 10/28/2020 in [place] [facility]    Any problems changes since your last visit  Denies    Any symptoms while exercising  denies    Psychosocial/Stress Level  0    Resting EKG rhythm  SR w/rare PVCs    Tobacco Use  None    ITP Next Review Date  11/01/20    Visit Number/Total Visits  36/36    On Call Medical Director Immediately Available  [place]    Target Heart Rate(Range)      Resting HR  63    Resting BP  140/79    Recovery HR  65    Recovery BP  135/78    Weight  90.4 kg (199 lb 4.8 oz) Exercise EKG Rhythm  SR/ST    Exercise Duration  55    Peak HR  118    Peak RPE  15    Peak Mets  4.3    Asymptomatic  yes    Total Minutes  60

## 2020-10-28 NOTE — BH NOTES
Patient's Name:  Khadar Allen                  Date:  10/28/2020    Khadar Allen was informed of the inherent limitations of a virtual visit,  and has consented to a virtual therapy visit on 10/28/2020. Information regarding emergency contact information for this patient during this visit is to contact: spouse Gokul Montalvo [phone] in addition to calling 911. The patient was informed that at any time during the virtual visit, they can decide to stop the virtual visit. The patient verified that they are physically located in the [facility] for this virtual visit. Behavior          Patient reports she graduated the Cardiopulmonary Rehab Program today   Intervention               Processed feeling and content over achievement of completing program and steps taken to continue a healthy lifestyle. Problem solving regarding continued healthy lifestyle. Response              Patient reports she stopped at the [employer] after completing the CPR program and obtained information on the [employer] and another program they have there. She also met a woman who is a member there and spoke highly of the [employer] Patient will call her insurance and determine if she would have coverage for the [employer]. A friend who had a previous heart attack is considering going to the [employer] also. Patient continues to enjoy exploring healthy recipes and her spouse is taking part in healthy eating as well. Patient has some beginning ideas of other interests to pursue now that she has decided to retire. Plan               Patient scheduled for further support     Khadar Allen is a 72 y.o. female being evaluated by a Virtual Visit (video visit) encounter to address concerns as mentioned above. A caregiver was present when appropriate. Due to this being a TeleHealth encounter (During WRZ-34 public health emergency), evaluation of the following areas was limited: face-to-face consultation.  Pursuant to the emergency declaration under the [facility], [address] [place] [address] authority and the [employer] and [employer] Act, this Virtual Visit was conducted with patient's (and/or legal guardian's) consent, to reduce the risk of exposure to COVID-19 and provide necessary medical care. Services were provided through a video synchronous discussion virtually to substitute for in-person encounter. --Ric Mclean LCSW on 10/28/2020 at 3:57 PM    An electronic signature was used to authenticate this note.      Ric Mclean LCSW                                       10/28/2020                             3:57 PM

## 2020-10-30 ENCOUNTER — APPOINTMENT (OUTPATIENT)
Dept: CARDIAC REHAB | Age: 65
End: 2020-10-30
Payer: COMMERCIAL

## 2020-11-02 ENCOUNTER — APPOINTMENT (OUTPATIENT)
Dept: CARDIAC REHAB | Age: 65
End: 2020-11-02

## 2020-11-11 ENCOUNTER — HOSPITAL ENCOUNTER (OUTPATIENT)
Dept: CARDIAC REHAB | Age: 65
Discharge: HOME | End: 2020-11-11

## 2020-11-11 PROCEDURE — 90834 PSYTX W PT 45 MINUTES: CPT

## 2020-11-11 NOTE — BH NOTES
Patient's Name:  Anna Paul                  Date:  11/11/2020    Anna Paul was informed of the inherent limitations of a virtual visit,  and has consented to a virtual therapy visit on 11/11/2020. Information regarding emergency contact information for this patient during this visit is to contact: Nathaly Arias () [phone] in addition to calling 911. The patient was informed that at any time during the virtual visit, they can decide to stop the virtual visit. The patient verified that they are physically located in the [facility] for this virtual visit. Behavior         Patient reports she turned in her papers to retire and understands her benefits. The  [employer] program is not covered under her new insurance so she joined [employer] where she will exercise till her housing complex exercise area is rennovated   Intervention              Processed implementation of plan to continue exercising, continue to eat healthy, and beginning adjustment to USP. Problem solving regarding health maintenance and adjustment to USP. Explored unhelpful cognitions. Processed feeling over long time friend is not disciplined enough to manage her health well. Response               Patient reports she is walking in the building where she lives on rainy days and walking outside on nice days. She has an exercise plan she will begin tomorrow at the gym. Patient is excited about further exploration of new healthy recipes and bought some cookware including an air fryer. Patient resisted supervisor's attempts to talk her out of retiring as he needs reliable workers like her. She would like to do some writing but is still thinking on her approach to writing. Plan               Patient would like to reschedule for further support in adjustment to USP.    Her insurance has changed to Medicare, however, so I will notify the office to contact her to sort out if she has coverage, any authorization required. Ximena Royal is a 72 y.o. female being evaluated by a Virtual Visit (video visit) encounter to address concerns as mentioned above. A caregiver was present when appropriate. Due to this being a TeleHealth encounter (During [facility]- public health emergency), evaluation of the following areas was limited: face-to-face consultation. Pursuant to the emergency declaration under the [address] [place] [address] and the [employer] and [employer] Act, this Virtual Visit was conducted with patient's (and/or legal guardian's) consent, to reduce the risk of exposure to COVID-19 and provide necessary medical care. Services were provided through a video synchronous discussion virtually to substitute for in-person encounter. --Viky Lion LCSW on 11/11/2020 at 3:53 PM    An electronic signature was used to authenticate this note.      Viky Lion LCSW                                       11/11/2020                             3:53 PM

## 2020-11-18 ENCOUNTER — APPOINTMENT (OUTPATIENT)
Dept: CARDIAC REHAB | Age: 65
End: 2020-11-18

## 2020-11-23 ENCOUNTER — TRANSCRIBE ORDER (OUTPATIENT)
Dept: SCHEDULING | Age: 65
End: 2020-11-23

## 2020-11-23 DIAGNOSIS — Z12.31 ENCOUNTER FOR SCREENING MAMMOGRAM FOR BREAST CANCER: Primary | ICD-10-CM

## 2020-12-04 ENCOUNTER — HOSPITAL ENCOUNTER (OUTPATIENT)
Dept: MAMMOGRAPHY | Age: 65
Discharge: HOME | End: 2020-12-04
Attending: NURSE PRACTITIONER

## 2020-12-04 DIAGNOSIS — Z12.31 ENCOUNTER FOR SCREENING MAMMOGRAM FOR BREAST CANCER: ICD-10-CM

## 2020-12-04 DIAGNOSIS — Z12.31 VISIT FOR SCREENING MAMMOGRAM: ICD-10-CM

## 2020-12-08 ENCOUNTER — HOSPITAL ENCOUNTER (OUTPATIENT)
Dept: MAMMOGRAPHY | Age: 65
Discharge: HOME | End: 2020-12-08
Attending: NURSE PRACTITIONER
Payer: MEDICARE

## 2020-12-08 ENCOUNTER — HOSPITAL ENCOUNTER (OUTPATIENT)
Dept: ULTRASOUND IMAGING | Age: 65
Discharge: HOME | End: 2020-12-08
Attending: NURSE PRACTITIONER
Payer: MEDICARE

## 2020-12-08 DIAGNOSIS — N63.20 LEFT BREAST LUMP: ICD-10-CM

## 2020-12-08 DIAGNOSIS — N63.22 UNSPECIFIED LUMP IN THE LEFT BREAST, UPPER INNER QUADRANT: ICD-10-CM

## 2020-12-08 PROCEDURE — 77062 BREAST TOMOSYNTHESIS BI: CPT

## 2020-12-08 PROCEDURE — 76642 ULTRASOUND BREAST LIMITED: CPT

## 2020-12-16 ENCOUNTER — HOSPITAL ENCOUNTER (OUTPATIENT)
Dept: CARDIAC REHAB | Age: 65
Discharge: HOME | End: 2020-12-16
Payer: MEDICARE

## 2020-12-16 PROCEDURE — 90834 PSYTX W PT 45 MINUTES: CPT

## 2020-12-16 NOTE — BH NOTES
Patient's Name:  Avery Huynh                  Date:  12/16/2020    Avery Huynh was informed of the inherent limitations of a virtual visit,  and has consented to a virtual therapy visit on 12/16/2020. Information regarding emergency contact information for this patient during this visit is to contact:  Jacinto Zimmerman [phone] in addition to calling 911. The patient was informed that at any time during the virtual visit, they can decide to stop the virtual visit. The patient verified that they are physically located in the [facility] for this virtual visit. Behavior         Patient reports she had to have a repeat mammogram due to a concern on an initial one. Her spouse continues to not understand her anxiety and say unhelpful things. Intervention               Processed feeling and content around fear of possible cancer and relief at finding out on second mammogram that she is free of cancer. Problem solving regarding managing interactions with spouse when she is anxious. Response               Role played some communication strategies in management of interactions with spouse when she is experiencing anxiety. Patient continues to explore cooking healthy recipes and continues to exercise. Plan               Patient rescheduled for further support. Avery Huynh is a 72 y.o. female being evaluated by a Virtual Visit (video visit) encounter to address concerns as mentioned above. A caregiver was present when appropriate. Due to this being a TeleHealth encounter (During CXIVV-43 public health emergency), evaluation of the following areas was limited: face-to-face consultation.  Pursuant to the emergency declaration under the [facility], [address] waiver authority and the [employer] and [employer] Act, this Virtual Visit was conducted with patient's (and/or legal guardian's) consent, to reduce the risk of exposure to COVID-19 and provide necessary medical care. Services were provided through a video synchronous discussion virtually to substitute for in-person encounter. --Sammie Auguste LCSW on 12/16/2020 at 5:55 PM    An electronic signature was used to authenticate this note.      Sammie Auguste LCSW                                       12/16/2020                             5:55 PM

## 2020-12-29 ENCOUNTER — TRANSCRIBE ORDER (OUTPATIENT)
Dept: REGISTRATION | Age: 65
End: 2020-12-29

## 2020-12-29 ENCOUNTER — HOSPITAL ENCOUNTER (OUTPATIENT)
Dept: LAB | Age: 65
Discharge: HOME | End: 2020-12-29
Payer: MEDICARE

## 2020-12-29 DIAGNOSIS — E03.9 MYXEDEMA HEART DISEASE: ICD-10-CM

## 2020-12-29 DIAGNOSIS — I51.9 MYXEDEMA HEART DISEASE: ICD-10-CM

## 2020-12-29 DIAGNOSIS — E78.00 PURE HYPERCHOLESTEROLEMIA: ICD-10-CM

## 2020-12-29 DIAGNOSIS — I10 ESSENTIAL HYPERTENSION, MALIGNANT: ICD-10-CM

## 2020-12-29 DIAGNOSIS — I10 ESSENTIAL HYPERTENSION, MALIGNANT: Primary | ICD-10-CM

## 2020-12-29 LAB
ALBUMIN SERPL-MCNC: 3.5 G/DL (ref 3.4–5)
ALBUMIN/GLOB SERPL: 1.1 {RATIO} (ref 0.8–1.7)
ALP SERPL-CCNC: 116 U/L (ref 45–117)
ALT SERPL-CCNC: 16 U/L (ref 13–56)
ANION GAP SERPL CALC-SCNC: 5 MMOL/L (ref 3–18)
AST SERPL-CCNC: 13 U/L (ref 10–38)
BASOPHILS # BLD: 0 K/UL (ref 0–0.1)
BASOPHILS NFR BLD: 1 % (ref 0–2)
BILIRUB SERPL-MCNC: 0.6 MG/DL (ref 0.2–1)
BUN SERPL-MCNC: 17 MG/DL (ref 7–18)
BUN/CREAT SERPL: 27 (ref 12–20)
CALCIUM SERPL-MCNC: 9 MG/DL (ref 8.5–10.1)
CHLORIDE SERPL-SCNC: 107 MMOL/L (ref 100–111)
CHOLEST SERPL-MCNC: 181 MG/DL
CO2 SERPL-SCNC: 30 MMOL/L (ref 21–32)
CREAT SERPL-MCNC: 0.62 MG/DL (ref 0.6–1.3)
DIFFERENTIAL METHOD BLD: ABNORMAL
EOSINOPHIL # BLD: 0.2 K/UL (ref 0–0.4)
EOSINOPHIL NFR BLD: 4 % (ref 0–5)
ERYTHROCYTE [DISTWIDTH] IN BLOOD BY AUTOMATED COUNT: 16.1 % (ref 11.6–14.5)
GLOBULIN SER CALC-MCNC: 3.2 G/DL (ref 2–4)
GLUCOSE SERPL-MCNC: 75 MG/DL (ref 74–99)
HCT VFR BLD AUTO: 36.9 % (ref 35–45)
HDLC SERPL-MCNC: 50 MG/DL (ref 40–60)
HDLC SERPL: 3.6 {RATIO} (ref 0–5)
HGB BLD-MCNC: 12 G/DL (ref 12–16)
LDLC SERPL CALC-MCNC: 114 MG/DL (ref 0–100)
LIPID PROFILE,FLP: ABNORMAL
LYMPHOCYTES # BLD: 2.4 K/UL (ref 0.9–3.6)
LYMPHOCYTES NFR BLD: 46 % (ref 21–52)
MCH RBC QN AUTO: 27 PG (ref 24–34)
MCHC RBC AUTO-ENTMCNC: 32.5 G/DL (ref 31–37)
MCV RBC AUTO: 82.9 FL (ref 74–97)
MONOCYTES # BLD: 0.4 K/UL (ref 0.05–1.2)
MONOCYTES NFR BLD: 8 % (ref 3–10)
NEUTS SEG # BLD: 2.1 K/UL (ref 1.8–8)
NEUTS SEG NFR BLD: 41 % (ref 40–73)
PLATELET # BLD AUTO: 309 K/UL (ref 135–420)
PMV BLD AUTO: 10.2 FL (ref 9.2–11.8)
POTASSIUM SERPL-SCNC: 4.3 MMOL/L (ref 3.5–5.5)
PROT SERPL-MCNC: 6.7 G/DL (ref 6.4–8.2)
RBC # BLD AUTO: 4.45 M/UL (ref 4.2–5.3)
SODIUM SERPL-SCNC: 142 MMOL/L (ref 136–145)
TRIGL SERPL-MCNC: 85 MG/DL (ref ?–150)
TSH SERPL DL<=0.05 MIU/L-ACNC: 3.88 UIU/ML (ref 0.36–3.74)
VLDLC SERPL CALC-MCNC: 17 MG/DL
WBC # BLD AUTO: 5.2 K/UL (ref 4.6–13.2)

## 2020-12-29 PROCEDURE — 80061 LIPID PANEL: CPT

## 2020-12-29 PROCEDURE — 36415 COLL VENOUS BLD VENIPUNCTURE: CPT

## 2020-12-29 PROCEDURE — 85025 COMPLETE CBC W/AUTO DIFF WBC: CPT

## 2020-12-29 PROCEDURE — 84443 ASSAY THYROID STIM HORMONE: CPT

## 2020-12-29 PROCEDURE — 80053 COMPREHEN METABOLIC PANEL: CPT

## 2020-12-30 ENCOUNTER — APPOINTMENT (OUTPATIENT)
Dept: CARDIAC REHAB | Age: 65
End: 2020-12-30
Payer: MEDICARE

## 2021-01-15 ENCOUNTER — HOSPITAL ENCOUNTER (EMERGENCY)
Age: 66
Discharge: HOME | End: 2021-01-16
Attending: STUDENT IN AN ORGANIZED HEALTH CARE EDUCATION/TRAINING PROGRAM
Payer: MEDICARE

## 2021-01-15 ENCOUNTER — APPOINTMENT (OUTPATIENT)
Dept: GENERAL RADIOLOGY | Age: 66
End: 2021-01-15
Attending: PHYSICIAN ASSISTANT
Payer: MEDICARE

## 2021-01-15 DIAGNOSIS — R07.89 CHEST TIGHTNESS: Primary | ICD-10-CM

## 2021-01-15 DIAGNOSIS — F41.1 ANXIETY STATE: ICD-10-CM

## 2021-01-15 DIAGNOSIS — R42 LIGHTHEADEDNESS: ICD-10-CM

## 2021-01-15 LAB
ALBUMIN SERPL-MCNC: 3.7 G/DL (ref 3.4–5)
ALBUMIN/GLOB SERPL: 0.9 {RATIO} (ref 0.8–1.7)
ALP SERPL-CCNC: 133 U/L (ref 45–117)
ALT SERPL-CCNC: 20 U/L (ref 13–56)
ANION GAP SERPL CALC-SCNC: 6 MMOL/L (ref 3–18)
AST SERPL-CCNC: 15 U/L (ref 10–38)
BASOPHILS # BLD: 0 K/UL (ref 0–0.1)
BASOPHILS NFR BLD: 1 % (ref 0–2)
BILIRUB SERPL-MCNC: 0.5 MG/DL (ref 0.2–1)
BNP SERPL-MCNC: 98 PG/ML (ref 0–900)
BUN SERPL-MCNC: 18 MG/DL (ref 7–18)
BUN/CREAT SERPL: 19 (ref 12–20)
CALCIUM SERPL-MCNC: 8.9 MG/DL (ref 8.5–10.1)
CHLORIDE SERPL-SCNC: 106 MMOL/L (ref 100–111)
CK MB CFR SERPL CALC: 1.3 % (ref 0–4)
CK MB CFR SERPL CALC: NORMAL % (ref 0–4)
CK MB SERPL-MCNC: 1 NG/ML (ref 5–25)
CK MB SERPL-MCNC: <1 NG/ML (ref 5–25)
CK SERPL-CCNC: 76 U/L (ref 26–192)
CK SERPL-CCNC: 76 U/L (ref 26–192)
CO2 SERPL-SCNC: 28 MMOL/L (ref 21–32)
CREAT SERPL-MCNC: 0.97 MG/DL (ref 0.6–1.3)
DIFFERENTIAL METHOD BLD: ABNORMAL
EOSINOPHIL # BLD: 0.2 K/UL (ref 0–0.4)
EOSINOPHIL NFR BLD: 4 % (ref 0–5)
ERYTHROCYTE [DISTWIDTH] IN BLOOD BY AUTOMATED COUNT: 15.6 % (ref 11.6–14.5)
GLOBULIN SER CALC-MCNC: 3.9 G/DL (ref 2–4)
GLUCOSE SERPL-MCNC: 114 MG/DL (ref 74–99)
HCT VFR BLD AUTO: 38.4 % (ref 35–45)
HGB BLD-MCNC: 12.5 G/DL (ref 12–16)
LYMPHOCYTES # BLD: 2.6 K/UL (ref 0.9–3.6)
LYMPHOCYTES NFR BLD: 46 % (ref 21–52)
MCH RBC QN AUTO: 27 PG (ref 24–34)
MCHC RBC AUTO-ENTMCNC: 32.6 G/DL (ref 31–37)
MCV RBC AUTO: 82.9 FL (ref 74–97)
MONOCYTES # BLD: 0.5 K/UL (ref 0.05–1.2)
MONOCYTES NFR BLD: 8 % (ref 3–10)
NEUTS SEG # BLD: 2.3 K/UL (ref 1.8–8)
NEUTS SEG NFR BLD: 41 % (ref 40–73)
PLATELET # BLD AUTO: 330 K/UL (ref 135–420)
PMV BLD AUTO: 10.2 FL (ref 9.2–11.8)
POTASSIUM SERPL-SCNC: 3.6 MMOL/L (ref 3.5–5.5)
PROT SERPL-MCNC: 7.6 G/DL (ref 6.4–8.2)
RBC # BLD AUTO: 4.63 M/UL (ref 4.2–5.3)
SODIUM SERPL-SCNC: 140 MMOL/L (ref 136–145)
TROPONIN I SERPL-MCNC: <0.02 NG/ML (ref 0–0.04)
TROPONIN I SERPL-MCNC: <0.02 NG/ML (ref 0–0.04)
WBC # BLD AUTO: 5.6 K/UL (ref 4.6–13.2)

## 2021-01-15 PROCEDURE — 85025 COMPLETE CBC W/AUTO DIFF WBC: CPT

## 2021-01-15 PROCEDURE — 99282 EMERGENCY DEPT VISIT SF MDM: CPT

## 2021-01-15 PROCEDURE — 71045 X-RAY EXAM CHEST 1 VIEW: CPT

## 2021-01-15 PROCEDURE — 82553 CREATINE MB FRACTION: CPT

## 2021-01-15 PROCEDURE — 80053 COMPREHEN METABOLIC PANEL: CPT

## 2021-01-15 PROCEDURE — 83880 ASSAY OF NATRIURETIC PEPTIDE: CPT

## 2021-01-15 PROCEDURE — 93005 ELECTROCARDIOGRAM TRACING: CPT

## 2021-01-16 VITALS
HEART RATE: 61 BPM | OXYGEN SATURATION: 98 % | RESPIRATION RATE: 18 BRPM | DIASTOLIC BLOOD PRESSURE: 83 MMHG | TEMPERATURE: 98.1 F | SYSTOLIC BLOOD PRESSURE: 155 MMHG

## 2021-01-16 RX ORDER — ALPRAZOLAM 0.5 MG/1
0.5 TABLET ORAL
Qty: 10 TAB | Refills: 0 | OUTPATIENT
Start: 2021-01-16 | End: 2021-06-06

## 2021-01-16 NOTE — ED PROVIDER NOTES
EMERGENCY DEPARTMENT HISTORY AND PHYSICAL EXAM    Date: 1/15/2021  Patient Name: Francis Natarajan    History of Presenting Illness     No chief complaint on file. History Provided By: Patient    Chief Complaint: chest tightness   Duration: 1 day  Timing:  Intermittent  Location: anterior chest  Quality: Tightness  Severity: Moderate  Modifying Factors: better with rest and meditation   Associated Symptoms: none      Additional History (Context): Francis Natarajan is a 72 y.o. female with PMH of MI - 7/2020, CAD, HTN, hyperlipidemia, extensive hx of panic attacks who presents with intermittent chest tightness since this morning. Reports chest tightness upon waking up, found moderate relief with rest and meditation. Second episode happened after running errands in the afternoon, made better with rest and meditation. She took a two hr nap and woke up with chest tightness. Chest tightness located across anterior chest, does not radiate and moderate in severity. Pt states chest tightness are similar to her panic attacks but \"my panic attacks usually don't come and go\", which makes her concern it is cardiac etiology instead of panic attack. She takes her medication as prescribed and only takes Lexapro or Xanax PRN. Pt has close f/u with cardiologist, Dr. Shalini Garcia, next appt 2/17/2020. Denies SOB, CHAN, palpitation, dizziness, fainting, n/v. No other complaints reported at this time. PCP: Tracie Schmidt NP    Current Outpatient Medications   Medication Sig Dispense Refill    ALPRAZolam (Xanax) 0.5 mg tablet Take 1 Tab by mouth every eight (8) hours as needed for Anxiety. Max Daily Amount: 1.5 mg. 10 Tab 0    potassium chloride (KAON 10%) 20 mEq/15 mL solution Take  by mouth daily. 15 ml daily      escitalopram oxalate (Lexapro) 20 mg tablet Take 20 mg by mouth daily.  hydrOXYzine HCL (ATARAX) 25 mg tablet Take  by mouth as needed.       carvediloL (COREG) 6.25 mg tablet Take 1 Tab by mouth every twelve (12) hours. 180 Tab 3    levothyroxine (SYNTHROID) 25 mcg tablet Take 75 mcg by mouth Daily (before breakfast).  amLODIPine (NORVASC) 2.5 mg tablet Take 1 Tab by mouth daily. 30 Tab 2    aspirin delayed-release 81 mg tablet Take 1 Tab by mouth daily. 30 Tab 11    clopidogreL (PLAVIX) 75 mg tab Take 1 Tab by mouth daily. 30 Tab 11    atorvastatin (LIPITOR) 80 mg tablet Take 1 Tab by mouth daily. 30 Tab 2    cholecalciferol (VITAMIN D3) (50,000 UNITS /1250 MCG) capsule Take  by mouth every seven (7) days. Past History     Past Medical History:  Past Medical History:   Diagnosis Date    Arthritis     CAD (coronary artery disease)     Calcific Achilles tendonitis     Right; insertional    Essential hypertension 7/13/2020    Hypercholesterolemia     Hypertension     Left knee pain     Menopause     Pain of right heel     Sprain of right ring finger     Thyroid disease     Wears glasses        Past Surgical History:  Past Surgical History:   Procedure Laterality Date    HX CHOLECYSTECTOMY  1990    HX HEENT      Eye surgery    HX HERNIA REPAIR      HX ORTHOPAEDIC Left 07/2020    left total knee arthroplasty    SD CARDIAC SURG PROCEDURE UNLIST      stent july 2020       Family History:  Family History   Problem Relation Age of Onset    Breast Cancer Other     Arthritis-osteo Mother     Hypertension Mother     Heart Disease Mother     Hypertension Other         Grandmother    Other Other         Heart problems: Mother, grandmother    Diabetes Other         Grandmother       Social History:  Social History     Tobacco Use    Smoking status: Former Smoker     Years: 15.00    Smokeless tobacco: Never Used   Substance Use Topics    Alcohol use: Yes     Frequency: Monthly or less     Comment: Occasional    Drug use: No       Allergies:  No Known Allergies      Review of Systems   Review of Systems   Constitutional: Negative for chills, diaphoresis and fever.    HENT: Negative for hearing loss. Eyes: Negative for visual disturbance. Respiratory: Positive for chest tightness. Negative for shortness of breath and wheezing. Cardiovascular: Negative for chest pain and palpitations. Gastrointestinal: Negative for nausea and vomiting. Genitourinary: Negative. Musculoskeletal: Negative. Neurological: Negative for dizziness, syncope, light-headedness, numbness and headaches. Psychiatric/Behavioral: The patient is nervous/anxious. All other systems reviewed and are negative. All Other Systems Negative  Physical Exam     Vitals:    01/15/21 2002   BP: (!) 155/83   Pulse: 61   Resp: 18   Temp: 98.1 °F (36.7 °C)   SpO2: 98%     Physical Exam  Vitals signs and nursing note reviewed. Constitutional:       General: She is not in acute distress. Appearance: Normal appearance. She is not ill-appearing, toxic-appearing or diaphoretic. HENT:      Head: Atraumatic. Eyes:      Extraocular Movements: Extraocular movements intact. Neck:      Musculoskeletal: Normal range of motion. Cardiovascular:      Rate and Rhythm: Normal rate and regular rhythm. Pulses: Normal pulses. Pulmonary:      Effort: Pulmonary effort is normal. No respiratory distress. Breath sounds: Normal breath sounds. Chest:      Chest wall: No tenderness. Abdominal:      General: Bowel sounds are normal.      Palpations: Abdomen is soft. Musculoskeletal: Normal range of motion. Skin:     General: Skin is warm and dry. Capillary Refill: Capillary refill takes less than 2 seconds. Neurological:      General: No focal deficit present. Mental Status: She is alert and oriented to person, place, and time. Mental status is at baseline. Coordination: Coordination normal.      Comments: Gait is steady and patient exhibits no evidence of ataxia. Patient is able to ambulate without difficulty. No focal neurological deficit noted.  No facial droop, slurred speech, or evidence of altered mentation noted on exam.     Psychiatric:         Mood and Affect: Mood normal.         Behavior: Behavior normal.         Thought Content: Thought content normal.         Judgment: Judgment normal.                Diagnostic Study Results     Labs -     Recent Results (from the past 12 hour(s))   EKG, 12 LEAD, INITIAL    Collection Time: 01/15/21  8:11 PM   Result Value Ref Range    Ventricular Rate 61 BPM    Atrial Rate 61 BPM    P-R Interval 192 ms    QRS Duration 80 ms    Q-T Interval 398 ms    QTC Calculation (Bezet) 400 ms    Calculated P Axis 53 degrees    Calculated R Axis 4 degrees    Calculated T Axis -53 degrees    Diagnosis       Normal sinus rhythm  T wave abnormality, consider inferior ischemia  Abnormal ECG  When compared with ECG of 10-OCT-2020 23:46,  No significant change was found     CBC WITH AUTOMATED DIFF    Collection Time: 01/15/21  8:19 PM   Result Value Ref Range    WBC 5.6 4.6 - 13.2 K/uL    RBC 4.63 4.20 - 5.30 M/uL    HGB 12.5 12.0 - 16.0 g/dL    HCT 38.4 35.0 - 45.0 %    MCV 82.9 74.0 - 97.0 FL    MCH 27.0 24.0 - 34.0 PG    MCHC 32.6 31.0 - 37.0 g/dL    RDW 15.6 (H) 11.6 - 14.5 %    PLATELET 389 144 - 382 K/uL    MPV 10.2 9.2 - 11.8 FL    NEUTROPHILS 41 40 - 73 %    LYMPHOCYTES 46 21 - 52 %    MONOCYTES 8 3 - 10 %    EOSINOPHILS 4 0 - 5 %    BASOPHILS 1 0 - 2 %    ABS. NEUTROPHILS 2.3 1.8 - 8.0 K/UL    ABS. LYMPHOCYTES 2.6 0.9 - 3.6 K/UL    ABS. MONOCYTES 0.5 0.05 - 1.2 K/UL    ABS. EOSINOPHILS 0.2 0.0 - 0.4 K/UL    ABS.  BASOPHILS 0.0 0.0 - 0.1 K/UL    DF AUTOMATED     METABOLIC PANEL, COMPREHENSIVE    Collection Time: 01/15/21  8:19 PM   Result Value Ref Range    Sodium 140 136 - 145 mmol/L    Potassium 3.6 3.5 - 5.5 mmol/L    Chloride 106 100 - 111 mmol/L    CO2 28 21 - 32 mmol/L    Anion gap 6 3.0 - 18 mmol/L    Glucose 114 (H) 74 - 99 mg/dL    BUN 18 7.0 - 18 MG/DL    Creatinine 0.97 0.6 - 1.3 MG/DL    BUN/Creatinine ratio 19 12 - 20      GFR est AA >60 >60 ml/min/1.73m2    GFR est non-AA 58 (L) >60 ml/min/1.73m2    Calcium 8.9 8.5 - 10.1 MG/DL    Bilirubin, total 0.5 0.2 - 1.0 MG/DL    ALT (SGPT) 20 13 - 56 U/L    AST (SGOT) 15 10 - 38 U/L    Alk. phosphatase 133 (H) 45 - 117 U/L    Protein, total 7.6 6.4 - 8.2 g/dL    Albumin 3.7 3.4 - 5.0 g/dL    Globulin 3.9 2.0 - 4.0 g/dL    A-G Ratio 0.9 0.8 - 1.7     CARDIAC PANEL,(CK, CKMB & TROPONIN)    Collection Time: 01/15/21  8:19 PM   Result Value Ref Range    CK - MB 1.0 <3.6 ng/ml    CK-MB Index 1.3 0.0 - 4.0 %    CK 76 26 - 192 U/L    Troponin-I, QT <0.02 0.0 - 0.045 NG/ML   NT-PRO BNP    Collection Time: 01/15/21  8:19 PM   Result Value Ref Range    NT pro-BNP 98 0 - 900 PG/ML   CARDIAC PANEL,(CK, CKMB & TROPONIN)    Collection Time: 01/15/21 11:24 PM   Result Value Ref Range    CK - MB <1.0 <3.6 ng/ml    CK-MB Index  0.0 - 4.0 %     CALCULATION NOT PERFORMED WHEN RESULT IS BELOW LINEAR LIMIT    CK 76 26 - 192 U/L    Troponin-I, QT <0.02 0.0 - 0.045 NG/ML       Radiologic Studies -   XR CHEST PORT   Final Result   IMPRESSION: No acute disease. XR CHEST PORT    (Results Pending)     CT Results  (Last 48 hours)    None        CXR Results  (Last 48 hours)               01/15/21 2034  XR CHEST PORT Final result    Impression:  IMPRESSION: No acute disease. Narrative:  Portable Frontal Chest.       CLINICAL HISTORY: Chest pain and history of panic attacks. .       TECHNIQUE: Single frontal view of the chest, obtained portably. COMPARISONS: 7/23/2020. FINDINGS:          The lungs are clear. The cardiomediastinal silhouette is stable with   unfolding of the aorta. .  Pulmonary vascularity is normal.  There are no   effusions. Medical Decision Making   I am the first provider for this patient. I reviewed the vital signs, available nursing notes, past medical history, past surgical history, family history and social history.     Vital Signs-Reviewed the patient's vital signs. Records Reviewed: Jelly Queen PA-C     Procedures:  Procedures    Provider Notes (Medical Decision Making): Impression: chest tightness, lightheadedness     EKG: normal sinus rhythm, rate 61, no STEMI or acute ST changes when compared to prior study, reviewed by myself and the ED attending. Jelly Queen PA-C   Chest x-ray no acute cardiopulmonary process     Labs unremarkable, including a negative set of cardiac enzymes, will trend in the ED. Repeat cardiac panel negative. No evidence to suggest PE at this time. Pt is not hypoxic or tachycardiac. Denies SOB and leg swelling. I have discussed the signs/sx of PE with the pt, return precautions given. Pt's cardiologist is Dr. Yao Beach. Her next apt is not until February. Will plan to d/c with outpatient stress test order and recommendation for follow-up with cardiology within 1 week. Return precautions given to the pt. Pt is requesting a refill of her xanax, will d./c with a very short course of this medication. Jelly Queen PA-C          MED RECONCILIATION:  No current facility-administered medications for this encounter. Current Outpatient Medications   Medication Sig    ALPRAZolam (Xanax) 0.5 mg tablet Take 1 Tab by mouth every eight (8) hours as needed for Anxiety. Max Daily Amount: 1.5 mg.  potassium chloride (KAON 10%) 20 mEq/15 mL solution Take  by mouth daily. 15 ml daily    escitalopram oxalate (Lexapro) 20 mg tablet Take 20 mg by mouth daily.  hydrOXYzine HCL (ATARAX) 25 mg tablet Take  by mouth as needed.  carvediloL (COREG) 6.25 mg tablet Take 1 Tab by mouth every twelve (12) hours.  levothyroxine (SYNTHROID) 25 mcg tablet Take 75 mcg by mouth Daily (before breakfast).  amLODIPine (NORVASC) 2.5 mg tablet Take 1 Tab by mouth daily.  aspirin delayed-release 81 mg tablet Take 1 Tab by mouth daily.  clopidogreL (PLAVIX) 75 mg tab Take 1 Tab by mouth daily.     atorvastatin (LIPITOR) 80 mg tablet Take 1 Tab by mouth daily.  cholecalciferol (VITAMIN D3) (50,000 UNITS /1250 MCG) capsule Take  by mouth every seven (7) days. Disposition:  D/c    DISCHARGE NOTE:   Patient is stable for discharge at this time. I have discussed all the findings from today's work up with the patient, including lab results and imaging. I have answered all questions. Rx for very short course of xanax and outpatient order for stress test given. Rest and close follow-up with the cardiologist recommended this week. Return to the ED immediately for any new or worsening symptoms. Jelly Queen PA-C . Follow-up Information     Follow up With Specialties Details Why Gabby Fall MD Cardiology, Internal Medicine In 1 week  [address] [facility] [address] [phone]      [facility] EMERGENCY DEPT Emergency Medicine  As needed, If symptoms worsen [address] [place] [id number]  [phone]          Current Discharge Medication List      START taking these medications    Details   ALPRAZolam (Xanax) 0.5 mg tablet Take 1 Tab by mouth every eight (8) hours as needed for Anxiety. Max Daily Amount: 1.5 mg.  Qty: 10 Tab, Refills: 0    Associated Diagnoses: Anxiety state                 Diagnosis     Clinical Impression:   1. Chest tightness    2. Lightheadedness    3.  Anxiety state

## 2021-01-16 NOTE — DISCHARGE INSTRUCTIONS
[employer] Activation    Thank you for requesting access to [employer]. Please follow the instructions below to securely access and download your online medical record. [employer] allows you to send messages to your doctor, view your test results, renew your prescriptions, schedule appointments, and more. How Do I Sign Up? In your internet browser, go to [email or web address]  Click on the First Time User? Click Here link in the Sign In box. You will be redirect to the New Member Sign Up page. Enter your [employer] Access Code exactly as it appears below. You will not need to use this code after youve completed the sign-up process. If you do not sign up before the expiration date, you must request a new code. [employer] Access Code: [unfilled] (This is the date your [employer] access code will )    Enter the last four digits of your Social Security Number (xxxx) and Date of Birth (mm/dd/yyyy) as indicated and click Submit. You will be taken to the next sign-up page. Create a [employer] ID. This will be your [employer] login ID and cannot be changed, so think of one that is secure and easy to remember. Create a [employer] password. You can change your password at any time. Enter your Password Reset Question and Answer. This can be used at a later time if you forget your password. Enter your e-mail address. You will receive e-mail notification when new information is available in [address] Click Sign Up. You can now view and download portions of your medical record. Click the [place] [employer] link to download a portable copy of your medical information. Additional Information    If you have questions, please visit the Frequently Asked Questions section of the [employer] website at [email or web address] Remember, [employer] is NOT to be used for urgent needs. For medical emergencies, dial 911.

## 2021-01-16 NOTE — ED TRIAGE NOTES
Patient has a history of anxiety attacks but had a heart attack in July 2020. She is experiencing some off and on chest tightness she believes it is a panic attack but wants to be evaluated.

## 2021-01-17 LAB
ATRIAL RATE: 61 BPM
CALCULATED P AXIS, ECG09: 53 DEGREES
CALCULATED R AXIS, ECG10: 4 DEGREES
CALCULATED T AXIS, ECG11: -53 DEGREES
DIAGNOSIS, 93000: NORMAL
P-R INTERVAL, ECG05: 192 MS
Q-T INTERVAL, ECG07: 398 MS
QRS DURATION, ECG06: 80 MS
QTC CALCULATION (BEZET), ECG08: 400 MS
VENTRICULAR RATE, ECG03: 61 BPM

## 2021-01-22 ENCOUNTER — HOSPITAL ENCOUNTER (OUTPATIENT)
Dept: NON INVASIVE DIAGNOSTICS | Age: 66
Discharge: HOME | End: 2021-01-22
Attending: PHYSICIAN ASSISTANT

## 2021-01-22 DIAGNOSIS — R07.89 CHEST TIGHTNESS: ICD-10-CM

## 2021-01-22 LAB — STRESS TARGET HR: 155 BPM

## 2021-01-22 NOTE — PROGRESS NOTES
Patient came in for a treadmill this morning, called MOODY Beltran NP. After consultation with patient, was decided to take her to office for a different test.  She is already an established patient with Dr. Rhoda Moran.

## 2021-01-22 NOTE — PROGRESS NOTES
Patient came for tread mill stress test. Hx of STEMI 7/20. S/p PCI RCA. Patient had recent vistit to the ED with chest pressure. Negative troponin. tread mill stress test canceled. Ordered NST on Tuesday 9 am at [place] office also she will see Dr. Jose Rafael Ricardo after NUCstress test. I have discussed this with Dr. Jose Rafael Ricardo.

## 2021-01-26 ENCOUNTER — OFFICE VISIT (OUTPATIENT)
Dept: CARDIOLOGY CLINIC | Age: 66
End: 2021-01-26
Payer: MEDICARE

## 2021-01-26 VITALS
DIASTOLIC BLOOD PRESSURE: 88 MMHG | TEMPERATURE: 97.3 F | HEART RATE: 59 BPM | WEIGHT: 204 LBS | BODY MASS INDEX: 30.21 KG/M2 | SYSTOLIC BLOOD PRESSURE: 173 MMHG | HEIGHT: 69 IN

## 2021-01-26 DIAGNOSIS — I25.10 CORONARY ARTERY DISEASE INVOLVING NATIVE CORONARY ARTERY OF NATIVE HEART WITHOUT ANGINA PECTORIS: ICD-10-CM

## 2021-01-26 DIAGNOSIS — Z95.5 HISTORY OF CORONARY ARTERY STENT PLACEMENT: ICD-10-CM

## 2021-01-26 DIAGNOSIS — R07.89 CHEST TIGHTNESS: Primary | ICD-10-CM

## 2021-01-26 DIAGNOSIS — I10 ESSENTIAL HYPERTENSION: ICD-10-CM

## 2021-01-26 PROCEDURE — G8510 SCR DEP NEG, NO PLAN REQD: HCPCS | Performed by: INTERNAL MEDICINE

## 2021-01-26 PROCEDURE — 3017F COLORECTAL CA SCREEN DOC REV: CPT | Performed by: INTERNAL MEDICINE

## 2021-01-26 PROCEDURE — G9899 SCRN MAM PERF RSLTS DOC: HCPCS | Performed by: INTERNAL MEDICINE

## 2021-01-26 PROCEDURE — G8417 CALC BMI ABV UP PARAM F/U: HCPCS | Performed by: INTERNAL MEDICINE

## 2021-01-26 PROCEDURE — 1101F PT FALLS ASSESS-DOCD LE1/YR: CPT | Performed by: INTERNAL MEDICINE

## 2021-01-26 PROCEDURE — 1090F PRES/ABSN URINE INCON ASSESS: CPT | Performed by: INTERNAL MEDICINE

## 2021-01-26 PROCEDURE — 99214 OFFICE O/P EST MOD 30 MIN: CPT | Performed by: INTERNAL MEDICINE

## 2021-01-26 PROCEDURE — G8427 DOCREV CUR MEDS BY ELIG CLIN: HCPCS | Performed by: INTERNAL MEDICINE

## 2021-01-26 PROCEDURE — G8754 DIAS BP LESS 90: HCPCS | Performed by: INTERNAL MEDICINE

## 2021-01-26 PROCEDURE — G8753 SYS BP > OR = 140: HCPCS | Performed by: INTERNAL MEDICINE

## 2021-01-26 PROCEDURE — G8536 NO DOC ELDER MAL SCRN: HCPCS | Performed by: INTERNAL MEDICINE

## 2021-01-26 PROCEDURE — G8399 PT W/DXA RESULTS DOCUMENT: HCPCS | Performed by: INTERNAL MEDICINE

## 2021-01-26 NOTE — PROGRESS NOTES
1. Have you been to the ER, urgent care clinic since your last visit? Hospitalized since your last visit? Yes [facility] for panic attack    2. Have you seen or consulted any other health care providers outside of the [address] [place] [address] since your last visit? Include any pap smears or colon screening.  Yes PCP

## 2021-01-26 NOTE — PROGRESS NOTES
HISTORY OF PRESENT ILLNESS  Darell Luis is a 72 y.o. female. Patient referred for pre-op evaluation due to abnormal EKG.    7/2020 Ms. Ladonna Rodriguez s/p left total knee replacement on 7/7/2020.  - S/P STEMI on 7/13/2020 - s/p cardiac cath with SAIGE to RCA  10/2020  Patient seen today for follow-up. She was in emergency room few days ago with episode of severe dizziness nausea and diarrhea. She had hypokalemia that was supplemented MI ruled out no acute EKG changes. She is feeling better since then. Diarrhea has resolved. Patient had gone out to eat on Friday night and diarrhea started on Saturday morning and stayed all day long like that. No anginal quality chest pain. Stable since then  1/2021  Patient is here for follow-up she was in emergency room last week with complaint of chest tightness described as right-sided tightness noted to activity exertion she felt like it was anxiety attack she was monitored in ER and discharged home she was given Xanax and after that she has felt better. She underwent stress test today in office    Hypertension  The history is provided by the patient and medical records. This is a chronic problem. The current episode started more than 1 week ago. Pertinent negatives include no chest pain, no abdominal pain, no headaches and no shortness of breath. The symptoms are relieved by medications. Hospital Follow Up  The history is provided by the patient. Pertinent negatives include no chest pain, no abdominal pain, no headaches and no shortness of breath. Review of Systems   Constitutional: Negative for chills, fever and malaise/fatigue. HENT: Negative for congestion and nosebleeds. Eyes: Negative for blurred vision, double vision and redness. Respiratory: Negative for cough, hemoptysis, sputum production, shortness of breath and wheezing. Cardiovascular: Negative for chest pain, palpitations, orthopnea, claudication, leg swelling and PND.    Gastrointestinal: Positive for nausea. Negative for abdominal pain, heartburn and vomiting.   Musculoskeletal: Positive for joint pain. Negative for falls and myalgias.   Skin: Negative for rash.   Neurological: Negative for dizziness, weakness and headaches.   Endo/Heme/Allergies: Does not bruise/bleed easily.   Psychiatric/Behavioral: The patient is not nervous/anxious.      Family History   Problem Relation Age of Onset   • Breast Cancer Other    • Arthritis-osteo Mother    • Hypertension Mother    • Heart Disease Mother    • Hypertension Other         Grandmother   • Other Other         Heart problems: Mother, grandmother   • Diabetes Other         Grandmother       Past Medical History:   Diagnosis Date   • Arthritis    • CAD (coronary artery disease)    • Calcific Achilles tendonitis     Right; insertional   • Essential hypertension 7/13/2020   • Hypercholesterolemia    • Hypertension    • Left knee pain    • Menopause    • Pain of right heel    • Sprain of right ring finger    • Thyroid disease    • Wears glasses        Past Surgical History:   Procedure Laterality Date   • HX CHOLECYSTECTOMY  1990   • HX HEENT      Eye surgery   • HX HERNIA REPAIR     • HX ORTHOPAEDIC Left 07/2020    left total knee arthroplasty   • GA CARDIAC SURG PROCEDURE UNLIST      stent july 2020       Social History     Tobacco Use   • Smoking status: Former Smoker     Years: 15.00   • Smokeless tobacco: Never Used   Substance Use Topics   • Alcohol use: Yes     Frequency: Monthly or less     Comment: Occasional       No Known Allergies    Prior to Admission medications    Medication Sig Start Date End Date Taking? Authorizing Provider   ALPRAZolam (Xanax) 0.5 mg tablet Take 1 Tab by mouth every eight (8) hours as needed for Anxiety. Max Daily Amount: 1.5 mg. 1/16/21  Yes Jelly Queen PA-C   potassium chloride (KAON 10%) 20 mEq/15 mL solution Take  by mouth daily. 15 ml daily   Yes Provider, Historical   escitalopram oxalate (Lexapro) 20 mg tablet Take 20  mg by mouth daily. Yes Provider, Historical   hydrOXYzine HCL (ATARAX) 25 mg tablet Take  by mouth as needed. Yes Provider, Historical   carvediloL (COREG) 6.25 mg tablet Take 1 Tab by mouth every twelve (12) hours. 9/30/20  Yes Jie Jaime NP   levothyroxine (SYNTHROID) 25 mcg tablet Take 75 mcg by mouth Daily (before breakfast). Yes Provider, Historical   amLODIPine (NORVASC) 2.5 mg tablet Take 1 Tab by mouth daily. 7/15/20  Yes Karen Ivory MD   aspirin delayed-release 81 mg tablet Take 1 Tab by mouth daily. 7/16/20  Yes Karen Ivory MD   clopidogreL (PLAVIX) 75 mg tab Take 1 Tab by mouth daily. 7/16/20  Yes Karen Ivory MD   atorvastatin (LIPITOR) 80 mg tablet Take 1 Tab by mouth daily. 7/15/20  Yes Karen Ivory MD         Visit Vitals  BP (!) 173/88   Pulse (!) 59   Temp 97.3 °F (36.3 °C)   Ht 5' 9\" (1.753 m)   Wt 92.5 kg (204 lb)   BMI 30.13 kg/m²       Physical Exam   Constitutional: She is oriented to person, place, and time. She appears well-developed and well-nourished. Neck: No JVD present. Cardiovascular: Normal rate, regular rhythm, normal heart sounds and intact distal pulses. Exam reveals no gallop and no friction rub. No murmur heard. Pulmonary/Chest: Effort normal and breath sounds normal. No respiratory distress. She has no wheezes. She has no rales. She exhibits no tenderness. Abdominal: Soft. She exhibits no distension and no mass. There is no abdominal tenderness. Musculoskeletal: Normal range of motion. General: Edema present. Comments: Dressing to let knee intact   Neurological: She is alert and oriented to person, place, and time. Skin: Skin is warm and dry. Psychiatric: She has a normal mood and affect. Nursing note and vitals reviewed. Echo 7/2020  Interpretation Summary   · Tricuspid regurgitation is inadequate for estimation of right ventricular systolic pressure.   · Normal cavity size and systolic function (ejection fraction normal). Mildly increased wall thickness. Estimated left ventricular ejection fraction is 55 - 60%. Visually measured ejection fraction. Abnormal left ventricular wall motion. Age-appropriate left ventricular diastolic function. · Mitral annular calcification. Trace mitral valve regurgitation is present. · The following segments are hypokinetic: basal inferior and mid inferior. Nuclear Stress Test 7/2/2020  Nuclear Cardiac Spect Rest then Gated Stress study. Kamaljit Holler was used as the stressing method and agent. (Kamaljit Holler given via a 10 - 20 sec injection.)   One day myocardial perfusion study. Date: 7/2/2020. Negative myocardial perfusion imaging. Myocardial perfusion imaging supports a low risk stress test.   There is no prior study available for comparison. .     Cardiac cath 7/13/2020  STEMI  · Critical one-vessel coronary artery disease with 99% stenosis and interval coronary thrombus at proximal/mid RCA. · Noncritical 50% mid LAD stenosis and diffuse luminal irregularities throughout the coronary vasculature with moderate proximal calcification  · Successful PCI of proximal/mid RCA deploying a drug-eluting stent with a residual 0% stenosis and a very tortuous right coronary artery. · Procedure done via right femoral artery and 6 Portuguese Angio-Seal at the end. I have personally reviewed patient's records available from hospital and other providers and incorporated findings in patient care. ER notes, lab, EKG, chest x-ray1/2021 1/2021  Nuclear stress test  Negative stress test    ASSESSMENT and PLAN    Ms. Lin Naidu has a reminder for a \"due or due soon\" health maintenance. I have asked that she contact her primary care provider for follow-up on this health maintenance. No flowsheet data found. Assessment     I have personally reviewed patients ekg done at other facility. I Have personally reviewed recent relevant labs available and discussed with patient      ICD-10-CM ICD-9-CM    1. Chest tightness  R07.89 786.59     Possibly from anxiety attack negative stress test today symptoms are better after Xanax symptoms are different than what she had with her coronary disease and s   2. Coronary artery disease involving native coronary artery of native heart without angina pectoris  I25.10 414.01     Stable monitor   3. Essential hypertension  I10 401.9     Elevated but has not taken medication usually normal   4. History of coronary artery stent placement  Z95.5 V45.82     Continue aspirin and statin as well as Plavix     7/2020   Patient referred for pre-op evaluation due to abnormal EKG. SR with T wave abnormalities. She has history of HTN, HLD, and has not been active due to   Left knee pain. Will evaluate with stress test in AM.    B/P is not controlled will increase lisinopril to 20 mg/ BID.  7/2020 - Pre-operative nuclear stress test was negative for ischemia. She underwent left knee replacement surgery on 7/7/20. She then presented to ER on 7/13 with nausea and generalized malaise, with STEMI. S/p cardiac cath with SAIGE to RCA. She has continued DAPT therapy with statin and BB. She has had multiple ER visits due to nausea - which resolved with Zofran. This is thought to be related to Percocet and was d/c this AM. She has been referred to cardiac rehab and is in PT for left knee. 10/2020  Cardiac status stable. Recent ER visit with nausea dizziness and diarrhea. Diarrhea likely related to food. Resolved since then. Mild hypokalemia being supplemented. Episode does not appear cardiac in nature. Continue current cardiac therapy including aspirin and Plavix  1/2021  Seen after recent ER visit with chest tightness which appears different than her anginal pain that she had with coronary disease prior to stent. Nuclear stress test today is negative her symptoms are better after using Xanax.  We will continue to monitor clinically continue current therapy  Medications Discontinued During This Encounter   Medication Reason    cholecalciferol (VITAMIN D3) (50,000 UNITS /1250 MCG) capsule Not A Current Medication       No orders of the defined types were placed in this encounter. Follow-up and Dispositions    · Return in about 4 months (around 5/26/2021).          Marco Delatorre MD

## 2021-01-28 ENCOUNTER — OFFICE VISIT (OUTPATIENT)
Dept: ORTHOPEDIC SURGERY | Age: 66
End: 2021-01-28
Payer: MEDICARE

## 2021-01-28 VITALS
RESPIRATION RATE: 16 BRPM | BODY MASS INDEX: 29.8 KG/M2 | HEIGHT: 69 IN | HEART RATE: 67 BPM | OXYGEN SATURATION: 99 % | DIASTOLIC BLOOD PRESSURE: 76 MMHG | TEMPERATURE: 98.2 F | SYSTOLIC BLOOD PRESSURE: 129 MMHG | WEIGHT: 201.2 LBS

## 2021-01-28 DIAGNOSIS — Z98.890 S/P SURGICAL MANIPULATION OF KNEE JOINT: ICD-10-CM

## 2021-01-28 DIAGNOSIS — Z96.652 S/P TOTAL KNEE ARTHROPLASTY, LEFT: Primary | ICD-10-CM

## 2021-01-28 DIAGNOSIS — M25.562 LEFT KNEE PAIN, UNSPECIFIED CHRONICITY: ICD-10-CM

## 2021-01-28 PROCEDURE — 99214 OFFICE O/P EST MOD 30 MIN: CPT | Performed by: PHYSICIAN ASSISTANT

## 2021-01-28 PROCEDURE — G8754 DIAS BP LESS 90: HCPCS | Performed by: PHYSICIAN ASSISTANT

## 2021-01-28 PROCEDURE — G8417 CALC BMI ABV UP PARAM F/U: HCPCS | Performed by: PHYSICIAN ASSISTANT

## 2021-01-28 PROCEDURE — G8536 NO DOC ELDER MAL SCRN: HCPCS | Performed by: PHYSICIAN ASSISTANT

## 2021-01-28 PROCEDURE — 1090F PRES/ABSN URINE INCON ASSESS: CPT | Performed by: PHYSICIAN ASSISTANT

## 2021-01-28 PROCEDURE — 1101F PT FALLS ASSESS-DOCD LE1/YR: CPT | Performed by: PHYSICIAN ASSISTANT

## 2021-01-28 PROCEDURE — G8427 DOCREV CUR MEDS BY ELIG CLIN: HCPCS | Performed by: PHYSICIAN ASSISTANT

## 2021-01-28 PROCEDURE — G8432 DEP SCR NOT DOC, RNG: HCPCS | Performed by: PHYSICIAN ASSISTANT

## 2021-01-28 PROCEDURE — 3017F COLORECTAL CA SCREEN DOC REV: CPT | Performed by: PHYSICIAN ASSISTANT

## 2021-01-28 PROCEDURE — G8752 SYS BP LESS 140: HCPCS | Performed by: PHYSICIAN ASSISTANT

## 2021-01-28 PROCEDURE — G9899 SCRN MAM PERF RSLTS DOC: HCPCS | Performed by: PHYSICIAN ASSISTANT

## 2021-01-28 PROCEDURE — G8399 PT W/DXA RESULTS DOCUMENT: HCPCS | Performed by: PHYSICIAN ASSISTANT

## 2021-01-28 NOTE — PROGRESS NOTES
[address] [facility], [place], operated by [facility], [address] [facility]  [phone]           Patient: Gala Vaughn                MRN: [id number]       SSN: [id number]  YOB: 1955        AGE: 72 y.o. SEX: female  Body mass index is 29.71 kg/m². PCP: Anna Glez NP  01/28/21      This office note has been dictated. REVIEW OF SYSTEMS:  Constitutional: Negative for fever, chills, weight loss and malaise/fatigue. HENT: Negative. Eyes: Negative. Respiratory: Negative. Cardiovascular: Negative. Gastrointestinal: No bowel incontinence or constipation. Genitourinary: No bladder incontinence or saddle anesthesia. Skin: Negative. Neurological: Negative. Endo/Heme/Allergies: Negative. Psychiatric/Behavioral: Negative. Musculoskeletal: As per HPI above. Past Medical History:   Diagnosis Date    Arthritis     CAD (coronary artery disease)     Calcific Achilles tendonitis     Right; insertional    Essential hypertension 7/13/2020    Hypercholesterolemia     Hypertension     Left knee pain     Menopause     Pain of right heel     Sprain of right ring finger     Thyroid disease     Wears glasses          Current Outpatient Medications:     ALPRAZolam (Xanax) 0.5 mg tablet, Take 1 Tab by mouth every eight (8) hours as needed for Anxiety. Max Daily Amount: 1.5 mg., Disp: 10 Tab, Rfl: 0    potassium chloride (KAON 10%) 20 mEq/15 mL solution, Take  by mouth daily. 15 ml daily, Disp: , Rfl:     escitalopram oxalate (Lexapro) 20 mg tablet, Take 20 mg by mouth daily. , Disp: , Rfl:     hydrOXYzine HCL (ATARAX) 25 mg tablet, Take  by mouth as needed. , Disp: , Rfl:     carvediloL (COREG) 6.25 mg tablet, Take 1 Tab by mouth every twelve (12) hours. , Disp: 180 Tab, Rfl: 3    levothyroxine (SYNTHROID) 25 mcg tablet, Take 75 mcg by mouth Daily (before breakfast). , Disp: , Rfl:     amLODIPine (NORVASC) 2.5 mg tablet, Take 1 Tab by mouth daily. , Disp: 30 Tab, Rfl: 2    aspirin delayed-release 81 mg tablet, Take 1 Tab by mouth daily. , Disp: 30 Tab, Rfl: 11    clopidogreL (PLAVIX) 75 mg tab, Take 1 Tab by mouth daily. , Disp: 30 Tab, Rfl: 11    atorvastatin (LIPITOR) 80 mg tablet, Take 1 Tab by mouth daily. , Disp: 30 Tab, Rfl: 2    No Known Allergies    Social History     Socioeconomic History    Marital status:      Spouse name: Not on file    Number of children: Not on file    Years of education: Not on file    Highest education level: Not on file   Occupational History    Not on file   Social Needs    Financial resource strain: Not on file    Food insecurity     Worry: Not on file     Inability: Not on file    Transportation needs     Medical: Not on file     Non-medical: Not on file   Tobacco Use    Smoking status: Former Smoker     Years: 15.00    Smokeless tobacco: Never Used   Substance and Sexual Activity    Alcohol use: Yes     Frequency: Monthly or less     Comment: Occasional    Drug use: No    Sexual activity: Not on file   Lifestyle    Physical activity     Days per week: Not on file     Minutes per session: Not on file    Stress: Not on file   Relationships    Social connections     Talks on phone: Not on file     Gets together: Not on file     Attends [employer] service: Not on file     Active member of club or organization: Not on file     Attends meetings of clubs or organizations: Not on file     Relationship status: Not on file    Intimate partner violence     Fear of current or ex partner: Not on file     Emotionally abused: Not on file     Physically abused: Not on file     Forced sexual activity: Not on file   Other Topics Concern    Not on file   Social History Narrative    Not on file       Past Surgical History:   Procedure Laterality Date    HX CHOLECYSTECTOMY  1990    HX HEENT      Eye surgery    HX HERNIA REPAIR      HX KNEE REPLACEMENT      HX ORTHOPAEDIC Left 07/2020    left total knee arthroplasty    DE CARDIAC SURG PROCEDURE UNLIST      stent july 2020         Patient seen and evaluated today for her left knee. She is now approximate 6 months status post left total replacement surgery. She did have an MI post knee replacement. Fortunately she is doing well from a cardiology standpoint. Her knee is feeling good as well. She did require manipulation anesthesia for the left knee. She does continue to work on her range of motion to was on her own. She is complaining of a little bit of posterior heel discomfort. Is worse in the mornings. Patient denies recent fevers, chills, chest pain, SOB, or injuries. No recent systemic changes noted. A 12-point review of systems is performed today. Pertinent positives are noted. All other systems reviewed and otherwise are negative. Physical exam: General: Alert and oriented x3, nad.  well-developed, well nourished. normal affect, AF. NC/AT, EOMI, neck supple, trachea midline, no JVD present. Breathing is non-labored. Examination lower extremities reveals pain-free range of the hips. There is no pain to palpation the trochanter bursa. Neck straight leg raise per negative calf tenderness. Negative Homans. No signs of DVT present. Left knee reveals skin intact. The surgical wound is healed nicely. There is no erythema, ecchymosis, warmth. There are no signs of infection or signs present. Range of motion is -4-120 degrees. Patella tracks nicely without rubs or crepitus noted. She does have some discomfort to palpation to the posterior heel and Achilles insertion. No defects noted in the Achilles tendon. Assessment: #1 status post left total replacement, #2 left Achilles tendinitis    Plan: At this point, she will continue activities as tolerated. She will continue with range of motion activities on an hourly basis. She is instructed on heel cord stretching. She was anti-inflammatory gel for her knee as well as her heel. We will see her back in the office in approximate 6 months time for reevaluation or sooner if necessary.               JR Rishabh ELIZABETH, PA-C, ATC

## 2021-02-10 ENCOUNTER — OFFICE VISIT (OUTPATIENT)
Dept: ORTHOPEDIC SURGERY | Age: 66
End: 2021-02-10
Payer: MEDICARE

## 2021-02-10 VITALS
RESPIRATION RATE: 16 BRPM | DIASTOLIC BLOOD PRESSURE: 68 MMHG | TEMPERATURE: 96.8 F | OXYGEN SATURATION: 100 % | WEIGHT: 203 LBS | HEIGHT: 69 IN | BODY MASS INDEX: 30.07 KG/M2 | HEART RATE: 56 BPM | SYSTOLIC BLOOD PRESSURE: 151 MMHG

## 2021-02-10 DIAGNOSIS — M79.672 PAIN OF LEFT HEEL: ICD-10-CM

## 2021-02-10 DIAGNOSIS — M76.62 ACHILLES TENDINITIS OF LEFT LOWER EXTREMITY: Primary | ICD-10-CM

## 2021-02-10 PROCEDURE — 99213 OFFICE O/P EST LOW 20 MIN: CPT | Performed by: ORTHOPAEDIC SURGERY

## 2021-02-10 PROCEDURE — 3017F COLORECTAL CA SCREEN DOC REV: CPT | Performed by: ORTHOPAEDIC SURGERY

## 2021-02-10 PROCEDURE — 1101F PT FALLS ASSESS-DOCD LE1/YR: CPT | Performed by: ORTHOPAEDIC SURGERY

## 2021-02-10 PROCEDURE — G8754 DIAS BP LESS 90: HCPCS | Performed by: ORTHOPAEDIC SURGERY

## 2021-02-10 PROCEDURE — G9899 SCRN MAM PERF RSLTS DOC: HCPCS | Performed by: ORTHOPAEDIC SURGERY

## 2021-02-10 PROCEDURE — G8417 CALC BMI ABV UP PARAM F/U: HCPCS | Performed by: ORTHOPAEDIC SURGERY

## 2021-02-10 PROCEDURE — G8427 DOCREV CUR MEDS BY ELIG CLIN: HCPCS | Performed by: ORTHOPAEDIC SURGERY

## 2021-02-10 PROCEDURE — G8432 DEP SCR NOT DOC, RNG: HCPCS | Performed by: ORTHOPAEDIC SURGERY

## 2021-02-10 PROCEDURE — G8536 NO DOC ELDER MAL SCRN: HCPCS | Performed by: ORTHOPAEDIC SURGERY

## 2021-02-10 PROCEDURE — G8399 PT W/DXA RESULTS DOCUMENT: HCPCS | Performed by: ORTHOPAEDIC SURGERY

## 2021-02-10 PROCEDURE — G8753 SYS BP > OR = 140: HCPCS | Performed by: ORTHOPAEDIC SURGERY

## 2021-02-10 PROCEDURE — 1090F PRES/ABSN URINE INCON ASSESS: CPT | Performed by: ORTHOPAEDIC SURGERY

## 2021-02-10 PROCEDURE — 73650 X-RAY EXAM OF HEEL: CPT | Performed by: ORTHOPAEDIC SURGERY

## 2021-02-10 NOTE — PROGRESS NOTES
AMBULATORY PROGRESS NOTE      Patient: Kenan Drake             MRN: [id number]     SSN: [id number] Body mass index is 29.98 kg/m². YOB: 1955     AGE: 72 y.o. EX: female    PCP: Sarkis Monson NP       IMPRESSION //  DIAGNOSIS AND TREATMENT PLAN      DIAGNOSES  1. Achilles tendinitis of left lower extremity    2. Pain of left heel        Orders Placed This Encounter    Generic Supply Order     Visco Heel Inserts    [[id number]] Heel Min 2V     Order Specific Question:   Reason for Exam     Answer:   pain        Kenan Drake has calcific insertional Achilles tendinitis on his left side. Conservative care is recommended for her. PLAN:    1. Home exercise program, Tylenol over-the-counter, Viscoheel insert home stretching program.  2.  If she does not improve, I will get her into formal PT for her Achilles tendon. RTO- 3 WEEKS      HPI //  [address] [facility] A 72 y.o. female who presents to my outpatient office for evaluation of:    She is being evaluated, for left hindfoot pain discomfort (present for 1 month now. Denies any fever shakes chills night sweats. She had left total knee replacement, by Dr. Jas Morales, in July 2020, 7 days later had a myocardial infarction. She has been in cardiac rehab. Chief complaint, is 1 month duration of left hindfoot pain on the insertion point of the Achilles tendon. She does notice when she was a shoe with a wedge type alignment it does cause her having less painful hindfoot. Kenan Drake denies any known history of rheumatoid arthritis, lupus, gout, psoriasis, or known history of inflammatory arthropathy. Kenan Drake denies any known flouroquinolone antibiotic exposure or steroid consumption associated with the development of her achilles tendon hindfoot pain.     Visit Vitals  BP (!) 151/68 (BP 1 Location: Left upper arm, BP Patient Position: Sitting)   Pulse (!) 56   Temp 96.8 °F (36 °C) (Temporal)   Resp 16 Ht 5' 9\" (1.753 m)   Wt 203 lb (92.1 kg)   SpO2 100%   BMI 29.98 kg/m²       ANKLE/FOOT left    Psychiatry: Alert, oriented x 3 (name,place,time of day); speech normal in context and clarity, memory intact grossly, no involuntary movements - tremors, no dementia  Gait: antalgic and slow  Tenderness: moderate tenderness at insertion point of the Achilles tendon Achilles tendon  Cutaneous: Mild swelling insertion point Achilles tendon  Joint Motion: WNL  Joint / Tendon Stability: No Ankle or Subtalar instability or joint laxity. No peroneal sublux ability or dislocation  Alignment: neutral Hindfoot, none Metatarsus Adductus Metatarsus. Neuro Motor/Sensory: NL/NL,  Vascular: NL foot/ankle pulses,   Lymphatics: No extremity lymphedema, No calf swelling, no tenderness to calf muscles. CHART REVIEW     Patient Active Problem List   Diagnosis Code    Arthritis of knee M17.10    STEMI (ST elevation myocardial infarction) ([facility] [address] I21.3    Essential hypertension I10    History of coronary artery stent placement Z95.5    Mixed hyperlipidemia E78.2    Coronary artery disease involving native coronary artery of native heart without angina pectoris I25.10        Carlos Edmonds has been experiencing pain and discomfort confirmed as outlined in the pain assessment outlined below.       Pain Assessment  2/10/2021   Location of Pain Ankle   Location Modifiers Left   Severity of Pain 6   Quality of Pain Sharp   Quality of Pain Comment -   Duration of Pain Persistent   Frequency of Pain Constant   Aggravating Factors (No Data)   Aggravating Factors Comment flat footed, walking barefooted, worse in the morning   Limiting Behavior No   Relieving Factors (No Data)   Relieving Factors Comment cane, lifted slippers   Result of Injury No        Carlos Edmonds  has a past medical history of Arthritis, CAD (coronary artery disease), Calcific Achilles tendonitis, Essential hypertension (7/13/2020), Hypercholesterolemia, Hypertension, Left knee pain, Menopause, Pain of right heel, Sprain of right ring finger, Thyroid disease, and Wears glasses. She also has no past medical history of Nipple discharge. Patients is employed at:         Past Medical History:   Diagnosis Date    Arthritis     CAD (coronary artery disease)     Calcific Achilles tendonitis     Right; insertional    Essential hypertension 7/13/2020    Hypercholesterolemia     Hypertension     Left knee pain     Menopause     Pain of right heel     Sprain of right ring finger     Thyroid disease     Wears glasses      Past Surgical History:   Procedure Laterality Date    HX CHOLECYSTECTOMY  1990    HX HEENT      Eye surgery    HX HERNIA REPAIR      HX KNEE REPLACEMENT      HX ORTHOPAEDIC Left 07/2020    left total knee arthroplasty    NE CARDIAC SURG PROCEDURE UNLIST      stent july 2020     Current Outpatient Medications   Medication Sig    ALPRAZolam (Xanax) 0.5 mg tablet Take 1 Tab by mouth every eight (8) hours as needed for Anxiety. Max Daily Amount: 1.5 mg.  potassium chloride (KAON 10%) 20 mEq/15 mL solution Take  by mouth daily. 15 ml daily    escitalopram oxalate (Lexapro) 20 mg tablet Take 20 mg by mouth daily.  hydrOXYzine HCL (ATARAX) 25 mg tablet Take  by mouth as needed.  carvediloL (COREG) 6.25 mg tablet Take 1 Tab by mouth every twelve (12) hours.  levothyroxine (SYNTHROID) 25 mcg tablet Take 75 mcg by mouth Daily (before breakfast).  amLODIPine (NORVASC) 2.5 mg tablet Take 1 Tab by mouth daily.  aspirin delayed-release 81 mg tablet Take 1 Tab by mouth daily.  clopidogreL (PLAVIX) 75 mg tab Take 1 Tab by mouth daily.  atorvastatin (LIPITOR) 80 mg tablet Take 1 Tab by mouth daily. No current facility-administered medications for this visit.       No Known Allergies  Social History     Occupational History    Not on file   Tobacco Use    Smoking status: Former Smoker     Years: 15.00  Smokeless tobacco: Never Used   Substance and Sexual Activity    Alcohol use: Yes     Frequency: Monthly or less     Comment: Occasional    Drug use: No    Sexual activity: Not on file     Family History   Problem Relation Age of Onset    Breast Cancer Other     Arthritis-osteo Mother     Hypertension Mother     Heart Disease Mother     Hypertension Other         Grandmother    Other Other         Heart problems: Mother, grandmother    Diabetes Other         Grandmother       THE  Claudia Gilmore MD 2/10/2021 . DIAGNOSTIC IMAGING  LAB DATA      Lab Results   Component Value Date/Time    Hemoglobin A1c 6.0 (H) 06/26/2020 12:32 PM    Hemoglobin A1c 6.0 (H) 05/23/2017 11:23 AM    //   Lab Results   Component Value Date/Time    Glucose 114 (H) 01/15/2021 08:19 PM        No results found for: HSG4XTQY, ILH3WHFA      No results found for: VITD3, XQVID2, XQVID3, XQVID, VD3RIA, HTKC04TNJIC      REVIEW OF SYSTEMS : 2/10/2021  ALL BELOW ARE Negative except : SEE HPI     CONSTITUTIONAL: No weight loss  PSYCHOLOGICAL : No Feelings of anxiety, depression, agitation  EYES: No blurred vision and no eye discharge. NO eye pain, double vision  ENT: No nasal discharge. No ear pain. CARDIOVASCULAR: No chest pain and no diaphoresis. RESPIRATORY: No cough, no hemoptysis. GI: No vomiting, no diarrhea   : No urinary frequency and no dysuria. MUSCULOSKELETAL: see HPI  SKIN: No rashes. NEURO:  No dizziness,weakness, headaches// No visual changes or confusion, or seizures,   ENDOCRINE: No polyphagia and no polydipsia. HEMATOLOGY: No bleeding tendencies.       DIAGNOSTIC IMAGING        HEEL 2 VIEW X RAYS  LEFT  X RAYS AT [address] [place] [address]  2/10/2021    FINDINGS:     SOFT TISSUES:              Absent soft tissue swelling, No soft tissue calcifications, No Calcified blood vessels     Soft tissue swelling location:    None to fibula region        None medial aspect None dorsal midfoot/forefoot  No radiopaque foreign body, abnormal lucency, or focal swelling noted within soft tissues. OSSEOUS:     No fractures, subluxations, dislocations   Bone spur to inferior aspect of calcaneus is  moderate   Bone spur to superior calcaneus region is           moderate SIZED CALCIFIC INSERTIONAL BONE SPUR IS PRESENT   Mineralization: suggests no Osteopenia or no Osteoporosis    ALIGNMENT:    Ankle mortise alignment is congruent. Tibial plafond and talar dome intact      No Osteochondral defects seen    No Ankle joint effusion seen         I have personally reviewed these images of the above study. The interpretation of this study is my professional opinion.     Jony Borrego MD  2/10/2021  4:48 PM

## 2021-03-21 ENCOUNTER — HOSPITAL ENCOUNTER (EMERGENCY)
Age: 66
Discharge: HOME | End: 2021-03-21
Attending: EMERGENCY MEDICINE
Payer: MEDICARE

## 2021-03-21 ENCOUNTER — APPOINTMENT (OUTPATIENT)
Dept: GENERAL RADIOLOGY | Age: 66
End: 2021-03-21
Attending: EMERGENCY MEDICINE
Payer: MEDICARE

## 2021-03-21 VITALS
OXYGEN SATURATION: 97 % | SYSTOLIC BLOOD PRESSURE: 162 MMHG | HEART RATE: 55 BPM | DIASTOLIC BLOOD PRESSURE: 82 MMHG | RESPIRATION RATE: 17 BRPM | TEMPERATURE: 98.1 F

## 2021-03-21 DIAGNOSIS — F41.1 ANXIETY STATE: Primary | ICD-10-CM

## 2021-03-21 LAB
ALBUMIN SERPL-MCNC: 4.2 G/DL (ref 3.4–5)
ALBUMIN/GLOB SERPL: 1.1 {RATIO} (ref 0.8–1.7)
ALP SERPL-CCNC: 119 U/L (ref 45–117)
ALT SERPL-CCNC: 19 U/L (ref 13–56)
ANION GAP SERPL CALC-SCNC: 5 MMOL/L (ref 3–18)
AST SERPL-CCNC: 14 U/L (ref 10–38)
BASOPHILS # BLD: 0 K/UL (ref 0–0.1)
BASOPHILS NFR BLD: 0 % (ref 0–2)
BILIRUB SERPL-MCNC: 0.5 MG/DL (ref 0.2–1)
BNP SERPL-MCNC: 55 PG/ML (ref 0–900)
BUN SERPL-MCNC: 14 MG/DL (ref 7–18)
BUN/CREAT SERPL: 22 (ref 12–20)
CALCIUM SERPL-MCNC: 8.9 MG/DL (ref 8.5–10.1)
CHLORIDE SERPL-SCNC: 105 MMOL/L (ref 100–111)
CO2 SERPL-SCNC: 29 MMOL/L (ref 21–32)
CREAT SERPL-MCNC: 0.64 MG/DL (ref 0.6–1.3)
DIFFERENTIAL METHOD BLD: NORMAL
EOSINOPHIL # BLD: 0.1 K/UL (ref 0–0.4)
EOSINOPHIL NFR BLD: 2 % (ref 0–5)
ERYTHROCYTE [DISTWIDTH] IN BLOOD BY AUTOMATED COUNT: 14.2 % (ref 11.6–14.5)
GLOBULIN SER CALC-MCNC: 3.9 G/DL (ref 2–4)
GLUCOSE SERPL-MCNC: 103 MG/DL (ref 74–99)
HCT VFR BLD AUTO: 41.3 % (ref 35–45)
HGB BLD-MCNC: 13.5 G/DL (ref 12–16)
LYMPHOCYTES # BLD: 3 K/UL (ref 0.9–3.6)
LYMPHOCYTES NFR BLD: 43 % (ref 21–52)
MCH RBC QN AUTO: 27.8 PG (ref 24–34)
MCHC RBC AUTO-ENTMCNC: 32.7 G/DL (ref 31–37)
MCV RBC AUTO: 85 FL (ref 74–97)
MONOCYTES # BLD: 0.4 K/UL (ref 0.05–1.2)
MONOCYTES NFR BLD: 5 % (ref 3–10)
NEUTS SEG # BLD: 3.6 K/UL (ref 1.8–8)
NEUTS SEG NFR BLD: 50 % (ref 40–73)
PLATELET # BLD AUTO: 330 K/UL (ref 135–420)
PMV BLD AUTO: 10.2 FL (ref 9.2–11.8)
POTASSIUM SERPL-SCNC: 3.6 MMOL/L (ref 3.5–5.5)
PROT SERPL-MCNC: 8.1 G/DL (ref 6.4–8.2)
RBC # BLD AUTO: 4.86 M/UL (ref 4.2–5.3)
SODIUM SERPL-SCNC: 139 MMOL/L (ref 136–145)
TROPONIN I SERPL-MCNC: <0.02 NG/ML (ref 0–0.04)
WBC # BLD AUTO: 7.1 K/UL (ref 4.6–13.2)

## 2021-03-21 PROCEDURE — 83880 ASSAY OF NATRIURETIC PEPTIDE: CPT

## 2021-03-21 PROCEDURE — 84484 ASSAY OF TROPONIN QUANT: CPT

## 2021-03-21 PROCEDURE — 85025 COMPLETE CBC W/AUTO DIFF WBC: CPT

## 2021-03-21 PROCEDURE — 80053 COMPREHEN METABOLIC PANEL: CPT

## 2021-03-21 PROCEDURE — 74011250637 HC RX REV CODE- 250/637: Performed by: EMERGENCY MEDICINE

## 2021-03-21 PROCEDURE — 71045 X-RAY EXAM CHEST 1 VIEW: CPT

## 2021-03-21 PROCEDURE — 93005 ELECTROCARDIOGRAM TRACING: CPT

## 2021-03-21 PROCEDURE — 99282 EMERGENCY DEPT VISIT SF MDM: CPT

## 2021-03-21 RX ORDER — GUAIFENESIN 100 MG/5ML
324 LIQUID (ML) ORAL
Status: COMPLETED | OUTPATIENT
Start: 2021-03-21 | End: 2021-03-21

## 2021-03-21 RX ADMIN — ASPIRIN 324 MG: 81 TABLET, CHEWABLE ORAL at 19:49

## 2021-03-22 LAB
ATRIAL RATE: 50 BPM
CALCULATED P AXIS, ECG09: 27 DEGREES
CALCULATED R AXIS, ECG10: 1 DEGREES
CALCULATED T AXIS, ECG11: -44 DEGREES
DIAGNOSIS, 93000: NORMAL
P-R INTERVAL, ECG05: 186 MS
Q-T INTERVAL, ECG07: 430 MS
QRS DURATION, ECG06: 78 MS
QTC CALCULATION (BEZET), ECG08: 392 MS
VENTRICULAR RATE, ECG03: 50 BPM

## 2021-03-22 NOTE — ED PROVIDER NOTES
EMERGENCY DEPARTMENT HISTORY AND PHYSICAL EXAM      Date: 3/21/2021  Patient Name: Laith Fuchs    History of Presenting Illness     Chief Complaint   Patient presents with    Anxiety       History (Context): Laith Fuchs is a 72 y.o. woman with active conditions as noted in the past medical history who presents with intermittent, subacute onset, persistent anxiety, vague chest pain, vague shortness of breath without exacerbating/relieving features or other associated symptoms. However, the patient recently had a myocardial infarction requiring stenting of the RCA. The symptoms do not feel like that, but the patient comes for evaluation of the chest pain. On review of systems, the patient denies fever, chills, headache, anhedonia, current drug use, homicidal ideation, suicidal ideation. PCP: Nakita Cruz NP    Current Outpatient Medications   Medication Sig Dispense Refill    ALPRAZolam (Xanax) 0.5 mg tablet Take 1 Tab by mouth every eight (8) hours as needed for Anxiety. Max Daily Amount: 1.5 mg. 10 Tab 0    potassium chloride (KAON 10%) 20 mEq/15 mL solution Take  by mouth daily. 15 ml daily      escitalopram oxalate (Lexapro) 20 mg tablet Take 20 mg by mouth daily.  hydrOXYzine HCL (ATARAX) 25 mg tablet Take  by mouth as needed.  carvediloL (COREG) 6.25 mg tablet Take 1 Tab by mouth every twelve (12) hours. 180 Tab 3    levothyroxine (SYNTHROID) 25 mcg tablet Take 75 mcg by mouth Daily (before breakfast).  amLODIPine (NORVASC) 2.5 mg tablet Take 1 Tab by mouth daily. 30 Tab 2    aspirin delayed-release 81 mg tablet Take 1 Tab by mouth daily. 30 Tab 11    clopidogreL (PLAVIX) 75 mg tab Take 1 Tab by mouth daily. 30 Tab 11    atorvastatin (LIPITOR) 80 mg tablet Take 1 Tab by mouth daily.  30 Tab 2       Past History     Past Medical History:  Past Medical History:   Diagnosis Date    Arthritis     CAD (coronary artery disease)     Calcific Achilles tendonitis Right; insertional    Essential hypertension 7/13/2020    Hypercholesterolemia     Hypertension     Left knee pain     Menopause     Pain of right heel     Sprain of right ring finger     Thyroid disease     Wears glasses        Past Surgical History:  Past Surgical History:   Procedure Laterality Date    HX CHOLECYSTECTOMY  1990    HX HEENT      Eye surgery    HX HERNIA REPAIR      HX KNEE REPLACEMENT      HX ORTHOPAEDIC Left 07/2020    left total knee arthroplasty    [place] CARDIAC SURG PROCEDURE UNLIST      stent july 2020       Family History:  Family History   Problem Relation Age of Onset    Breast Cancer Other     Arthritis-osteo Mother     Hypertension Mother     Heart Disease Mother     Hypertension Other         Grandmother    Other Other         Heart problems: Mother, grandmother    Diabetes Other         Grandmother       Social History:  Social History     Tobacco Use    Smoking status: Former Smoker     Years: 15.00    Smokeless tobacco: Never Used   Substance Use Topics    Alcohol use: Yes     Frequency: Monthly or less     Comment: Occasional    Drug use: No       Allergies:  No Known Allergies    PMH, PSH, family history, social history, allergies reviewed with the patient with significant items noted above. Review of Systems   As per HPI, otherwise reviewed and negative. Physical Exam     Vitals:    03/21/21 1750   BP: (!) 162/82   Pulse: (!) 55   Resp: 17   Temp: 98.1 °F (36.7 °C)   SpO2: 97%       Gen: Anxious appearing and appears mildly distressed  HEENT: Normocephalic, sclera anicteric  Cardiovascular: Normal rate, regular rhythm, no murmurs, rubs, gallops. Pulses intact and equal distally. Pulmonary: No respiratory distress. No stridor. Clear lungs. Normal work of breathing   ABD: Soft, nontender, nondistended. Neuro: Alert. Normal speech. Normal mentation. Psych: Dress: Normal. Behavior: Anxious. Thought Process: Linear.   Thought content: Related symptoms. Mood: \"Anxious\". Affect congruent with mood. : No CVA tenderness  EXT: Moves all extremities well. No cyanosis or clubbing. Skin: Warm and well-perfused. Diagnostic Study Results     Labs -     Recent Results (from the past 12 hour(s))   LIPID PANEL    Collection Time: 03/25/21  9:56 AM   Result Value Ref Range    LIPID PROFILE          Cholesterol, total 177 <200 MG/DL    Triglyceride 94 <150 MG/DL    HDL Cholesterol 59 40 - 60 MG/DL    LDL, calculated 99.2 0 - 100 MG/DL    VLDL, calculated 18.8 MG/DL    CHOL/HDL Ratio 3.0 0 - 5.0     TSH 3RD GENERATION    Collection Time: 03/25/21  9:56 AM   Result Value Ref Range    TSH 2.74 0.36 - 3.74 uIU/mL   T4, FREE    Collection Time: 03/25/21  9:56 AM   Result Value Ref Range    T4, Free 0.9 0.7 - 1.5 NG/DL       Radiologic Studies -   XR CHEST PORT   Final Result   No radiographic evidence of acute cardiopulmonary process. CT Results  (Last 48 hours)    None        CXR Results  (Last 48 hours)    None            Medical Decision Making   I am the first provider for this patient. I reviewed the vital signs, available nursing notes, past medical history, past surgical history, family history and social history. Vital Signs-Reviewed the patient's vital signs. EKG: Interpreted by myself. Records Reviewed: Personally, on initial evaluation    MDM:   Patient presents with chief complaint of anxiety without relief from medications that is associated with chest pain and mild shortness of breath. Exam significant for anxiety, normal chest exam.  Patient condition stable.    DDX considered: ACS, heart failure anxiety, metabolic abnormality, endocrine abnormality  DDX thought to be less likely but also considered due to high risk condition: Cardiopulmonary dysfunction    Plan:   Reassurance and treatment    Orders as below:  Orders Placed This Encounter    XR CHEST PORT    CBC WITH AUTOMATED DIFF    COMPREHENSIVE METABOLIC PANEL    TROPONIN I    PRO-BNP    EKG, 12 LEAD, INITIAL    aspirin chewable tablet 324 mg        ED Course:   Work-up as above is negative. Patient request discharge home. Patient will be discharged home with cardiology follow-up    DISCHARGE NOTE:     Care plan outlined and precautions discussed. Results were reviewed with the patient. All medications were reviewed with the patient; will d/c home with no changes to meds. All of pt's questions and concerns were addressed. Alarm symptoms and return precautions associated with chief complaint and evaluation were reviewed with the patient in detail. The patient demonstrated adequate understanding. Patient was instructed and agrees to follow up with psychiatry, as well as to return to the ED upon further deterioration. Patient is ready to go home. The patient is happy with this plan    Follow-up Information     Follow up With Specialties Details Why Contact Info    Giovanni Service, NP Nurse Practitioner Go to  As needed, If symptoms worsen [phone]  [phone]            Discharge Medication List as of 3/21/2021  9:20 PM            Diagnosis     Clinical Impression:   1. Anxiety state        Signed,  Celso Branham MD  Emergency Physician  [facility]  [facility]    As a voice dictation software was utilized to dictate this note, minor word transpositions can occur. I apologize for confusing wording and typographic errors. Please feel free to contact me for clarification.

## 2021-03-25 ENCOUNTER — TRANSCRIBE ORDER (OUTPATIENT)
Dept: REGISTRATION | Age: 66
End: 2021-03-25

## 2021-03-25 ENCOUNTER — HOSPITAL ENCOUNTER (OUTPATIENT)
Dept: LAB | Age: 66
Discharge: HOME | End: 2021-03-25
Payer: MEDICARE

## 2021-03-25 DIAGNOSIS — E03.9 MYXEDEMA HEART DISEASE: ICD-10-CM

## 2021-03-25 DIAGNOSIS — I51.9 MYXEDEMA HEART DISEASE: ICD-10-CM

## 2021-03-25 DIAGNOSIS — E78.00 PURE HYPERCHOLESTEROLEMIA: ICD-10-CM

## 2021-03-25 DIAGNOSIS — E78.00 PURE HYPERCHOLESTEROLEMIA: Primary | ICD-10-CM

## 2021-03-25 LAB
CHOLEST SERPL-MCNC: 177 MG/DL
HDLC SERPL-MCNC: 59 MG/DL (ref 40–60)
HDLC SERPL: 3 {RATIO} (ref 0–5)
LDLC SERPL CALC-MCNC: 99.2 MG/DL (ref 0–100)
LIPID PROFILE,FLP: NORMAL
T4 FREE SERPL-MCNC: 0.9 NG/DL (ref 0.7–1.5)
TRIGL SERPL-MCNC: 94 MG/DL (ref ?–150)
TSH SERPL DL<=0.05 MIU/L-ACNC: 2.74 UIU/ML (ref 0.36–3.74)
VLDLC SERPL CALC-MCNC: 18.8 MG/DL

## 2021-03-25 PROCEDURE — 84443 ASSAY THYROID STIM HORMONE: CPT

## 2021-03-25 PROCEDURE — 84439 ASSAY OF FREE THYROXINE: CPT

## 2021-03-25 PROCEDURE — 80061 LIPID PANEL: CPT

## 2021-03-25 PROCEDURE — 36415 COLL VENOUS BLD VENIPUNCTURE: CPT

## 2021-04-08 ENCOUNTER — OFFICE VISIT (OUTPATIENT)
Dept: ORTHOPEDIC SURGERY | Age: 66
End: 2021-04-08
Payer: MEDICARE

## 2021-04-08 VITALS
TEMPERATURE: 96.5 F | HEART RATE: 58 BPM | BODY MASS INDEX: 31.1 KG/M2 | RESPIRATION RATE: 16 BRPM | WEIGHT: 210 LBS | OXYGEN SATURATION: 99 % | HEIGHT: 69 IN

## 2021-04-08 DIAGNOSIS — M76.62 ACHILLES TENDINITIS OF LEFT LOWER EXTREMITY: Primary | ICD-10-CM

## 2021-04-08 PROCEDURE — G8427 DOCREV CUR MEDS BY ELIG CLIN: HCPCS | Performed by: ORTHOPAEDIC SURGERY

## 2021-04-08 PROCEDURE — G8536 NO DOC ELDER MAL SCRN: HCPCS | Performed by: ORTHOPAEDIC SURGERY

## 2021-04-08 PROCEDURE — 3017F COLORECTAL CA SCREEN DOC REV: CPT | Performed by: ORTHOPAEDIC SURGERY

## 2021-04-08 PROCEDURE — G8432 DEP SCR NOT DOC, RNG: HCPCS | Performed by: ORTHOPAEDIC SURGERY

## 2021-04-08 PROCEDURE — G8756 NO BP MEASURE DOC: HCPCS | Performed by: ORTHOPAEDIC SURGERY

## 2021-04-08 PROCEDURE — 1101F PT FALLS ASSESS-DOCD LE1/YR: CPT | Performed by: ORTHOPAEDIC SURGERY

## 2021-04-08 PROCEDURE — G8417 CALC BMI ABV UP PARAM F/U: HCPCS | Performed by: ORTHOPAEDIC SURGERY

## 2021-04-08 PROCEDURE — G8399 PT W/DXA RESULTS DOCUMENT: HCPCS | Performed by: ORTHOPAEDIC SURGERY

## 2021-04-08 PROCEDURE — 99213 OFFICE O/P EST LOW 20 MIN: CPT | Performed by: ORTHOPAEDIC SURGERY

## 2021-04-08 PROCEDURE — G9899 SCRN MAM PERF RSLTS DOC: HCPCS | Performed by: ORTHOPAEDIC SURGERY

## 2021-04-08 PROCEDURE — 1090F PRES/ABSN URINE INCON ASSESS: CPT | Performed by: ORTHOPAEDIC SURGERY

## 2021-04-08 RX ORDER — TRAMADOL HYDROCHLORIDE 50 MG/1
50 TABLET ORAL
Qty: 20 TAB | Refills: 0 | Status: SHIPPED | OUTPATIENT
Start: 2021-04-08 | End: 2021-04-18

## 2021-04-08 NOTE — PROGRESS NOTES
AMBULATORY PROGRESS NOTE      Patient: Thaddeus Glez             MRN: [id number]     SSN: [id number] Body mass index is 31.01 kg/m². YOB: 1955     AGE: 72 y.o. EX: female    PCP: Dolores Bafrield NP       IMPRESSION // [address] has have a left Achilles tendon tendinitis. She has calcific left Achilles insertional tendinitis. She cannot take anti-inflammatory agents, due to being on Plavix. As such I wrote for short-term duration of tramadol. She understands I cannot write for this for any prolonged // extended period time. DIAGNOSES  1. Achilles tendinitis of left lower extremity        Orders Placed This Encounter    Generic Supply Order     Dorsal night splint    REFERRAL TO PHYSICAL THERAPY     Referral Type:   PT/OT/ST     Referral Reason:   Specialty Services Required     Requested Specialty:   Physical Therapy     Number of Visits Requested:   1    traMADoL (ULTRAM) 50 mg tablet     Sig: Take 1 Tab by mouth every six (6) hours as needed for Pain for up to 10 days. Max Daily Amount: 200 mg. Dispense:  20 Tab     Refill:  0        PLAN:    1. Formal PT and HEP   2.  Dorsal night splint      RTO-  4 weeks    Cindy Bautista  expresses understanding of the diagnosis, treatment plan, and all of their proposed questions were answered to their satisfaction. Patient education has been provided re the diagnoses. Thaddeus Glez may have a reminder for a \"due or due soon\" health maintenance. I have asked that she contact her primary care provider for follow-up on this health maintenance.         HPI //  810 [facility] A 72 y.o. female who is a/an  established patient, presenting to my outpatient office for evaluation of  the following chief complaint(s):     Chief Complaint   Patient presents with    Foot Pain     left       She presents today, of complaint of pain discomfort, to her left hindfoot long Achilles tendon. Visit Vitals  Pulse (!) 58   Temp (!) 96.5 °F (35.8 °C) (Temporal)   Resp 16   Ht 5' 9\" (1.753 m)   Wt 210 lb (95.3 kg)   SpO2 99%   BMI 31.01 kg/m²       Appearance: Alert, well appearing and pleasant patient who is in no distress, oriented to person, place/time, and who follows commands. This patient is accompanied in the examination room by her  self. There is signs of: no dementia  Psychiatric: Affect/mood are appropriate. Speech normal in context and clarity, memory intact grossly, no involuntary movements - tremors. Patient arrives to office via: without assistive device:    H EENT (2): Head normocephalic & atraumatic. Both pupils are round     Eye: EOM are intact // Neck: ROM WNL  //  Hearings Intact   Respiratory: Breathing non labored      ANKLE/FOOT left     Psychiatry: Alert, oriented x 3 (name,place,time of day); speech normal in context and clarity, memory intact grossly, no involuntary movements - tremors, no dementia  Gait: antalgic and slow  Tenderness: moderate tenderness at insertion point of the Achilles tendon Achilles tendon  Cutaneous: Mild swelling insertion point Achilles tendon  Joint Motion: WNL  Joint / Tendon Stability: No Ankle or Subtalar instability or joint laxity. No peroneal sublux ability or dislocation  Alignment: neutral Hindfoot, none Metatarsus Adductus Metatarsus. Neuro Motor/Sensory: NL/NL,  Vascular: NL foot/ankle pulses,   Lymphatics: No extremity lymphedema, No calf swelling, no tenderness to calf muscles. CHART REVIEW     Brigido Hernandez has been experiencing pain and discomfort confirmed as outlined in the pain assessment outlined below.  was reviewed by Donald Eastman MD on 4/8/2021.      Pain Assessment  4/8/2021   Location of Pain Other (comment)   Location Modifiers Left   Severity of Pain 4   Quality of Pain Sharp   Quality of Pain Comment -   Duration of Pain Persistent   Frequency of Pain Constant   Aggravating Factors Standing; Other (Comment)   Aggravating Factors Comment the morning time. Limiting Behavior Yes   Relieving Factors Elevation   Relieving Factors Comment -   Result of Injury -        Mino Ritchie  has a past medical history of Arthritis, CAD (coronary artery disease), Calcific Achilles tendonitis, Essential hypertension (7/13/2020), Hypercholesterolemia, Hypertension, Left knee pain, Menopause, Pain of right heel, Sprain of right ring finger, Thyroid disease, and Wears glasses. She also has no past medical history of Nipple discharge. Patients is employed at:         Past Medical History:   Diagnosis Date    Arthritis     CAD (coronary artery disease)     Calcific Achilles tendonitis     Right; insertional    Essential hypertension 7/13/2020    Hypercholesterolemia     Hypertension     Left knee pain     Menopause     Pain of right heel     Sprain of right ring finger     Thyroid disease     Wears glasses      Past Surgical History:   Procedure Laterality Date    HX CHOLECYSTECTOMY  1990    HX HEENT      Eye surgery    HX HERNIA REPAIR      HX KNEE REPLACEMENT      HX ORTHOPAEDIC Left 07/2020    left total knee arthroplasty    NE CARDIAC SURG PROCEDURE UNLIST      stent july 2020     Current Outpatient Medications   Medication Sig    traMADoL (ULTRAM) 50 mg tablet Take 1 Tab by mouth every six (6) hours as needed for Pain for up to 10 days. Max Daily Amount: 200 mg.    ALPRAZolam (Xanax) 0.5 mg tablet Take 1 Tab by mouth every eight (8) hours as needed for Anxiety. Max Daily Amount: 1.5 mg.  potassium chloride (KAON 10%) 20 mEq/15 mL solution Take  by mouth daily. 15 ml daily    escitalopram oxalate (Lexapro) 20 mg tablet Take 20 mg by mouth daily.  hydrOXYzine HCL (ATARAX) 25 mg tablet Take  by mouth as needed.  carvediloL (COREG) 6.25 mg tablet Take 1 Tab by mouth every twelve (12) hours.     levothyroxine (SYNTHROID) 25 mcg tablet Take 75 mcg by mouth Daily (before breakfast).  amLODIPine (NORVASC) 2.5 mg tablet Take 1 Tab by mouth daily.  aspirin delayed-release 81 mg tablet Take 1 Tab by mouth daily.  clopidogreL (PLAVIX) 75 mg tab Take 1 Tab by mouth daily.  atorvastatin (LIPITOR) 80 mg tablet Take 1 Tab by mouth daily. No current facility-administered medications for this visit. No Known Allergies  Social History     Occupational History    Not on file   Tobacco Use    Smoking status: Former Smoker     Years: 15.00    Smokeless tobacco: Never Used   Substance and Sexual Activity    Alcohol use: Yes     Frequency: Monthly or less     Comment: Occasional    Drug use: No    Sexual activity: Not on file     Family History   Problem Relation Age of Onset    Breast Cancer Other     Arthritis-osteo Mother     Hypertension Mother     Heart Disease Mother     Hypertension Other         Grandmother    Other Other         Heart problems: Mother, grandmother    Diabetes Other         Grandmother        DIAGNOSTIC LAB DATA      Lab Results   Component Value Date/Time    Hemoglobin A1c 6.0 (H) 06/26/2020 12:32 PM    Hemoglobin A1c 6.0 (H) 05/23/2017 11:23 AM    //   Lab Results   Component Value Date/Time    Glucose 103 (H) 03/21/2021 08:05 PM        No results found for: PXJ0HBAM, RBE8ACIR      No results found for: VITD3, XQVID2, XQVID3, XQVID, VD3RIA, [id number]      REVIEW OF SYSTEMS : 4/8/2021  ALL BELOW ARE Negative except : SEE HPI     All other systems reviewed and are negative. 12 point review of systems otherwise negative unless noted in HPI. DIAGNOSTIC IMAGING /ORDERS     None today     I have reviewed the results of the above study. The interpretation of this study is my professional opinion. An electronic signature was used to authenticate this note.     Lay Ruiz MD  4/8/2021  5:10 PM      Disclaimer: Sections of this note are dictated using utilizing voice recognition software, which may have resulted in some phonetic based errors in grammar and contents. Even though attempts were made to correct all the mistakes, some may have been missed, and remained in the body of the document. If questions arise, please contact our department.      Chip Tellez MD  4/8/2021  5:10 PM

## 2021-04-23 ENCOUNTER — HOSPITAL ENCOUNTER (OUTPATIENT)
Dept: PHYSICAL THERAPY | Age: 66
Discharge: HOME | End: 2021-04-23
Payer: MEDICARE

## 2021-04-23 PROCEDURE — 97161 PT EVAL LOW COMPLEX 20 MIN: CPT

## 2021-04-23 PROCEDURE — 97110 THERAPEUTIC EXERCISES: CPT

## 2021-04-23 NOTE — PROGRESS NOTES
[facility] [address] [facility] [address], [address] [phone] fax    Plan of Care/ Statement of Necessity for Physical Therapy Services    Patient name: Brooklyn Rojas Start of Care: 2021   Referral source: Gabriela Casiano MD : 1955    Medical Diagnosis: Pain in left foot [M79.672]  Payor: [place] MEDICARE / Plan: [place] MEDICARE PART A & B / Product Type: Medicare /    Onset Date:2021    Treatment Diagnosis: left foot pain   Prior Hospitalization: see medical history Provider#: [id number]   Medications: Verified on Patient summary List    Comorbidities: heart disease with hx of MI, left TKA, OA, thyroid issues, HTN   Prior Level of Function: retired. Functionally independent. Limited left knee extension and LE strength as she was unable to rehab due to MI         The Plan of Care and following information is based on the information from the initial evaluation. Assessment/ key information: Patient is a 72 y. o.female presenting with Pain in left foot [M79.672]. Ms. Anaya Kathleen presents to initial PT evaluation with c/o left ankle pain beginning ~ 2 months ago. She has limited left LE strength and knee ROM residually from prior left TKA. She was unable to fully rehab the knee as she had an MI ~ 1 week post op and needed to attend cardiac rehab. Left ankle ROM and strength also limited, with pes planus present bilaterally. Symptoms consistent with achilles tendonitis. Patient will benefit from skilled PT services to address deficits and facilitate return to premorbid activity level and promote improved quality of life.        Evaluation Complexity History MEDIUM  Complexity : 1-2 comorbidities / personal factors will impact the outcome/ POC ; Examination LOW Complexity : 1-2 Standardized tests and measures addressing body structure, function, activity limitation and / or participation in recreation  ;Presentation MEDIUM Complexity : Evolving with changing characteristics  ; Clinical Decision Making MEDIUM Complexity : FOTO score of 26-74  Overall Complexity Rating: LOW   Problem List: pain affecting function, decrease ROM, decrease strength, edema affecting function, impaired gait/ balance, decrease ADL/ functional abilitiies, decrease activity tolerance, decrease flexibility/ joint mobility and decrease transfer abilities   Treatment Plan may include any combination of the following: Therapeutic exercise, Therapeutic activities, Neuromuscular re-education, Physical agent/modality, Gait/balance training, Manual therapy, Aquatic therapy, Patient education, Self Care training, Functional mobility training, Home safety training and Stair training  Patient / Family readiness to learn indicated by: asking questions, trying to perform skills and interest  Persons(s) to be included in education: patient (P)  Barriers to Learning/Limitations: None  Patient Goal (s): make it go away  Patient Self Reported Health Status: good  Rehabilitation Potential: good  Short Term Goals: To be accomplished in 1 weeks:  1. Therapist to establish HEP for ROM & Strengthening to improve ease with gait & ADLs. Long Term Goals: To be accomplished in 4 weeks:  1. Patient will be independent with HEP to improve carryover of functional gains with ADLs between visits. Eval Status:n/a  2. Pt will increase left ankle DF to -10 degrees to normalize gait pattern. Eval Status DF: -30 deg  3. Pt will increase FOTO score to 63 points to demonstrate improved functional mobility   Eval Status:FOTO: 47  4. Pt will increase left ankle strength to grossly 5/5 with MMT to improve ankle stability for gait/balance. Eval Status: DF: 4/5, PF:4/5, IV: 3/5, EV: 3/5  5. Patient will increase left knee extension ROM to 0 degrees to improve le mechanics for gait. Eval status: 10 deg from neutral    Frequency / Duration: Patient to be seen 2 times per week for 4 weeks.     Patient/ Caregiver education and instruction: Diagnosis, prognosis, self care, activity modification and exercises   [x]  Plan of care has been reviewed with PTA      Certification Period: 4/23/21 - 4/22/21    Edilberto Gould, PT 4/23/2021 10:25 AM    ________________________________________________________________________    I certify that the above Therapy Services are being furnished while the patient is under my care. I agree with the treatment plan and certify that this therapy is necessary.     [de-identified] Signature:____________Date:_________TIME:________     Sandra Cardenas MD  ** Signature, Date and Time must be completed for valid certification **    Please sign and return to [facility] [address], [address]  [phone] fax

## 2021-04-23 NOTE — PROGRESS NOTES
PT DAILY TREATMENT NOTE - [facility]     Patient Name: Mohsen Welsh  Date:2021  : 1955  [x]  Patient  Verified  Payor: [place] MEDICARE / Plan: VA MEDICARE PART A & B / Product Type: Medicare /    In time:1030  Out time:1113  Total Treatment Time (min): 43  Total Timed Codes (min): 23  1:1 Treatment Time ( [address] only): 37   Visit #: 1 of 10    Treatment Area: Pain in left foot [M79.672]    SUBJECTIVE  Pain Level (0-10 scale): 7  Any medication changes, allergies to medications, adverse drug reactions, diagnosis change, or new procedure performed?: [x] No    [] Yes (see summary sheet for update)  Subjective functional status:   [x] See Eval form in paper chart      OBJECTIVE    20 min [x]Eval                  []Re-Eval       23 min Therapeutic Exercise:  [x] See flow sheet :HEP   Rationale: increase ROM, increase strength, improve coordination, improve balance and increase proprioception to improve the patients ability to perform ADls. With   [] TE   [] TA   [] neuro   [] other: Patient Education: [x] Review HEP    [] Progressed/Changed HEP based on:   [] positioning   [] body mechanics   [] transfers   [] heat/ice application    [] other:                  Pain Level (0-10 scale) post treatment: 7    ASSESSMENT:   [x]  See Evaluation         Goals:  Short Term Goals: To be accomplished in 1 weeks:  1. Therapist to establish HEP for ROM & Strengthening to improve ease with gait & ADLs. Long Term Goals: To be accomplished in 4 weeks:  1. Patient will be independent with HEP to improve carryover of functional gains with ADLs between visits. Eval Status:n/a  2. Pt will increase left ankle DF to -10 degrees to normalize gait pattern. Eval Status DF: -30 deg  3. Pt will increase FOTO score to 63 points to demonstrate improved functional mobility   Eval Status:FOTO: 47  4. Pt will increase left ankle strength to grossly 5/5 with MMT to improve ankle stability for gait/balance.     Eval Status: DF: 4/5, PF:4/5, IV: 3/5, EV: 3/5  5. Patient will increase left knee extension ROM to 0 degrees to improve le mechanics for gait.     Eval status: 10 deg from neutral    PLAN      [x]  Continue plan of care    []  Other:_      Isabela Paris, PT 4/23/2021  10:27 AM

## 2021-04-27 ENCOUNTER — APPOINTMENT (OUTPATIENT)
Dept: PHYSICAL THERAPY | Age: 66
End: 2021-04-27
Payer: MEDICARE

## 2021-04-28 ENCOUNTER — HOSPITAL ENCOUNTER (OUTPATIENT)
Dept: PHYSICAL THERAPY | Age: 66
Discharge: HOME | End: 2021-04-28
Payer: MEDICARE

## 2021-04-28 PROCEDURE — 97110 THERAPEUTIC EXERCISES: CPT

## 2021-04-28 PROCEDURE — 97112 NEUROMUSCULAR REEDUCATION: CPT

## 2021-04-28 NOTE — PROGRESS NOTES
PT DAILY TREATMENT NOTE     Patient Name: Benji Delay  Date:2021  : 1955  [x]  Patient  Verified  Payor: VA MEDICARE / Plan: VA MEDICARE PART A & B / Product Type: Medicare /    In time:1118  Out time:1204  Total Treatment Time (min): 55  Visit #: 2 of 10    Medicare/BCBS Only   Total Timed Codes (min):  46 1:1 Treatment Time:  46       Treatment Area: Pain in left foot [M79.672]    SUBJECTIVE  Pain Level (0-10 scale): 4  Any medication changes, allergies to medications, adverse drug reactions, diagnosis change, or new procedure performed?: [x] No    [] Yes (see summary sheet for update)  Subjective functional status/changes:   [] No changes reported  Pt reports she has been doing her HEP and feels like it is helping. OBJECTIVE     26 min Therapeutic Exercise:  [x] See flow sheet :   Rationale: increase ROM and increase strength to improve the patients ability to perform ADLs    20 min Neuromuscular Re-education:  [x]  See flow sheet : balance, intrinsic foot strength, quad emanuel   Rationale: increase strength, improve coordination, improve balance and increase proprioception  to improve the patients ability to normalize gait          With   [] TE   [] TA   [] neuro   [] other: Patient Education: [x] Review HEP    [] Progressed/Changed HEP based on:   [] positioning   [] body mechanics   [] transfers   [] heat/ice application    [] other:      Other Objective/Functional Measures: initiated exercises per flow sheet     Pain Level (0-10 scale) post treatment: 2    ASSESSMENT/Changes in Function: Pt was able to initiate exercises per flow sheet without increase in symptoms. Distributed TB for 3 way hip HEP per pt request. Pt challenged by intrinsic foot strength, substituting with toe PF vs arch tightening.      Patient will continue to benefit from skilled PT services to modify and progress therapeutic interventions, address functional mobility deficits, address ROM deficits, address strength deficits, analyze and address soft tissue restrictions, analyze and cue movement patterns, analyze and modify body mechanics/ergonomics, assess and modify postural abnormalities, address imbalance/dizziness and instruct in home and community integration to attain remaining goals. []  See Plan of Care  []  See progress note/recertification  []  See Discharge Summary         Progress towards goals / Updated goals:  Short Term Goals: To be accomplished in 1 weeks:  1. Therapist to establish HEP for ROM & Strengthening to improve ease with gait & ADLs. MET     Long Term Goals: To be accomplished in 4 weeks:  1. Patient will be independent with HEP to improve carryover of functional gains with ADLs between visits. Eval Status:n/a   MET  2. Pt will increase left ankle DF to -10 degrees to normalize gait pattern. Eval Status DF: -30 deg  3. Pt will increase FOTO score to 63 points to demonstrate improved functional mobility              Eval Status:FOTO: 47  4. Pt will increase left ankle strength to grossly 5/5 with MMT to improve ankle stability for gait/balance. Eval Status: DF: 4/5, PF:4/5, IV: 3/5, EV: 3/5  5. Patient will increase left knee extension ROM to 0 degrees to improve le mechanics for gait.                Eval status: 10 deg from neutral    PLAN  [x]  Upgrade activities as tolerated     [x]  Continue plan of care  []  Update interventions per flow sheet       []  Discharge due to:_  []  Other:_      Philis Gitelman, PT 4/28/2021  12:06 PM    Future Appointments   Date Time Provider [facility]   4/29/2021  3:00 PM Crow Lockett MD [facility] BS AMB   4/30/2021  9:00 AM Timmy Candelario [facility]   5/4/2021  9:45 AM Minnie Luke DPT [facility]   5/6/2021  8:15 AM Los Xie PTA [facility]   5/11/2021  9:45 AM Geraldine Carbajal MD [facility] BS AMB   5/11/2021 11:15 AM Sheryl Amato PTA [facility]   5/13/2021  9:00 AM Naomi Lam [place]i[facility]   5/18/2021  9:00 AM Eri Saeed [facility]   5/20/2021  9:45 AM Yarbrough Parents, [facility]   5/26/2021  8:00 AM Jose Davison MD [facility] BS AMB   7/29/2021  8:30 AM Myron Bates PA-C [facility] BS AMB

## 2021-04-30 ENCOUNTER — HOSPITAL ENCOUNTER (OUTPATIENT)
Dept: PHYSICAL THERAPY | Age: 66
Discharge: HOME | End: 2021-04-30
Payer: MEDICARE

## 2021-04-30 PROCEDURE — 97112 NEUROMUSCULAR REEDUCATION: CPT

## 2021-04-30 PROCEDURE — 97110 THERAPEUTIC EXERCISES: CPT

## 2021-04-30 NOTE — PROGRESS NOTES
PT DAILY TREATMENT NOTE     Patient Name: Cheryl Morris  Date:2021  : 1955  [x]  Patient  Verified  Payor: VA MEDICARE / Plan: VA MEDICARE PART A & B / Product Type: Medicare /    In time:9:00  Out time:9:40  Total Treatment Time (min): 40  Visit #: 3 of 10    Medicare/BCBS Only   Total Timed Codes (min):  40 1:1 Treatment Time:  40       Treatment Area: Pain in left foot [M79.672]    SUBJECTIVE  Pain Level (0-10 scale): 5  Any medication changes, allergies to medications, adverse drug reactions, diagnosis change, or new procedure performed?: [x] No    [] Yes (see summary sheet for update)  Subjective functional status/changes:   [] No changes reported  Pt reports having a lot of discomfort in the mornings    OBJECTIVE    30 min Therapeutic Exercise:  [x] See flow sheet :   Rationale: increase ROM and increase strength to improve the patients ability to perform ADLs    10 min Neuromuscular Re-education:  [x]  See flow sheet :   Rationale: increase strength, improve coordination, improve balance and increase proprioception  to improve the patients ability to perform functional tasks              With   [] TE   [] TA   [] neuro   [] other: Patient Education: [x] Review HEP    [] Progressed/Changed HEP based on:   [] positioning   [] body mechanics   [] transfers   [] heat/ice application    [] other:      Other Objective/Functional Measures:      Pain Level (0-10 scale) post treatment: 3    ASSESSMENT/Changes in Function: Pt was challenged well with therex today. Occasional self-corrected LOB with SLS and steamboats. Cont's to be limited with ankle ROM. Decr'd pain and \"tightness\" following treatment today.       Patient will continue to benefit from skilled PT services to modify and progress therapeutic interventions, address functional mobility deficits, address ROM deficits, address strength deficits, analyze and address soft tissue restrictions, analyze and cue movement patterns, analyze and modify body mechanics/ergonomics, assess and modify postural abnormalities, address imbalance/dizziness and instruct in home and community integration to attain remaining goals. []  See Plan of Care  []  See progress note/recertification  []  See Discharge Summary         Progress towards goals / Updated goals:  Short Term Goals: To be accomplished in 1 weeks:  1. Therapist to establish HEP for ROM & Strengthening to improve ease with gait & ADLs. MET      Long Term Goals: To be accomplished in 4 weeks:  1. Patient will be independent with HEP to improve carryover of functional gains with ADLs between visits.             Eval Status:n/a              MET  2. Pt will increase left ankle DF to -10 degrees to normalize gait pattern.               Eval Status DF: -30 deg  3. Pt will increase FOTO score to 63 points to demonstrate improved functional mobility              Eval Status:FOTO: 47  4. Pt will increase left ankle strength to grossly 5/5 with MMT to improve ankle stability for gait/balance.              Eval Status: DF: 4/5, PF:4/5, IV: 3/5, EV: 3/5  5. Patient will increase left knee extension ROM to 0 degrees to improve le mechanics for gait.                Eval status: 10 deg from neutral    PLAN  []  Upgrade activities as tolerated     [x]  Continue plan of care  []  Update interventions per flow sheet       []  Discharge due to:_  []  Other:_      Onelia Alvarez PTA 4/30/2021  9:04 AM    Future Appointments   Date Time Provider Yary Baer   5/4/2021  9:45 AM ROX WillardT [facility]   5/6/2021  8:15 AM Mumtaz Vera PTA [facility]   5/11/2021  9:45 AM Darrell Galvin MD [facility] BS AMB   5/11/2021 11:15 [facility]   5/13/2021  9:00 AM Job Rhodes, PT [facility]   5/18/2021  9:00 AM Cait Thorpe PTA [facility]   5/20/2021  9:45 AM Toribio Roque PT [facility]   5/26/2021  8:00 AM Isabella Rosario MD [facility] BS AMB   7/29/2021 8:30 AM DAVON Arora AMB

## 2021-05-04 ENCOUNTER — HOSPITAL ENCOUNTER (OUTPATIENT)
Dept: PHYSICAL THERAPY | Age: 66
Discharge: HOME | End: 2021-05-04
Payer: MEDICARE

## 2021-05-04 PROCEDURE — 97140 MANUAL THERAPY 1/> REGIONS: CPT

## 2021-05-04 PROCEDURE — 97110 THERAPEUTIC EXERCISES: CPT

## 2021-05-04 PROCEDURE — 97112 NEUROMUSCULAR REEDUCATION: CPT

## 2021-05-04 NOTE — PROGRESS NOTES
PT DAILY TREATMENT NOTE     Patient Name: De Bales  Date:2021  : 1955  [x]  Patient  Verified  Payor: Jan Muse / Plan: VA MEDICARE PART A & B / Product Type: Medicare /    In time:9:52A  Out time:10:45A  Total Treatment Time (min): 53  Visit #: 4 of 10    Medicare/BCBS Only   Total Timed Codes (min):  53 1:1 Treatment Time:  53       Treatment Area: Pain in left foot [M79.672]    SUBJECTIVE  Pain Level (0-10 scale): 2  Any medication changes, allergies to medications, adverse drug reactions, diagnosis change, or new procedure performed?: [x] No    [] Yes (see summary sheet for update)  Subjective functional status/changes:   [] No changes reported  Pt reports continued increased pain levels in L heel following sitting extended periods of time     OBJECTIVE    33 min Therapeutic Exercise:  [x] See flow sheet :   Rationale: increase ROM and increase strength to improve the patients ability to perform ADLs    10 min Neuromuscular Re-education:  [x]  See flow sheet :   Rationale: increase strength, improve coordination, improve balance and increase proprioception  to improve the patients ability to perform functional tasks    10 min Manual Therapy:  STM/TPr to L gastroc/soleus, gr II-III AP talocrural jt mobs in prone    The manual therapy interventions were performed at a separate and distinct time from the therapeutic activities interventions.   Rationale: decrease pain, increase ROM, increase tissue extensibility and decrease trigger points to increase tolerance to therex and return to PLOF        With   [x] TE   [] TA   [x] neuro   [] other: Patient Education: [x] Review HEP    [] Progressed/Changed HEP based on:   [] positioning   [] body mechanics   [] transfers   [] heat/ice application    [] other:      Other Objective/Functional Measures:    See goals below     Pain Level (0-10 scale) post treatment: 0    ASSESSMENT/Changes in Function:   Improvement in ankle/knee AROM evident by objective measures noted below. MT techniques performed with improvements in tissue mobility/AROM after w/ report of a decrease in pain levels . Continued current therex program w/ report of no pain upon conclusion of today's session. Patient will continue to benefit from skilled PT services to modify and progress therapeutic interventions, address functional mobility deficits, address ROM deficits, address strength deficits, analyze and address soft tissue restrictions, analyze and cue movement patterns, analyze and modify body mechanics/ergonomics, assess and modify postural abnormalities, address imbalance/dizziness and instruct in home and community integration to attain remaining goals. [x]  See Plan of Care  []  See progress note/recertification  []  See Discharge Summary         Progress towards goals / Updated goals:  Short Term Goals: To be accomplished in 1 weeks:  1. Therapist to establish HEP for ROM & Strengthening to improve ease with gait & ADLs. MET      Long Term Goals: To be accomplished in 4 weeks:  1. Patient will be independent with HEP to improve carryover of functional gains with ADLs between visits.             Eval Status:n/a              MET  2. Pt will increase left ankle DF to -10 degrees to normalize gait pattern.               Eval Status DF: -30 deg   Current: DF: -12 deg  3. Pt will increase FOTO score to 63 points to demonstrate improved functional mobility              Eval Status:FOTO: 47  4. Pt will increase left ankle strength to grossly 5/5 with MMT to improve ankle stability for gait/balance.              Eval Status: DF: 4/5, PF:4/5, IV: 3/5, EV: 3/5  5. Patient will increase left knee extension ROM to 0 degrees to improve le mechanics for gait.                Eval status: 10 deg from neutral   Current: 5 deg from neutral    PLAN  []  Upgrade activities as tolerated     [x]  Continue plan of care  []  Update interventions per flow sheet       [] Discharge due to:_  []  Other:_      Yeimi Styles, DPT 5/4/2021  9:04 AM    Future Appointments   Date Time Provider Yary Baer   5/4/2021  9:45 AM Jacki Ferrera DPT [facility]   5/6/2021  8:15 AM Dhara Saavedra, PTA [facility]   5/11/2021  9:45 AM Erik Galvin MD [facility] BS AMB   5/11/2021 11:15 AM Angelica Gaming, PTA [facility]   5/13/2021  9:00 AM Shad Quigley, PT [facility]   5/18/2021  9:00 AM Angelica Gaming, PTA [facility]   5/20/2021  9:45 AM Kiana Mar, PT [facility]   5/26/2021  8:00 AM Kathy Koenig MD [facility] BS AMB   7/29/2021  8:30 AM Rossana Onofre PA-C [facility] BS AMB

## 2021-05-06 ENCOUNTER — HOSPITAL ENCOUNTER (OUTPATIENT)
Dept: PHYSICAL THERAPY | Age: 66
Discharge: HOME | End: 2021-05-06
Payer: MEDICARE

## 2021-05-06 PROCEDURE — 97110 THERAPEUTIC EXERCISES: CPT

## 2021-05-06 PROCEDURE — 97112 NEUROMUSCULAR REEDUCATION: CPT

## 2021-05-06 NOTE — PROGRESS NOTES
PT DAILY TREATMENT NOTE     Patient Name: Mino Lexington  Date:2021  : 1955  [x]  Patient  Verified  Payor: [place] MEDICARE / Plan: [place] MEDICARE PART A & B / Product Type: Medicare /    In time:816  Out time:906  Total Treatment Time (min): 50  Visit #: 5 of 10    Medicare/BCBS Only   Total Timed Codes (min):  40 1:1 Treatment Time:  40       Treatment Area: Pain in left foot [M79.672]    SUBJECTIVE  Pain Level (0-10 scale): 3  Any medication changes, allergies to medications, adverse drug reactions, diagnosis change, or new procedure performed?: [x] No    [] Yes (see summary sheet for update)  Subjective functional status/changes:   [] No changes reported  \"I have a little tightness. \"    OBJECTIVE    Modality rationale: decrease pain to improve the patients ability to improve mobility and gait   Min Type Additional Details    [] Estim:  []Unatt       []IFC  []Premod                        []Other:  []w/ice   []w/heat  Position:  Location:    [] Estim: []Att    []TENS instruct  []NMES                    []Other:  []w/US   []w/ice   []w/heat  Position:  Location:    []  Traction: [] Cervical       []Lumbar                       [] Prone          []Supine                       []Intermittent   []Continuous Lbs:  [] before manual  [] after manual    []  Ultrasound: []Continuous   [] Pulsed                           []1MHz   []3MHz W/cm2:  Location:    []  Iontophoresis with dexamethasone         Location: [] Take home patch   [] In clinic   10 [x]  Ice     []  heat  []  Ice massage  []  Laser   []  Anodyne Position: supine with wedge   Location: left foot/ankle    []  Laser with stim  []  Other:  Position:  Location:    []  Vasopneumatic Device Pressure:       [] lo [] med [] hi   Temperature: [] lo [] med [] hi   [x] Skin assessment post-treatment:  [x]intact []redness- no adverse reaction    []redness  adverse reaction:     15 min Therapeutic Exercise:  [x] See flow sheet :   Rationale: increase ROM and increase strength to improve the patients ability to perform ADLs    25 min Neuromuscular Re-education:  [x]  See flow sheet : ankle/foot stabilization activities   Rationale: increase ROM, increase strength, improve coordination, improve balance and increase proprioception  to improve the patients ability to improve mobility, stance stability, and gait        With   [x] TE   [] TA   [x] neuro   [] other: Patient Education: [x] Review HEP    [] Progressed/Changed HEP based on:   [x] positioning   [x] body mechanics   [] transfers   [] heat/ice application    [] other:      Other Objective/Functional Measures:      Pain Level (0-10 scale) post treatment: 0    ASSESSMENT/Changes in Function: Pt is making progress with her functional mobility and balance. She reports having achilles tightness throughout treatment, but decreased following treatment. Patient will continue to benefit from skilled PT services to modify and progress therapeutic interventions, address functional mobility deficits, address ROM deficits, address strength deficits, analyze and address soft tissue restrictions, analyze and cue movement patterns, analyze and modify body mechanics/ergonomics, assess and modify postural abnormalities, address imbalance/dizziness and instruct in home and community integration to attain remaining goals. [x]  See Plan of Care  []  See progress note/recertification  []  See Discharge Summary         Progress towards goals / Updated goals:  Short Term Goals: To be accomplished in 1 weeks:  1. Therapist to establish HEP for ROM & Strengthening to improve ease with gait & ADLs.                MET      Long Term Goals: To be accomplished in 4 weeks:  1. Patient will be independent with HEP to improve carryover of functional gains with ADLs between visits.             Eval Status:n/a              MET  2.  Pt will increase left ankle DF to -10 degrees to normalize gait pattern.               Eval Status DF: -30 deg              Current: DF: -12 deg  3. Pt will increase FOTO score to 63 points to demonstrate improved functional mobility              Eval Status:FOTO: 47   Assess at 30 day cindi  4. Pt will increase left ankle strength to grossly 5/5 with MMT to improve ankle stability for gait/balance.              Eval Status: DF: 4/5, PF:4/5, IV: 3/5, EV: 3/5   Making steady progress  5. Patient will increase left knee extension ROM to 0 degrees to improve le mechanics for gait.                Eval status: 10 deg from neutral              Current: 5 deg from neutral       PLAN  []  Upgrade activities as tolerated     [x]  Continue plan of care  []  Update interventions per flow sheet       []  Discharge due to:_  []  Other:_      Nidhi Reid, PTA, CSCS 5/6/2021  9:12 AM    Future Appointments   Date Time Provider Yary Baer   5/11/2021  9:45 AM Deondre Galvin MD [facility] BS AMB   5/11/2021 11:15 AM Elke Ano, PTA [facility]   5/13/2021  9:00 AM Presley Redding, PT [facility]   5/18/2021  9:00 AM Elke Ano, PTA [facility]   5/20/2021  9:45 AM Shana Austin, PT [facility]   5/26/2021  8:00 AM Lorena Harp MD [facility] BS AMB   7/29/2021  8:30 AM Jose Cristobal PA-C [facility] BS AMB

## 2021-05-11 ENCOUNTER — OFFICE VISIT (OUTPATIENT)
Dept: ORTHOPEDIC SURGERY | Age: 66
End: 2021-05-11
Payer: MEDICARE

## 2021-05-11 ENCOUNTER — HOSPITAL ENCOUNTER (OUTPATIENT)
Dept: PHYSICAL THERAPY | Age: 66
Discharge: HOME | End: 2021-05-11
Payer: MEDICARE

## 2021-05-11 VITALS
BODY MASS INDEX: 31.1 KG/M2 | TEMPERATURE: 97.3 F | OXYGEN SATURATION: 97 % | WEIGHT: 210 LBS | HEIGHT: 69 IN | RESPIRATION RATE: 16 BRPM | HEART RATE: 60 BPM

## 2021-05-11 DIAGNOSIS — M79.672 PAIN OF LEFT HEEL: ICD-10-CM

## 2021-05-11 DIAGNOSIS — M76.62 ACHILLES TENDINITIS OF LEFT LOWER EXTREMITY: Primary | ICD-10-CM

## 2021-05-11 PROCEDURE — 97110 THERAPEUTIC EXERCISES: CPT

## 2021-05-11 PROCEDURE — G8399 PT W/DXA RESULTS DOCUMENT: HCPCS | Performed by: ORTHOPAEDIC SURGERY

## 2021-05-11 PROCEDURE — 1101F PT FALLS ASSESS-DOCD LE1/YR: CPT | Performed by: ORTHOPAEDIC SURGERY

## 2021-05-11 PROCEDURE — G8417 CALC BMI ABV UP PARAM F/U: HCPCS | Performed by: ORTHOPAEDIC SURGERY

## 2021-05-11 PROCEDURE — 1090F PRES/ABSN URINE INCON ASSESS: CPT | Performed by: ORTHOPAEDIC SURGERY

## 2021-05-11 PROCEDURE — 97112 NEUROMUSCULAR REEDUCATION: CPT

## 2021-05-11 PROCEDURE — G9899 SCRN MAM PERF RSLTS DOC: HCPCS | Performed by: ORTHOPAEDIC SURGERY

## 2021-05-11 PROCEDURE — G8427 DOCREV CUR MEDS BY ELIG CLIN: HCPCS | Performed by: ORTHOPAEDIC SURGERY

## 2021-05-11 PROCEDURE — G8536 NO DOC ELDER MAL SCRN: HCPCS | Performed by: ORTHOPAEDIC SURGERY

## 2021-05-11 PROCEDURE — 3017F COLORECTAL CA SCREEN DOC REV: CPT | Performed by: ORTHOPAEDIC SURGERY

## 2021-05-11 PROCEDURE — G8756 NO BP MEASURE DOC: HCPCS | Performed by: ORTHOPAEDIC SURGERY

## 2021-05-11 PROCEDURE — 99213 OFFICE O/P EST LOW 20 MIN: CPT | Performed by: ORTHOPAEDIC SURGERY

## 2021-05-11 PROCEDURE — G8432 DEP SCR NOT DOC, RNG: HCPCS | Performed by: ORTHOPAEDIC SURGERY

## 2021-05-11 RX ORDER — FLUOXETINE HYDROCHLORIDE 20 MG/1
CAPSULE ORAL
COMMUNITY
Start: 2021-04-28 | End: 2021-11-17

## 2021-05-11 NOTE — PROGRESS NOTES
AMBULATORY PROGRESS NOTE      Patient: Brigid Morton             MRN: [id number]     SSN: [id number] Body mass index is 31.01 kg/m². YOB: 1955     AGE: 72 y.o. EX: female    PCP: Duane Carbajal NP       IMPRESSION // [address] has a diagnosis of:      DIAGNOSES    1. Achilles tendinitis of left lower extremity    2. Pain of left heel        Orders Placed This Encounter    FLUoxetine (PROzac) 20 mg capsule        PLAN:    1. I will advise pt to continue attending PT. 2. I anticipate ordering a MRI of her left ankle to assess left achilles tendon if left ankle pain does not subside. RTO-  1 month    Brigid Morton  expresses understanding of the diagnosis, treatment plan, and all of their proposed questions were answered to their satisfaction. Patient education has been provided re the diagnoses. HPI //  [facility] A 72 y.o. female who is a/an  established patient, presenting to my outpatient office for evaluation of  the following chief complaint(s):     Chief Complaint   Patient presents with    Foot Pain     left     At LOV she presented with complaint of pain discomfort, to her left hindfoot long Achilles tendon. She cannot take anti-inflammatory agents, due to being on Plavix. As such I wrote for short-term duration of tramadol. Formal PT, HEP, and Dorsal night splint    Since LOV pt has attended 4 sessions of PT. She went to PT this morning. Pt states she has bought new shoes for arch support. Visit Vitals  Pulse 60   Temp 97.3 °F (36.3 °C)   Resp 16   Ht 5' 9\" (1.753 m)   Wt 210 lb (95.3 kg)   SpO2 97%   BMI 31.01 kg/m²       Appearance: Alert, well appearing and pleasant patient who is in no distress, oriented to person, place/time, and who follows commands. This patient is accompanied in the examination room by her  self.  There is signs of: no dementia  Psychiatric: Affect/mood are appropriate. Speech normal in context and clarity, memory intact grossly, no involuntary movements - tremors. Patient arrives to office via: without assistive device. H EENT (2): Head normocephalic & atraumatic. Eye: pupils are round// EOM are intact // Neck: ROM WNL  // Hearings Intact   Respiratory: Breathing non labored     ANKLE/FOOT left    Gait: normal  Tenderness: mild over the left distal achilles tendon  Cutaneous: swelling to the left distal achilles tendon  Joint Motion: WNL  Joint / Tendon Stability: No Ankle or Subtalar instability or joint laxity. No peroneal sublux ability or dislocation  Alignment: neutral Hindfoot,    Neuro Motor/Sensory: NL/NL  Vascular: NL foot/ankle pulses,   Lymphatics: No extremity lymphedema, No calf swelling, no tenderness to calf muscles. CHART REVIEW     Thaddeus Glez has been experiencing pain and discomfort confirmed as outlined in the pain assessment outlined below.  was reviewed by Jazmín Guillen MD on 5/11/2021. Pain Assessment  5/11/2021   Location of Pain Foot   Location Modifiers Left   Severity of Pain 2   Quality of Pain Other (Comment); Sharp   Quality of Pain Comment tight and pulling   Duration of Pain A few minutes   Frequency of Pain Intermittent   Aggravating Factors Other (Comment)   Aggravating Factors Comment worse in the mornings   Limiting Behavior No   Relieving Factors Ice   Relieving Factors Comment -   Result of Injury -        Thaddeus Glez  has a past medical history of Arthritis, CAD (coronary artery disease), Calcific Achilles tendonitis, Essential hypertension (7/13/2020), Hypercholesterolemia, Hypertension, Left knee pain, Menopause, Pain of right heel, Sprain of right ring finger, Thyroid disease, and Wears glasses. She also has no past medical history of Nipple discharge.      Patients is employed at:         Past Medical History:   Diagnosis Date    Arthritis     CAD (coronary artery disease)     Calcific Achilles tendonitis     Right; insertional    Essential hypertension 7/13/2020    Hypercholesterolemia     Hypertension     Left knee pain     Menopause     Pain of right heel     Sprain of right ring finger     Thyroid disease     Wears glasses      Past Surgical History:   Procedure Laterality Date    HX CHOLECYSTECTOMY  1990    HX HEENT      Eye surgery    HX HERNIA REPAIR      HX KNEE REPLACEMENT      HX ORTHOPAEDIC Left 07/2020    left total knee arthroplasty    GA CARDIAC SURG PROCEDURE UNLIST      stent july 2020     Current Outpatient Medications   Medication Sig    FLUoxetine (PROzac) 20 mg capsule     ALPRAZolam (Xanax) 0.5 mg tablet Take 1 Tab by mouth every eight (8) hours as needed for Anxiety. Max Daily Amount: 1.5 mg.  potassium chloride (KAON 10%) 20 mEq/15 mL solution Take  by mouth daily. 15 ml daily    hydrOXYzine HCL (ATARAX) 25 mg tablet Take  by mouth as needed.  carvediloL (COREG) 6.25 mg tablet Take 1 Tab by mouth every twelve (12) hours.  levothyroxine (SYNTHROID) 25 mcg tablet Take 75 mcg by mouth Daily (before breakfast).  amLODIPine (NORVASC) 2.5 mg tablet Take 1 Tab by mouth daily.  aspirin delayed-release 81 mg tablet Take 1 Tab by mouth daily.  clopidogreL (PLAVIX) 75 mg tab Take 1 Tab by mouth daily.  atorvastatin (LIPITOR) 80 mg tablet Take 1 Tab by mouth daily.  escitalopram oxalate (Lexapro) 20 mg tablet Take 20 mg by mouth daily. No current facility-administered medications for this visit.       No Known Allergies  Social History     Occupational History    Not on file   Tobacco Use    Smoking status: Former Smoker     Years: 15.00    Smokeless tobacco: Never Used   Substance and Sexual Activity    Alcohol use: Yes     Frequency: Monthly or less     Comment: Occasional    Drug use: No    Sexual activity: Not on file     Family History   Problem Relation Age of Onset    Breast Cancer Other     Arthritis-osteo Mother     Hypertension Mother     Heart Disease Mother     Hypertension Other         Grandmother    Other Other         Heart problems: Mother, grandmother    Diabetes Other         Grandmother        DIAGNOSTIC LAB DATA      Lab Results   Component Value Date/Time    Hemoglobin A1c 6.0 (H) 06/26/2020 12:32 PM    Hemoglobin A1c 6.0 (H) 05/23/2017 11:23 AM    //   Lab Results   Component Value Date/Time    Glucose 103 (H) 03/21/2021 08:05 PM        No results found for: VMH8FOZY, UTS7VIMK      No results found for: VITD3, XQVID2, XQVID3, XQVID, VD3RIA, FISQ12INGEO      REVIEW OF SYSTEMS : 5/11/2021  ALL BELOW ARE Negative except : SEE HPI     All other systems reviewed and are negative. 12 point review of systems otherwise negative unless noted in HPI. DIAGNOSTIC IMAGING /ORDERS      none today      I have reviewed the results of the above study. The interpretation of this study is my professional opinion. An electronic signature was used to authenticate this note. Disclaimer: Sections of this note are dictated using utilizing voice recognition software, which may have resulted in some phonetic based errors in grammar and contents. Even though attempts were made to correct all the mistakes, some may have been missed, and remained in the body of the document. If questions arise, please contact our department. Chi Varela may have a reminder for a \"due or due soon\" health maintenance. I have asked that she contact her primary care provider for follow-up on this health maintenance. Aruna Mendes, as dictated by Leonila Galvin MD  5/11/2021  7:49 AM

## 2021-05-11 NOTE — PROGRESS NOTES
PT DAILY TREATMENT NOTE     Patient Name: Luciano Levi  Date:2021  : 1955  [x]  Patient  Verified  Payor: [place] MEDICARE / Plan: [place] MEDICARE PART A & B / Product Type: Medicare /    In time:817  Out time:900  Total Treatment Time (min): 43  Visit #: 6 of 10    Medicare/BCBS Only   Total Timed Codes (min):  43 1:1 Treatment Time:  43       Treatment Area: Pain in left foot [M79.672]    SUBJECTIVE  Pain Level (0-10 scale): 2-3  Any medication changes, allergies to medications, adverse drug reactions, diagnosis change, or new procedure performed?: [x] No    [] Yes (see summary sheet for update)  Subjective functional status/changes:   [] No changes reported  \"I'm getting better. \"    OBJECTIVE    18 min Therapeutic Exercise:  [x] See flow sheet :   Rationale: increase ROM and increase strength to improve the patients ability to perform ADLs    25 min Neuromuscular Re-education:  [x]  See flow sheet : balance, intrinsic foot strenght    Rationale: increase strength, improve coordination, improve balance and increase proprioception  to improve the patients ability to normalize gait          With   [] TE   [] TA   [] neuro   [] other: Patient Education: [x] Review HEP    [] Progressed/Changed HEP based on:   [] positioning   [] body mechanics   [] transfers   [] heat/ice application    [] other:      Other Objective/Functional Measures: progressed exercises per flow sheet      Pain Level (0-10 scale) post treatment: 1    ASSESSMENT/Changes in Function: Pt tolerated progression of exercises with out increase in symptoms. Pt remains challenged with intrinsic foot strength, unable to isolate hallux from other digits. Progressing well with standing functional strength and single leg balance.      Patient will continue to benefit from skilled PT services to modify and progress therapeutic interventions, address functional mobility deficits, address ROM deficits, address strength deficits, analyze and address soft tissue restrictions, analyze and cue movement patterns, analyze and modify body mechanics/ergonomics, assess and modify postural abnormalities, address imbalance/dizziness and instruct in home and community integration to attain remaining goals. []  See Plan of Care  []  See progress note/recertification  []  See Discharge Summary         Progress towards goals / Updated goals:  Short Term Goals: To be accomplished in 1 weeks:  1. Therapist to establish HEP for ROM & Strengthening to improve ease with gait & ADLs.                MET      Long Term Goals: To be accomplished in 4 weeks:  1. Patient will be independent with HEP to improve carryover of functional gains with ADLs between visits.             Eval Status:n/a              MET  2. Pt will increase left ankle DF to -10 degrees to normalize gait pattern.               Eval Status DF: -30 deg              Current: DF: -12 deg  3. Pt will increase FOTO score to 63 points to demonstrate improved functional mobility              Eval Status:FOTO: 47              Assess at 30 day cindi  4. Pt will increase left ankle strength to grossly 5/5 with MMT to improve ankle stability for gait/balance.              Eval Status: DF: 4/5, PF:4/5, IV: 3/5, EV: 3/5              Making steady progress  5. Patient will increase left knee extension ROM to 0 degrees to improve le mechanics for gait.                Eval status: 10 deg from neutral              Current: 5 deg from neutral    PLAN  [x]  Upgrade activities as tolerated     [x]  Continue plan of care  []  Update interventions per flow sheet       []  Discharge due to:_  []  Other:_      Gabriel Yen, PT 5/11/2021  8:22 AM    Future Appointments   Date Time Provider Yary Baer   5/11/2021  9:45 AM Enid Bryant MD [facility] BS AMB   5/13/2021  9:00 AM Leonora Rai, PT [facility]   5/18/2021  9:00 AM Monique Ribera PTA [facility]   5/20/2021  9:45 AM Valorie Cadena, PT [facility] 5/26/2021  8:00 AM Dedrick Pal MD [facility] BS AMB   7/29/2021  8:30 AM Guerline Joyce PA-C [facility] BS AMB

## 2021-05-13 ENCOUNTER — HOSPITAL ENCOUNTER (OUTPATIENT)
Dept: PHYSICAL THERAPY | Age: 66
Discharge: HOME | End: 2021-05-13
Payer: MEDICARE

## 2021-05-13 PROCEDURE — 97110 THERAPEUTIC EXERCISES: CPT

## 2021-05-13 PROCEDURE — 97112 NEUROMUSCULAR REEDUCATION: CPT

## 2021-05-13 NOTE — PROGRESS NOTES
PT DAILY TREATMENT NOTE     Patient Name: Luciano Levi  Date:2021  : 1955  [x]  Patient  Verified  Payor: VA MEDICARE / Plan: VA MEDICARE PART A & B / Product Type: Medicare /    In time:900  Out time:938  Total Treatment Time (min): 38  Visit #: 7 of 10    Medicare/BCBS Only   Total Timed Codes (min):  38 1:1 Treatment Time:  38       Treatment Area: Pain in left foot [M79.672]    SUBJECTIVE  Pain Level (0-10 scale): 3  Any medication changes, allergies to medications, adverse drug reactions, diagnosis change, or new procedure performed?: [x] No    [] Yes (see summary sheet for update)  Subjective functional status/changes:   [] No changes reported  \"I get relief after my sessions, but then by the next morning the pain has come back. \"    OBJECTIVE    28 min Therapeutic Exercise:  [x]? See flow sheet :   Rationale: increase ROM and increase strength to improve the patients ability to perform ADLs     10 min Neuromuscular Re-education:  [x]? See flow sheet : balance, intrinsic foot strenght    Rationale: increase strength, improve coordination, improve balance and increase proprioception  to improve the patients ability to normalize gait            With   [] TE   [] TA   [] neuro   [] other: Patient Education: [x] Review HEP    [] Progressed/Changed HEP based on:   [] positioning   [] body mechanics   [] transfers   [] heat/ice application    [] other:      Other Objective/Functional Measures:      Pain Level (0-10 scale) post treatment: 1.5    ASSESSMENT/Changes in Function: discussed use of a night splint to help with morning symptoms. Patient thinks she has one at home and will bring to next session for us to review its usage. Exercises held per flow sheet due to time constraint, patient requesting to finish early to get to another doctor appt.     Patient will continue to benefit from skilled PT services to modify and progress therapeutic interventions, address functional mobility deficits, address ROM deficits, address strength deficits, analyze and address soft tissue restrictions, analyze and cue movement patterns, analyze and modify body mechanics/ergonomics, assess and modify postural abnormalities, address imbalance/dizziness and instruct in home and community integration to attain remaining goals. []  See Plan of Care  []  See progress note/recertification  []  See Discharge Summary         Progress towards goals / Updated goals:  Short Term Goals: To be accomplished in 1 weeks:  1. Therapist to establish HEP for ROM & Strengthening to improve ease with gait & ADLs.                MET      Long Term Goals: To be accomplished in 4 weeks:  1. Patient will be independent with HEP to improve carryover of functional gains with ADLs between visits.             Eval Status:n/a              MET  2. Pt will increase left ankle DF to -10 degrees to normalize gait pattern.               Eval Status DF: -30 deg              Current: DF: -12 deg  3. Pt will increase FOTO score to 63 points to demonstrate improved functional mobility              Eval Status:FOTO: 47              Assess at 30 day cindi  4. Pt will increase left ankle strength to grossly 5/5 with MMT to improve ankle stability for gait/balance.              Eval Status: DF: 4/5, PF:4/5, IV: 3/5, EV: 3/5              Making steady progress  5. Patient will increase left knee extension ROM to 0 degrees to improve le mechanics for gait.                Eval status: 10 deg from neutral              Current: 5 deg from neutral    PLAN  []  Upgrade activities as tolerated     [x]  Continue plan of care  []  Update interventions per flow sheet       []  Discharge due to:_  []  Other:_      Jesus Galeas, PT 5/13/2021  9:09 AM    Future Appointments   Date Time Provider Yary Baer   5/18/2021  9:00 AM [facility]   5/20/2021  9:45 AM [facility]   5/26/2021  8:00 AM Joseline Rodriguez MD [facility] AMB   6/8/2021  9:45 AM Eder Galvin MD [facility] BS AMB   7/29/2021  8:30 AM Dru Denver, PA-C [facility] AMB

## 2021-05-18 ENCOUNTER — HOSPITAL ENCOUNTER (OUTPATIENT)
Dept: PHYSICAL THERAPY | Age: 66
Discharge: HOME | End: 2021-05-18
Payer: MEDICARE

## 2021-05-18 PROCEDURE — 97110 THERAPEUTIC EXERCISES: CPT

## 2021-05-18 PROCEDURE — 97112 NEUROMUSCULAR REEDUCATION: CPT

## 2021-05-18 NOTE — PROGRESS NOTES
PT DAILY TREATMENT NOTE     Patient Name: Dez Diaz  Date:2021  : 1955  [x]  Patient  Verified  Payor: Jasiel Edmondson / Plan: [place] & [place] / Product Type: Medicare /    In time:902  Out time:945  Total Treatment Time (min): 43  Visit #: 8 of 10    Medicare/BCBS Only   Total Timed Codes (min):  43 1:1 Treatment Time:  43       Treatment Area: Pain in left foot [M79.672]    SUBJECTIVE  Pain Level (0-10 scale): 0  Any medication changes, allergies to medications, adverse drug reactions, diagnosis change, or new procedure performed?: [x] No    [] Yes (see summary sheet for update)  Subjective functional status/changes:   [] No changes reported  \"it's not bad today; I'm feeling better. \"    OBJECTIVE      31 min Therapeutic Exercise:  [] See flow sheet :   Rationale: increase ROM and increase strength to improve the patients ability to increase activity tolerance       12 min Neuromuscular Re-education:  []  See flow sheet : foot intrinsics, balance   Rationale: increase strength, improve coordination, improve balance and increase proprioception  to improve the patients ability to tolerate sustained postures          With   [] TE   [] TA   [] neuro   [] other: Patient Education: [x] Review HEP    [] Progressed/Changed HEP based on:   [] positioning   [] body mechanics   [] transfers   [] heat/ice application    [] other:      Other Objective/Functional Measures: increased reps per flow sheet     Pain Level (0-10 scale) post treatment: 0    ASSESSMENT/Changes in Function: Patient reported no pain but a little stiffness upon arrival today. She forgot her splint but stated she will stop back by with it later today or bring it to her next visit. She continues to lack TKE in stance and has difficulty with SLS on left using intermittent reaching strategy. Instructed patient on performing ankle 4 way with band and updated HEP. She demonstrated and verbalized understanding.  She had no pain upon departure. Patient will continue to benefit from skilled PT services to modify and progress therapeutic interventions, address functional mobility deficits, address ROM deficits, address strength deficits, analyze and address soft tissue restrictions, analyze and cue movement patterns, analyze and modify body mechanics/ergonomics, assess and modify postural abnormalities, address imbalance/dizziness and instruct in home and community integration to attain remaining goals. []  See Plan of Care  []  See progress note/recertification  []  See Discharge Summary         Progress towards goals / Updated goals:  Short Term Goals: To be accomplished in 1 weeks:  1. Therapist to establish HEP for ROM & Strengthening to improve ease with gait & ADLs.                MET      Long Term Goals: To be accomplished in 4 weeks:  1. Patient will be independent with HEP to improve carryover of functional gains with ADLs between visits.             Eval Status:n/a              MET  2. Pt will increase left ankle DF to -10 degrees to normalize gait pattern.               Eval Status DF: -30 deg              Current: DF: -12 deg  3. Pt will increase FOTO score to 63 points to demonstrate improved functional mobility              Eval Status:FOTO: 47              Assess at 30 day cindi  4. Pt will increase left ankle strength to grossly 5/5 with MMT to improve ankle stability for gait/balance.              Eval Status: DF: 4/5, PF:4/5, IV: 3/5, EV: 3/5              Making steady progress  5. Patient will increase left knee extension ROM to 0 degrees to improve le mechanics for gait.                Eval status: 10 deg from neutral              Current: 5 deg from neutral    PLAN  [x]  Upgrade activities as tolerated     []  Continue plan of care  []  Update interventions per flow sheet       []  Discharge due to:_  []  Other:_      Vee Owen, PTA 5/18/2021  8:50 AM    Future Appointments   Date Time Provider Yary Baer 5/18/2021  9:00 AM Geraldina Bosworth [facility]   5/20/2021  9:45 AM Mark Durant PT [facility]   5/26/2021  8:00 AM Bijan Hatfiled MD [facility] BS AMB   6/8/2021  9:45 AM Luigi Zaidi MD [facility] BS AMB   7/29/2021  8:30 AM Estee Moore PA-C [facility] BS AMB

## 2021-05-20 ENCOUNTER — HOSPITAL ENCOUNTER (OUTPATIENT)
Dept: PHYSICAL THERAPY | Age: 66
Discharge: HOME | End: 2021-05-20
Payer: MEDICARE

## 2021-05-20 PROCEDURE — 97110 THERAPEUTIC EXERCISES: CPT

## 2021-05-20 PROCEDURE — 97530 THERAPEUTIC ACTIVITIES: CPT

## 2021-05-20 NOTE — PROGRESS NOTES
PT DAILY TREATMENT NOTE     Patient Name: Bailee Ramos  Date:2021  : 1955  [x]  Patient  Verified  Payor: [place] MEDICARE / Plan: [place] MEDICARE PART A & B / Product Type: Medicare /    In time:949  Out time:1031  Total Treatment Time (min): 42  Visit #: 9 of 10    Medicare/BCBS Only   Total Timed Codes (min):  42 1:1 Treatment Time:  42       Treatment Area: Pain in left foot [M79.672]    SUBJECTIVE  Pain Level (0-10 scale): 1  Any medication changes, allergies to medications, adverse drug reactions, diagnosis change, or new procedure performed?: [x] No    [] Yes (see summary sheet for update)  Subjective functional status/changes:   [] No changes reported  See recert    OBJECTIVE    23 min Therapeutic Exercise:  [x] See flow sheet :   Rationale: increase ROM and increase strength to improve the patients ability to perform ADLs    19 min Therapeutic Activity:  [x]  See flow sheet : reassessment, pre gait, functional hip strength   Rationale: increase strength and improve coordination  to improve the patients ability to negotiate home and community            With   [] TE   [] TA   [] neuro   [] other: Patient Education: [x] Review HEP    [] Progressed/Changed HEP based on:   [] positioning   [] body mechanics   [] transfers   [] heat/ice application    [] other:      Other Objective/Functional Measures: see recert     Pain Level (0-10 scale) post treatment: 0    ASSESSMENT/Changes in Function: see recert    Patient will continue to benefit from skilled PT services to modify and progress therapeutic interventions, address functional mobility deficits, address ROM deficits, address strength deficits, analyze and address soft tissue restrictions, analyze and cue movement patterns, analyze and modify body mechanics/ergonomics, assess and modify postural abnormalities, address imbalance/dizziness and instruct in home and community integration to attain remaining goals.      []  See Plan of Care  [x]  See progress note/recertification  []  See Discharge Summary         Progress towards goals / Updated goals:  1. Pt will increase left ankle DF to 0 degrees to normalize gait pattern.               recert status: updated goal: lacking 5 degrees with knees flexed and extended  2. Pt will increase left ankle DF/EV strength to grossly 5/5 with MMT to improve ankle stability for gait/balance.              recert status:  DF = 4+/5, EV= 4/5  3. Patient will increase left knee extension ROM to 0 degrees to improve le mechanics for gait.                recert status: No change = lacking 10 degrees       PLAN  [x]  Upgrade activities as tolerated     [x]  Continue plan of care  []  Update interventions per flow sheet       []  Discharge due to:_  []  Other:_      Roopa Hsieh, PT 5/20/2021  10:38 AM    Future Appointments   Date Time Provider Yary Baer   5/26/2021  8:00 AM Quincy Valiente MD [facility] BS AMB   6/8/2021  9:45 AM Nichole Edge MD [facility] BS AMB   7/29/2021  8:30 AM Yolanda Schaefer PA-C [facility] BS AMB

## 2021-05-25 ENCOUNTER — HOSPITAL ENCOUNTER (OUTPATIENT)
Dept: PHYSICAL THERAPY | Age: 66
Discharge: HOME | End: 2021-05-25
Payer: MEDICARE

## 2021-05-25 PROCEDURE — 97110 THERAPEUTIC EXERCISES: CPT

## 2021-05-25 PROCEDURE — 97112 NEUROMUSCULAR REEDUCATION: CPT

## 2021-05-25 NOTE — PROGRESS NOTES
PT DAILY TREATMENT NOTE     Patient Name: Teresa Dubois  Date:2021  : 1955  [x]  Patient  Verified  Payor: VA MEDICARE / Plan: [place] MEDICARE PART A & B / Product Type: Medicare /    In YBT  Out time:1200  Total Treatment Time (min): 43  Visit #: 1 of 8    Medicare/BCBS Only   Total Timed Codes (min):  43 1:1 Treatment Time:  43       Treatment Area: Pain in left foot [M79.672]    SUBJECTIVE  Pain Level (0-10 scale): 2  Any medication changes, allergies to medications, adverse drug reactions, diagnosis change, or new procedure performed?: [x] No    [] Yes (see summary sheet for update)  Subjective functional status/changes:   [] No changes reported  Patient reports she had no pain this morning but she's been rushing around and it started up a little. OBJECTIVE    30 min Therapeutic Exercise:  [] See flow sheet :   Rationale: increase ROM and increase strength to improve the patients ability to increase activity tolerance    13 min Neuromuscular Re-education:  []  See flow sheet : balance, foot intrinsics, quad re-ed   Rationale: increase strength, improve coordination, improve balance and increase proprioception  to improve the patients ability to increase stability and reduce pain with ambulation            With   [] TE   [] TA   [] neuro   [] other: Patient Education: [x] Review HEP    [] Progressed/Changed HEP based on:   [] positioning   [] body mechanics   [] transfers   [] heat/ice application    [] other:      Other Objective/Functional Measures: progressed per flow sheet     Pain Level (0-10 scale) post treatment: 0     ASSESSMENT/Changes in Function: Patient reports nightly use of DF splint is helping with morning pain. Progressed exercises per flow sheet to improve TKE and DF on left LE. Patient tolerated the exercises well with reports of fatigue with elevated heel raises. She reported no pain following session.     Patient will continue to benefit from skilled PT services to modify and progress therapeutic interventions, address functional mobility deficits, address ROM deficits, address strength deficits, analyze and address soft tissue restrictions, analyze and cue movement patterns, analyze and modify body mechanics/ergonomics, assess and modify postural abnormalities, address imbalance/dizziness and instruct in home and community integration to attain remaining goals. []  See Plan of Care  []  See progress note/recertification  []  See Discharge Summary         Progress towards goals / Updated goals:  Goals for this certification period to be accomplished in 4 weeks:  1. Pt will increase left ankle DF to 0 degrees to normalize gait pattern.               recert status: updated goal: lacking 5 degrees with knees flexed and extended  2. Pt will increase left ankle DF/EV strength to grossly 5/5 with MMT to improve ankle stability for gait/balance.              recert status:  DF = 4+/5, EV= 4/5  3. Patient will increase left knee extension ROM to 0 degrees to improve le mechanics for gait.                recert status: No change = lacking 10 degrees    PLAN  [x]  Upgrade activities as tolerated     []  Continue plan of care  []  Update interventions per flow sheet       []  Discharge due to:_  []  Other:_      Elvie Jackson PTA 5/25/2021  10:59 AM    Future Appointments   Date Time Provider Yary De Leoni   5/25/2021 11:15 AM Inder Becker PTA [facility]   5/26/2021  8:00 AM Zehra Santos MD [facility] BS AMB   6/8/2021  9:45 AM Juan Diego Salazar MD [facility] BS AMB   6/9/2021 11:15 AM [facility]   6/14/2021 11:15 AM [facility]   7/29/2021  8:30 AM Daphne Cheema PA-C [facility] BS AMB

## 2021-05-26 ENCOUNTER — OFFICE VISIT (OUTPATIENT)
Dept: CARDIOLOGY CLINIC | Age: 66
End: 2021-05-26
Payer: MEDICARE

## 2021-05-26 VITALS
OXYGEN SATURATION: 97 % | HEIGHT: 69 IN | TEMPERATURE: 97.7 F | BODY MASS INDEX: 30.33 KG/M2 | HEART RATE: 53 BPM | SYSTOLIC BLOOD PRESSURE: 147 MMHG | DIASTOLIC BLOOD PRESSURE: 87 MMHG | WEIGHT: 204.8 LBS

## 2021-05-26 DIAGNOSIS — F41.0 PANIC ATTACK: ICD-10-CM

## 2021-05-26 DIAGNOSIS — E78.2 MIXED HYPERLIPIDEMIA: ICD-10-CM

## 2021-05-26 DIAGNOSIS — I10 ESSENTIAL HYPERTENSION: ICD-10-CM

## 2021-05-26 DIAGNOSIS — I25.10 CORONARY ARTERY DISEASE INVOLVING NATIVE CORONARY ARTERY OF NATIVE HEART WITHOUT ANGINA PECTORIS: Primary | ICD-10-CM

## 2021-05-26 DIAGNOSIS — Z95.5 HISTORY OF CORONARY ARTERY STENT PLACEMENT: ICD-10-CM

## 2021-05-26 PROCEDURE — G8753 SYS BP > OR = 140: HCPCS | Performed by: INTERNAL MEDICINE

## 2021-05-26 PROCEDURE — G8432 DEP SCR NOT DOC, RNG: HCPCS | Performed by: INTERNAL MEDICINE

## 2021-05-26 PROCEDURE — G8399 PT W/DXA RESULTS DOCUMENT: HCPCS | Performed by: INTERNAL MEDICINE

## 2021-05-26 PROCEDURE — 1090F PRES/ABSN URINE INCON ASSESS: CPT | Performed by: INTERNAL MEDICINE

## 2021-05-26 PROCEDURE — G8754 DIAS BP LESS 90: HCPCS | Performed by: INTERNAL MEDICINE

## 2021-05-26 PROCEDURE — 99214 OFFICE O/P EST MOD 30 MIN: CPT | Performed by: INTERNAL MEDICINE

## 2021-05-26 PROCEDURE — 3017F COLORECTAL CA SCREEN DOC REV: CPT | Performed by: INTERNAL MEDICINE

## 2021-05-26 PROCEDURE — G8417 CALC BMI ABV UP PARAM F/U: HCPCS | Performed by: INTERNAL MEDICINE

## 2021-05-26 PROCEDURE — 1101F PT FALLS ASSESS-DOCD LE1/YR: CPT | Performed by: INTERNAL MEDICINE

## 2021-05-26 PROCEDURE — G8427 DOCREV CUR MEDS BY ELIG CLIN: HCPCS | Performed by: INTERNAL MEDICINE

## 2021-05-26 PROCEDURE — G9899 SCRN MAM PERF RSLTS DOC: HCPCS | Performed by: INTERNAL MEDICINE

## 2021-05-26 PROCEDURE — G8536 NO DOC ELDER MAL SCRN: HCPCS | Performed by: INTERNAL MEDICINE

## 2021-05-26 NOTE — PROGRESS NOTES
1. Have you been to the ER, urgent care clinic since your last visit? Hospitalized since your last visit? Yes When: 03/2021 [facility]: [facility] Reason for visit: Anxiety    2. Have you seen or consulted any other health care providers outside of the [address] [place] [address] since your last visit? Include any pap smears or colon screening. Yes Where:  Therapist Reason for visit: Panic Attacks

## 2021-05-26 NOTE — PROGRESS NOTES
HISTORY OF PRESENT ILLNESS  Thaddeus Glez is a 72 y.o. female. Patient referred for pre-op evaluation due to abnormal EKG.    7/2020 Ms. Mallory Quigley s/p left total knee replacement on 7/7/2020.  - S/P STEMI on 7/13/2020 - s/p cardiac cath with SAIGE to RCA  10/2020  Patient seen today for follow-up. She was in emergency room few days ago with episode of severe dizziness nausea and diarrhea. She had hypokalemia that was supplemented MI ruled out no acute EKG changes. She is feeling better since then. Diarrhea has resolved. Patient had gone out to eat on Friday night and diarrhea started on Saturday morning and stayed all day long like that. No anginal quality chest pain. Stable since then  1/2021  Patient is here for follow-up she was in emergency room last week with complaint of chest tightness described as right-sided tightness noted to activity exertion she felt like it was anxiety attack she was monitored in ER and discharged home she was given Xanax and after that she has felt better. She underwent stress test today in office    Hypertension  The history is provided by the patient and medical records. This is a chronic problem. The current episode started more than 1 week ago. Pertinent negatives include no chest pain, no abdominal pain, no headaches and no shortness of breath. The symptoms are relieved by medications. Hospital Follow Up  The history is provided by the patient. Pertinent negatives include no chest pain, no abdominal pain, no headaches and no shortness of breath. Review of Systems   Constitutional: Negative for chills, fever and malaise/fatigue. HENT: Negative for congestion and nosebleeds. Eyes: Negative for blurred vision, double vision and redness. Respiratory: Negative for cough, hemoptysis, sputum production, shortness of breath and wheezing. Cardiovascular: Negative for chest pain, palpitations, orthopnea, claudication, leg swelling and PND.    Gastrointestinal: Positive for nausea. Negative for abdominal pain, heartburn and vomiting. Musculoskeletal: Positive for joint pain. Negative for falls and myalgias. Skin: Negative for rash. Neurological: Negative for dizziness, weakness and headaches. Endo/Heme/Allergies: Does not bruise/bleed easily. Psychiatric/Behavioral: The patient is not nervous/anxious. Family History   Problem Relation Age of Onset    Breast Cancer Other     Arthritis-osteo Mother     Hypertension Mother     Heart Disease Mother     Hypertension Other         Grandmother    Other Other         Heart problems: Mother, grandmother    Diabetes Other         Grandmother       Past Medical History:   Diagnosis Date    Arthritis     CAD (coronary artery disease)     Calcific Achilles tendonitis     Right; insertional    Essential hypertension 7/13/2020    Hypercholesterolemia     Hypertension     Left knee pain     Menopause     Pain of right heel     Sprain of right ring finger     Thyroid disease     Wears glasses        Past Surgical History:   Procedure Laterality Date    HX CHOLECYSTECTOMY  1990    HX HEENT      Eye surgery    HX HERNIA REPAIR      HX KNEE REPLACEMENT      HX ORTHOPAEDIC Left 07/2020    left total knee arthroplasty    ND CARDIAC SURG PROCEDURE UNLIST      stent july 2020       Social History     Tobacco Use    Smoking status: Former Smoker     Years: 15.00    Smokeless tobacco: Never Used   Substance Use Topics    Alcohol use: Yes     Comment: Occasional       No Known Allergies    Prior to Admission medications    Medication Sig Start Date End Date Taking? Authorizing Provider   FLUoxetine (PROzac) 20 mg capsule  4/28/21  Yes Provider, Historical   ALPRAZolam (Xanax) 0.5 mg tablet Take 1 Tab by mouth every eight (8) hours as needed for Anxiety. Max Daily Amount: 1.5 mg. 1/16/21  Yes Jelly Queen PA-C   potassium chloride SR (K-TAB) 20 mEq tablet Take 20 mEq by mouth daily.  15 ml daily   Yes Provider, Historical   hydrOXYzine HCL (ATARAX) 25 mg tablet Take  by mouth as needed. Yes Provider, Historical   carvediloL (COREG) 6.25 mg tablet Take 1 Tab by mouth every twelve (12) hours. 9/30/20  Yes Jie Jaime NP   levothyroxine (SYNTHROID) 25 mcg tablet Take 75 mcg by mouth Daily (before breakfast). Yes Provider, Historical   amLODIPine (NORVASC) 2.5 mg tablet Take 1 Tab by mouth daily. 7/15/20  Yes Kristopher Anne MD   aspirin delayed-release 81 mg tablet Take 1 Tab by mouth daily. 7/16/20  Yes Kristopher Anne MD   clopidogreL (PLAVIX) 75 mg tab Take 1 Tab by mouth daily. 7/16/20  Yes Kristopher Anne MD   atorvastatin (LIPITOR) 80 mg tablet Take 1 Tab by mouth daily. 7/15/20  Yes Kristopher Anne MD         Visit Vitals  BP (!) 147/87 (BP 1 Location: Right arm, BP Patient Position: Sitting, BP Cuff Size: Adult)   Pulse (!) 53   Temp 97.7 °F (36.5 °C) (Temporal)   Ht 5' 9\" (1.753 m)   Wt 92.9 kg (204 lb 12.8 oz)   SpO2 97%   BMI 30.24 kg/m²       Physical Exam  Vitals and nursing note reviewed. Constitutional:       Appearance: She is well-developed. Neck:      Vascular: No JVD. Cardiovascular:      Rate and Rhythm: Normal rate and regular rhythm. Pulses: Intact distal pulses. Heart sounds: Normal heart sounds. No murmur heard. No friction rub. No gallop. Pulmonary:      Effort: Pulmonary effort is normal. No respiratory distress. Breath sounds: Normal breath sounds. No wheezing or rales. Chest:      Chest wall: No tenderness. Abdominal:      General: There is no distension. Palpations: Abdomen is soft. There is no mass. Tenderness: There is no abdominal tenderness. Musculoskeletal:         General: Normal range of motion. Comments: Dressing to let knee intact   Skin:     General: Skin is warm and dry. Neurological:      Mental Status: She is alert and oriented to person, place, and time.        Echo 7/2020  Interpretation Summary   · Tricuspid regurgitation is inadequate for estimation of right ventricular systolic pressure. · Normal cavity size and systolic function (ejection fraction normal). Mildly increased wall thickness. Estimated left ventricular ejection fraction is 55 - 60%. Visually measured ejection fraction. Abnormal left ventricular wall motion. Age-appropriate left ventricular diastolic function. · Mitral annular calcification. Trace mitral valve regurgitation is present. · The following segments are hypokinetic: basal inferior and mid inferior. Nuclear Stress Test 7/2/2020  Nuclear Cardiac Spect Rest then Gated Stress study. Charley Car was used as the stressing method and agent. (Charley Car given via a 10 - 20 sec injection.)   One day myocardial perfusion study. Date: 7/2/2020. Negative myocardial perfusion imaging. Myocardial perfusion imaging supports a low risk stress test.   There is no prior study available for comparison. .     Cardiac cath 7/13/2020  STEMI  · Critical one-vessel coronary artery disease with 99% stenosis and interval coronary thrombus at proximal/mid RCA. · Noncritical 50% mid LAD stenosis and diffuse luminal irregularities throughout the coronary vasculature with moderate proximal calcification  · Successful PCI of proximal/mid RCA deploying a drug-eluting stent with a residual 0% stenosis and a very tortuous right coronary artery. · Procedure done via right femoral artery and 6 Vietnamese Angio-Seal at the end. I have personally reviewed patient's records available from hospital and other providers and incorporated findings in patient care. ER notes, lab, EKG, chest x-ray-1/2021 1/2021  Nuclear stress test  Negative stress test  Results for Toma Meals (MRN [id number]) as of 5/26/2021 08:00   Ref.  Range 3/25/2021 09:56   Triglyceride Latest Ref Range: <150 MG/DL 94   Cholesterol, total Latest Ref Range: <200 MG/   HDL Cholesterol Latest Ref Range: 40 - 60 MG/DL 59   CHOL/HDL Ratio Latest Ref Range: 0 - 5.0 3. 0   VLDL, calculated Latest Units: MG/DL 18.8   LDL, calculated Latest Ref Range: 0 - 100 MG/DL 99.2     ASSESSMENT and PLAN    Ms. Giovanna Cummins has a reminder for a \"due or due soon\" health maintenance. I have asked that she contact her primary care provider for follow-up on this health maintenance. No flowsheet data found. Assessment     I have personally reviewed patients ekg done at other facility. I Have personally reviewed recent relevant labs available and discussed with patient      ICD-10-CM ICD-9-CM    1. Coronary artery disease involving native coronary artery of native heart without angina pectoris  I25.10 414.01     Stable continue treatment monitor   2. Essential hypertension  I10 401.9 BSI MYCHART BP FLOWSHEET    Stable continue current treatment   3. History of coronary artery stent placement  Z95.5 V45.82     Stable   4. Mixed hyperlipidemia  E78.2 272.2     Continue treatment lab with PCP   5. Panic attack  F41.0 300.01      7/2020   Patient referred for pre-op evaluation due to abnormal EKG. SR with T wave abnormalities. She has history of HTN, HLD, and has not been active due to   Left knee pain. Will evaluate with stress test in AM.    B/P is not controlled will increase lisinopril to 20 mg/ BID.  7/2020 - Pre-operative nuclear stress test was negative for ischemia. She underwent left knee replacement surgery on 7/7/20. She then presented to ER on 7/13 with nausea and generalized malaise, with STEMI. S/p cardiac cath with SAIGE to RCA. She has continued DAPT therapy with statin and BB. She has had multiple ER visits due to nausea - which resolved with Zofran. This is thought to be related to Percocet and was d/c this AM. She has been referred to cardiac rehab and is in PT for left knee. 10/2020  Cardiac status stable. Recent ER visit with nausea dizziness and diarrhea. Diarrhea likely related to food. Resolved since then. Mild hypokalemia being supplemented.   Episode does not appear cardiac in nature. Continue current cardiac therapy including aspirin and Plavix  1/2021  Seen after recent ER visit with chest tightness which appears different than her anginal pain that she had with coronary disease prior to stent. Nuclear stress test today is negative her symptoms are better after using Xanax. We will continue to monitor clinically continue current therapy  5/2021  Stable cardiac status. Still has anxiety attacks treated by PCP and now supposed to see psychotherapist.  LDL 99 continue aggressive dieting and maximum dose of statin. If needed use PCSK9 inhibitor. Blood pressure elevated. My chart blood pressure links-her home readings are normal        Medications Discontinued During This Encounter   Medication Reason    escitalopram oxalate (Lexapro) 20 mg tablet Not A Current Medication       Orders Placed This Encounter    BSI [employer] BP FLOWSHEET     Order Specific Question:   After how many readings would you like to receive a notification of this patient's entries? Answer:   7       Follow-up and Dispositions    · Return in about 6 months (around 11/26/2021) for send [employer] link.          Clara Welch MD

## 2021-05-27 ENCOUNTER — APPOINTMENT (OUTPATIENT)
Dept: PHYSICAL THERAPY | Age: 66
End: 2021-05-27
Payer: MEDICARE

## 2021-06-06 ENCOUNTER — HOSPITAL ENCOUNTER (EMERGENCY)
Age: 66
Discharge: HOME | End: 2021-06-06
Attending: EMERGENCY MEDICINE
Payer: MEDICARE

## 2021-06-06 ENCOUNTER — APPOINTMENT (OUTPATIENT)
Dept: GENERAL RADIOLOGY | Age: 66
End: 2021-06-06
Attending: EMERGENCY MEDICINE
Payer: MEDICARE

## 2021-06-06 VITALS
OXYGEN SATURATION: 99 % | DIASTOLIC BLOOD PRESSURE: 63 MMHG | SYSTOLIC BLOOD PRESSURE: 167 MMHG | HEIGHT: 69 IN | TEMPERATURE: 98.3 F | HEART RATE: 60 BPM | BODY MASS INDEX: 30.21 KG/M2 | WEIGHT: 204 LBS | RESPIRATION RATE: 16 BRPM

## 2021-06-06 DIAGNOSIS — F41.0 ANXIETY ATTACK: Primary | ICD-10-CM

## 2021-06-06 DIAGNOSIS — R07.9 ACUTE CHEST PAIN: ICD-10-CM

## 2021-06-06 LAB
ALBUMIN SERPL-MCNC: 3.7 G/DL (ref 3.4–5)
ALBUMIN/GLOB SERPL: 1 {RATIO} (ref 0.8–1.7)
ALP SERPL-CCNC: 118 U/L (ref 45–117)
ALT SERPL-CCNC: 18 U/L (ref 13–56)
ANION GAP SERPL CALC-SCNC: 5 MMOL/L (ref 3–18)
AST SERPL-CCNC: 14 U/L (ref 10–38)
BASOPHILS # BLD: 0 K/UL (ref 0–0.1)
BASOPHILS NFR BLD: 1 % (ref 0–2)
BILIRUB SERPL-MCNC: 0.5 MG/DL (ref 0.2–1)
BUN SERPL-MCNC: 12 MG/DL (ref 7–18)
BUN/CREAT SERPL: 17 (ref 12–20)
CALCIUM SERPL-MCNC: 8.5 MG/DL (ref 8.5–10.1)
CHLORIDE SERPL-SCNC: 106 MMOL/L (ref 100–111)
CK MB CFR SERPL CALC: NORMAL % (ref 0–4)
CK MB SERPL-MCNC: <1 NG/ML (ref 5–25)
CK SERPL-CCNC: 103 U/L (ref 26–192)
CO2 SERPL-SCNC: 30 MMOL/L (ref 21–32)
CREAT SERPL-MCNC: 0.72 MG/DL (ref 0.6–1.3)
DIFFERENTIAL METHOD BLD: NORMAL
EOSINOPHIL # BLD: 0.1 K/UL (ref 0–0.4)
EOSINOPHIL NFR BLD: 2 % (ref 0–5)
ERYTHROCYTE [DISTWIDTH] IN BLOOD BY AUTOMATED COUNT: 14.2 % (ref 11.6–14.5)
GLOBULIN SER CALC-MCNC: 3.8 G/DL (ref 2–4)
GLUCOSE SERPL-MCNC: 148 MG/DL (ref 74–99)
HCT VFR BLD AUTO: 38.4 % (ref 35–45)
HGB BLD-MCNC: 12.7 G/DL (ref 12–16)
LIPASE SERPL-CCNC: 62 U/L (ref 73–393)
LYMPHOCYTES # BLD: 2.1 K/UL (ref 0.9–3.6)
LYMPHOCYTES NFR BLD: 37 % (ref 21–52)
MCH RBC QN AUTO: 27.7 PG (ref 24–34)
MCHC RBC AUTO-ENTMCNC: 33.1 G/DL (ref 31–37)
MCV RBC AUTO: 83.7 FL (ref 74–97)
MONOCYTES # BLD: 0.3 K/UL (ref 0.05–1.2)
MONOCYTES NFR BLD: 6 % (ref 3–10)
NEUTS SEG # BLD: 3.1 K/UL (ref 1.8–8)
NEUTS SEG NFR BLD: 54 % (ref 40–73)
PLATELET # BLD AUTO: 296 K/UL (ref 135–420)
PMV BLD AUTO: 9.8 FL (ref 9.2–11.8)
POTASSIUM SERPL-SCNC: 3.4 MMOL/L (ref 3.5–5.5)
PROT SERPL-MCNC: 7.5 G/DL (ref 6.4–8.2)
RBC # BLD AUTO: 4.59 M/UL (ref 4.2–5.3)
SODIUM SERPL-SCNC: 141 MMOL/L (ref 136–145)
TROPONIN I SERPL-MCNC: <0.02 NG/ML (ref 0–0.04)
TROPONIN I SERPL-MCNC: <0.02 NG/ML (ref 0–0.04)
WBC # BLD AUTO: 5.7 K/UL (ref 4.6–13.2)

## 2021-06-06 PROCEDURE — 99283 EMERGENCY DEPT VISIT LOW MDM: CPT

## 2021-06-06 PROCEDURE — 80053 COMPREHEN METABOLIC PANEL: CPT

## 2021-06-06 PROCEDURE — 85025 COMPLETE CBC W/AUTO DIFF WBC: CPT

## 2021-06-06 PROCEDURE — 93005 ELECTROCARDIOGRAM TRACING: CPT

## 2021-06-06 PROCEDURE — 74011250637 HC RX REV CODE- 250/637: Performed by: EMERGENCY MEDICINE

## 2021-06-06 PROCEDURE — 83690 ASSAY OF LIPASE: CPT

## 2021-06-06 PROCEDURE — 71045 X-RAY EXAM CHEST 1 VIEW: CPT

## 2021-06-06 PROCEDURE — 82553 CREATINE MB FRACTION: CPT

## 2021-06-06 RX ORDER — ALPRAZOLAM 0.5 MG/1
0.5 TABLET ORAL
Qty: 30 TABLET | Refills: 0 | Status: SHIPPED | OUTPATIENT
Start: 2021-06-06 | End: 2022-06-06

## 2021-06-06 RX ORDER — ALPRAZOLAM 0.25 MG/1
0.5 TABLET ORAL
Status: COMPLETED | OUTPATIENT
Start: 2021-06-06 | End: 2021-06-06

## 2021-06-06 RX ADMIN — ALPRAZOLAM 0.5 MG: 0.25 TABLET ORAL at 18:28

## 2021-06-06 NOTE — ED TRIAGE NOTES
Patient presents to the ED with an episode of nausea and sweating, lasting about 20 minutes, states she had an MI last year, denies CP/SOB during the episode

## 2021-06-06 NOTE — ED PROVIDER NOTES
EMERGENCY DEPARTMENT HISTORY AND PHYSICAL EXAM      Date: 6/6/2021  Patient Name: Marion Alejo    History of Presenting Illness     Chief Complaint   Patient presents with    Nausea    Excessive Sweating       History (Context): Marion Alejo is a 72 y.o. woman with active conditions as noted in the past medical history who presents with acute onset, persistent, severe chest pain that is improving sister with nausea and sweating. The patient recently had a myocardial infarction that felt the same, but says she has since had multiple episodes of panic and anxiety. She states that she is having trouble discerning which is which. No clear exacerbating/features or other associated symptoms      On review of systems, the patient denies fever, chills, headache, anhedonia, current drug use, homicidal ideation, suicidal ideation. PCP: Haja Myrick NP    Current Outpatient Medications   Medication Sig Dispense Refill    ALPRAZolam (Xanax) 0.5 mg tablet Take 1 Tablet by mouth every eight (8) hours as needed (Chest pain accompanied by shortness of breath and anxiety). Max Daily Amount: 1.5 mg. 30 Tablet 0    FLUoxetine (PROzac) 20 mg capsule       potassium chloride SR (K-TAB) 20 mEq tablet Take 20 mEq by mouth daily. 15 ml daily      hydrOXYzine HCL (ATARAX) 25 mg tablet Take  by mouth as needed.  carvediloL (COREG) 6.25 mg tablet Take 1 Tab by mouth every twelve (12) hours. 180 Tab 3    levothyroxine (SYNTHROID) 25 mcg tablet Take 75 mcg by mouth Daily (before breakfast).  amLODIPine (NORVASC) 2.5 mg tablet Take 1 Tab by mouth daily. 30 Tab 2    aspirin delayed-release 81 mg tablet Take 1 Tab by mouth daily. 30 Tab 11    clopidogreL (PLAVIX) 75 mg tab Take 1 Tab by mouth daily. 30 Tab 11    atorvastatin (LIPITOR) 80 mg tablet Take 1 Tab by mouth daily.  30 Tab 2       Past History     Past Medical History:  Past Medical History:   Diagnosis Date    Arthritis     CAD (coronary artery disease)     Calcific Achilles tendonitis     Right; insertional    Essential hypertension 7/13/2020    Hypercholesterolemia     Hypertension     Left knee pain     Menopause     Pain of right heel     Sprain of right ring finger     Thyroid disease     Wears glasses        Past Surgical History:  Past Surgical History:   Procedure Laterality Date    HX CHOLECYSTECTOMY  1990    HX HEENT      Eye surgery    HX HERNIA REPAIR      HX KNEE REPLACEMENT      HX ORTHOPAEDIC Left 07/2020    left total knee arthroplasty    [place] CARDIAC SURG PROCEDURE UNLIST      stent july 2020       Family History:  Family History   Problem Relation Age of Onset    Breast Cancer Other     Arthritis-osteo Mother     Hypertension Mother     Heart Disease Mother     Hypertension Other         Grandmother    Other Other         Heart problems: Mother, grandmother    Diabetes Other         Grandmother       Social History:  Social History     Tobacco Use    Smoking status: Former Smoker     Years: 15.00    Smokeless tobacco: Never Used   Substance Use Topics    Alcohol use: Yes     Comment: Occasional    Drug use: No       Allergies:  No Known Allergies    PMH, PSH, family history, social history, allergies reviewed with the patient with significant items noted above. Review of Systems   As per HPI, otherwise reviewed and negative. Physical Exam     Vitals:    06/06/21 1245   BP: (!) 167/63   Pulse: 60   Resp: 16   Temp: 98.3 °F (36.8 °C)   SpO2: 99%   Weight: 92.5 kg (204 lb)   Height: 5' 9\" (1.753 m)       Gen: Anxious appearing and appears mildly distressed  HEENT: Normocephalic, sclera anicteric  Cardiovascular: Normal rate, regular rhythm, no murmurs, rubs, gallops. Pulses intact and equal distally. Pulmonary: No respiratory distress. No stridor. Clear lungs. ABD: Soft, nontender, nondistended. Neuro: Alert. Normal speech. Normal mentation. Psych: Dress: Normal. Behavior: Anxious.  Thought Process: Linear. Thought content: Related symptoms. Mood: \" Slightly anxious\". Affect congruent with mood. : No CVA tenderness  EXT: Moves all extremities well. No cyanosis or clubbing. Skin: Warm and well-perfused. Diagnostic Study Results     Labs -     Recent Results (from the past 12 hour(s))   CBC WITH AUTOMATED DIFF    Collection Time: 06/06/21 12:55 PM   Result Value Ref Range    WBC 5.7 4.6 - 13.2 K/uL    RBC 4.59 4.20 - 5.30 M/uL    HGB 12.7 12.0 - 16.0 g/dL    HCT 38.4 35.0 - 45.0 %    MCV 83.7 74.0 - 97.0 FL    MCH 27.7 24.0 - 34.0 PG    MCHC 33.1 31.0 - 37.0 g/dL    RDW 14.2 11.6 - 14.5 %    PLATELET 880 459 - 779 K/uL    MPV 9.8 9.2 - 11.8 FL    NEUTROPHILS 54 40 - 73 %    LYMPHOCYTES 37 21 - 52 %    MONOCYTES 6 3 - 10 %    EOSINOPHILS 2 0 - 5 %    BASOPHILS 1 0 - 2 %    ABS. NEUTROPHILS 3.1 1.8 - 8.0 K/UL    ABS. LYMPHOCYTES 2.1 0.9 - 3.6 K/UL    ABS. MONOCYTES 0.3 0.05 - 1.2 K/UL    ABS. EOSINOPHILS 0.1 0.0 - 0.4 K/UL    ABS. BASOPHILS 0.0 0.0 - 0.1 K/UL    DF AUTOMATED     METABOLIC PANEL, COMPREHENSIVE    Collection Time: 06/06/21 12:55 PM   Result Value Ref Range    Sodium 141 136 - 145 mmol/L    Potassium 3.4 (L) 3.5 - 5.5 mmol/L    Chloride 106 100 - 111 mmol/L    CO2 30 21 - 32 mmol/L    Anion gap 5 3.0 - 18 mmol/L    Glucose 148 (H) 74 - 99 mg/dL    BUN 12 7.0 - 18 MG/DL    Creatinine 0.72 0.6 - 1.3 MG/DL    BUN/Creatinine ratio 17 12 - 20      GFR est AA >60 >60 ml/min/1.73m2    GFR est non-AA >60 >60 ml/min/1.73m2    Calcium 8.5 8.5 - 10.1 MG/DL    Bilirubin, total 0.5 0.2 - 1.0 MG/DL    ALT (SGPT) 18 13 - 56 U/L    AST (SGOT) 14 10 - 38 U/L    Alk.  phosphatase 118 (H) 45 - 117 U/L    Protein, total 7.5 6.4 - 8.2 g/dL    Albumin 3.7 3.4 - 5.0 g/dL    Globulin 3.8 2.0 - 4.0 g/dL    A-G Ratio 1.0 0.8 - 1.7     CARDIAC PANEL,(CK, CKMB & TROPONIN)    Collection Time: 06/06/21 12:55 PM   Result Value Ref Range    CK - MB <1.0 <3.6 ng/ml    CK-MB Index  0.0 - 4.0 %     CALCULATION NOT PERFORMED WHEN RESULT IS BELOW LINEAR LIMIT     26 - 192 U/L    Troponin-I, QT <0.02 0.0 - 0.045 NG/ML   LIPASE    Collection Time: 06/06/21 12:55 PM   Result Value Ref Range    Lipase 62 (L) 73 - 393 U/L   TROPONIN I    Collection Time: 06/06/21  4:02 PM   Result Value Ref Range    Troponin-I, QT <0.02 0.0 - 0.045 NG/ML       Radiologic Studies -   XR CHEST PORT   Final Result   1. Borderline heart size                  CT Results  (Last 48 hours)    None        CXR Results  (Last 48 hours)               06/06/21 1317  XR CHEST PORT Final result    Impression:  1. Borderline heart size                   Narrative:  EXAM: XR CHEST PORT       CLINICAL INDICATION/HISTORY : nausea, sweating. COMPARISON: March 21, 2021       TECHNIQUE: 1 VIEWS       FINDINGS:        The lungs are clear. Borderline heart size. No evidence of pleural effusion. Medical Decision Making   I am the first provider for this patient. I reviewed the vital signs, available nursing notes, past medical history, past surgical history, family history and social history. Vital Signs-Reviewed the patient's vital signs. EKG: Interpreted by myself. Records Reviewed: Personally, on initial evaluation    MDM:   Patient presents with chest pain, nausea, shortness of breath. Exam significant for relatively normal exam.  Patient condition stable. DDX considered: Anxiety, metabolic abnormality, endocrine abnormality  DDX thought to be less likely but also considered due to high risk condition: Cardiopulmonary dysfunction    Plan:   Reassurance and treatment    Orders as below:  Orders Placed This Encounter    XR CHEST PORT    CBC WITH AUTOMATED DIFF    METABOLIC PANEL, COMPREHENSIVE    CARDIAC PANEL,(CK, CKMB & TROPONIN)    LIPASE    TROPONIN I    EKG, 12 LEAD, INITIAL    ALPRAZolam (Xanax) 0.5 mg tablet        ED Course:   Work-up as above is negative.   This appeared to be related to anxiety and potentially PTSD. The patient has a long standing anxiety. The patient will need to be on some sort of breakthrough medicine for episodic panic    DISCHARGE NOTE:     Care plan outlined and precautions discussed. Results were reviewed with the patient. All medications were reviewed with the patient; will d/c home with as needed Xanax. All of pt's questions and concerns were addressed. Alarm symptoms and return precautions associated with chief complaint and evaluation were reviewed with the patient in detail. The patient demonstrated adequate understanding. Patient was instructed and agrees to follow up with psychiatry, as well as to return to the ED upon further deterioration. Patient is ready to go home. The patient is happy with this plan    Follow-up Information    None         Current Discharge Medication List      CONTINUE these medications which have CHANGED    Details   ALPRAZolam (Xanax) 0.5 mg tablet Take 1 Tablet by mouth every eight (8) hours as needed (Chest pain accompanied by shortness of breath and anxiety). Max Daily Amount: 1.5 mg.  Qty: 30 Tablet, Refills: 0  Start date: 6/6/2021, End date: 6/6/2022    Associated Diagnoses: Anxiety attack               Diagnosis     Clinical Impression:   1. Anxiety attack    2. Acute chest pain        Signed,  Netta Villagomez MD  Emergency Physician  [facility]    As a voice dictation software was utilized to dictate this note, minor word transpositions can occur. I apologize for confusing wording and typographic errors. Please feel free to contact me for clarification.

## 2021-06-07 LAB
ATRIAL RATE: 60 BPM
CALCULATED P AXIS, ECG09: 4 DEGREES
CALCULATED R AXIS, ECG10: 25 DEGREES
CALCULATED T AXIS, ECG11: -9 DEGREES
DIAGNOSIS, 93000: NORMAL
P-R INTERVAL, ECG05: 192 MS
Q-T INTERVAL, ECG07: 404 MS
QRS DURATION, ECG06: 78 MS
QTC CALCULATION (BEZET), ECG08: 404 MS
VENTRICULAR RATE, ECG03: 60 BPM

## 2021-06-08 ENCOUNTER — OFFICE VISIT (OUTPATIENT)
Dept: ORTHOPEDIC SURGERY | Age: 66
End: 2021-06-08
Payer: MEDICARE

## 2021-06-08 VITALS
BODY MASS INDEX: 30.36 KG/M2 | RESPIRATION RATE: 15 BRPM | WEIGHT: 205 LBS | OXYGEN SATURATION: 98 % | HEART RATE: 60 BPM | HEIGHT: 69 IN

## 2021-06-08 DIAGNOSIS — M76.62 ACHILLES TENDINITIS OF LEFT LOWER EXTREMITY: Primary | ICD-10-CM

## 2021-06-08 DIAGNOSIS — M79.672 PAIN OF LEFT HEEL: ICD-10-CM

## 2021-06-08 PROCEDURE — G8417 CALC BMI ABV UP PARAM F/U: HCPCS | Performed by: ORTHOPAEDIC SURGERY

## 2021-06-08 PROCEDURE — G8756 NO BP MEASURE DOC: HCPCS | Performed by: ORTHOPAEDIC SURGERY

## 2021-06-08 PROCEDURE — G8536 NO DOC ELDER MAL SCRN: HCPCS | Performed by: ORTHOPAEDIC SURGERY

## 2021-06-08 PROCEDURE — 3017F COLORECTAL CA SCREEN DOC REV: CPT | Performed by: ORTHOPAEDIC SURGERY

## 2021-06-08 PROCEDURE — G8432 DEP SCR NOT DOC, RNG: HCPCS | Performed by: ORTHOPAEDIC SURGERY

## 2021-06-08 PROCEDURE — 1101F PT FALLS ASSESS-DOCD LE1/YR: CPT | Performed by: ORTHOPAEDIC SURGERY

## 2021-06-08 PROCEDURE — G9899 SCRN MAM PERF RSLTS DOC: HCPCS | Performed by: ORTHOPAEDIC SURGERY

## 2021-06-08 PROCEDURE — G8427 DOCREV CUR MEDS BY ELIG CLIN: HCPCS | Performed by: ORTHOPAEDIC SURGERY

## 2021-06-08 PROCEDURE — 99213 OFFICE O/P EST LOW 20 MIN: CPT | Performed by: ORTHOPAEDIC SURGERY

## 2021-06-08 PROCEDURE — 1090F PRES/ABSN URINE INCON ASSESS: CPT | Performed by: ORTHOPAEDIC SURGERY

## 2021-06-08 PROCEDURE — G8399 PT W/DXA RESULTS DOCUMENT: HCPCS | Performed by: ORTHOPAEDIC SURGERY

## 2021-06-08 NOTE — PROGRESS NOTES
AMBULATORY PROGRESS NOTE      Patient: Radha Levy             MRN: [id number]     SSN: [id number] Body mass index is 30.27 kg/m². YOB: 1955     AGE: 72 y.o. EX: female    PCP: Fiorella Pleitez NP       IMPRESSION //  [address] has a diagnosis of:      DIAGNOSES    1. Achilles tendinitis of left lower extremity    2. Pain of left heel        No orders of the defined types were placed in this encounter. PLAN:    1. I will advise her to finish PT      RTO-  PRN    Radha Levy  expresses understanding of the diagnosis, treatment plan, and all of their proposed questions were answered to their satisfaction. Patient education has been provided re the diagnoses. HPI //  0 [facility] A 72 y.o. female who is a/an  established patient, presenting to my outpatient office for evaluation of  the following chief complaint(s):     Chief Complaint   Patient presents with    Ankle Pain     left ankle       She has been seen in the past for complaints of pain discomfort, to her left hindfoot long Achilles tendon. She cannot take anti-inflammatory agents, due to being on Plavix.  As such I wrote for short-term duration of tramadol. At [address] [place] [address] pt stated she had attended 4 sessions of PT. She went to PT on the morning of her LOV. Pt stated she has bought new shoes for arch support. Since LOV the Pt has been attending PT and has 5 sessions left. This morning her left achilles tendon felt stiff, but otherwise her pain has been gradually subsiding. Visit Vitals  Pulse 60   Resp 15   Ht 5' 9\" (1.753 m)   Wt 205 lb (93 kg)   SpO2 98%   BMI 30.27 kg/m²       Appearance: Alert, well appearing and pleasant patient who is in no distress, oriented to person, place/time, and who follows commands. This patient is accompanied in the examination room by her  self.  There is signs of: no dementia  Psychiatric: Affect/mood are appropriate. Speech normal in context and clarity, memory intact grossly, no involuntary movements - tremors. Patient arrives to office via: without assistive device. H EENT (2): Head normocephalic & atraumatic. Eye: pupils are round// EOM are intact // Neck: ROM WNL  // Hearings Intact   Respiratory: Breathing non labored     ANKLE/FOOT left    Gait: normal  Tenderness: nono   Cutaneous: WNL. Joint Motion: FROM  Joint / Tendon Stability: No Ankle or Subtalar instability or joint laxity. No peroneal sublux ability or dislocation  Alignment: neutral Hindfoot,    Neuro Motor/Sensory: NL/NL  Vascular: NL foot/ankle pulses,   Lymphatics: No extremity lymphedema, No calf swelling, no tenderness to calf muscles. CHART REVIEW     Dez Diaz has been experiencing pain and discomfort confirmed as outlined in the pain assessment outlined below.  was reviewed by Rc Rivera MD on 6/8/2021. Pain Assessment  6/8/2021   Location of Pain Ankle   Location Modifiers Left   Severity of Pain 2   Quality of Pain Other (Comment)   Quality of Pain Comment stiffness   Duration of Pain -   Frequency of Pain Intermittent   Aggravating Factors Walking   Aggravating Factors Comment -   Limiting Behavior -   Relieving Factors -   Relieving Factors Comment -   Result of Injury -        Dez Diaz  has a past medical history of Arthritis, CAD (coronary artery disease), Calcific Achilles tendonitis, Essential hypertension (7/13/2020), Hypercholesterolemia, Hypertension, Left knee pain, Menopause, Pain of right heel, Sprain of right ring finger, Thyroid disease, and Wears glasses. She also has no past medical history of Nipple discharge.      Patients is employed at:         Past Medical History:   Diagnosis Date    Arthritis     CAD (coronary artery disease)     Calcific Achilles tendonitis     Right; insertional    Essential hypertension 7/13/2020    Hypercholesterolemia  Hypertension     Left knee pain     Menopause     Pain of right heel     Sprain of right ring finger     Thyroid disease     Wears glasses      Past Surgical History:   Procedure Laterality Date    HX CHOLECYSTECTOMY  1990    HX HEENT      Eye surgery    HX HERNIA REPAIR      HX KNEE REPLACEMENT      HX ORTHOPAEDIC Left 07/2020    left total knee arthroplasty    [place] CARDIAC SURG PROCEDURE UNLIST      stent july 2020     Current Outpatient Medications   Medication Sig    ALPRAZolam (Xanax) 0.5 mg tablet Take 1 Tablet by mouth every eight (8) hours as needed (Chest pain accompanied by shortness of breath and anxiety). Max Daily Amount: 1.5 mg.    FLUoxetine (PROzac) 20 mg capsule     potassium chloride SR (K-TAB) 20 mEq tablet Take 20 mEq by mouth daily. 15 ml daily    hydrOXYzine HCL (ATARAX) 25 mg tablet Take  by mouth as needed.  carvediloL (COREG) 6.25 mg tablet Take 1 Tab by mouth every twelve (12) hours.  levothyroxine (SYNTHROID) 25 mcg tablet Take 75 mcg by mouth Daily (before breakfast).  amLODIPine (NORVASC) 2.5 mg tablet Take 1 Tab by mouth daily.  aspirin delayed-release 81 mg tablet Take 1 Tab by mouth daily.  clopidogreL (PLAVIX) 75 mg tab Take 1 Tab by mouth daily.  atorvastatin (LIPITOR) 80 mg tablet Take 1 Tab by mouth daily. No current facility-administered medications for this visit. No Known Allergies  Social History     Occupational History    Not on file   Tobacco Use    Smoking status: Former Smoker     Years: 15.00    Smokeless tobacco: Never Used   Substance and Sexual Activity    Alcohol use: Yes     Comment: Occasional    Drug use: No    Sexual activity: Not on file     Family History   Problem Relation Age of Onset    Breast Cancer Other     Arthritis-osteo Mother     Hypertension Mother     Heart Disease Mother     Hypertension Other         Grandmother    Other Other         Heart problems:  Mother, grandmother   [facility] Diabetes Other Carlos        DIAGNOSTIC LAB DATA      Lab Results   Component Value Date/Time    Hemoglobin A1c 6.0 (H) 06/26/2020 12:32 PM    Hemoglobin A1c 6.0 (H) 05/23/2017 11:23 AM    //   Lab Results   Component Value Date/Time    Glucose 148 (H) 06/06/2021 12:55 PM        No results found for: ZYZ1FFMI, ULN1KMEN      No results found for: VITD3, XQVID2, XQVID3, XQVID, VD3RIA, TVKP69JGMHB      REVIEW OF SYSTEMS : 6/8/2021  ALL BELOW ARE Negative except : SEE HPI     All other systems reviewed and are negative. 12 point review of systems otherwise negative unless noted in HPI. DIAGNOSTIC IMAGING /ORDERS      No X ray's were obtained today       I have reviewed the results of the above study. The interpretation of this study is my professional opinion. An electronic signature was used to authenticate this note. Disclaimer: Sections of this note are dictated using utilizing voice recognition software, which may have resulted in some phonetic based errors in grammar and contents. Even though attempts were made to correct all the mistakes, some may have been missed, and remained in the body of the document. If questions arise, please contact our department. Cheryl Morris may have a reminder for a \"due or due soon\" health maintenance. I have asked that she contact her primary care provider for follow-up on this health maintenance.       Ramírez Harvey, as dictated by Earnest Alvarez MD  6/8/2021  7:18 AM

## 2021-06-09 ENCOUNTER — APPOINTMENT (OUTPATIENT)
Dept: PHYSICAL THERAPY | Age: 66
End: 2021-06-09
Payer: MEDICARE

## 2021-06-14 ENCOUNTER — HOSPITAL ENCOUNTER (OUTPATIENT)
Dept: PHYSICAL THERAPY | Age: 66
Discharge: HOME | End: 2021-06-14
Payer: MEDICARE

## 2021-06-14 PROCEDURE — 97110 THERAPEUTIC EXERCISES: CPT

## 2021-06-14 PROCEDURE — 97112 NEUROMUSCULAR REEDUCATION: CPT

## 2021-06-14 NOTE — PROGRESS NOTES
PT DAILY TREATMENT NOTE     Patient Name: Chandra Bond  Date:2021  : 1955  [x]  Patient  Verified  Payor: [place] MEDICARE / Plan: [place] MEDICARE PART A & B / Product Type: Medicare /    In time:1115  Out time:1158  Total Treatment Time (min): 43  Visit #: 2 of 8    Medicare/BCBS Only   Total Timed Codes (min):  43 1:1 Treatment Time:  43       Treatment Area: Pain in left foot [M79.002]    SUBJECTIVE  Pain Level (0-10 scale): 0  Any medication changes, allergies to medications, adverse drug reactions, diagnosis change, or new procedure performed?: [x] No    [] Yes (see summary sheet for update)  Subjective functional status/changes:   [] No changes reported  \"No pain. \"    OBJECTIVE    Modality rationale: patient declined   Min Type Additional Details    [] Estim:  []Unatt       []IFC  []Premod                        []Other:  []w/ice   []w/heat  Position:  Location:    [] Estim: []Att    []TENS instruct  []NMES                    []Other:  []w/US   []w/ice   []w/heat  Position:  Location:    []  Traction: [] Cervical       []Lumbar                       [] Prone          []Supine                       []Intermittent   []Continuous Lbs:  [] before manual  [] after manual    []  Ultrasound: []Continuous   [] Pulsed                           []1MHz   []3MHz W/cm2:  Location:    []  Iontophoresis with dexamethasone         Location: [] Take home patch   [] In clinic    []  Ice     []  heat  []  Ice massage  []  Laser   []  Anodyne Position:  Location:    []  Laser with stim  []  Other:  Position:  Location:    []  Vasopneumatic Device  Pre-treatment girth:  Post-treatment girth:  Measured at (location):  Pressure:       [] lo [] med [] hi   Temperature: [] lo [] med [] hi   [] Skin assessment post-treatment:  []intact []redness- no adverse reaction    []redness  adverse reaction:     13 min Therapeutic Exercise:  [x] See flow sheet :   Rationale: increase ROM and increase strength to improve the patients ability to perform ADLs    30 min Neuromuscular Re-education:  [x]  See flow sheet  Ankle/foot stabilization activities   Rationale: increase ROM, increase strength, improve coordination, improve balance and increase proprioception  to improve the patients ability to improve mobility, stance stability, and gait        With   [x] TE   [] TA   [x] neuro   [] other: Patient Education: [x] Review HEP    [] Progressed/Changed HEP based on:   [x] positioning   [x] body mechanics   [] transfers   [] heat/ice application    [] other:      Other Objective/Functional Measures:     Pain Level (0-10 scale) post treatment:     ASSESSMENT/Changes in Function: Pt is making good progress with ankle and knee strength. Some quad weakness noted during SLR, but overall improving well. Planning to transition to an updated HEP at her NV barring any changes in status. Patient will continue to benefit from skilled PT services to modify and progress therapeutic interventions, address functional mobility deficits, address ROM deficits, address strength deficits, analyze and address soft tissue restrictions, analyze and cue movement patterns, analyze and modify body mechanics/ergonomics, assess and modify postural abnormalities, address imbalance/dizziness and instruct in home and community integration to attain remaining goals. [x]  See Plan of Care  []  See progress note/recertification  []  See Discharge Summary         Progress towards goals / Updated goals:  1. Pt will increase left ankle DF to 0 degrees to normalize gait pattern.               recert status: updated goal: lacking 5 degrees with knees flexed and extended   Measure NV  2. Pt will increase left ankle DF/EV strength to grossly 5/5 with MMT to improve ankle stability for gait/balance.              recert status:  DF = 4+/5, EV= 4/5   Measure NV  3. Patient will increase left knee extension ROM to 0 degrees to improve le mechanics for gait.                recert status: No change = lacking 10 degrees   Measure NV    PLAN  []  Upgrade activities as tolerated     [x]  Continue plan of care  []  Update interventions per flow sheet       []  Discharge due to:_  []  Other:_      Tae Gregg PTA, CSCS 6/14/2021  12:04 PM    Future Appointments   Date Time Provider Yary Baer   6/14/2021 11:15 AM [facility]   7/29/2021  8:30 AM DAVON Lee BS AMB   11/17/2021  8:15 AM Kathy Koenig MD [facility] BS AMB

## 2021-06-18 ENCOUNTER — APPOINTMENT (OUTPATIENT)
Dept: PHYSICAL THERAPY | Age: 66
End: 2021-06-18
Payer: MEDICARE

## 2021-06-21 RX ORDER — CLOPIDOGREL BISULFATE 75 MG/1
75 TABLET ORAL DAILY
Qty: 90 TABLET | Refills: 1 | Status: SHIPPED | OUTPATIENT
Start: 2021-06-21 | End: 2021-10-04

## 2021-06-28 ENCOUNTER — HOSPITAL ENCOUNTER (OUTPATIENT)
Dept: PHYSICAL THERAPY | Age: 66
Discharge: HOME | End: 2021-06-28
Payer: MEDICARE

## 2021-06-28 PROCEDURE — 97110 THERAPEUTIC EXERCISES: CPT

## 2021-06-28 PROCEDURE — 97530 THERAPEUTIC ACTIVITIES: CPT

## 2021-06-28 PROCEDURE — 97112 NEUROMUSCULAR REEDUCATION: CPT

## 2021-06-28 NOTE — PROGRESS NOTES
Physical Therapy Discharge Instructions      [facility] [address]  [address] [place], [address] [phone] fax      Patient: Charli Mccabe  : 1955      Continue Home Exercise Program 1-2 times per day       Continue with    [x] Ice  as needed      [] Heat           Follow up with MD:     [] Upon completion of therapy     [x] As needed      Recommendations:     [x]   Return to activity with home program    []   Return to activity with the following modifications:       []Post Rehab Program    []Join Independent aquatic program     []Return to/join local gym      Kennedi Ravi PTA, CSCS   2021 2:04 PM

## 2021-06-28 NOTE — PROGRESS NOTES
PT DAILY TREATMENT NOTE     Patient Name: Vargas Castrejon  Date:2021  : 1955  [x]  Patient  Verified  Payor: Esperanza Grover / Plan: [place] MEDICARE PART A & B / Product Type: Medicare /    In time:130  Out time:209  Total Treatment Time (min): 39  Visit #: 1 of 1    Medicare/BCBS Only   Total Timed Codes (min):  39 1:1 Treatment Time:  39       Treatment Area: Pain in left foot [M79.222]    SUBJECTIVE  Pain Level (0-10 scale): 2  Any medication changes, allergies to medications, adverse drug reactions, diagnosis change, or new procedure performed?: [x] No    [] Yes (see summary sheet for update)  Subjective functional status/changes:   [] No changes reported  \"I was able to walk over a mile in the mall this [place] without any trouble, but did have some tightness later on after I got home. \"    OBJECTIVE    Modality rationale: patient declined   Min Type Additional Details    [] Estim:  []Unatt       []IFC  []Premod                        []Other:  []w/ice   []w/heat  Position:  Location:    [] Estim: []Att    []TENS instruct  []NMES                    []Other:  []w/US   []w/ice   []w/heat  Position:  Location:    []  Traction: [] Cervical       []Lumbar                       [] Prone          []Supine                       []Intermittent   []Continuous Lbs:  [] before manual  [] after manual    []  Ultrasound: []Continuous   [] Pulsed                           []1MHz   []3MHz W/cm2:  Location:    []  Iontophoresis with dexamethasone         Location: [] Take home patch   [] In clinic    []  Ice     []  heat  []  Ice massage  []  Laser   []  Anodyne Position:  Location:    []  Laser with stim  []  Other:  Position:  Location:    []  Vasopneumatic Device    []  Right     []  Left  Pre-treatment girth:  Post-treatment girth:  Measured at (location):  Pressure:       [] lo [] med [] hi   Temperature: [] lo [] med [] hi   [] Skin assessment post-treatment:  []intact []redness- no adverse reaction    []redness  adverse reaction:     10 min Therapeutic Exercise:  [x] See flow sheet :   Rationale: increase ROM and increase strength to improve the patients ability to perform ADLs    19 min Therapeutic Activity:  [x]  See flow sheet : reassessment, HEP review   Rationale: increase ROM, increase strength, improve coordination, improve balance and increase proprioception  to improve the patients ability to improve mobility and ADL performance     10 min Neuromuscular Re-education:  [x]  See flow sheet : ankle/foot stabilization activities    Rationale: increase ROM, increase strength, improve coordination, improve balance and increase proprioception  to improve the patients ability to improve mobility and ADL performance        With   [x] TE   [x] TA   [x] neuro   [] other: Patient Education: [x] Review HEP    [] Progressed/Changed HEP based on:   [x] positioning   [x] body mechanics   [] transfers   [] heat/ice application    [] other:      Other Objective/Functional Measures:   AROM left ankle DF with knee flexed 5 deg  AROM left ankle DF with knee extended 0 deg    MMT left ankle DF 5/5  MMT left ankle EV 5/5     Pain Level (0-10 scale) post treatment: 0    ASSESSMENT/Changes in Function: Ms. Masoud Victor reports 70% improvement since beginning therapy. Pt demonstrates increased DF AROM with both knee flexed and extended. Her strength increased compared to last recertification. We are discharging to an updated HEP at this time. []  See Plan of Care  []  See progress note/recertification  [x]  See Discharge Summary         Progress towards goals / Updated goals:  1. Pt will increase left ankle DF to 0 degrees to normalize gait pattern.               recert status: updated goal: lacking 5 degrees with knees flexed and extended              MET; 5 deg  2.  Pt will increase left ankle DF/EV strength to grossly 5/5 with MMT to improve ankle stability for gait/balance.              recert status:  DF = 4+/5, EV= 4/5 MET; DF and EV 5/5 each  3. Patient will increase left knee extension ROM to 0 degrees to improve le mechanics for gait.                recert status: No change = lacking 10 degrees              MET; 0 deg    Functional Gains: walking/standing tolerance, exercise, pain  Functional Deficits: occasional tightness  % improvement: 70%  Pain   Average: 2/10       Best: 0/10     Worst: 4-5/10  Patient Goal: \"to wake up in the morning without pain\"    PLAN  []  Upgrade activities as tolerated     []  Continue plan of care  []  Update interventions per flow sheet       [x]  Discharge due to: [address]  []  Other:_      Patti Bernal, PTA, CSCS 6/28/2021  2:11 PM    Future Appointments   Date Time Provider Yary Baer   7/29/2021  8:30 AM Maria Isabel Jorgensen PA-C [facility] BS AMB   11/17/2021  8:15 AM Roberto Mills MD [facility] BS AMB

## 2021-06-29 NOTE — PROGRESS NOTES
[facility] [place] [address]  [address], [address] [phone] fax    Discharge Summary  Patient name: Cindy Bautista Start of Care: 2021   Referral source: Eugenia Galvin MD : 1955               Medical Diagnosis: Pain in left foot [M79.672]  Payor: VA MEDICARE / Plan: [place] MEDICARE PART A & B / Product Type: Medicare /     Onset Date:2021               Treatment Diagnosis: left foot pain   Prior Hospitalization: see medical history Provider#: [id number]   Medications: Verified on Patient summary List    Comorbidities: heart disease with hx of MI, left TKA, OA, thyroid issues, HTN   Prior Level of Function: retired. Functionally independent. Limited left knee extension and LE strength as she was unable to rehab due to MI       Visits from Start of Care: 12    Missed Visits: 2    Reporting Period : 21 to 21    1. Pt will increase left ankle DF to 0 degrees to normalize gait pattern.               recert status: updated goal: lacking 5 degrees with knees flexed and extended              MET; 5 deg  2. Pt will increase left ankle DF/EV strength to grossly 5/5 with MMT to improve ankle stability for gait/balance.              recert status:  DF = 4+/5, EV= 4/5              MET; DF and EV 5/5 each  3. Patient will increase left knee extension ROM to 0 degrees to improve le mechanics for gait.             recert status: No change = lacking 10 degrees              MET; 0 deg     Functional Gains: walking/standing tolerance, exercise, pain  Functional Deficits: occasional tightness  % improvement: 70%  Pain   Average: 2/10                  Best: 0/10                Worst: 4-5/10  Patient Goal: \"to wake up in the morning without pain\"     Assessment/Summary of care:   Ms. Keiry Ma reports 70% improvement since beginning therapy. Pt demonstrates increased DF AROM with both knee flexed and extended.  Her strength increased compared to last recertification. We are discharging to an updated HEP at this time.     RECOMMENDATIONS:  [x]Discontinue therapy: [x]Patient has reached or is progressing toward set goals      []Patient is non-compliant or has abdicated      []Due to lack of appreciable progress towards set [id number]Bri Beard, PT 6/29/2021 8:02 AM

## 2021-07-29 ENCOUNTER — OFFICE VISIT (OUTPATIENT)
Dept: ORTHOPEDIC SURGERY | Age: 66
End: 2021-07-29
Payer: MEDICARE

## 2021-07-29 VITALS — HEART RATE: 63 BPM | OXYGEN SATURATION: 98 % | TEMPERATURE: 96.8 F | BODY MASS INDEX: 30.86 KG/M2 | WEIGHT: 209 LBS

## 2021-07-29 DIAGNOSIS — M75.51 BURSITIS OF RIGHT SHOULDER: Primary | ICD-10-CM

## 2021-07-29 DIAGNOSIS — Z98.890 S/P SURGICAL MANIPULATION OF KNEE JOINT: ICD-10-CM

## 2021-07-29 DIAGNOSIS — M25.562 LEFT KNEE PAIN, UNSPECIFIED CHRONICITY: ICD-10-CM

## 2021-07-29 DIAGNOSIS — Z96.652 S/P TOTAL KNEE ARTHROPLASTY, LEFT: ICD-10-CM

## 2021-07-29 DIAGNOSIS — M25.511 ACUTE PAIN OF RIGHT SHOULDER: ICD-10-CM

## 2021-07-29 PROCEDURE — 1090F PRES/ABSN URINE INCON ASSESS: CPT | Performed by: PHYSICIAN ASSISTANT

## 2021-07-29 PROCEDURE — G9899 SCRN MAM PERF RSLTS DOC: HCPCS | Performed by: PHYSICIAN ASSISTANT

## 2021-07-29 PROCEDURE — G8536 NO DOC ELDER MAL SCRN: HCPCS | Performed by: PHYSICIAN ASSISTANT

## 2021-07-29 PROCEDURE — G8756 NO BP MEASURE DOC: HCPCS | Performed by: PHYSICIAN ASSISTANT

## 2021-07-29 PROCEDURE — 99214 OFFICE O/P EST MOD 30 MIN: CPT | Performed by: PHYSICIAN ASSISTANT

## 2021-07-29 PROCEDURE — G8399 PT W/DXA RESULTS DOCUMENT: HCPCS | Performed by: PHYSICIAN ASSISTANT

## 2021-07-29 PROCEDURE — 3017F COLORECTAL CA SCREEN DOC REV: CPT | Performed by: PHYSICIAN ASSISTANT

## 2021-07-29 PROCEDURE — 73030 X-RAY EXAM OF SHOULDER: CPT | Performed by: PHYSICIAN ASSISTANT

## 2021-07-29 PROCEDURE — G8417 CALC BMI ABV UP PARAM F/U: HCPCS | Performed by: PHYSICIAN ASSISTANT

## 2021-07-29 PROCEDURE — G8432 DEP SCR NOT DOC, RNG: HCPCS | Performed by: PHYSICIAN ASSISTANT

## 2021-07-29 PROCEDURE — G8427 DOCREV CUR MEDS BY ELIG CLIN: HCPCS | Performed by: PHYSICIAN ASSISTANT

## 2021-07-29 PROCEDURE — 20611 DRAIN/INJ JOINT/BURSA W/US: CPT | Performed by: PHYSICIAN ASSISTANT

## 2021-07-29 PROCEDURE — 1101F PT FALLS ASSESS-DOCD LE1/YR: CPT | Performed by: PHYSICIAN ASSISTANT

## 2021-07-29 RX ORDER — BETAMETHASONE SODIUM PHOSPHATE AND BETAMETHASONE ACETATE 3; 3 MG/ML; MG/ML
6 INJECTION, SUSPENSION INTRA-ARTICULAR; INTRALESIONAL; INTRAMUSCULAR; SOFT TISSUE ONCE
Status: COMPLETED | OUTPATIENT
Start: 2021-07-29 | End: 2021-07-29

## 2021-07-29 RX ADMIN — BETAMETHASONE SODIUM PHOSPHATE AND BETAMETHASONE ACETATE 6 MG: 3; 3 INJECTION, SUSPENSION INTRA-ARTICULAR; INTRALESIONAL; INTRAMUSCULAR; SOFT TISSUE at 09:01

## 2021-07-29 NOTE — PROGRESS NOTES
[address] [facility], [address] [facility]  [phone]           Patient: Lindy Geronimo                MRN: [id number]       SSN: [id number]  YOB: 1955        AGE: 72 y.o. SEX: female  Body mass index is 30.86 kg/m². PCP: Lizzette Yu NP  07/29/21            REVIEW OF SYSTEMS:  Constitutional: Negative for fever, chills, weight loss and malaise/fatigue. HENT: Negative. Eyes: Negative. Respiratory: Negative. Cardiovascular: Negative. Gastrointestinal: No bowel incontinence or constipation. Genitourinary: No bladder incontinence or saddle anesthesia. Skin: Negative. Neurological: Negative. Endo/Heme/Allergies: Negative. Psychiatric/Behavioral: Negative. Musculoskeletal: As per HPI above. Past Medical History:   Diagnosis Date    Arthritis     CAD (coronary artery disease)     Calcific Achilles tendonitis     Right; insertional    Essential hypertension 7/13/2020    Hypercholesterolemia     Hypertension     Left knee pain     Menopause     Pain of right heel     Sprain of right ring finger     Thyroid disease     Wears glasses          Current Outpatient Medications:     clopidogreL (PLAVIX) 75 mg tab, Take 1 Tablet by mouth daily. , Disp: 90 Tablet, Rfl: 1    ALPRAZolam (Xanax) 0.5 mg tablet, Take 1 Tablet by mouth every eight (8) hours as needed (Chest pain accompanied by shortness of breath and anxiety). Max Daily Amount: 1.5 mg., Disp: 30 Tablet, Rfl: 0    FLUoxetine (PROzac) 20 mg capsule, , Disp: , Rfl:     potassium chloride SR (K-TAB) 20 mEq tablet, Take 20 mEq by mouth daily. 15 ml daily, Disp: , Rfl:     hydrOXYzine HCL (ATARAX) 25 mg tablet, Take  by mouth as needed. , Disp: , Rfl:     carvediloL (COREG) 6.25 mg tablet, Take 1 Tab by mouth every twelve (12) hours. , Disp: 180 Tab, Rfl: 3    levothyroxine (SYNTHROID) 25 mcg tablet, Take 75 mcg by mouth Daily (before breakfast). , Disp: , Rfl:     amLODIPine (NORVASC) 2.5 mg tablet, Take 1 Tab by mouth daily. , Disp: 30 Tab, Rfl: 2    aspirin delayed-release 81 mg tablet, Take 1 Tab by mouth daily. , Disp: 30 Tab, Rfl: 11    atorvastatin (LIPITOR) 80 mg tablet, Take 1 Tab by mouth daily. , Disp: 30 Tab, Rfl: 2    No Known Allergies    Social History     Socioeconomic History    Marital status:      Spouse name: Not on file    Number of children: Not on file    Years of education: Not on file    Highest education level: Not on file   Occupational History    Not on file   Tobacco Use    Smoking status: Former Smoker     Years: 15.00    Smokeless tobacco: Never Used   Substance and Sexual Activity    Alcohol use: Yes     Comment: Occasional    Drug use: No    Sexual activity: Not on file   Other Topics Concern    Not on file   Social History Narrative    Not on file     Social Determinants of Health     Financial Resource Strain:     Difficulty of Paying Living Expenses:    Food Insecurity:     Worried About Running Out of Food in the Last Year:     [address] in the Last Year:    Transportation Needs:     Lack of Transportation (Medical):      Lack of Transportation (Non-Medical):    Physical Activity:     Days of Exercise per Week:     Minutes of Exercise per Session:    Stress:     Feeling of Stress :    Social Connections:     Frequency of Communication with Friends and Family:     Frequency of Social Gatherings with Friends and Family:     Attends Gnosticist Services:     Active Member of Clubs or Organizations:     Attends Club or Organization Meetings:     Marital Status:    Intimate Partner Violence:     Fear of Current or Ex-Partner:     Emotionally Abused:     Physically Abused:     Sexually Abused:        Past Surgical History:   Procedure Laterality Date    HX CHOLECYSTECTOMY  1990    HX HEENT      Eye surgery    HX HERNIA REPAIR      HX KNEE REPLACEMENT      HX ORTHOPAEDIC Left 07/2020    left total knee arthroplasty    HI CARDIAC SURG PROCEDURE UNLIST      stent july 2020       Patient seen and evaluated today for her right shoulder as well as her left knee. The patient had a left knee replacement in the past and post surgery did have an MI. Fortunately she recovered well from it. She did require manipulation of her knee and has progressed well from that. She is very pleased with the results of the knee replacement. She is walking up to 2 miles a day. She is having no problems with the knee. In regards to the shoulder she is having some discomfort in the shoulder over the past 3 weeks without specific injury. She has trouble with overhead activities. She does have discomfort at night which radiates into her deltoid. She denies any numbness and tingling. She denies any weakness. Patient denies recent fevers, chills, chest pain, SOB, or injuries. No recent systemic changes noted. A 12-point review of systems is performed today. Pertinent positives are noted. All other systems reviewed and otherwise are negative. Physical exam: General: Alert and oriented x3, nad.  well-developed, well nourished. normal affect, AF. NC/AT, EOMI, neck supple, trachea midline, no JVD present. Breathing is non-labored. Examination of the right shoulder the skin intact. There is no erythema, ecchymosis, warmth. There are no signs of infection or cellulitis present. She does have discomfort to palpation the subacromial space. Range of motion is approximate 90 degrees of forward flexion, 90 degrees of abduction, 30 degrees external rotation. She has a positive Morris test.  Positive Neer test.  Negative drop arm, speeds, Wallace's. Slightly decreased strength in external rotation. Neurovascular status intact. Examination of the left lower extremity reveals pain-free range of the hip. The left knee reveals skin intact. The surgical wounds healed nicely.   There is no erythema, ecchymosis, warmth. There are no signs of infection or cellulitis present. Range of motion is full. Excellent stability. Patella tracks nicely without rubs or crepitus noted. Radiographs pain office today 7/29/2021 [address] [place] including 3 views of the right shoulder shows no acute bony abnormalities. Assessment: #1 right shoulder subacromial bursitis, impingement syndrome, rotator cuff pathology, #2 status post left knee replacement doing well    Plan: At this point, we discussed treatment options. We will move for the cortisone injection for the right shoulder bursitis today. After informed consent, under aseptic conditions, with US guided assitance, the right shoulder was prepped with betadine and a mxiture of 3ml 1% lidocaine and 6mg of celestone was injected without complications. The patient tolerated the injection well. The patient is instructed on post-injection care. We discussed physical therapy as well as an MRI of the shoulder. We will see her back in the office in 4 weeks time for reevaluation. We may move forward with an MRI of the right shoulder upon her return. Chart reviewed for the following:  Boni MCNALLY PA-C, have reviewed the History, Physical and updated the Allergic reactions for [employer]?     TIME OUT performed immediately prior to start of procedure:  Boni MCNALLY PA-C, have performed the following reviews on [employer] prior to the start of the procedure:  ????????  * Patient was identified by name and date of birth   * Agreement on procedure being performed was verified  * Risks and Benefits explained to the patient  * Procedure site verified and marked as necessary  * Patient was positioned for comfort  * Consent was signed and verified    Time:8:56 AM    Body part: right shoulder, intra-bursal    Medication & Dose: 3ml 1% lidocaine and 6mg celestone    Date of procedure: 07/29/21    Procedure performed by: Holden Bonds Zoila Sherwood PA-C    Provider assisted by: none    Patient assisted by: self    How tolerated by patient: tolerated the procedure well with no complications    Post Procedural Pain Scale: 0    Comments:   [address] using a frequency of 10MHz with a 12L-RS transducer head was used to confirm needle placement.   Ultrasound images captured using [address] Ultrasound machine and scanned into patient's chart       Leonora Green PA-C, ATC

## 2021-08-28 ENCOUNTER — HOSPITAL ENCOUNTER (EMERGENCY)
Age: 66
Discharge: HOME | End: 2021-08-29
Attending: STUDENT IN AN ORGANIZED HEALTH CARE EDUCATION/TRAINING PROGRAM | Admitting: STUDENT IN AN ORGANIZED HEALTH CARE EDUCATION/TRAINING PROGRAM
Payer: MEDICARE

## 2021-08-28 ENCOUNTER — APPOINTMENT (OUTPATIENT)
Dept: GENERAL RADIOLOGY | Age: 66
End: 2021-08-28
Attending: STUDENT IN AN ORGANIZED HEALTH CARE EDUCATION/TRAINING PROGRAM
Payer: MEDICARE

## 2021-08-28 DIAGNOSIS — R51.9 ACUTE NONINTRACTABLE HEADACHE, UNSPECIFIED HEADACHE TYPE: Primary | ICD-10-CM

## 2021-08-28 DIAGNOSIS — R11.0 NAUSEA WITHOUT VOMITING: ICD-10-CM

## 2021-08-28 LAB
ALBUMIN SERPL-MCNC: 3.6 G/DL (ref 3.4–5)
ALBUMIN/GLOB SERPL: 0.9 {RATIO} (ref 0.8–1.7)
ALP SERPL-CCNC: 123 U/L (ref 45–117)
ALT SERPL-CCNC: 18 U/L (ref 13–56)
ANION GAP SERPL CALC-SCNC: 3 MMOL/L (ref 3–18)
AST SERPL-CCNC: 14 U/L (ref 10–38)
BASOPHILS # BLD: 0 K/UL (ref 0–0.1)
BASOPHILS NFR BLD: 1 % (ref 0–2)
BILIRUB SERPL-MCNC: 0.5 MG/DL (ref 0.2–1)
BUN SERPL-MCNC: 11 MG/DL (ref 7–18)
BUN/CREAT SERPL: 14 (ref 12–20)
CALCIUM SERPL-MCNC: 8.9 MG/DL (ref 8.5–10.1)
CHLORIDE SERPL-SCNC: 108 MMOL/L (ref 100–111)
CO2 SERPL-SCNC: 30 MMOL/L (ref 21–32)
CREAT SERPL-MCNC: 0.79 MG/DL (ref 0.6–1.3)
DIFFERENTIAL METHOD BLD: NORMAL
EOSINOPHIL # BLD: 0.2 K/UL (ref 0–0.4)
EOSINOPHIL NFR BLD: 3 % (ref 0–5)
ERYTHROCYTE [DISTWIDTH] IN BLOOD BY AUTOMATED COUNT: 14.4 % (ref 11.6–14.5)
GLOBULIN SER CALC-MCNC: 4 G/DL (ref 2–4)
GLUCOSE SERPL-MCNC: 95 MG/DL (ref 74–99)
HCT VFR BLD AUTO: 40.7 % (ref 35–45)
HGB BLD-MCNC: 13.2 G/DL (ref 12–16)
INR PPP: 1.1 (ref 0.8–1.2)
LIPASE SERPL-CCNC: 76 U/L (ref 73–393)
LYMPHOCYTES # BLD: 2.4 K/UL (ref 0.9–3.6)
LYMPHOCYTES NFR BLD: 40 % (ref 21–52)
MAGNESIUM SERPL-MCNC: 2.2 MG/DL (ref 1.6–2.6)
MCH RBC QN AUTO: 27.6 PG (ref 24–34)
MCHC RBC AUTO-ENTMCNC: 32.4 G/DL (ref 31–37)
MCV RBC AUTO: 85.1 FL (ref 78–100)
MONOCYTES # BLD: 0.5 K/UL (ref 0.05–1.2)
MONOCYTES NFR BLD: 8 % (ref 3–10)
NEUTS SEG # BLD: 2.8 K/UL (ref 1.8–8)
NEUTS SEG NFR BLD: 48 % (ref 40–73)
PLATELET # BLD AUTO: 286 K/UL (ref 135–420)
PMV BLD AUTO: 9.9 FL (ref 9.2–11.8)
POTASSIUM SERPL-SCNC: 3.8 MMOL/L (ref 3.5–5.5)
PROT SERPL-MCNC: 7.6 G/DL (ref 6.4–8.2)
PROTHROMBIN TIME: 13.7 SEC (ref 11.5–15.2)
RBC # BLD AUTO: 4.78 M/UL (ref 4.2–5.3)
SODIUM SERPL-SCNC: 141 MMOL/L (ref 136–145)
TROPONIN I SERPL-MCNC: <0.02 NG/ML (ref 0–0.04)
WBC # BLD AUTO: 5.9 K/UL (ref 4.6–13.2)

## 2021-08-28 PROCEDURE — 74011250636 HC RX REV CODE- 250/636: Performed by: STUDENT IN AN ORGANIZED HEALTH CARE EDUCATION/TRAINING PROGRAM

## 2021-08-28 PROCEDURE — 84484 ASSAY OF TROPONIN QUANT: CPT

## 2021-08-28 PROCEDURE — 71046 X-RAY EXAM CHEST 2 VIEWS: CPT

## 2021-08-28 PROCEDURE — 83690 ASSAY OF LIPASE: CPT

## 2021-08-28 PROCEDURE — 99284 EMERGENCY DEPT VISIT MOD MDM: CPT

## 2021-08-28 PROCEDURE — 93005 ELECTROCARDIOGRAM TRACING: CPT

## 2021-08-28 PROCEDURE — 85610 PROTHROMBIN TIME: CPT

## 2021-08-28 PROCEDURE — 85025 COMPLETE CBC W/AUTO DIFF WBC: CPT

## 2021-08-28 PROCEDURE — 83735 ASSAY OF MAGNESIUM: CPT

## 2021-08-28 PROCEDURE — 80053 COMPREHEN METABOLIC PANEL: CPT

## 2021-08-28 PROCEDURE — 74011250637 HC RX REV CODE- 250/637: Performed by: STUDENT IN AN ORGANIZED HEALTH CARE EDUCATION/TRAINING PROGRAM

## 2021-08-28 RX ORDER — IBUPROFEN 600 MG/1
600 TABLET ORAL ONCE
Status: COMPLETED | OUTPATIENT
Start: 2021-08-28 | End: 2021-08-28

## 2021-08-28 RX ORDER — MAGNESIUM SULFATE HEPTAHYDRATE 40 MG/ML
2 INJECTION, SOLUTION INTRAVENOUS ONCE
Status: DISCONTINUED | OUTPATIENT
Start: 2021-08-28 | End: 2021-08-29 | Stop reason: HOSPADM

## 2021-08-28 RX ORDER — DEXAMETHASONE 4 MG/1
6 TABLET ORAL ONCE
Status: COMPLETED | OUTPATIENT
Start: 2021-08-28 | End: 2021-08-28

## 2021-08-28 RX ORDER — ONDANSETRON 4 MG/1
4 TABLET, ORALLY DISINTEGRATING ORAL
Qty: 20 TABLET | Refills: 0 | Status: SHIPPED | OUTPATIENT
Start: 2021-08-28 | End: 2022-04-20

## 2021-08-28 RX ORDER — ACETAMINOPHEN 325 MG/1
650 TABLET ORAL ONCE
Status: COMPLETED | OUTPATIENT
Start: 2021-08-28 | End: 2021-08-28

## 2021-08-28 RX ADMIN — IBUPROFEN 600 MG: 600 TABLET, FILM COATED ORAL at 22:03

## 2021-08-28 RX ADMIN — ACETAMINOPHEN 650 MG: 325 TABLET ORAL at 22:03

## 2021-08-28 RX ADMIN — DEXAMETHASONE 6 MG: 4 TABLET ORAL at 22:03

## 2021-08-29 VITALS
HEART RATE: 59 BPM | BODY MASS INDEX: 30.36 KG/M2 | DIASTOLIC BLOOD PRESSURE: 67 MMHG | TEMPERATURE: 98 F | RESPIRATION RATE: 22 BRPM | WEIGHT: 205 LBS | OXYGEN SATURATION: 98 % | SYSTOLIC BLOOD PRESSURE: 135 MMHG | HEIGHT: 69 IN

## 2021-08-29 LAB
ATRIAL RATE: 57 BPM
CALCULATED P AXIS, ECG09: 17 DEGREES
CALCULATED R AXIS, ECG10: -6 DEGREES
CALCULATED T AXIS, ECG11: -34 DEGREES
DIAGNOSIS, 93000: NORMAL
P-R INTERVAL, ECG05: 188 MS
Q-T INTERVAL, ECG07: 410 MS
QRS DURATION, ECG06: 80 MS
QTC CALCULATION (BEZET), ECG08: 399 MS
VENTRICULAR RATE, ECG03: 57 BPM

## 2021-08-29 NOTE — ED PROVIDER NOTES
HPI   Patient is a 68-year-old female who presents with approximately 1 week history of on and off headaches and nausea. The headache she feels on the top of her head. It does comes and goes. She is not sure what makes it better or worse. It feels like her typical headache that she has had in the past but it is usually not on and off for this time. She did take some Tylenol which did help. She is not taking any other medications. She is able to sleep at night. Denies any trauma or injury. The nausea is pretty consistent. She denies any associated abdominal pain, vomiting, changes in her bowel habits, dysuria or hematuria. She does intermittently feel like she is having chest pain associated with this and she is concerned because she states that her heart attack she had nausea in addition to chest pain. She does also feel anxious when her chest pain occurs and thinks that this is her anxiety. The pain does not radiate anywhere. She feels it throughout her chest.  She denies any associated shortness of breath. She denies any recent sick contacts or Covid exposure. She was tested for Covid 4 days ago and found to be negative. States that has been compliant with her medications. She was recently started on fluoxetine but switched to Lexapro about a week ago.   Past Medical History:   Diagnosis Date    Arthritis     CAD (coronary artery disease)     Calcific Achilles tendonitis     Right; insertional    Essential hypertension 7/13/2020    Hypercholesterolemia     Hypertension     Left knee pain     Menopause     Pain of right heel     Sprain of right ring finger     Thyroid disease     Wears glasses        Past Surgical History:   Procedure Laterality Date    HX CHOLECYSTECTOMY  1990    HX HEENT      Eye surgery    HX HERNIA REPAIR      HX KNEE REPLACEMENT      HX ORTHOPAEDIC Left 07/2020    left total knee arthroplasty    CO CARDIAC SURG PROCEDURE UNLIST      stent july 2020 Family History:   Problem Relation Age of Onset    Breast Cancer Other     Arthritis-osteo Mother     Hypertension Mother     Heart Disease Mother     Hypertension Other         Grandmother    Other Other         Heart problems: Mother, grandmother    Diabetes Other         Grandmother       Social History     Socioeconomic History    Marital status:      Spouse name: Not on file    Number of children: Not on file    Years of education: Not on file    Highest education level: Not on file   Occupational History    Not on file   Tobacco Use    Smoking status: Former Smoker     Years: 15.00    Smokeless tobacco: Never Used   Substance and Sexual Activity    Alcohol use: Yes     Comment: Occasional    Drug use: No    Sexual activity: Not on file   Other Topics Concern    Not on file   Social History Narrative    Not on file     Social Determinants of Health     Financial Resource Strain:     Difficulty of Paying Living Expenses:    Food Insecurity:     Worried About Running Out of Food in the Last Year:     [address] in the Last Year:    Transportation Needs:     Lack of Transportation (Medical):  Lack of Transportation (Non-Medical):    Physical Activity:     Days of Exercise per Week:     Minutes of Exercise per Session:    Stress:     Feeling of Stress :    Social Connections:     Frequency of Communication with Friends and Family:     Frequency of Social Gatherings with Friends and Family:     Attends Amish Services:     Active Member of Clubs or Organizations:     Attends Club or Organization Meetings:     Marital Status:    Intimate Partner Violence:     Fear of Current or Ex-Partner:     Emotionally Abused:     Physically Abused:     Sexually Abused: ALLERGIES: Patient has no known allergies.     Review of Systems  Constitutional: No fever  HENT: No ear pain  Eyes: No change in vision  Respiratory: No SOB  Cardio: Positive for chest pain  GI: No blood in stool  : No hematuria  MSK: No back pain  Skin: No rashes  Neuro: Positive for headache    Vitals:    08/28/21 2035   BP: (!) 175/92   Pulse: 61   Resp: 16   Temp: 98 °F (36.7 °C)   SpO2: 98%   Weight: 93 kg (205 lb)   Height: 5' 9\" (1.753 m)            Physical Exam   General: No acute distress  Head: Normocephalic, atraumatic  Psych: Cooperative and alert  Eyes: No scleral icterus, normal conjunctiva, disconjugate gaze, pupils 2 mm, equal and reactive, extraocular motion intact  ENT: Partially dry oral mucosa, no erythema, no rhinorrhea  Neck: Supple, nontender, normal range of motion  CV: Regular rate and rhythm, no pitting edema, palpable radial pulses  Pulm: Clear breath sounds bilaterally without any wheezing or rhonchi, normal respiratory rate  GI: Normal bowel sounds, soft, non-tender  MSK: Moves all four extremities  Skin: No rashes  Neuro: Face is symmetrical, tongue protrudes midline, vision and hearing grossly intact, normal speech, 5/5 strength bilateral upper and lower extremities, no pronator drift, finger-to-nose cerebellar testing is within normal limits, no extinction, sensation grossly intact, can name common objects, alert and oriented x4        MDM   Patient 80-year-old female who presents with headache and nausea. Patient clinically appears well. She is hemodynamic stable and has a benign abdominal exam.  She is noted to be hypertensive. Her nausea and headache could be due to elevated blood pressure although less likely. We will evaluate for endorgan damage and heart issues. Although unlikely will evaluate for ACS. I do not suspect intra-abdominal pathology as she has no other GI issues however will obtain GI labs. Patient will be treated with fluids and magnesium as well as Tylenol and ibuprofen. She began to dexamethasone to help prevent recurrence of her headaches. EKG shows a sinus bradycardic rhythm at a rate of 57 bpm.  Axis is normal.  QRS is narrow.   There is no specific ST elevations or depressions. There is significant artifact from the baseline. Peers to be an isolated T wave inversions in 3 and aVF. R wave progression across pericardium is satisfactory. Overall no signs of ACS or arrhythmia. Chest x-ray per my interpretation shows no acute cardiopulmonary process. CBC, INR, CMP, troponin are all within normal limits. Since her symptoms have been going on for several days I do feel that 1 troponin is sufficient to rule out ACS in this low risk patient. Upon examination patient states that she is feeling much better. Her nausea has resolved and her headache is very minor. We did discuss the importance of blood pressure control importance of following up with her primary care doctor. We discussed different regimens to use at home to help with her symptoms. Patient stable for discharge at this time. Patient is in agreement with the plan to be discharged at this time. All the patient's questions were answered. Patient was given written instructions on the diagnosis, and states understanding of the plan moving forward. We did discuss important signs and symptoms that should prompt quick return to the emergency department. Disposition: Patient was discharged home in stable condition.   They will follow up with their primary care physician    Prescriptions: Zofran    Diagnosis: Acute headache  Acute nausea  Procedures

## 2021-08-29 NOTE — ED TRIAGE NOTES
Patient comes in stating that on the 12th, her PCP changed her anxiety medicine from one thing to something else. Patient then states that she got sick and went back to her doctor who gave her a prescription for Zofran. Patient states that about a week later, she was still sick, called her doctor and was told by her doctor to go get a Covid test which was negative. Patient states that she was prescribed something else but then she became sick again last night with nausea and a headache. Patient states that today she is still having nausea and a headache.

## 2021-09-14 ENCOUNTER — HOSPITAL ENCOUNTER (OUTPATIENT)
Dept: LAB | Age: 66
Discharge: HOME | End: 2021-09-14
Payer: MEDICARE

## 2021-09-14 ENCOUNTER — TRANSCRIBE ORDER (OUTPATIENT)
Dept: REGISTRATION | Age: 66
End: 2021-09-14

## 2021-09-14 DIAGNOSIS — I51.9 MYXEDEMA HEART DISEASE: ICD-10-CM

## 2021-09-14 DIAGNOSIS — I10 ESSENTIAL HYPERTENSION, MALIGNANT: ICD-10-CM

## 2021-09-14 DIAGNOSIS — I10 ESSENTIAL HYPERTENSION, MALIGNANT: Primary | ICD-10-CM

## 2021-09-14 DIAGNOSIS — E03.9 MYXEDEMA HEART DISEASE: ICD-10-CM

## 2021-09-14 LAB
ALBUMIN SERPL-MCNC: 3.5 G/DL (ref 3.4–5)
ALBUMIN/GLOB SERPL: 1.1 {RATIO} (ref 0.8–1.7)
ALP SERPL-CCNC: 108 U/L (ref 45–117)
ALT SERPL-CCNC: 15 U/L (ref 13–56)
ANION GAP SERPL CALC-SCNC: 5 MMOL/L (ref 3–18)
AST SERPL-CCNC: 12 U/L (ref 10–38)
BASOPHILS # BLD: 0 K/UL (ref 0–0.1)
BASOPHILS NFR BLD: 1 % (ref 0–2)
BILIRUB SERPL-MCNC: 0.5 MG/DL (ref 0.2–1)
BUN SERPL-MCNC: 16 MG/DL (ref 7–18)
BUN/CREAT SERPL: 23 (ref 12–20)
CALCIUM SERPL-MCNC: 8.8 MG/DL (ref 8.5–10.1)
CHLORIDE SERPL-SCNC: 107 MMOL/L (ref 100–111)
CO2 SERPL-SCNC: 30 MMOL/L (ref 21–32)
CREAT SERPL-MCNC: 0.7 MG/DL (ref 0.6–1.3)
DIFFERENTIAL METHOD BLD: ABNORMAL
EOSINOPHIL # BLD: 0.2 K/UL (ref 0–0.4)
EOSINOPHIL NFR BLD: 4 % (ref 0–5)
ERYTHROCYTE [DISTWIDTH] IN BLOOD BY AUTOMATED COUNT: 14.9 % (ref 11.6–14.5)
GLOBULIN SER CALC-MCNC: 3.2 G/DL (ref 2–4)
GLUCOSE SERPL-MCNC: 82 MG/DL (ref 74–99)
HCT VFR BLD AUTO: 39.9 % (ref 35–45)
HGB BLD-MCNC: 12.6 G/DL (ref 12–16)
LYMPHOCYTES # BLD: 2 K/UL (ref 0.9–3.6)
LYMPHOCYTES NFR BLD: 41 % (ref 21–52)
MCH RBC QN AUTO: 27.1 PG (ref 24–34)
MCHC RBC AUTO-ENTMCNC: 31.6 G/DL (ref 31–37)
MCV RBC AUTO: 85.8 FL (ref 78–100)
MONOCYTES # BLD: 0.4 K/UL (ref 0.05–1.2)
MONOCYTES NFR BLD: 8 % (ref 3–10)
NEUTS SEG # BLD: 2.2 K/UL (ref 1.8–8)
NEUTS SEG NFR BLD: 46 % (ref 40–73)
PLATELET # BLD AUTO: 322 K/UL (ref 135–420)
PMV BLD AUTO: 10.6 FL (ref 9.2–11.8)
POTASSIUM SERPL-SCNC: 4 MMOL/L (ref 3.5–5.5)
PROT SERPL-MCNC: 6.7 G/DL (ref 6.4–8.2)
RBC # BLD AUTO: 4.65 M/UL (ref 4.2–5.3)
SODIUM SERPL-SCNC: 142 MMOL/L (ref 136–145)
T4 FREE SERPL-MCNC: 0.8 NG/DL (ref 0.7–1.5)
TSH SERPL DL<=0.05 MIU/L-ACNC: 2.52 UIU/ML (ref 0.36–3.74)
WBC # BLD AUTO: 4.9 K/UL (ref 4.6–13.2)

## 2021-09-14 PROCEDURE — 80053 COMPREHEN METABOLIC PANEL: CPT

## 2021-09-14 PROCEDURE — 36415 COLL VENOUS BLD VENIPUNCTURE: CPT

## 2021-09-14 PROCEDURE — 85025 COMPLETE CBC W/AUTO DIFF WBC: CPT

## 2021-09-14 PROCEDURE — 84439 ASSAY OF FREE THYROXINE: CPT

## 2021-09-14 PROCEDURE — 84443 ASSAY THYROID STIM HORMONE: CPT

## 2021-10-04 RX ORDER — CLOPIDOGREL BISULFATE 75 MG/1
TABLET ORAL
Qty: 90 TABLET | Refills: 1 | Status: SHIPPED | OUTPATIENT
Start: 2021-10-04 | End: 2022-07-05 | Stop reason: SDUPTHER

## 2021-10-28 ENCOUNTER — TRANSCRIBE ORDER (OUTPATIENT)
Dept: SCHEDULING | Age: 66
End: 2021-10-28

## 2021-10-28 DIAGNOSIS — Z12.31 VISIT FOR SCREENING MAMMOGRAM: Primary | ICD-10-CM

## 2021-11-17 ENCOUNTER — OFFICE VISIT (OUTPATIENT)
Dept: CARDIOLOGY CLINIC | Age: 66
End: 2021-11-17
Payer: MEDICARE

## 2021-11-17 VITALS
HEIGHT: 69 IN | SYSTOLIC BLOOD PRESSURE: 139 MMHG | WEIGHT: 208 LBS | HEART RATE: 62 BPM | BODY MASS INDEX: 30.81 KG/M2 | DIASTOLIC BLOOD PRESSURE: 58 MMHG | OXYGEN SATURATION: 99 %

## 2021-11-17 DIAGNOSIS — Z95.5 HISTORY OF CORONARY ARTERY STENT PLACEMENT: ICD-10-CM

## 2021-11-17 DIAGNOSIS — I10 ESSENTIAL HYPERTENSION: ICD-10-CM

## 2021-11-17 DIAGNOSIS — I25.10 CORONARY ARTERY DISEASE INVOLVING NATIVE CORONARY ARTERY OF NATIVE HEART WITHOUT ANGINA PECTORIS: Primary | ICD-10-CM

## 2021-11-17 DIAGNOSIS — E78.2 MIXED HYPERLIPIDEMIA: ICD-10-CM

## 2021-11-17 PROCEDURE — G8417 CALC BMI ABV UP PARAM F/U: HCPCS | Performed by: INTERNAL MEDICINE

## 2021-11-17 PROCEDURE — 1090F PRES/ABSN URINE INCON ASSESS: CPT | Performed by: INTERNAL MEDICINE

## 2021-11-17 PROCEDURE — G8432 DEP SCR NOT DOC, RNG: HCPCS | Performed by: INTERNAL MEDICINE

## 2021-11-17 PROCEDURE — 99214 OFFICE O/P EST MOD 30 MIN: CPT | Performed by: INTERNAL MEDICINE

## 2021-11-17 PROCEDURE — G9899 SCRN MAM PERF RSLTS DOC: HCPCS | Performed by: INTERNAL MEDICINE

## 2021-11-17 PROCEDURE — G8536 NO DOC ELDER MAL SCRN: HCPCS | Performed by: INTERNAL MEDICINE

## 2021-11-17 PROCEDURE — G8399 PT W/DXA RESULTS DOCUMENT: HCPCS | Performed by: INTERNAL MEDICINE

## 2021-11-17 PROCEDURE — G8752 SYS BP LESS 140: HCPCS | Performed by: INTERNAL MEDICINE

## 2021-11-17 PROCEDURE — G8754 DIAS BP LESS 90: HCPCS | Performed by: INTERNAL MEDICINE

## 2021-11-17 PROCEDURE — 3017F COLORECTAL CA SCREEN DOC REV: CPT | Performed by: INTERNAL MEDICINE

## 2021-11-17 PROCEDURE — 1101F PT FALLS ASSESS-DOCD LE1/YR: CPT | Performed by: INTERNAL MEDICINE

## 2021-11-17 PROCEDURE — G8427 DOCREV CUR MEDS BY ELIG CLIN: HCPCS | Performed by: INTERNAL MEDICINE

## 2021-11-17 RX ORDER — DESVENLAFAXINE 25 MG/1
TABLET, EXTENDED RELEASE ORAL
COMMUNITY
Start: 2021-11-17 | End: 2021-11-17

## 2021-11-17 NOTE — PROGRESS NOTES
1. Have you been to the ER, urgent care clinic since your last visit? Hospitalized since your last visit? Yes Where: [address] for panic attack    2. Have you seen or consulted any other health care providers outside of the [address] [place] [address] since your last visit? Include any pap smears or colon screening.  No

## 2021-11-17 NOTE — PROGRESS NOTES
HISTORY OF PRESENT ILLNESS  Daryn Ca is a 77 y.o. female. Patient referred for pre-op evaluation due to abnormal EKG.    7/2020 Ms. Penny Joel s/p left total knee replacement on 7/7/2020.  - S/P STEMI on 7/13/2020 - s/p cardiac cath with SAIGE to RCA  10/2020  Patient seen today for follow-up. She was in emergency room few days ago with episode of severe dizziness nausea and diarrhea. She had hypokalemia that was supplemented MI ruled out no acute EKG changes. She is feeling better since then. Diarrhea has resolved. Patient had gone out to eat on Friday night and diarrhea started on Saturday morning and stayed all day long like that. No anginal quality chest pain. Stable since then  1/2021  Patient is here for follow-up she was in emergency room last week with complaint of chest tightness described as right-sided tightness noted to activity exertion she felt like it was anxiety attack she was monitored in ER and discharged home she was given Xanax and after that she has felt better. She underwent stress test today in office  11/2021  Patient seen today for follow-up. Her symptoms are stable. She has occasional anxiety attack. Appears stable    Hypertension  The history is provided by the patient and medical records. This is a chronic problem. The current episode started more than 1 week ago. Pertinent negatives include no chest pain, no abdominal pain, no headaches and no shortness of breath. The symptoms are relieved by medications. Hospital Follow Up  The history is provided by the patient. Pertinent negatives include no chest pain, no abdominal pain, no headaches and no shortness of breath. Follow-up  Pertinent negatives include no chest pain, no abdominal pain, no headaches and no shortness of breath. Review of Systems   Constitutional: Negative for chills, fever and malaise/fatigue. HENT: Negative for congestion and nosebleeds.     Eyes: Negative for blurred vision, double vision and redness. Respiratory: Negative for cough, hemoptysis, sputum production, shortness of breath and wheezing. Cardiovascular: Negative for chest pain, palpitations, orthopnea, claudication, leg swelling and PND. Gastrointestinal: Positive for nausea. Negative for abdominal pain, heartburn and vomiting. Musculoskeletal: Positive for joint pain. Negative for falls and myalgias. Skin: Negative for rash. Neurological: Negative for dizziness, weakness and headaches. Endo/Heme/Allergies: Does not bruise/bleed easily. Psychiatric/Behavioral: The patient is not nervous/anxious. Family History   Problem Relation Age of Onset    Breast Cancer Other     Arthritis-osteo Mother     Hypertension Mother     Heart Disease Mother     Hypertension Other         Grandmother    Other Other         Heart problems: Mother, grandmother    Diabetes Other         Grandmother       Past Medical History:   Diagnosis Date    Arthritis     CAD (coronary artery disease)     Calcific Achilles tendonitis     Right; insertional    Essential hypertension 7/13/2020    Hypercholesterolemia     Hypertension     Left knee pain     Menopause     Pain of right heel     Sprain of right ring finger     Thyroid disease     Wears glasses        Past Surgical History:   Procedure Laterality Date    HX CHOLECYSTECTOMY  1990    HX HEENT      Eye surgery    HX HERNIA REPAIR      HX KNEE REPLACEMENT      HX ORTHOPAEDIC Left 07/2020    left total knee arthroplasty    CA CARDIAC SURG PROCEDURE UNLIST      stent july 2020       Social History     Tobacco Use    Smoking status: Former Smoker     Years: 15.00    Smokeless tobacco: Never Used   Substance Use Topics    Alcohol use: Yes     Comment: Occasional       No Known Allergies    Prior to Admission medications    Medication Sig Start Date End Date Taking?  Authorizing Provider   clopidogreL (PLAVIX) 75 mg tab TAKE 1 TABLET BY MOUTH EVERY DAY 10/4/21  Yes Kervin Coleman Elinor BRANDON NP   ondansetron (Zofran ODT) 4 mg disintegrating tablet 1 Tablet by SubLINGual route every eight (8) hours as needed for Nausea or Vomiting. 8/28/21  Yes Gloria Skelton MD   ALPRAZolam (Xanax) 0.5 mg tablet Take 1 Tablet by mouth every eight (8) hours as needed (Chest pain accompanied by shortness of breath and anxiety). Max Daily Amount: 1.5 mg. 6/6/21 6/6/22 Yes Ajith Mcfadden MD   potassium chloride SR (K-TAB) 20 mEq tablet Take 20 mEq by mouth daily. 15 ml daily   Yes Provider, Historical   carvediloL (COREG) 6.25 mg tablet Take 1 Tab by mouth every twelve (12) hours. 9/30/20  Yes Jie Jaime NP   levothyroxine (SYNTHROID) 25 mcg tablet Take 75 mcg by mouth Daily (before breakfast). Yes Provider, Historical   amLODIPine (NORVASC) 2.5 mg tablet Take 1 Tab by mouth daily. 7/15/20  Yes Laura Encinas MD   aspirin delayed-release 81 mg tablet Take 1 Tab by mouth daily. 7/16/20  Yes Laura Encinas MD   atorvastatin (LIPITOR) 80 mg tablet Take 1 Tab by mouth daily. 7/15/20  Yes Laura Encinas MD         Visit Vitals  BP (!) 139/58 (BP 1 Location: Left upper arm, BP Patient Position: Sitting, BP Cuff Size: Adult)   Pulse 62   Ht 5' 9\" (1.753 m)   Wt 94.3 kg (208 lb)   SpO2 99%   BMI 30.72 kg/m²       Physical Exam  Vitals and nursing note reviewed. Constitutional:       Appearance: She is well-developed. Neck:      Vascular: No JVD. Cardiovascular:      Rate and Rhythm: Normal rate and regular rhythm. Pulses: Intact distal pulses. Heart sounds: Normal heart sounds. No murmur heard. No friction rub. No gallop. Pulmonary:      Effort: Pulmonary effort is normal. No respiratory distress. Breath sounds: Normal breath sounds. No wheezing or rales. Chest:      Chest wall: No tenderness. Abdominal:      General: There is no distension. Palpations: Abdomen is soft. There is no mass. Tenderness: There is no abdominal tenderness.    Musculoskeletal:         General: Normal range of motion. Comments: Dressing to let knee intact   Skin:     General: Skin is warm and dry. Neurological:      Mental Status: She is alert and oriented to person, place, and time. Echo 7/2020  Interpretation Summary   · Tricuspid regurgitation is inadequate for estimation of right ventricular systolic pressure. · Normal cavity size and systolic function (ejection fraction normal). Mildly increased wall thickness. Estimated left ventricular ejection fraction is 55 - 60%. Visually measured ejection fraction. Abnormal left ventricular wall motion. Age-appropriate left ventricular diastolic function. · Mitral annular calcification. Trace mitral valve regurgitation is present. · The following segments are hypokinetic: basal inferior and mid inferior. Nuclear Stress Test 7/2/2020  Nuclear Cardiac Spect Rest then Gated Stress study. Blase Gell was used as the stressing method and agent. (Blase Gell given via a 10 - 20 sec injection.)   One day myocardial perfusion study. Date: 7/2/2020. Negative myocardial perfusion imaging. Myocardial perfusion imaging supports a low risk stress test.   There is no prior study available for comparison. .     Cardiac cath 7/13/2020  STEMI  · Critical one-vessel coronary artery disease with 99% stenosis and interval coronary thrombus at proximal/mid RCA. · Noncritical 50% mid LAD stenosis and diffuse luminal irregularities throughout the coronary vasculature with moderate proximal calcification  · Successful PCI of proximal/mid RCA deploying a drug-eluting stent with a residual 0% stenosis and a very tortuous right coronary artery. · Procedure done via right femoral artery and 6 Nepali Angio-Seal at the end. I have personally reviewed patient's records available from hospital and other providers and incorporated findings in patient care.   ER notes, lab, EKG, chest x-ray-1/2021 1/2021  Nuclear stress test  Negative stress test  Results for CHRISTEN Deonte Case (MRN [id number]) as of 5/26/2021 08:00   Ref. Range 3/25/2021 09:56   Triglyceride Latest Ref Range: <150 MG/DL 94   Cholesterol, total Latest Ref Range: <200 MG/   HDL Cholesterol Latest Ref Range: 40 - 60 MG/DL 59   CHOL/HDL Ratio Latest Ref Range: 0 - 5.0   3.0   VLDL, calculated Latest Units: MG/DL 18.8   LDL, calculated Latest Ref Range: 0 - 100 MG/DL 99.2     ASSESSMENT and PLAN    Ms. Taz Riley has a reminder for a \"due or due soon\" health maintenance. I have asked that she contact her primary care provider for follow-up on this health maintenance. No flowsheet data found. Assessment     I have personally reviewed patients ekg done at other facility. I Have personally reviewed recent relevant labs available and discussed with patient      ICD-10-CM ICD-9-CM    1. Coronary artery disease involving native coronary artery of native heart without angina pectoris  I25.10 414.01 LIPID PANEL    Stable continue treatment monitor   2. Essential hypertension  I10 401.9     Stable continue treatment   3. History of coronary artery stent placement  Z95.5 V45.82     Stable   4. Mixed hyperlipidemia  E78.2 272.2     Continue treatment lab with PCP     7/2020   Patient referred for pre-op evaluation due to abnormal EKG. SR with T wave abnormalities. She has history of HTN, HLD, and has not been active due to   Left knee pain. Will evaluate with stress test in AM.    B/P is not controlled will increase lisinopril to 20 mg/ BID.  7/2020 - Pre-operative nuclear stress test was negative for ischemia. She underwent left knee replacement surgery on 7/7/20. She then presented to ER on 7/13 with nausea and generalized malaise, with STEMI. S/p cardiac cath with SAIGE to RCA. She has continued DAPT therapy with statin and BB. She has had multiple ER visits due to nausea - which resolved with Zofran.   This is thought to be related to Percocet and was d/c this AM. She has been referred to cardiac rehab and is in PT for left knee. 10/2020  Cardiac status stable. Recent ER visit with nausea dizziness and diarrhea. Diarrhea likely related to food. Resolved since then. Mild hypokalemia being supplemented. Episode does not appear cardiac in nature. Continue current cardiac therapy including aspirin and Plavix  1/2021  Seen after recent ER visit with chest tightness which appears different than her anginal pain that she had with coronary disease prior to stent. Nuclear stress test today is negative her symptoms are better after using Xanax. We will continue to monitor clinically continue current therapy  5/2021  Stable cardiac status. Still has anxiety attacks treated by PCP and now supposed to see psychotherapist.  LDL 99 continue aggressive dieting and maximum dose of statin. If needed use PCSK9 inhibitor. Blood pressure elevated. My chart blood pressure links-her home readings are normal  11/2021  Stable cardiac status. We will continue Plavix and discontinue aspirin as it is about 16-month after the stent. Continue dietary modification. Follow-up lipid profile      Medications Discontinued During This Encounter   Medication Reason    desvenlafaxine succinate (PRISTIQ) 25 mg ER tablet Not A Current Medication    FLUoxetine (PROzac) 20 mg capsule Not A Current Medication    hydrOXYzine HCL (ATARAX) 25 mg tablet Not A Current Medication       Orders Placed This Encounter    LIPID PANEL     Standing Status:   Future     Standing Expiration Date:   5/18/2022       Follow-up and Dispositions    · Return in about 6 months (around 5/17/2022).          Lili Catalan MD

## 2021-11-26 NOTE — ED NOTES
I performed a brief evaluation, including history and physical, of the patient here in triage and I have determined that the patient will need further treatment and evaluation from the main side ER provider. I have placed initial orders to help in expediting patients care. Complains of nausea x 2 days. No improvement with zofran rx from PCP. Today increased nausea with diarrhea and vomit x1. Feels like prior MI symptoms. MI with stent 7/13/2020- on plavix. Denies CP/SOB. July 23, 2020 at 6:04 PM - April LIANET Garcia        There were no vitals taken for this visit. (4) no limitation

## 2021-12-06 ENCOUNTER — HOSPITAL ENCOUNTER (OUTPATIENT)
Dept: MAMMOGRAPHY | Age: 66
Discharge: HOME | End: 2021-12-06
Attending: PHYSICIAN ASSISTANT
Payer: MEDICARE

## 2021-12-06 ENCOUNTER — HOSPITAL ENCOUNTER (OUTPATIENT)
Dept: ULTRASOUND IMAGING | Age: 66
Discharge: HOME | End: 2021-12-06
Attending: PHYSICIAN ASSISTANT
Payer: MEDICARE

## 2021-12-06 DIAGNOSIS — N63.0 BREAST LUMP: ICD-10-CM

## 2021-12-06 DIAGNOSIS — R92.8 ABNORMAL MAMMOGRAM: ICD-10-CM

## 2021-12-06 PROCEDURE — 76642 ULTRASOUND BREAST LIMITED: CPT

## 2021-12-06 PROCEDURE — 77062 BREAST TOMOSYNTHESIS BI: CPT

## 2022-01-26 ENCOUNTER — TRANSCRIBE ORDER (OUTPATIENT)
Dept: SCHEDULING | Age: 67
End: 2022-01-26

## 2022-01-26 DIAGNOSIS — E03.9 HYPOTHYROID: Primary | ICD-10-CM

## 2022-01-27 ENCOUNTER — OFFICE VISIT (OUTPATIENT)
Dept: ORTHOPEDIC SURGERY | Age: 67
End: 2022-01-27
Payer: MEDICARE

## 2022-01-27 VITALS
HEART RATE: 68 BPM | BODY MASS INDEX: 31.25 KG/M2 | HEIGHT: 69 IN | RESPIRATION RATE: 16 BRPM | TEMPERATURE: 96.6 F | WEIGHT: 211 LBS | OXYGEN SATURATION: 96 %

## 2022-01-27 DIAGNOSIS — M75.51 BURSITIS OF RIGHT SHOULDER: ICD-10-CM

## 2022-01-27 DIAGNOSIS — M62.838 MUSCLE SPASM: ICD-10-CM

## 2022-01-27 DIAGNOSIS — M54.2 NECK PAIN: ICD-10-CM

## 2022-01-27 DIAGNOSIS — M54.12 CERVICAL RADICULOPATHY: Primary | ICD-10-CM

## 2022-01-27 PROCEDURE — G8417 CALC BMI ABV UP PARAM F/U: HCPCS | Performed by: PHYSICIAN ASSISTANT

## 2022-01-27 PROCEDURE — 1090F PRES/ABSN URINE INCON ASSESS: CPT | Performed by: PHYSICIAN ASSISTANT

## 2022-01-27 PROCEDURE — G8432 DEP SCR NOT DOC, RNG: HCPCS | Performed by: PHYSICIAN ASSISTANT

## 2022-01-27 PROCEDURE — G9899 SCRN MAM PERF RSLTS DOC: HCPCS | Performed by: PHYSICIAN ASSISTANT

## 2022-01-27 PROCEDURE — 72040 X-RAY EXAM NECK SPINE 2-3 VW: CPT | Performed by: PHYSICIAN ASSISTANT

## 2022-01-27 PROCEDURE — G8536 NO DOC ELDER MAL SCRN: HCPCS | Performed by: PHYSICIAN ASSISTANT

## 2022-01-27 PROCEDURE — 99214 OFFICE O/P EST MOD 30 MIN: CPT | Performed by: PHYSICIAN ASSISTANT

## 2022-01-27 PROCEDURE — G8399 PT W/DXA RESULTS DOCUMENT: HCPCS | Performed by: PHYSICIAN ASSISTANT

## 2022-01-27 PROCEDURE — 3017F COLORECTAL CA SCREEN DOC REV: CPT | Performed by: PHYSICIAN ASSISTANT

## 2022-01-27 PROCEDURE — G8427 DOCREV CUR MEDS BY ELIG CLIN: HCPCS | Performed by: PHYSICIAN ASSISTANT

## 2022-01-27 PROCEDURE — 1101F PT FALLS ASSESS-DOCD LE1/YR: CPT | Performed by: PHYSICIAN ASSISTANT

## 2022-01-27 PROCEDURE — G8756 NO BP MEASURE DOC: HCPCS | Performed by: PHYSICIAN ASSISTANT

## 2022-01-27 RX ORDER — METHYLPREDNISOLONE 4 MG/1
TABLET ORAL
Qty: 1 DOSE PACK | Refills: 0 | Status: SHIPPED | OUTPATIENT
Start: 2022-01-27 | End: 2022-04-20

## 2022-01-27 RX ORDER — CYCLOBENZAPRINE HCL 10 MG
10 TABLET ORAL
Qty: 30 TABLET | Refills: 0 | Status: SHIPPED | OUTPATIENT
Start: 2022-01-27 | End: 2022-02-24 | Stop reason: SDUPTHER

## 2022-01-27 NOTE — PROGRESS NOTES
[address] [facility], [address] [facility]  [phone]           Patient: Nasrin Polanco                MRN: [id number]       SSN: [id number]  YOB: 1955        AGE: 77 y.o. SEX: female  Body mass index is 31.16 kg/m². PCP: JAMES Lawrence  01/27/22      This office note has been dictated. REVIEW OF SYSTEMS:  Constitutional: Negative for fever, chills, weight loss and malaise/fatigue. HENT: Negative. Eyes: Negative. Respiratory: Negative. Cardiovascular: Negative. Gastrointestinal: No bowel incontinence or constipation. Genitourinary: No bladder incontinence or saddle anesthesia. Skin: Negative. Neurological: Negative. Endo/Heme/Allergies: Negative. Psychiatric/Behavioral: Negative. Musculoskeletal: As per HPI above. Past Medical History:   Diagnosis Date    Arthritis     CAD (coronary artery disease)     Calcific Achilles tendonitis     Right; insertional    Essential hypertension 7/13/2020    Hypercholesterolemia     Hypertension     Left knee pain     Menopause     Pain of right heel     Sprain of right ring finger     Thyroid disease     Wears glasses          Current Outpatient Medications:     methylPREDNISolone (MEDROL DOSEPACK) 4 mg tablet, Per dose pack instructions, Disp: 1 Dose Pack, Rfl: 0    cyclobenzaprine (FLEXERIL) 10 mg tablet, Take 1 Tablet by mouth three (3) times daily as needed for Muscle Spasm(s). , Disp: 30 Tablet, Rfl: 0    clopidogreL (PLAVIX) 75 mg tab, TAKE 1 TABLET BY MOUTH EVERY DAY, Disp: 90 Tablet, Rfl: 1    ondansetron (Zofran ODT) 4 mg disintegrating tablet, 1 Tablet by SubLINGual route every eight (8) hours as needed for Nausea or Vomiting., Disp: 20 Tablet, Rfl: 0    ALPRAZolam (Xanax) 0.5 mg tablet, Take 1 Tablet by mouth every eight (8) hours as needed (Chest pain accompanied by shortness of breath and anxiety).  Max Daily Amount: 1.5 mg., Disp: 30 Tablet, Rfl: 0    potassium chloride SR (K-TAB) 20 mEq tablet, Take 20 mEq by mouth daily. 15 ml daily, Disp: , Rfl:     carvediloL (COREG) 6.25 mg tablet, Take 1 Tab by mouth every twelve (12) hours. , Disp: 180 Tab, Rfl: 3    levothyroxine (SYNTHROID) 25 mcg tablet, Take 75 mcg by mouth Daily (before breakfast). , Disp: , Rfl:     amLODIPine (NORVASC) 2.5 mg tablet, Take 1 Tab by mouth daily. , Disp: 30 Tab, Rfl: 2    atorvastatin (LIPITOR) 80 mg tablet, Take 1 Tab by mouth daily. , Disp: 30 Tab, Rfl: 2    aspirin delayed-release 81 mg tablet, Take 1 Tab by mouth daily. (Patient not taking: Reported on 1/27/2022), Disp: 30 Tab, Rfl: 11    No Known Allergies    Social History     Socioeconomic History    Marital status:      Spouse name: Not on file    Number of children: Not on file    Years of education: Not on file    Highest education level: Not on file   Occupational History    Not on file   Tobacco Use    Smoking status: Former Smoker     Years: 15.00    Smokeless tobacco: Never Used   Substance and Sexual Activity    Alcohol use: Yes     Comment: Occasional    Drug use: No    Sexual activity: Not on file   Other Topics Concern    Not on file   Social History Narrative    Not on file     Social Determinants of Health     Financial Resource Strain:     Difficulty of Paying Living Expenses: Not on file   Food Insecurity:     Worried About Running Out of Food in the Last Year: Not on file    Marycarmen of Food in the Last Year: Not on file   Transportation Needs:     Lack of Transportation (Medical): Not on file    Lack of Transportation (Non-Medical):  Not on file   Physical Activity:     Days of Exercise per Week: Not on file    Minutes of Exercise per Session: Not on file   Stress:     Feeling of Stress : Not on file   Social Connections:     Frequency of Communication with Friends and Family: Not on file    Frequency of Social Gatherings with Friends and Family: Not on file   Courtenay Spurling Attends Bahai Services: Not on file    Active Member of Clubs or Organizations: Not on file    Attends Club or Organization Meetings: Not on file    Marital Status: Not on file   Intimate Partner Violence:     Fear of Current or Ex-Partner: Not on file    Emotionally Abused: Not on file    Physically Abused: Not on file    Sexually Abused: Not on file   Housing Stability:     Unable to Pay for Housing in the Last Year: Not on file    Number of [place] in the Last Year: Not on file    Unstable Housing in the Last Year: Not on file       Past Surgical History:   Procedure Laterality Date    HX CHOLECYSTECTOMY  1990    HX HEENT      Eye surgery    HX HERNIA REPAIR      HX KNEE REPLACEMENT      HX ORTHOPAEDIC Left 07/2020    left total knee arthroplasty    DE CARDIAC SURG PROCEDURE UNLIST      stent july 2020         Patient seen evaluate today complaining of her neck. She reports some discomfort with certain movements in her neck which causes her to have some numbness and tingling down the left side of her neck to her shoulder blade. She does report a little bit of weakness into her hands at times. She denies any numbness and tingling to the hands. She had no recent injuries. No fevers or chills. Patient denies recent fevers, chills, chest pain, SOB, or injuries. No recent systemic changes noted. A 12-point review of systems is performed today. Pertinent positives are noted. All other systems reviewed and otherwise are negative. Physical exam: General: Alert and oriented x3, nad.  well-developed, well nourished. normal affect, AF. NC/AT, EOMI, neck supple, trachea midline, no JVD present. Breathing is non-labored. Examination of the cervical spine reveals good range of motion. There is no tenderness palpation midline. Does have some paraspinal muscle spasm present to the left. Neurovascular status intact.   C5-T2 intact to myotomes and dermatomes and symmetric bilaterally with a little bit of weakness  strength. Radiographs obtained the office today 1/27/2022 [address] [place] include AP and lateral of the cervical spine shows multilevel degenerative changes and spondylosis. No acute abnormalities noted. Assessment: Cervical degenerative disc disease, radiculopathy    Plan: At this point, we discussed treatment options. We will move forward with an MRI of the cervical spine for further information. Prescription for Medrol Dosepak as well as Flexeril be sent to her pharmacy. She is instructed on use as well as usual precautions.               JR Rishabh ELIZABETH, PA-C, ATC

## 2022-02-03 DIAGNOSIS — M54.2 NECK PAIN: ICD-10-CM

## 2022-02-10 ENCOUNTER — HOSPITAL ENCOUNTER (OUTPATIENT)
Dept: ULTRASOUND IMAGING | Age: 67
Discharge: HOME | End: 2022-02-10
Attending: PHYSICIAN ASSISTANT
Payer: MEDICARE

## 2022-02-10 ENCOUNTER — HOSPITAL ENCOUNTER (OUTPATIENT)
Dept: MRI IMAGING | Age: 67
Discharge: HOME | End: 2022-02-10
Attending: PHYSICIAN ASSISTANT
Payer: MEDICARE

## 2022-02-10 DIAGNOSIS — E03.9 HYPOTHYROID: ICD-10-CM

## 2022-02-10 PROCEDURE — 76536 US EXAM OF HEAD AND NECK: CPT

## 2022-02-10 PROCEDURE — 72141 MRI NECK SPINE W/O DYE: CPT

## 2022-02-24 ENCOUNTER — OFFICE VISIT (OUTPATIENT)
Dept: ORTHOPEDIC SURGERY | Age: 67
End: 2022-02-24
Payer: MEDICARE

## 2022-02-24 VITALS
HEIGHT: 69 IN | RESPIRATION RATE: 16 BRPM | WEIGHT: 210.8 LBS | HEART RATE: 62 BPM | OXYGEN SATURATION: 100 % | BODY MASS INDEX: 31.22 KG/M2

## 2022-02-24 DIAGNOSIS — M62.838 MUSCLE SPASM: ICD-10-CM

## 2022-02-24 DIAGNOSIS — M54.12 CERVICAL RADICULOPATHY: Primary | ICD-10-CM

## 2022-02-24 PROCEDURE — 1101F PT FALLS ASSESS-DOCD LE1/YR: CPT | Performed by: PHYSICIAN ASSISTANT

## 2022-02-24 PROCEDURE — G8536 NO DOC ELDER MAL SCRN: HCPCS | Performed by: PHYSICIAN ASSISTANT

## 2022-02-24 PROCEDURE — 1090F PRES/ABSN URINE INCON ASSESS: CPT | Performed by: PHYSICIAN ASSISTANT

## 2022-02-24 PROCEDURE — G8417 CALC BMI ABV UP PARAM F/U: HCPCS | Performed by: PHYSICIAN ASSISTANT

## 2022-02-24 PROCEDURE — G8399 PT W/DXA RESULTS DOCUMENT: HCPCS | Performed by: PHYSICIAN ASSISTANT

## 2022-02-24 PROCEDURE — 3017F COLORECTAL CA SCREEN DOC REV: CPT | Performed by: PHYSICIAN ASSISTANT

## 2022-02-24 PROCEDURE — G8427 DOCREV CUR MEDS BY ELIG CLIN: HCPCS | Performed by: PHYSICIAN ASSISTANT

## 2022-02-24 PROCEDURE — G9899 SCRN MAM PERF RSLTS DOC: HCPCS | Performed by: PHYSICIAN ASSISTANT

## 2022-02-24 PROCEDURE — G8756 NO BP MEASURE DOC: HCPCS | Performed by: PHYSICIAN ASSISTANT

## 2022-02-24 PROCEDURE — G8432 DEP SCR NOT DOC, RNG: HCPCS | Performed by: PHYSICIAN ASSISTANT

## 2022-02-24 PROCEDURE — 99213 OFFICE O/P EST LOW 20 MIN: CPT | Performed by: PHYSICIAN ASSISTANT

## 2022-02-24 RX ORDER — CYCLOBENZAPRINE HCL 10 MG
10 TABLET ORAL
Qty: 60 TABLET | Refills: 1 | Status: SHIPPED | OUTPATIENT
Start: 2022-02-24 | End: 2022-09-30

## 2022-02-24 NOTE — PROGRESS NOTES
[address] [facility], [address] [facility]  [phone]           Patient: Melo Orta                MRN: [id number]       SSN: [id number]  YOB: 1955        AGE: 77 y.o. SEX: female  Body mass index is 31.13 kg/m². PCP: JAMES Finney  02/24/22      This office note has been dictated. REVIEW OF SYSTEMS:  Constitutional: Negative for fever, chills, weight loss and malaise/fatigue. HENT: Negative. Eyes: Negative. Respiratory: Negative. Cardiovascular: Negative. Gastrointestinal: No bowel incontinence or constipation. Genitourinary: No bladder incontinence or saddle anesthesia. Skin: Negative. Neurological: Negative. Endo/Heme/Allergies: Negative. Psychiatric/Behavioral: Negative. Musculoskeletal: As per HPI above. Past Medical History:   Diagnosis Date    Arthritis     CAD (coronary artery disease)     Calcific Achilles tendonitis     Right; insertional    Essential hypertension 7/13/2020    Hypercholesterolemia     Hypertension     Left knee pain     Menopause     Pain of right heel     Sprain of right ring finger     Thyroid disease     Wears glasses          Current Outpatient Medications:     methylPREDNISolone (MEDROL DOSEPACK) 4 mg tablet, Per dose pack instructions, Disp: 1 Dose Pack, Rfl: 0    clopidogreL (PLAVIX) 75 mg tab, TAKE 1 TABLET BY MOUTH EVERY DAY, Disp: 90 Tablet, Rfl: 1    ondansetron (Zofran ODT) 4 mg disintegrating tablet, 1 Tablet by SubLINGual route every eight (8) hours as needed for Nausea or Vomiting., Disp: 20 Tablet, Rfl: 0    ALPRAZolam (Xanax) 0.5 mg tablet, Take 1 Tablet by mouth every eight (8) hours as needed (Chest pain accompanied by shortness of breath and anxiety). Max Daily Amount: 1.5 mg., Disp: 30 Tablet, Rfl: 0    potassium chloride SR (K-TAB) 20 mEq tablet, Take 20 mEq by mouth daily.  15 ml daily, Disp: , Rfl:     carvediloL (COREG) 6.25 mg tablet, Take 1 Tab by mouth every twelve (12) hours. , Disp: 180 Tab, Rfl: 3    levothyroxine (SYNTHROID) 25 mcg tablet, Take 75 mcg by mouth Daily (before breakfast). , Disp: , Rfl:     amLODIPine (NORVASC) 2.5 mg tablet, Take 1 Tab by mouth daily. , Disp: 30 Tab, Rfl: 2    atorvastatin (LIPITOR) 80 mg tablet, Take 1 Tab by mouth daily. , Disp: 30 Tab, Rfl: 2    cyclobenzaprine (FLEXERIL) 10 mg tablet, Take 1 Tablet by mouth three (3) times daily as needed for Muscle Spasm(s). (Patient not taking: Reported on 2/24/2022), Disp: 30 Tablet, Rfl: 0    aspirin delayed-release 81 mg tablet, Take 1 Tab by mouth daily. (Patient not taking: Reported on 1/27/2022), Disp: 30 Tab, Rfl: 11    No Known Allergies    Social History     Socioeconomic History    Marital status:      Spouse name: Not on file    Number of children: Not on file    Years of education: Not on file    Highest education level: Not on file   Occupational History    Not on file   Tobacco Use    Smoking status: Former Smoker     Years: 15.00    Smokeless tobacco: Never Used   Substance and Sexual Activity    Alcohol use: Yes     Comment: Occasional    Drug use: No    Sexual activity: Not on file   Other Topics Concern    Not on file   Social History Narrative    Not on file     Social Determinants of Health     Financial Resource Strain:     Difficulty of Paying Living Expenses: Not on file   Food Insecurity:     Worried About Running Out of Food in the Last Year: Not on file    Marycarmen of Food in the Last Year: Not on file   Transportation Needs:     Lack of Transportation (Medical): Not on file    Lack of Transportation (Non-Medical):  Not on file   Physical Activity:     Days of Exercise per Week: Not on file    Minutes of Exercise per Session: Not on file   Stress:     Feeling of Stress : Not on file   Social Connections:     Frequency of Communication with Friends and Family: Not on file    Frequency of Social Gatherings with Friends and Family: Not on file    Attends Anabaptism Services: Not on file    Active Member of Clubs or Organizations: Not on file    Attends Club or Organization Meetings: Not on file    Marital Status: Not on file   Intimate Partner Violence:     Fear of Current or Ex-Partner: Not on file    Emotionally Abused: Not on file    Physically Abused: Not on file    Sexually Abused: Not on file   Housing Stability:     Unable to Pay for Housing in the Last Year: Not on file    Number of [place] in the Last Year: Not on file    Unstable Housing in the Last Year: Not on file       Past Surgical History:   Procedure Laterality Date    HX CHOLECYSTECTOMY  1990    HX HEENT      Eye surgery    HX HERNIA REPAIR      HX KNEE REPLACEMENT      HX ORTHOPAEDIC Left 07/2020    left total knee arthroplasty    GA CARDIAC SURG PROCEDURE UNLIST      stent july 2020         Patient seen evaluated today for her neck. The patient does continue to have discomfort in her neck which radiates into her shoulder on the left side. She has some numbness in the area. I sent her for MRI and she today for reevaluation. She has no weakness to report. I placed on a Medrol Dosepak which seemed to help a little bit. She takes Tylenol for pain. She is unable to take anti-inflammatories as she is on Plavix. She had a previous MI. She has done very well with her knee replacement. Quite pleased with the results. Patient denies recent fevers, chills, chest pain, SOB, or injuries. No recent systemic changes noted. A 12-point review of systems is performed today. Pertinent positives are noted. All other systems reviewed and otherwise are negative. Physical exam: General: Alert and oriented x3, nad.  well-developed, well nourished. normal affect, AF. NC/AT, EOMI, neck supple, trachea midline, no JVD present. Breathing is non-labored. Cervical spine has pretty well-maintained range of motion. Negative Spurling's. Sensorimotor intact to the upper extremities. Little bit of numbness to C5. Strength is symmetric. Left knee reveals skin intact. There is no erythema ecchymosis noted. No signs of infection or cellulitis present. Full range of motion. Excellent stability. Review of MRI C-spine:  IMPRESSION  1. Suboptimal exam due to low signal-to-noise ratio. Multilevel degenerative  disc disease. Multilevel ventral cord compression and moderate spinal stenosis. No definite myelopathy. 2. Multilevel high-grade severe foraminal stenosis    Assessment: #1 cervical spine spondylosis, degenerative disc disease, radiculopathy, #2 status post left knee replacement doing well    Plan: At this point, we discussed treatment options. A refill of Flexeril be sent to her pharmacy. She will continue with Tylenol for pain. Referral for the spine center will be placed. We will see her back for her left knee in about 6 months.               JR Rishabh ELIZABETH, PANelC, ATC

## 2022-03-18 PROBLEM — M17.10 ARTHRITIS OF KNEE: Status: ACTIVE | Noted: 2020-07-07

## 2022-03-18 PROBLEM — Z95.5 HISTORY OF CORONARY ARTERY STENT PLACEMENT: Status: ACTIVE | Noted: 2020-10-16

## 2022-03-19 PROBLEM — I21.3 STEMI (ST ELEVATION MYOCARDIAL INFARCTION) (HCC): Status: ACTIVE | Noted: 2020-07-13

## 2022-03-19 PROBLEM — I25.10 CORONARY ARTERY DISEASE INVOLVING NATIVE CORONARY ARTERY OF NATIVE HEART WITHOUT ANGINA PECTORIS: Status: ACTIVE | Noted: 2020-10-16

## 2022-03-19 PROBLEM — I10 ESSENTIAL HYPERTENSION: Status: ACTIVE | Noted: 2020-07-13

## 2022-03-19 PROBLEM — E78.2 MIXED HYPERLIPIDEMIA: Status: ACTIVE | Noted: 2020-10-16

## 2022-04-08 ENCOUNTER — HOSPITAL ENCOUNTER (OUTPATIENT)
Dept: LAB | Age: 67
Discharge: HOME | End: 2022-04-08
Payer: MEDICARE

## 2022-04-08 DIAGNOSIS — I25.10 CORONARY ARTERY DISEASE INVOLVING NATIVE CORONARY ARTERY OF NATIVE HEART WITHOUT ANGINA PECTORIS: ICD-10-CM

## 2022-04-08 LAB
CHOLEST SERPL-MCNC: 183 MG/DL
HDLC SERPL-MCNC: 48 MG/DL (ref 40–60)
HDLC SERPL: 3.8 {RATIO} (ref 0–5)
LDLC SERPL CALC-MCNC: 111.4 MG/DL (ref 0–100)
LIPID PROFILE,FLP: ABNORMAL
TRIGL SERPL-MCNC: 118 MG/DL (ref ?–150)
VLDLC SERPL CALC-MCNC: 23.6 MG/DL

## 2022-04-08 PROCEDURE — 36415 COLL VENOUS BLD VENIPUNCTURE: CPT

## 2022-04-08 PROCEDURE — 80061 LIPID PANEL: CPT

## 2022-04-12 ENCOUNTER — APPOINTMENT (OUTPATIENT)
Dept: GENERAL RADIOLOGY | Age: 67
End: 2022-04-12
Attending: EMERGENCY MEDICINE
Payer: MEDICARE

## 2022-04-12 ENCOUNTER — TELEPHONE (OUTPATIENT)
Dept: CARDIOLOGY CLINIC | Age: 67
End: 2022-04-12

## 2022-04-12 ENCOUNTER — HOSPITAL ENCOUNTER (EMERGENCY)
Age: 67
Discharge: HOME | End: 2022-04-12
Attending: EMERGENCY MEDICINE
Payer: MEDICARE

## 2022-04-12 VITALS
SYSTOLIC BLOOD PRESSURE: 149 MMHG | DIASTOLIC BLOOD PRESSURE: 98 MMHG | RESPIRATION RATE: 16 BRPM | OXYGEN SATURATION: 97 % | HEART RATE: 60 BPM | TEMPERATURE: 98.6 F

## 2022-04-12 DIAGNOSIS — E78.2 MIXED HYPERLIPIDEMIA: Primary | ICD-10-CM

## 2022-04-12 DIAGNOSIS — I10 ESSENTIAL HYPERTENSION: ICD-10-CM

## 2022-04-12 DIAGNOSIS — R07.9 CHEST PAIN, UNSPECIFIED TYPE: Primary | ICD-10-CM

## 2022-04-12 LAB
ANION GAP SERPL CALC-SCNC: 4 MMOL/L (ref 3–18)
BASOPHILS # BLD: 0 K/UL (ref 0–0.1)
BASOPHILS NFR BLD: 1 % (ref 0–2)
BUN SERPL-MCNC: 13 MG/DL (ref 7–18)
BUN/CREAT SERPL: 20 (ref 12–20)
CALCIUM SERPL-MCNC: 8.6 MG/DL (ref 8.5–10.1)
CHLORIDE SERPL-SCNC: 108 MMOL/L (ref 100–111)
CO2 SERPL-SCNC: 27 MMOL/L (ref 21–32)
CREAT SERPL-MCNC: 0.66 MG/DL (ref 0.6–1.3)
DIFFERENTIAL METHOD BLD: NORMAL
EOSINOPHIL # BLD: 0.1 K/UL (ref 0–0.4)
EOSINOPHIL NFR BLD: 2 % (ref 0–5)
ERYTHROCYTE [DISTWIDTH] IN BLOOD BY AUTOMATED COUNT: 14.1 % (ref 11.6–14.5)
GLUCOSE SERPL-MCNC: 107 MG/DL (ref 74–99)
HCT VFR BLD AUTO: 39.6 % (ref 35–45)
HGB BLD-MCNC: 13 G/DL (ref 12–16)
IMM GRANULOCYTES # BLD AUTO: 0 K/UL (ref 0–0.04)
IMM GRANULOCYTES NFR BLD AUTO: 0 % (ref 0–0.5)
LYMPHOCYTES # BLD: 3.1 K/UL (ref 0.9–3.6)
LYMPHOCYTES NFR BLD: 41 % (ref 21–52)
MCH RBC QN AUTO: 27.8 PG (ref 24–34)
MCHC RBC AUTO-ENTMCNC: 32.8 G/DL (ref 31–37)
MCV RBC AUTO: 84.6 FL (ref 78–100)
MONOCYTES # BLD: 0.6 K/UL (ref 0.05–1.2)
MONOCYTES NFR BLD: 8 % (ref 3–10)
NEUTS SEG # BLD: 3.6 K/UL (ref 1.8–8)
NEUTS SEG NFR BLD: 48 % (ref 40–73)
NRBC # BLD: 0 K/UL (ref 0–0.01)
NRBC BLD-RTO: 0 PER 100 WBC
PLATELET # BLD AUTO: 294 K/UL (ref 135–420)
PMV BLD AUTO: 10.4 FL (ref 9.2–11.8)
POTASSIUM SERPL-SCNC: 4.7 MMOL/L (ref 3.5–5.5)
RBC # BLD AUTO: 4.68 M/UL (ref 4.2–5.3)
SODIUM SERPL-SCNC: 139 MMOL/L (ref 136–145)
TROPONIN-HIGH SENSITIVITY: 9 NG/L (ref 0–54)
WBC # BLD AUTO: 7.4 K/UL (ref 4.6–13.2)

## 2022-04-12 PROCEDURE — 93005 ELECTROCARDIOGRAM TRACING: CPT

## 2022-04-12 PROCEDURE — 71045 X-RAY EXAM CHEST 1 VIEW: CPT

## 2022-04-12 PROCEDURE — 74011250637 HC RX REV CODE- 250/637: Performed by: EMERGENCY MEDICINE

## 2022-04-12 PROCEDURE — 99285 EMERGENCY DEPT VISIT HI MDM: CPT

## 2022-04-12 PROCEDURE — 84484 ASSAY OF TROPONIN QUANT: CPT

## 2022-04-12 PROCEDURE — 85025 COMPLETE CBC W/AUTO DIFF WBC: CPT

## 2022-04-12 PROCEDURE — 80048 BASIC METABOLIC PNL TOTAL CA: CPT

## 2022-04-12 RX ORDER — EZETIMIBE 10 MG/1
10 TABLET ORAL DAILY
Qty: 30 TABLET | Refills: 3 | Status: SHIPPED | OUTPATIENT
Start: 2022-04-12 | End: 2022-08-11 | Stop reason: SDUPTHER

## 2022-04-12 RX ADMIN — ALUMINUM HYDROXIDE AND MAGNESIUM HYDROXIDE 30 ML: 200; 200 SUSPENSION ORAL at 04:35

## 2022-04-12 NOTE — ED NOTES
Pt arrives from [facility] AAOX3, resp even and unlabored on RA. Pt states she is having chest fullness that has been off and on for the last week. Pt reports hx of MI in 2020 and panic attacks, and gas.  Pending EDP eval.

## 2022-04-12 NOTE — ED PROVIDER NOTES
EMERGENCY DEPARTMENT HISTORY AND PHYSICAL EXAM    2:37 AM patient seen at this time in room 19      Date: 4/12/2022  Patient Name: Jmaes Galvan    History of Presenting Illness     Chief Complaint   Patient presents with    Chest Pain         History Provided By: patient    Additional History (Context): James Galvan is a 77 y.o. female presents with history of panic attacks and MI previously, has concerning symptoms at night, she thinks that her panic attacks tightness that begins in her flanks bilaterally wraps around to the lower margin of the rib cage and is tight in nature this started at 8 PM tonight. Has improved. Robin Machuca PCP: JAMES Hameed    Chief Complaint:   Duration:    Timing:    Location:   Quality:   Severity:   Modifying Factors:   Associated Symptoms:       Current Facility-Administered Medications   Medication Dose Route Frequency Provider Last Rate Last Admin    aluminum-magnesium hydroxide (MAALOX) oral suspension 30 mL  30 mL Oral NOW Shannan Mai MD         Current Outpatient Medications   Medication Sig Dispense Refill    cyclobenzaprine (FLEXERIL) 10 mg tablet Take 1 Tablet by mouth three (3) times daily as needed for Muscle Spasm(s). 60 Tablet 1    methylPREDNISolone (MEDROL DOSEPACK) 4 mg tablet Per dose pack instructions 1 Dose Pack 0    clopidogreL (PLAVIX) 75 mg tab TAKE 1 TABLET BY MOUTH EVERY DAY 90 Tablet 1    ondansetron (Zofran ODT) 4 mg disintegrating tablet 1 Tablet by SubLINGual route every eight (8) hours as needed for Nausea or Vomiting. 20 Tablet 0    ALPRAZolam (Xanax) 0.5 mg tablet Take 1 Tablet by mouth every eight (8) hours as needed (Chest pain accompanied by shortness of breath and anxiety). Max Daily Amount: 1.5 mg. 30 Tablet 0    potassium chloride SR (K-TAB) 20 mEq tablet Take 20 mEq by mouth daily. 15 ml daily      carvediloL (COREG) 6.25 mg tablet Take 1 Tab by mouth every twelve (12) hours.  180 Tab 3    levothyroxine (SYNTHROID) 25 mcg tablet Take 75 mcg by mouth Daily (before breakfast).  amLODIPine (NORVASC) 2.5 mg tablet Take 1 Tab by mouth daily. 30 Tab 2    atorvastatin (LIPITOR) 80 mg tablet Take 1 Tab by mouth daily. 27 Tab 2       Past History     Past Medical History:  Past Medical History:   Diagnosis Date    Arthritis     CAD (coronary artery disease)     Calcific Achilles tendonitis     Right; insertional    Essential hypertension 7/13/2020    Hypercholesterolemia     Hypertension     Left knee pain     Menopause     Pain of right heel     Sprain of right ring finger     Thyroid disease     Wears glasses        Past Surgical History:  Past Surgical History:   Procedure Laterality Date    HX CHOLECYSTECTOMY  1990    HX HEENT      Eye surgery    HX HERNIA REPAIR      HX KNEE REPLACEMENT      HX ORTHOPAEDIC Left 07/2020    left total knee arthroplasty    DC CARDIAC SURG PROCEDURE UNLIST      stent july 2020       Family History:  Family History   Problem Relation Age of Onset    Breast Cancer Other     OSTEOARTHRITIS Mother     Hypertension Mother     Heart Disease Mother     Hypertension Other         Grandmother    Other Other         Heart problems: Mother, grandmother    Diabetes Other         Grandmother       Social History:  Social History     Tobacco Use    Smoking status: Former Smoker     Years: 15.00    Smokeless tobacco: Never Used   Substance Use Topics    Alcohol use: Yes     Comment: Occasional    Drug use: No       Allergies:  No Known Allergies      Review of Systems     Review of Systems   Constitutional: Negative for diaphoresis and fever. HENT: Negative for congestion and sore throat. Eyes: Negative for pain and itching. Respiratory: Negative for cough and shortness of breath. Cardiovascular: Positive for chest pain. Negative for palpitations. Gastrointestinal: Negative for abdominal pain and diarrhea. Endocrine: Negative for polydipsia and polyuria.    Genitourinary: Negative for dysuria and hematuria. Musculoskeletal: Negative for arthralgias and myalgias. Skin: Negative for rash and wound. Neurological: Negative for seizures and syncope. Hematological: Does not bruise/bleed easily. Psychiatric/Behavioral: Negative for agitation and hallucinations. Physical Exam       Patient Vitals for the past 12 hrs:   Temp Pulse Resp BP SpO2   04/12/22 0210 98.6 °F (37 °C) 60 17 (!) 154/75 100 %       IPVITALS  Patient Vitals for the past 24 hrs:   BP Temp Pulse Resp SpO2   04/12/22 0210 (!) 154/75 98.6 °F (37 °C) 60 17 100 %       Physical Exam  Vitals and nursing note reviewed. Constitutional:       General: She is not in acute distress. Appearance: She is well-developed. She is obese. HENT:      Head: Normocephalic and atraumatic. Eyes:      General: No scleral icterus. Conjunctiva/sclera: Conjunctivae normal.   Neck:      Vascular: No JVD. Cardiovascular:      Rate and Rhythm: Normal rate and regular rhythm. Heart sounds: Normal heart sounds. Comments: 4 intact extremity pulses  Pulmonary:      Effort: Pulmonary effort is normal.      Breath sounds: Normal breath sounds. Chest:      Chest wall: No tenderness. Abdominal:      Palpations: Abdomen is soft. There is no mass. Tenderness: There is no abdominal tenderness. Musculoskeletal:         General: Normal range of motion. Cervical back: Normal range of motion and neck supple. Lymphadenopathy:      Cervical: No cervical adenopathy. Skin:     General: Skin is warm and dry. Neurological:      Mental Status: She is alert.            Diagnostic Study Results   Labs -  Recent Results (from the past 24 hour(s))   EKG, 12 LEAD, INITIAL    Collection Time: 04/12/22  1:56 AM   Result Value Ref Range    Ventricular Rate 63 BPM    Atrial Rate 63 BPM    P-R Interval 166 ms    QRS Duration 78 ms    Q-T Interval 394 ms    QTC Calculation (Bezet) 403 ms    Calculated P Axis 32 degrees Calculated R Axis 18 degrees    Calculated T Axis -14 degrees    Diagnosis       Normal sinus rhythm  Possible Left atrial enlargement  Nonspecific T wave abnormality  Abnormal ECG  When compared with ECG of 28-AUG-2021 21:38,  premature atrial complexes are no longer present     CBC WITH AUTOMATED DIFF    Collection Time: 04/12/22  2:48 AM   Result Value Ref Range    WBC 7.4 4.6 - 13.2 K/uL    RBC 4.68 4.20 - 5.30 M/uL    HGB 13.0 12.0 - 16.0 g/dL    HCT 39.6 35.0 - 45.0 %    MCV 84.6 78.0 - 100.0 FL    MCH 27.8 24.0 - 34.0 PG    MCHC 32.8 31.0 - 37.0 g/dL    RDW 14.1 11.6 - 14.5 %    PLATELET 813 619 - 506 K/uL    MPV 10.4 9.2 - 11.8 FL    NRBC 0.0 0  WBC    ABSOLUTE NRBC 0.00 0.00 - 0.01 K/uL    NEUTROPHILS 48 40 - 73 %    LYMPHOCYTES 41 21 - 52 %    MONOCYTES 8 3 - 10 %    EOSINOPHILS 2 0 - 5 %    BASOPHILS 1 0 - 2 %    IMMATURE GRANULOCYTES 0 0.0 - 0.5 %    ABS. NEUTROPHILS 3.6 1.8 - 8.0 K/UL    ABS. LYMPHOCYTES 3.1 0.9 - 3.6 K/UL    ABS. MONOCYTES 0.6 0.05 - 1.2 K/UL    ABS. EOSINOPHILS 0.1 0.0 - 0.4 K/UL    ABS. BASOPHILS 0.0 0.0 - 0.1 K/UL    ABS. IMM. GRANS. 0.0 0.00 - 0.04 K/UL    DF AUTOMATED     METABOLIC PANEL, BASIC    Collection Time: 04/12/22  2:48 AM   Result Value Ref Range    Sodium 139 136 - 145 mmol/L    Potassium 4.7 3.5 - 5.5 mmol/L    Chloride 108 100 - 111 mmol/L    CO2 27 21 - 32 mmol/L    Anion gap 4 3.0 - 18 mmol/L    Glucose 107 (H) 74 - 99 mg/dL    BUN 13 7.0 - 18 MG/DL    Creatinine 0.66 0.6 - 1.3 MG/DL    BUN/Creatinine ratio 20 12 - 20      GFR est AA >60 >60 ml/min/1.73m2    GFR est non-AA >60 >60 ml/min/1.73m2    Calcium 8.6 8.5 - 10.1 MG/DL   TROPONIN-HIGH SENSITIVITY    Collection Time: 04/12/22  2:48 AM   Result Value Ref Range    Troponin-High Sensitivity 9 0 - 54 ng/L       Radiologic Studies -   XR CHEST PORT    (Results Pending)     No results found.     Medications ordered:   Medications   aluminum-magnesium hydroxide (MAALOX) oral suspension 30 mL (has no administration in time range)         Medical Decision Making   Initial Medical Decision Making and DDx:  Twelve-lead EKG sinus rhythm at 63 no acute ischemic process    Had pain since 8 PM the previous night 1 troponin sufficient to rule out ACS presentation is atypical, also does not correspond to the symptoms from her previous heart attack. Chest x-ray to rule out pneumothorax or pneumonia both unlikely. Do not suspect PE or aortic disease. ED Course: Progress Notes, Reevaluation, and Consults:     4:23 AM Pt reevaluated at this time. Discussed results and findings, as well as, diagnosis and plan for discharge. Follow up with doctors/services listed. Return to the emergency department for alarming symptoms. Pt verbalizes understanding and agreement with plan. All questions addressed. No alarming findings, no indication of ACS. No infiltrate or pneumothorax. Some improvement on reassessment. Maalox for symptomatic management of her stomach. She will follow up with her primary care. I am the first provider for this patient. I reviewed the vital signs, available nursing notes, past medical history, past surgical history, family history and social history. Patient Vitals for the past 12 hrs:   Temp Pulse Resp BP SpO2   04/12/22 0210 98.6 °F (37 °C) 60 17 (!) 154/75 100 %       Vital Signs-Reviewed the patient's vital signs. Pulse Oximetry Analysis, Cardiac Monitor, 12 lead ekg:      Interpreted by the EP. Records Reviewed: Nursing notes reviewed (Time of Review: 2:37 AM)    Procedures:   Critical Care Time:   Aspirin: (was aspirin given for stroke?)    Diagnosis     Clinical Impression:   1.  Chest pain, unspecified type        Disposition: Discharged      Follow-up Information     Follow up With Specialties Details Why Contact Info    JAMES Denny Physician Assistant In 2 days  [address] [place] [address]  [address] [place] [address],[address]  [address]  [phone]             Patient's Medications Start Taking    No medications on file   Continue Taking    ALPRAZOLAM (XANAX) 0.5 MG TABLET    Take 1 Tablet by mouth every eight (8) hours as needed (Chest pain accompanied by shortness of breath and anxiety). Max Daily Amount: 1.5 mg.    AMLODIPINE (NORVASC) 2.5 MG TABLET    Take 1 Tab by mouth daily. ATORVASTATIN (LIPITOR) 80 MG TABLET    Take 1 Tab by mouth daily. CARVEDILOL (COREG) 6.25 MG TABLET    Take 1 Tab by mouth every twelve (12) hours. CLOPIDOGREL (PLAVIX) 75 MG TAB    TAKE 1 TABLET BY MOUTH EVERY DAY    CYCLOBENZAPRINE (FLEXERIL) 10 MG TABLET    Take 1 Tablet by mouth three (3) times daily as needed for Muscle Spasm(s). LEVOTHYROXINE (SYNTHROID) 25 MCG TABLET    Take 75 mcg by mouth Daily (before breakfast). METHYLPREDNISOLONE (MEDROL DOSEPACK) 4 MG TABLET    Per dose pack instructions    ONDANSETRON (ZOFRAN ODT) 4 MG DISINTEGRATING TABLET    1 Tablet by SubLINGual route every eight (8) hours as needed for Nausea or Vomiting. POTASSIUM CHLORIDE SR (K-TAB) 20 MEQ TABLET    Take 20 mEq by mouth daily.  15 ml daily   These Medications have changed    No medications on file   Stop Taking    No medications on file     _______________________________    Notes:    Brown Hodgkins, MD using Dragon dictation      _______________________________

## 2022-04-12 NOTE — ED NOTES
Pt is discharged. Pt is AAOX3, resp even and unlabored on RA, states her pain is a little better than was before. Pt has all belongings and discharge paperwork. Pt is ambulating out of ED with steady gait.

## 2022-04-12 NOTE — TELEPHONE ENCOUNTER
Spoke with patient per Dr. Rc Escalera regarding lab/test. Lab reviewed. LDL is elevated. Add Zetia 10 mg a day. Lipid/LFt in 4 weeks. She voices understanding and acceptance of this advice and will call back if any further questions or concerns.

## 2022-04-12 NOTE — TELEPHONE ENCOUNTER
----- Message from Stefan Moore MD sent at 4/12/2022 11:52 AM EDT -----  LDL elevated add Zetia 10 mg a day.   Lipid LFT 4-week

## 2022-04-12 NOTE — TELEPHONE ENCOUNTER
Requested Prescriptions     Pending Prescriptions Disp Refills    ezetimibe (ZETIA) 10 mg tablet 30 Tablet 3     Sig: Take 1 Tablet by mouth daily.

## 2022-04-13 LAB
ATRIAL RATE: 63 BPM
CALCULATED P AXIS, ECG09: 32 DEGREES
CALCULATED R AXIS, ECG10: 18 DEGREES
CALCULATED T AXIS, ECG11: -14 DEGREES
DIAGNOSIS, 93000: NORMAL
P-R INTERVAL, ECG05: 166 MS
Q-T INTERVAL, ECG07: 394 MS
QRS DURATION, ECG06: 78 MS
QTC CALCULATION (BEZET), ECG08: 403 MS
VENTRICULAR RATE, ECG03: 63 BPM

## 2022-04-19 ENCOUNTER — OFFICE VISIT (OUTPATIENT)
Dept: ORTHOPEDIC SURGERY | Age: 67
End: 2022-04-19
Payer: MEDICARE

## 2022-04-19 VITALS
HEIGHT: 69 IN | WEIGHT: 208 LBS | TEMPERATURE: 97 F | OXYGEN SATURATION: 98 % | HEART RATE: 61 BPM | BODY MASS INDEX: 30.81 KG/M2

## 2022-04-19 DIAGNOSIS — M48.02 CERVICAL SPINAL STENOSIS: ICD-10-CM

## 2022-04-19 DIAGNOSIS — M54.12 CERVICAL RADICULOPATHY: Primary | ICD-10-CM

## 2022-04-19 PROCEDURE — 1090F PRES/ABSN URINE INCON ASSESS: CPT | Performed by: PHYSICAL MEDICINE & REHABILITATION

## 2022-04-19 PROCEDURE — G8427 DOCREV CUR MEDS BY ELIG CLIN: HCPCS | Performed by: PHYSICAL MEDICINE & REHABILITATION

## 2022-04-19 PROCEDURE — G8417 CALC BMI ABV UP PARAM F/U: HCPCS | Performed by: PHYSICAL MEDICINE & REHABILITATION

## 2022-04-19 PROCEDURE — G9899 SCRN MAM PERF RSLTS DOC: HCPCS | Performed by: PHYSICAL MEDICINE & REHABILITATION

## 2022-04-19 PROCEDURE — 99204 OFFICE O/P NEW MOD 45 MIN: CPT | Performed by: PHYSICAL MEDICINE & REHABILITATION

## 2022-04-19 PROCEDURE — G8756 NO BP MEASURE DOC: HCPCS | Performed by: PHYSICAL MEDICINE & REHABILITATION

## 2022-04-19 PROCEDURE — G8432 DEP SCR NOT DOC, RNG: HCPCS | Performed by: PHYSICAL MEDICINE & REHABILITATION

## 2022-04-19 PROCEDURE — G8399 PT W/DXA RESULTS DOCUMENT: HCPCS | Performed by: PHYSICAL MEDICINE & REHABILITATION

## 2022-04-19 PROCEDURE — 3017F COLORECTAL CA SCREEN DOC REV: CPT | Performed by: PHYSICAL MEDICINE & REHABILITATION

## 2022-04-19 PROCEDURE — G8536 NO DOC ELDER MAL SCRN: HCPCS | Performed by: PHYSICAL MEDICINE & REHABILITATION

## 2022-04-19 PROCEDURE — 1101F PT FALLS ASSESS-DOCD LE1/YR: CPT | Performed by: PHYSICAL MEDICINE & REHABILITATION

## 2022-04-19 RX ORDER — DESVENLAFAXINE 25 MG/1
1 TABLET, EXTENDED RELEASE ORAL DAILY
COMMUNITY
Start: 2022-01-23

## 2022-04-19 RX ORDER — GABAPENTIN 300 MG/1
300 CAPSULE ORAL 3 TIMES DAILY
Qty: 90 CAPSULE | Refills: 2 | Status: SHIPPED | OUTPATIENT
Start: 2022-04-19 | End: 2022-09-29 | Stop reason: SDUPTHER

## 2022-04-19 RX ORDER — LINACLOTIDE 145 UG/1
1 CAPSULE, GELATIN COATED ORAL DAILY
COMMUNITY
Start: 2022-04-06 | End: 2022-05-18

## 2022-04-19 NOTE — LETTER
4/20/2022    Patient: Mari Danielson   YOB: 1955   Date of Visit: 4/19/2022     Micheal Harry, [address]   [address] [place] [address]  Via Fax: [phone]    Dear JAMES Alfred,      Thank you for referring Ms. Cindy Bautista to [facility] for evaluation. My notes for this consultation are attached. If you have questions, please do not hesitate to call me. I look forward to following your patient along with you.       Sincerely,    Isabel Kurtz MD

## 2022-04-19 NOTE — PROGRESS NOTES
[address]  [address], [address]  [facility], [address]  Phone: [phone]  Fax: [phone]  : 1955  PCP: JAMES Alvarez  2022    NEW PATIENT      HISTORY OF PRESENT ILLNESS  Gina Riley is a 77 y.o. female c/o neck pain and tingling/itching radiating into the left shoulder and parascapular region. She initially thought she slept wrong. She sleeps on her left side. She denies weakness. She was seen by JAMES Martinez who prescribed a MDP with some benefit. She took Tylenol for pain. She has been taking Flexeril. She is unable to take NSAIDs because she is on Plavix. Cervical spine MRI dated 2/10/22 reviewed. Per report, Suboptimal exam due to low signal-to-noise ratio. Multilevel degenerative disc disease. Multilevel ventral cord compression and moderate spinal stenosis. No definite myelopathy. Multilevel high-grade severe foraminal stenosis. Pt denies change in bowel or bladder habits. Pain Score: 5/10. Treatments patient has tried:  Physical therapy:Unknown  Doing HEP: Unknown  Non-opioid medications: Yes  Spinal injections: No  Spinal surgery- No.   Last Cervical Spine MRI:  -  Multilevel ventral cord compression and moderate spinal stenosis. No definite myelopathy. Multilevel high-grade severe foraminal stenosis. PmHx: h/o MI; coronary artery stent placement; s/p left TKR    ASSESSMENT  Gina Riley is a 77 y.o. female with c/o neck pain, numbness, and tingling radiating into the left shoulder. She had decreased sensation in a left C4 distribution. Her symptoms are likely due to a left C4 radiculopathy vs cervical spinal stenosis. She had a mildly positive Horne's sign bilaterally. PLAN  1. Referral to Dr. Luna Church for cervical MARGI. 2. Begin taper Gabapentin 300 mg TID. Pt will f/u after cervical MARGI or sooner if needed. Diagnoses and all orders for this visit:    1.  Cervical radiculopathy    2. Cervical spinal stenosis             CHIEF COMPLAINT  Gerry Lerma is seen today in consultation at the request of Shreyas Bower [place] for complaints of neck pain into the left shoulder. PAST MEDICAL HISTORY   Past Medical History:   Diagnosis Date    Arthritis     CAD (coronary artery disease)     Calcific Achilles tendonitis     Right; insertional    Essential hypertension 7/13/2020    Hypercholesterolemia     Hypertension     Left knee pain     Menopause     Pain of right heel     Sprain of right ring finger     Thyroid disease     Wears glasses        Past Surgical History:   Procedure Laterality Date    HX CHOLECYSTECTOMY  1990    HX HEENT      Eye surgery    HX HERNIA REPAIR      HX KNEE REPLACEMENT      HX ORTHOPAEDIC Left 07/2020    left total knee arthroplasty    [place] CARDIAC SURG PROCEDURE UNLIST      stent july 2020       MEDICATIONS      Current Outpatient Medications   Medication Sig Dispense Refill    desvenlafaxine succinate (PRISTIQ) 25 mg ER tablet Take 1 Tablet by mouth daily.  Linzess 145 mcg cap capsule Take 1 Capsule by mouth daily.  ezetimibe (ZETIA) 10 mg tablet Take 1 Tablet by mouth daily. 30 Tablet 3    cyclobenzaprine (FLEXERIL) 10 mg tablet Take 1 Tablet by mouth three (3) times daily as needed for Muscle Spasm(s). 60 Tablet 1    clopidogreL (PLAVIX) 75 mg tab TAKE 1 TABLET BY MOUTH EVERY DAY 90 Tablet 1    ALPRAZolam (Xanax) 0.5 mg tablet Take 1 Tablet by mouth every eight (8) hours as needed (Chest pain accompanied by shortness of breath and anxiety). Max Daily Amount: 1.5 mg. 30 Tablet 0    potassium chloride SR (K-TAB) 20 mEq tablet Take 20 mEq by mouth daily. 15 ml daily      carvediloL (COREG) 6.25 mg tablet Take 1 Tab by mouth every twelve (12) hours. 180 Tab 3    levothyroxine (SYNTHROID) 25 mcg tablet Take 75 mcg by mouth Daily (before breakfast).  amLODIPine (NORVASC) 2.5 mg tablet Take 1 Tab by mouth daily.  30 Tab 2    atorvastatin (LIPITOR) 80 mg tablet Take 1 Tab by mouth daily. 30 Tab 2    methylPREDNISolone (MEDROL DOSEPACK) 4 mg tablet Per dose pack instructions 1 Dose Pack 0    ondansetron (Zofran ODT) 4 mg disintegrating tablet 1 Tablet by SubLINGual route every eight (8) hours as needed for Nausea or Vomiting. 20 Tablet 0       ALLERGIES  No Known Allergies       SOCIAL HISTORY    Social History     Socioeconomic History    Marital status:    Tobacco Use    Smoking status: Former Smoker     Years: 15.00    Smokeless tobacco: Never Used   Substance and Sexual Activity    Alcohol use: Yes     Comment: Occasional    Drug use: No     Social Determinants of Health     Physical Activity: Unknown    Days of Exercise per Week: 1 day       FAMILY HISTORY    Family History   Problem Relation Age of Onset    Breast Cancer Other     OSTEOARTHRITIS Mother     Hypertension Mother     Heart Disease Mother     Hypertension Other         Grandmother    Other Other         Heart problems: Mother, grandmother    Diabetes Other         Grandmother         REVIEW OF SYSTEMS  Review of Systems   Constitutional: Negative for chills, fever and weight loss. Respiratory: Negative for shortness of breath. Cardiovascular: Negative for chest pain. Gastrointestinal: Negative for constipation. Negative for fecal incontinence    Genitourinary: Negative for dysuria. Negative for urinary incontinence   Musculoskeletal: Positive for neck pain. Skin: Negative for rash. Neurological: Positive for tingling ( neck and left shoulder). Negative for dizziness, tremors, focal weakness and headaches. Endo/Heme/Allergies: Does not bruise/bleed easily. Psychiatric/Behavioral: The patient does not have insomnia.           PHYSICAL EXAMINATION  Visit Vitals  Pulse 61   Temp 97 °F (36.1 °C) (Tympanic)   Ht 5' 9\" (1.753 m)   Wt 208 lb (94.3 kg)   SpO2 98% Comment: RA   BMI 30.72 kg/m²         Pain Assessment 4/19/2022   Location of Pain Neck   Location Modifiers -   Severity of Pain 5   Quality of Pain Other (Comment)   Quality of Pain Comment n/t, pins/needles   Duration of Pain Persistent   Frequency of Pain Intermittent   Date Pain First Started -   Date Pain First Started Comment -   Aggravating Factors Other (Comment)   Aggravating Factors Comment sitting, lying down   Limiting Behavior Yes   Relieving Factors Other (Comment)   Relieving Factors Comment sleep   Result of Injury No         Constitutional:  Well developed, well nourished, in no acute distress. Psychiatric: Affect and mood are appropriate. HEENT: Normocephalic, atraumatic. Extraocular movements intact. Integumentary: No rashes or abrasions noted on exposed areas. Cardiovascular: Regular rate and rhythm. Pulmonary: Clear to auscultation bilaterally. SPINE/MUSCULOSKELETAL EXAM    Cervical spine:  Neck is midline. Normal muscle tone. No focal atrophy is noted. ROM pain free. Shoulder ROM intact. Tenderness to palpation left upper trapezius. Negative Spurling's sign. Negative Tinel's sign. Mildly positive Horne's sign bilaterally. Decreased sensation to light touch in a left C4 distribution. MOTOR:      Biceps  Triceps Deltoids Wrist Ext Wrist Flex Hand Intrin   Right 5/5 5/5 5/5 5/5 5/5 5/5   Left 5/5 5/5 5/5 5/5 5/5 5/5             Hip Flex  Quads Hamstrings Ankle DF EHL Ankle PF   Right 5/5 5/5 5/5 5/5 5/5 5/5   Left 5/5 5/5 5/5 5/5 5/5 5/5     DTRs are 2+ biceps, triceps, brachioradialis, patella, and Achilles. Negative Straight Leg raise. Squat not tested. No difficulty with tandem gait. Ambulation without assistive device. FWB.       RADIOGRAPHS/DATA  Cervical Spine MRI images taken on 2/10/22 personally reviewed with patient:  Mildly motion degraded examination and low signal-to-noise ratio.      Alignment: Straightening of the cervical lordosis without subluxation  Vertebral body height: Normal  Marrow signal: No suspicious bone marrow lesion or infiltrative bone marrow  process. Minimal reactive discogenic edema changes at C3-4 and T1-2. Cervicomedullary Junction: Patent  Disc spaces: Moderately severe disc height loss throughout with associated  osteophytosis. Cervical cord: No cord expansion or atrophy. Suboptimal evaluation of the cord  signal due to low signal-to-noise ratio. Further discussed below where relevant.     On axial imaging, findings at each level are as follows:     C2/C3: Disc osteophyte complex partially effacing ventral CSF space. Bilateral  mild facet hypertrophy. Mild canal stenosis and foraminal stenosis.     C3/C4: Disc osteophyte complex flattening ventral cord and partially effacing  dorsal CSF space. Bilateral facet hypertrophy and uncovertebral spurring. Moderate spinal stenosis and severe foraminal stenosis.     C4/C5: Disc osteophyte complex flattening ventral cord. Uncovertebral spurring. Moderate spinal stenosis and severe foraminal stenosis.     C5/C6: Disc osteophyte complex flattening ventral cord. Uncovertebral spurring. Moderate spinal stenosis and severe foraminal stenosis.     C6/C7: Disc osteophyte complex flattening ventral cord. Uncovertebral spurring. Moderate spinal stenosis and severe foraminal stenosis.     C7/T1: Disc osteophyte complex partially effacing ventral CSF space. Mild spinal  stenosis. Moderate foraminal stenosis.     Other structures: Remainder of the upper thoracic levels T1-T4 demonstrate disc  osteophyte complexes with partial effacement of the ventral CSF space but no  high-grade spinal stenosis. High-grade left T1-T2 neural foramina stenosis.     IMPRESSION  1. Suboptimal exam due to low signal-to-noise ratio. Multilevel degenerative  disc disease. Multilevel ventral cord compression and moderate spinal stenosis. No definite myelopathy. 2. Multilevel high-grade severe foraminal stenosis.       20 minutes of face-to-face contact were spent with the patient during today's visit extensively discussing symptoms and treatment plan. All questions were answered. More than half of this visit today was spent on counseling. Written by Rhiannon Bergeron, as dictated by Dr. Monda Holter.

## 2022-04-19 NOTE — PROGRESS NOTES
Lee Ann Borjas presents today for   Chief Complaint   Patient presents with    Neck Pain       Is someone accompanying this pt? no    Is the patient using any DME equipment during OV? no    Depression Screening:  3 most recent PHQ Screens 7/29/2021   PHQ Not Done -   Little interest or pleasure in doing things Not at all   Feeling down, depressed, irritable, or hopeless Not at all   Total Score PHQ 2 0   Trouble falling or staying asleep, or sleeping too much -   Feeling tired or having little energy -   Poor appetite, weight loss, or overeating -   Feeling bad about yourself - or that you are a failure or have let yourself or your family down -   Trouble concentrating on things such as school, work, reading, or watching TV -   Moving or speaking so slowly that other people could have noticed; or the opposite being so fidgety that others notice -   Thoughts of being better off dead, or hurting yourself in some way -   PHQ 9 Score -       Learning Assessment:  Learning Assessment 4/19/2022   PRIMARY LEARNER Patient   PRIMARY LANGUAGE ENGLISH   ANSWERED BY patient   RELATIONSHIP SELF       Abuse Screening:  Abuse Screening Questionnaire 4/19/2022   Do you ever feel afraid of your partner? N   Are you in a relationship with someone who physically or mentally threatens you? N   Is it safe for you to go home? Y       Fall Risk  Fall Risk Assessment, last 12 mths 7/29/2021   Able to walk? Yes   Fall in past 12 months? -   Do you feel unsteady? 0   Are you worried about falling 0   Number of falls in past 12 months -   Fall with injury? -       Coordination of Care:  1. Have you been to the ER, urgent care clinic since your last visit? Yes, pt went on 04/12/22 for abdominal issues. Notes in [facility] care. Hospitalized since your last visit? no    2. Have you seen or consulted any other health care providers outside of the [address] [place] [address] [place] since your last visit?  Yes, ortho, pcp Include any pap smears or colon screening.  no

## 2022-05-12 ENCOUNTER — HOSPITAL ENCOUNTER (OUTPATIENT)
Dept: LAB | Age: 67
Discharge: HOME | End: 2022-05-12
Payer: MEDICARE

## 2022-05-12 ENCOUNTER — TELEPHONE (OUTPATIENT)
Dept: CARDIOLOGY CLINIC | Age: 67
End: 2022-05-12

## 2022-05-12 DIAGNOSIS — E78.2 MIXED HYPERLIPIDEMIA: ICD-10-CM

## 2022-05-12 DIAGNOSIS — I10 ESSENTIAL HYPERTENSION: ICD-10-CM

## 2022-05-12 LAB
ALBUMIN SERPL-MCNC: 3.6 G/DL (ref 3.4–5)
ALBUMIN/GLOB SERPL: 1.2 {RATIO} (ref 0.8–1.7)
ALP SERPL-CCNC: 120 U/L (ref 45–117)
ALT SERPL-CCNC: 19 U/L (ref 13–56)
AST SERPL-CCNC: 14 U/L (ref 10–38)
BILIRUB DIRECT SERPL-MCNC: 0.1 MG/DL (ref 0–0.2)
BILIRUB SERPL-MCNC: 0.5 MG/DL (ref 0.2–1)
CHOLEST SERPL-MCNC: 170 MG/DL
GLOBULIN SER CALC-MCNC: 3.1 G/DL (ref 2–4)
HDLC SERPL-MCNC: 50 MG/DL (ref 40–60)
HDLC SERPL: 3.4 {RATIO} (ref 0–5)
LDLC SERPL CALC-MCNC: 85.4 MG/DL (ref 0–100)
LIPID PROFILE,FLP: ABNORMAL
PROT SERPL-MCNC: 6.7 G/DL (ref 6.4–8.2)
TRIGL SERPL-MCNC: 173 MG/DL (ref ?–150)
VLDLC SERPL CALC-MCNC: 34.6 MG/DL

## 2022-05-12 PROCEDURE — 80061 LIPID PANEL: CPT

## 2022-05-12 PROCEDURE — 36415 COLL VENOUS BLD VENIPUNCTURE: CPT

## 2022-05-12 PROCEDURE — 80076 HEPATIC FUNCTION PANEL: CPT

## 2022-05-12 NOTE — PROGRESS NOTES
LDL better.   Triglyceride mildly increased continue with dietary modification and current treatment

## 2022-05-12 NOTE — TELEPHONE ENCOUNTER
----- Message from Josh Way MD sent at 5/12/2022 12:42 PM EDT -----  LDL better.   Triglyceride mildly increased continue with dietary modification and current treatment

## 2022-05-12 NOTE — TELEPHONE ENCOUNTER
Spoke with patient regarding lab/test per Dr. Kimberly Montero. Lab reviewed. LDL better. Triglyceride mildly increased continue with dietary modification and current treatment. She voices understanding and acceptance of this advice and will call back if any further questions or concerns.

## 2022-05-18 ENCOUNTER — OFFICE VISIT (OUTPATIENT)
Dept: CARDIOLOGY CLINIC | Age: 67
End: 2022-05-18
Payer: MEDICARE

## 2022-05-18 VITALS
OXYGEN SATURATION: 99 % | HEART RATE: 61 BPM | BODY MASS INDEX: 30.66 KG/M2 | WEIGHT: 207 LBS | SYSTOLIC BLOOD PRESSURE: 120 MMHG | HEIGHT: 69 IN | DIASTOLIC BLOOD PRESSURE: 70 MMHG

## 2022-05-18 DIAGNOSIS — E78.2 MIXED HYPERLIPIDEMIA: ICD-10-CM

## 2022-05-18 DIAGNOSIS — Z95.5 HISTORY OF CORONARY ARTERY STENT PLACEMENT: ICD-10-CM

## 2022-05-18 DIAGNOSIS — I25.10 CORONARY ARTERY DISEASE INVOLVING NATIVE CORONARY ARTERY OF NATIVE HEART WITHOUT ANGINA PECTORIS: Primary | ICD-10-CM

## 2022-05-18 DIAGNOSIS — I10 ESSENTIAL HYPERTENSION: ICD-10-CM

## 2022-05-18 PROCEDURE — G8427 DOCREV CUR MEDS BY ELIG CLIN: HCPCS | Performed by: INTERNAL MEDICINE

## 2022-05-18 PROCEDURE — 1090F PRES/ABSN URINE INCON ASSESS: CPT | Performed by: INTERNAL MEDICINE

## 2022-05-18 PROCEDURE — 3017F COLORECTAL CA SCREEN DOC REV: CPT | Performed by: INTERNAL MEDICINE

## 2022-05-18 PROCEDURE — G8754 DIAS BP LESS 90: HCPCS | Performed by: INTERNAL MEDICINE

## 2022-05-18 PROCEDURE — G8536 NO DOC ELDER MAL SCRN: HCPCS | Performed by: INTERNAL MEDICINE

## 2022-05-18 PROCEDURE — G8432 DEP SCR NOT DOC, RNG: HCPCS | Performed by: INTERNAL MEDICINE

## 2022-05-18 PROCEDURE — G8417 CALC BMI ABV UP PARAM F/U: HCPCS | Performed by: INTERNAL MEDICINE

## 2022-05-18 PROCEDURE — 99214 OFFICE O/P EST MOD 30 MIN: CPT | Performed by: INTERNAL MEDICINE

## 2022-05-18 PROCEDURE — G9899 SCRN MAM PERF RSLTS DOC: HCPCS | Performed by: INTERNAL MEDICINE

## 2022-05-18 PROCEDURE — 1101F PT FALLS ASSESS-DOCD LE1/YR: CPT | Performed by: INTERNAL MEDICINE

## 2022-05-18 PROCEDURE — G8752 SYS BP LESS 140: HCPCS | Performed by: INTERNAL MEDICINE

## 2022-05-18 PROCEDURE — G8399 PT W/DXA RESULTS DOCUMENT: HCPCS | Performed by: INTERNAL MEDICINE

## 2022-05-18 NOTE — PROGRESS NOTES
HISTORY OF PRESENT ILLNESS  Tg Hawkins is a 77 y.o. female. Patient referred for pre-op evaluation due to abnormal EKG.    7/2020 Ms. Raul Flood s/p left total knee replacement on 7/7/2020.  - S/P STEMI on 7/13/2020 - s/p cardiac cath with SAIGE to RCA  10/2020  Patient seen today for follow-up. She was in emergency room few days ago with episode of severe dizziness nausea and diarrhea. She had hypokalemia that was supplemented MI ruled out no acute EKG changes. She is feeling better since then. Diarrhea has resolved. Patient had gone out to eat on Friday night and diarrhea started on Saturday morning and stayed all day long like that. No anginal quality chest pain. Stable since then  1/2021  Patient is here for follow-up she was in emergency room last week with complaint of chest tightness described as right-sided tightness noted to activity exertion she felt like it was anxiety attack she was monitored in ER and discharged home she was given Xanax and after that she has felt better. She underwent stress test today in office  11/2021  Patient seen today for follow-up. Her symptoms are stable. She has occasional anxiety attack. Appears stable    Follow-up  Pertinent negatives include no chest pain, no abdominal pain, no headaches and no shortness of breath. Hypertension  The history is provided by the patient and medical records. This is a chronic problem. The current episode started more than 1 week ago. Pertinent negatives include no chest pain, no abdominal pain, no headaches and no shortness of breath. The symptoms are relieved by medications. Hospital Follow Up  The history is provided by the patient. Pertinent negatives include no chest pain, no abdominal pain, no headaches and no shortness of breath. Review of Systems   Constitutional: Negative for chills, fever and malaise/fatigue. HENT: Negative for congestion and nosebleeds.     Eyes: Negative for blurred vision, double vision and redness. Respiratory: Negative for cough, hemoptysis, sputum production, shortness of breath and wheezing. Cardiovascular: Negative for chest pain, palpitations, orthopnea, claudication, leg swelling and PND. Gastrointestinal: Positive for nausea. Negative for abdominal pain, heartburn and vomiting. Musculoskeletal: Positive for joint pain. Negative for falls and myalgias. Skin: Negative for rash. Neurological: Negative for dizziness, weakness and headaches. Endo/Heme/Allergies: Does not bruise/bleed easily. Psychiatric/Behavioral: The patient is not nervous/anxious. Family History   Problem Relation Age of Onset    Breast Cancer Other     OSTEOARTHRITIS Mother     Hypertension Mother     Heart Disease Mother     Hypertension Other         Grandmother    Other Other         Heart problems: Mother, grandmother    Diabetes Other         Grandmother       Past Medical History:   Diagnosis Date    Arthritis     CAD (coronary artery disease)     Calcific Achilles tendonitis     Right; insertional    Essential hypertension 7/13/2020    Hypercholesterolemia     Hypertension     Left knee pain     Menopause     Pain of right heel     Sprain of right ring finger     Thyroid disease     Wears glasses        Past Surgical History:   Procedure Laterality Date    HX CHOLECYSTECTOMY  1990    HX HEENT      Eye surgery    HX HERNIA REPAIR      HX KNEE REPLACEMENT      HX ORTHOPAEDIC Left 07/2020    left total knee arthroplasty    [place] CARDIAC SURG PROCEDURE UNLIST      stent july 2020       Social History     Tobacco Use    Smoking status: Former Smoker     Years: 15.00    Smokeless tobacco: Never Used   Substance Use Topics    Alcohol use: Yes     Comment: Occasional       No Known Allergies    Prior to Admission medications    Medication Sig Start Date End Date Taking? Authorizing Provider   desvenlafaxine succinate (PRISTIQ) 25 mg ER tablet Take 1 Tablet by mouth daily.  1/23/22 Provider, Historical   Linzess 145 mcg cap capsule Take 1 Capsule by mouth daily. 4/6/22   Provider, Historical   gabapentin (NEURONTIN) 300 mg capsule Take 1 Capsule by mouth three (3) times daily. Max Daily Amount: 900 mg. 4/19/22   Mike Malone MD   ezetimibe (ZETIA) 10 mg tablet Take 1 Tablet by mouth daily. 4/12/22   Aliyah Morales MD   cyclobenzaprine (FLEXERIL) 10 mg tablet Take 1 Tablet by mouth three (3) times daily as needed for Muscle Spasm(s). 2/24/22   Glenn Lofton PA-C   clopidogreL (PLAVIX) 75 mg tab TAKE 1 TABLET BY MOUTH EVERY DAY 10/4/21   Elinor Beltran NP   ALPRAZolam (Xanax) 0.5 mg tablet Take 1 Tablet by mouth every eight (8) hours as needed (Chest pain accompanied by shortness of breath and anxiety). Max Daily Amount: 1.5 mg. 6/6/21 6/6/22  Milagro Palacios MD   potassium chloride SR (K-TAB) 20 mEq tablet Take 20 mEq by mouth daily. 15 ml daily    Provider, Historical   carvediloL (COREG) 6.25 mg tablet Take 1 Tab by mouth every twelve (12) hours. 9/30/20   Jie Jaime NP   levothyroxine (SYNTHROID) 25 mcg tablet Take 75 mcg by mouth Daily (before breakfast). Provider, Historical   amLODIPine (NORVASC) 2.5 mg tablet Take 1 Tab by mouth daily. 7/15/20   Kayla Goodwin MD   atorvastatin (LIPITOR) 80 mg tablet Take 1 Tab by mouth daily. 7/15/20   Kayla Goodwin MD         Visit Vitals  Ht 5' 9\" (1.753 m)   Wt 93.9 kg (207 lb)   BMI 30.57 kg/m²       Physical Exam  Vitals and nursing note reviewed. Constitutional:       Appearance: She is well-developed. Neck:      Vascular: No JVD. Cardiovascular:      Rate and Rhythm: Normal rate and regular rhythm. Pulses: Intact distal pulses. Heart sounds: Normal heart sounds. No murmur heard. No friction rub. No gallop. Pulmonary:      Effort: Pulmonary effort is normal. No respiratory distress. Breath sounds: Normal breath sounds. No wheezing or rales. Chest:      Chest wall: No tenderness.    Abdominal: General: There is no distension. Palpations: Abdomen is soft. There is no mass. Tenderness: There is no abdominal tenderness. Musculoskeletal:         General: Normal range of motion. Comments: Dressing to let knee intact   Skin:     General: Skin is warm and dry. Neurological:      Mental Status: She is alert and oriented to person, place, and time. Echo 7/2020  Interpretation Summary   · Tricuspid regurgitation is inadequate for estimation of right ventricular systolic pressure. · Normal cavity size and systolic function (ejection fraction normal). Mildly increased wall thickness. Estimated left ventricular ejection fraction is 55 - 60%. Visually measured ejection fraction. Abnormal left ventricular wall motion. Age-appropriate left ventricular diastolic function. · Mitral annular calcification. Trace mitral valve regurgitation is present. · The following segments are hypokinetic: basal inferior and mid inferior. Nuclear Stress Test 7/2/2020  Nuclear Cardiac Spect Rest then Gated Stress study. Antoine Gravely was used as the stressing method and agent. (Antoine Gravely given via a 10 - 20 sec injection.)   One day myocardial perfusion study. Date: 7/2/2020. Negative myocardial perfusion imaging. Myocardial perfusion imaging supports a low risk stress test.   There is no prior study available for comparison. .     Cardiac cath 7/13/2020  STEMI  · Critical one-vessel coronary artery disease with 99% stenosis and interval coronary thrombus at proximal/mid RCA. · Noncritical 50% mid LAD stenosis and diffuse luminal irregularities throughout the coronary vasculature with moderate proximal calcification  · Successful PCI of proximal/mid RCA deploying a drug-eluting stent with a residual 0% stenosis and a very tortuous right coronary artery. · Procedure done via right femoral artery and 6 [place] Angio-Seal at the end.   I have personally reviewed patient's records available from hospital and other providers and incorporated findings in patient care. ER notes, lab, EKG, chest x-ray-1/2021 1/2021  Nuclear stress test  Negative stress test  Results for Earnest Pina (MRN [id number]) as of 5/26/2021 08:00   Ref. Range 3/25/2021 09:56   Triglyceride Latest Ref Range: <150 MG/DL 94   Cholesterol, total Latest Ref Range: <200 MG/   HDL Cholesterol Latest Ref Range: 40 - 60 MG/DL 59   CHOL/HDL Ratio Latest Ref Range: 0 - 5.0   3.0   VLDL, calculated Latest Units: MG/DL 18.8   LDL, calculated Latest Ref Range: 0 - 100 MG/DL 99.2     ASSESSMENT and PLAN    Ms. Los Bonner has a reminder for a \"due or due soon\" health maintenance. I have asked that she contact her primary care provider for follow-up on this health maintenance. No flowsheet data found. I Have personally reviewed recent relevant labs available and discussed with patient  5/2022    Assessment     I have personally reviewed patients ekg done at other facility. I Have personally reviewed recent relevant labs available and discussed with patient      ICD-10-CM ICD-9-CM    1. Coronary artery disease involving native coronary artery of native heart without angina pectoris  I25.10 414.01     Stable continue treatment monitor   2. History of coronary artery stent placement  Z95.5 V45.82     Stable   3. Essential hypertension  I10 401.9     Stable continue current treatment   4. Mixed hyperlipidemia  E78.2 272.2     Continue treatment lab with PCP     7/2020   Patient referred for pre-op evaluation due to abnormal EKG. SR with T wave abnormalities. She has history of HTN, HLD, and has not been active due to   Left knee pain. Will evaluate with stress test in AM.    B/P is not controlled will increase lisinopril to 20 mg/ BID.  7/2020 - Pre-operative nuclear stress test was negative for ischemia. She underwent left knee replacement surgery on 7/7/20. She then presented to ER on 7/13 with nausea and generalized malaise, with STEMI.   S/p cardiac cath with SAIGE to RCA. She has continued DAPT therapy with statin and BB. She has had multiple ER visits due to nausea - which resolved with Zofran. This is thought to be related to Percocet and was d/c this AM. She has been referred to cardiac rehab and is in PT for left knee. 10/2020  Cardiac status stable. Recent ER visit with nausea dizziness and diarrhea. Diarrhea likely related to food. Resolved since then. Mild hypokalemia being supplemented. Episode does not appear cardiac in nature. Continue current cardiac therapy including aspirin and Plavix  1/2021  Seen after recent ER visit with chest tightness which appears different than her anginal pain that she had with coronary disease prior to stent. Nuclear stress test today is negative her symptoms are better after using Xanax. We will continue to monitor clinically continue current therapy  5/2021  Stable cardiac status. Still has anxiety attacks treated by PCP and now supposed to see psychotherapist.  LDL 99 continue aggressive dieting and maximum dose of statin. If needed use PCSK9 inhibitor. Blood pressure elevated. My chart blood pressure links-her home readings are normal  11/2021  Stable cardiac status. We will continue Plavix and discontinue aspirin as it is about 16-month after the stent. Continue dietary modification. Follow-up lipid profile  5/2022  Stable cardiac status continue current treatment. Labs reviewed. Followed by GI. Possible need for colonoscopy. Okay to hold Plavix during that time. Consider baby aspirin during that time. Okay for back injection as needed and interruption of Plavix for that. There are no discontinued medications. No orders of the defined types were placed in this encounter.           Holly Murillo MD

## 2022-05-18 NOTE — PROGRESS NOTES
1. Have you been to the ER, urgent care clinic since your last visit? Hospitalized since your last visit? Yes When: 4/2022 Where: [facility] Reason for visit: chest pain    2. Have you seen or consulted any other health care providers outside of the [address] [place] [address] since your last visit? Include any pap smears or colon screening.       No

## 2022-06-06 ENCOUNTER — TELEPHONE (OUTPATIENT)
Dept: ORTHOPEDIC SURGERY | Age: 67
End: 2022-06-06

## 2022-06-06 NOTE — TELEPHONE ENCOUNTER
Patient called stating that she was referred to get an injection but the provider was not sure if she was able to have it due to her heart condition and that the provider was going to call her Cardiologist Dr. Samantha Subramanian. She was wondering that was done yet or not.  Please advise patient at [phone]

## 2022-06-07 NOTE — TELEPHONE ENCOUNTER
Called patient [phone] and verified her name and date of birth. I inquired what are concerns were. Patient stated she has been seen by her cardiologist and they have told her that it is okay to come off the medication but she has not heard anything about her injection. I informed her that she would need to call Dr. Shu Begum office [phone] and let them know so they can reach out to her cardiologist and get the approval for the medical clearance. Patient verbalized understanding and stated she will call their office tomorrow and let them know. No further action needed at this time.

## 2022-06-07 NOTE — TELEPHONE ENCOUNTER
Pt was referred to Dr Luis M Aponte for cervical MARGI. Can you see what is going on? This was from April? I don't see where we were going to check with cardiology.

## 2022-07-05 DIAGNOSIS — I25.10 CORONARY ARTERY DISEASE INVOLVING NATIVE CORONARY ARTERY OF NATIVE HEART WITHOUT ANGINA PECTORIS: Primary | ICD-10-CM

## 2022-07-05 DIAGNOSIS — Z95.5 HISTORY OF CORONARY ARTERY STENT PLACEMENT: ICD-10-CM

## 2022-07-05 RX ORDER — CLOPIDOGREL BISULFATE 75 MG/1
75 TABLET ORAL DAILY
Qty: 90 TABLET | Refills: 3 | Status: SHIPPED | OUTPATIENT
Start: 2022-07-05

## 2022-07-19 ENCOUNTER — HOSPITAL ENCOUNTER (EMERGENCY)
Age: 67
Discharge: HOME | End: 2022-07-19
Attending: STUDENT IN AN ORGANIZED HEALTH CARE EDUCATION/TRAINING PROGRAM
Payer: MEDICARE

## 2022-07-19 ENCOUNTER — APPOINTMENT (OUTPATIENT)
Dept: CT IMAGING | Age: 67
End: 2022-07-19
Attending: PHYSICIAN ASSISTANT
Payer: MEDICARE

## 2022-07-19 ENCOUNTER — APPOINTMENT (OUTPATIENT)
Dept: GENERAL RADIOLOGY | Age: 67
End: 2022-07-19
Attending: PHYSICIAN ASSISTANT
Payer: MEDICARE

## 2022-07-19 VITALS
BODY MASS INDEX: 30.66 KG/M2 | RESPIRATION RATE: 11 BRPM | WEIGHT: 207 LBS | SYSTOLIC BLOOD PRESSURE: 162 MMHG | TEMPERATURE: 97.9 F | HEART RATE: 50 BPM | HEIGHT: 69 IN | DIASTOLIC BLOOD PRESSURE: 71 MMHG | OXYGEN SATURATION: 100 %

## 2022-07-19 DIAGNOSIS — R42 DIZZINESS: Primary | ICD-10-CM

## 2022-07-19 LAB
ANION GAP SERPL CALC-SCNC: 3 MMOL/L (ref 3–18)
ATRIAL RATE: 58 BPM
BASOPHILS # BLD: 0.1 K/UL (ref 0–0.1)
BASOPHILS NFR BLD: 1 % (ref 0–2)
BNP SERPL-MCNC: 33 PG/ML (ref 0–900)
BUN SERPL-MCNC: 12 MG/DL (ref 7–18)
BUN/CREAT SERPL: 18 (ref 12–20)
CALCIUM SERPL-MCNC: 8.9 MG/DL (ref 8.5–10.1)
CALCULATED P AXIS, ECG09: 74 DEGREES
CALCULATED R AXIS, ECG10: 3 DEGREES
CALCULATED T AXIS, ECG11: -23 DEGREES
CHLORIDE SERPL-SCNC: 105 MMOL/L (ref 100–111)
CO2 SERPL-SCNC: 30 MMOL/L (ref 21–32)
CREAT SERPL-MCNC: 0.68 MG/DL (ref 0.6–1.3)
DIAGNOSIS, 93000: NORMAL
DIFFERENTIAL METHOD BLD: NORMAL
EOSINOPHIL # BLD: 0.1 K/UL (ref 0–0.4)
EOSINOPHIL NFR BLD: 2 % (ref 0–5)
ERYTHROCYTE [DISTWIDTH] IN BLOOD BY AUTOMATED COUNT: 14.4 % (ref 11.6–14.5)
GLUCOSE BLD STRIP.AUTO-MCNC: 107 MG/DL (ref 70–110)
GLUCOSE SERPL-MCNC: 106 MG/DL (ref 74–99)
HCT VFR BLD AUTO: 41.1 % (ref 35–45)
HGB BLD-MCNC: 13.4 G/DL (ref 12–16)
IMM GRANULOCYTES # BLD AUTO: 0 K/UL (ref 0–0.04)
IMM GRANULOCYTES NFR BLD AUTO: 0 % (ref 0–0.5)
INR PPP: 1 (ref 0.8–1.2)
LYMPHOCYTES # BLD: 2.1 K/UL (ref 0.9–3.6)
LYMPHOCYTES NFR BLD: 37 % (ref 21–52)
MCH RBC QN AUTO: 27.7 PG (ref 24–34)
MCHC RBC AUTO-ENTMCNC: 32.6 G/DL (ref 31–37)
MCV RBC AUTO: 85.1 FL (ref 78–100)
MONOCYTES # BLD: 0.5 K/UL (ref 0.05–1.2)
MONOCYTES NFR BLD: 8 % (ref 3–10)
NEUTS SEG # BLD: 2.9 K/UL (ref 1.8–8)
NEUTS SEG NFR BLD: 51 % (ref 40–73)
NRBC # BLD: 0 K/UL (ref 0–0.01)
NRBC BLD-RTO: 0 PER 100 WBC
P-R INTERVAL, ECG05: 210 MS
PLATELET # BLD AUTO: 311 K/UL (ref 135–420)
PMV BLD AUTO: 10 FL (ref 9.2–11.8)
POTASSIUM SERPL-SCNC: 4 MMOL/L (ref 3.5–5.5)
PROTHROMBIN TIME: 13.1 SEC (ref 11.5–15.2)
Q-T INTERVAL, ECG07: 408 MS
QRS DURATION, ECG06: 80 MS
QTC CALCULATION (BEZET), ECG08: 400 MS
RBC # BLD AUTO: 4.83 M/UL (ref 4.2–5.3)
SODIUM SERPL-SCNC: 138 MMOL/L (ref 136–145)
TROPONIN-HIGH SENSITIVITY: 8 NG/L (ref 0–54)
VENTRICULAR RATE, ECG03: 58 BPM
WBC # BLD AUTO: 5.6 K/UL (ref 4.6–13.2)

## 2022-07-19 PROCEDURE — 93005 ELECTROCARDIOGRAM TRACING: CPT

## 2022-07-19 PROCEDURE — 85025 COMPLETE CBC W/AUTO DIFF WBC: CPT

## 2022-07-19 PROCEDURE — 83880 ASSAY OF NATRIURETIC PEPTIDE: CPT

## 2022-07-19 PROCEDURE — 70450 CT HEAD/BRAIN W/O DYE: CPT

## 2022-07-19 PROCEDURE — 74011000636 HC RX REV CODE- 636: Performed by: STUDENT IN AN ORGANIZED HEALTH CARE EDUCATION/TRAINING PROGRAM

## 2022-07-19 PROCEDURE — 71045 X-RAY EXAM CHEST 1 VIEW: CPT

## 2022-07-19 PROCEDURE — 80048 BASIC METABOLIC PNL TOTAL CA: CPT

## 2022-07-19 PROCEDURE — 82962 GLUCOSE BLOOD TEST: CPT

## 2022-07-19 PROCEDURE — 99202 OFFICE O/P NEW SF 15 MIN: CPT | Performed by: PSYCHIATRY & NEUROLOGY

## 2022-07-19 PROCEDURE — 70496 CT ANGIOGRAPHY HEAD: CPT

## 2022-07-19 PROCEDURE — 84484 ASSAY OF TROPONIN QUANT: CPT

## 2022-07-19 PROCEDURE — 85610 PROTHROMBIN TIME: CPT

## 2022-07-19 PROCEDURE — 99285 EMERGENCY DEPT VISIT HI MDM: CPT

## 2022-07-19 PROCEDURE — 74011250636 HC RX REV CODE- 250/636: Performed by: PHYSICIAN ASSISTANT

## 2022-07-19 RX ORDER — MECLIZINE HCL 12.5 MG 12.5 MG/1
50 TABLET ORAL
Status: COMPLETED | OUTPATIENT
Start: 2022-07-19 | End: 2022-07-19

## 2022-07-19 RX ORDER — MECLIZINE HYDROCHLORIDE 25 MG/1
TABLET ORAL
Qty: 20 TABLET | Refills: 0 | Status: SHIPPED | OUTPATIENT
Start: 2022-07-19

## 2022-07-19 RX ADMIN — IOPAMIDOL 90 ML: 755 INJECTION, SOLUTION INTRAVENOUS at 10:45

## 2022-07-19 RX ADMIN — MECLIZINE 50 MG: 12.5 TABLET ORAL at 13:38

## 2022-07-19 RX ADMIN — SODIUM CHLORIDE 1000 ML: 9 INJECTION, SOLUTION INTRAVENOUS at 13:40

## 2022-07-19 NOTE — DISCHARGE INSTRUCTIONS
Take medication as prescribed. Follow-up with your primary care physician and neurologist within 2 days for reassessment. Bring the results from this visit with you for their review. Return to the ED immediately for any new, worsening, or persistent symptoms, including fever, weakness, or any other medical concerns. Philip Beckwith

## 2022-07-19 NOTE — ED NOTES
Departed from the ED to [address] [place] [address], via wheelchair, accompanied by RN, in stable condition.

## 2022-07-19 NOTE — ED TRIAGE NOTES
Patient complains of having dizziness upon waking this morning. she became diaphoretic while laying in bed. Patient states that laying flat she was able to feel better, but dizziness continues. Patient states she also has tingling in her toes.

## 2022-07-19 NOTE — CONSULTS
Consult    Patient: Soha Chavira MRN: [id number]  SSN: [id number]    YOB: 1955  Age: 77 y.o. Sex: female      Subjective:      Soha Chavira is a 77 y.o. female who is being seen for dizziness which began about 630 this morning when she got up for the first time. She has never had this before. She denies diplopia, dysarthria, any change in her hearing, and any weakness in her extremities. She had very brief numbness or tingling in the toes of both feet this morning but no weakness. She has noticed that if she turns her head to the left she will feel dizzy. In spite of several attempts, I could not get a better definition from her of her symptoms than \"dizziness. \"  When I suggested the possibility of spinning, she did endorse this. She has had a cranial CT scan which was unremarkable without contrast and she has had a CT angiogram which showed a 60% stenosis of cervical portion of the right internal carotid artery. She has been evaluated by telemetry neurology and the working diagnosis was positional vertigo. Past Medical History:   Diagnosis Date    Arthritis     CAD (coronary artery disease)     Calcific Achilles tendonitis     Right; insertional    Essential hypertension 7/13/2020    Hypercholesterolemia     Hypertension     Left knee pain     Menopause     Pain of right heel     Sprain of right ring finger     Thyroid disease     Wears glasses      Past Surgical History:   Procedure Laterality Date    HX CHOLECYSTECTOMY  1990    HX HEENT      Eye surgery    HX HERNIA REPAIR      HX KNEE REPLACEMENT      HX ORTHOPAEDIC Left 07/2020    left total knee arthroplasty    [place] CARDIAC SURG PROCEDURE UNLIST      stent july 2020      Family History   Problem Relation Age of Onset    Breast Cancer Other     OSTEOARTHRITIS Mother     Hypertension Mother     Heart Disease Mother     Hypertension Other         Grandmother    Other Other         Heart problems:  Mother, grandmother    Diabetes Other         Grandmother     Social History     Tobacco Use    Smoking status: Former Smoker     Years: 15.00    Smokeless tobacco: Never Used   Substance Use Topics    Alcohol use: Yes     Comment: Occasional      Current Facility-Administered Medications   Medication Dose Route Frequency Provider Last Rate Last Admin    meclizine (ANTIVERT) tablet 50 mg  50 mg Oral NOW Tejal Mendes, [address]        sodium chloride 0.9 % bolus infusion 1,000 mL  1,000 mL IntraVENous Alexey Munoz, [address]         Current Outpatient Medications   Medication Sig Dispense Refill    clopidogreL (PLAVIX) 75 mg tab Take 1 Tablet by mouth daily. 90 Tablet 3    desvenlafaxine succinate (PRISTIQ) 25 mg ER tablet Take 1 Tablet by mouth daily.  gabapentin (NEURONTIN) 300 mg capsule Take 1 Capsule by mouth three (3) times daily. Max Daily Amount: 900 mg. 90 Capsule 2    ezetimibe (ZETIA) 10 mg tablet Take 1 Tablet by mouth daily. 30 Tablet 3    cyclobenzaprine (FLEXERIL) 10 mg tablet Take 1 Tablet by mouth three (3) times daily as needed for Muscle Spasm(s). 60 Tablet 1    potassium chloride SR (K-TAB) 20 mEq tablet Take 20 mEq by mouth daily. 15 ml daily      carvediloL (COREG) 6.25 mg tablet Take 1 Tab by mouth every twelve (12) hours. 180 Tab 3    levothyroxine (SYNTHROID) 25 mcg tablet Take 75 mcg by mouth Daily (before breakfast).  amLODIPine (NORVASC) 2.5 mg tablet Take 1 Tab by mouth daily. 30 Tab 2    atorvastatin (LIPITOR) 80 mg tablet Take 1 Tab by mouth daily. 30 Tab 2        No Known Allergies    Review of Systems: See the HPI      Objective:     Vitals:    07/19/22 1101 07/19/22 1102 07/19/22 1145 07/19/22 1245   BP:   (!) 153/82 (!) 191/103   Pulse: (!) 57 (!) 57 (!) 53 (!) 52   Resp: 13 13 14 12   SpO2:       Weight:       Height:            Physical Exam: This is a pleasant middle-aged -American woman in no acute distress.     Mental status exam: Unremarkable    Cranial nerves: I examined cranial nerves II through XII. Only significant abnormality was subtle horizontal jerk nystagmus with gaze to the far left. VOR appeared to be intact. Hearing appeared to be intact as measured by her response to speech. Exam: No upper extremity drift, no upper extremity or lower extremity ataxia. When she was lying supine, and when she turns her head to the right, there were subtle complaints of dizziness, but when she turned her head to the left, she complained of sudden severe spinning. There to be an increase in the horizontal jerk nystagmus to the left with a faint component or rotatory nystagmus, clockwise when viewed from the front. Assessment:     Hospital Problems  Date Reviewed: 4/19/2022    None        Most likely diagnosis is positional vertigo. Plan:     Continue with the meclizine. I told her that if her symptoms do not resolve within several days, she should be seen by physical therapy so that a vestibular test can see her and treat her with the canalith repositioning maneuver.     Signed By: Erinn Guan MD     July 19, 2022

## 2022-07-19 NOTE — ED NOTES
Returned from [address] [place] [address], via wheelchair, accompanied by RN, in stable condition. Assisted to room #15 and positioned to comfort. Explanation of plan of care provided to the patient. Awaiting tele neurologist's evaluation.

## 2022-07-19 NOTE — ED PROVIDER NOTES
EMERGENCY DEPARTMENT HISTORY AND PHYSICAL EXAM    12:28 PM      Date: 7/19/2022  Patient Name: Linda Cifuentes    History of Presenting Illness     Chief Complaint   Patient presents with    Dizziness       History Provided By: Patient    Additional History (Context): Linda Cifuentes is a 77 y.o. female with  hx of arthritis, CAD, HTN, HLD, thyroid d/o, and other noted PMH  who presents with complaint of dizziness since this morning around 7 AM.  Patient notes she did not wake up with the symptoms but noticed them while she was laying in bed. Patient notes the symptoms have slowly improved. She notes tingling to bilateral feet as well. Denies head injury, slurred speech, facial droop, changes in vision, chest pain, dyspnea. Denies hx of TIA/ CVA, vertigo. Notes she is on Plavix, taking as prescribed. [place] PCP: JAMES Guevara    Current Facility-Administered Medications   Medication Dose Route Frequency Provider Last Rate Last Admin    sodium chloride 0.9 % bolus infusion 1,000 mL  1,000 mL IntraVENous Bita Chow PA 1,000 mL/hr at 07/19/22 1340 1,000 mL at 07/19/22 1340     Current Outpatient Medications   Medication Sig Dispense Refill    meclizine (ANTIVERT) 25 mg tablet Take 1 tablet by mouth every 6 hours for the next 3 days. Then stop taking the meclizine. Restart the meclizine for 3 day intervals if vertigo/ dizziness returns. 20 Tablet 0    clopidogreL (PLAVIX) 75 mg tab Take 1 Tablet by mouth daily. 90 Tablet 3    desvenlafaxine succinate (PRISTIQ) 25 mg ER tablet Take 1 Tablet by mouth daily. gabapentin (NEURONTIN) 300 mg capsule Take 1 Capsule by mouth three (3) times daily. Max Daily Amount: 900 mg. 90 Capsule 2    ezetimibe (ZETIA) 10 mg tablet Take 1 Tablet by mouth daily. 30 Tablet 3    cyclobenzaprine (FLEXERIL) 10 mg tablet Take 1 Tablet by mouth three (3) times daily as needed for Muscle Spasm(s).  60 Tablet 1    carvediloL (COREG) 6.25 mg tablet Take 1 Tab by mouth every twelve (12) hours. 180 Tab 3    levothyroxine (SYNTHROID) 25 mcg tablet Take 75 mcg by mouth Daily (before breakfast). amLODIPine (NORVASC) 2.5 mg tablet Take 1 Tab by mouth daily. 30 Tab 2    atorvastatin (LIPITOR) 80 mg tablet Take 1 Tab by mouth daily. 27 Tab 2       Past History     Past Medical History:  Past Medical History:   Diagnosis Date    Arthritis     CAD (coronary artery disease)     Calcific Achilles tendonitis     Right; insertional    Essential hypertension 7/13/2020    Hypercholesterolemia     Hypertension     Left knee pain     Menopause     Pain of right heel     Sprain of right ring finger     Thyroid disease     Wears glasses        Past Surgical History:  Past Surgical History:   Procedure Laterality Date    HX CHOLECYSTECTOMY  1990    HX HEENT      Eye surgery    HX HERNIA REPAIR      HX KNEE REPLACEMENT      HX ORTHOPAEDIC Left 07/2020    left total knee arthroplasty    [place] CARDIAC SURG PROCEDURE UNLIST      stent july 2020       Family History:  Family History   Problem Relation Age of Onset    Breast Cancer Other     OSTEOARTHRITIS Mother     Hypertension Mother     Heart Disease Mother     Hypertension Other         Grandmother    Other Other         Heart problems: Mother, grandmother    Diabetes Other         Grandmother       Social History:  Social History     Tobacco Use    Smoking status: Former Smoker     Years: 15.00    Smokeless tobacco: Never Used   Substance Use Topics    Alcohol use: Yes     Comment: Occasional    Drug use: No       Allergies:  No Known Allergies      Review of Systems       Review of Systems   Constitutional: Negative for chills and fever. Respiratory: Negative for shortness of breath. Cardiovascular: Negative for chest pain. Gastrointestinal: Negative for abdominal pain, nausea and vomiting. Skin: Negative for rash. Neurological: Positive for dizziness. Negative for weakness and numbness.    All other systems reviewed and are negative. Physical Exam     Visit Vitals  BP (!) 166/100   Pulse (!) 51   Resp 13   Ht 5' 9\" (1.753 m)   Wt 93.9 kg (207 lb)   SpO2 100%   BMI 30.57 kg/m²         Physical Exam  Vitals and nursing note reviewed. Constitutional:       General: She is not in acute distress. Appearance: Normal appearance. She is well-developed. She is not ill-appearing, toxic-appearing or diaphoretic. HENT:      Head: Normocephalic and atraumatic. Eyes:      Pupils: Pupils are equal, round, and reactive to light. Cardiovascular:      Rate and Rhythm: Normal rate and regular rhythm. Heart sounds: Normal heart sounds. No murmur heard. No friction rub. No gallop. Pulmonary:      Effort: Pulmonary effort is normal. No respiratory distress. Breath sounds: Normal breath sounds. No wheezing or rales. Abdominal:      General: Abdomen is flat. There is no distension. Palpations: Abdomen is soft. Tenderness: There is no abdominal tenderness. There is no guarding. Musculoskeletal:         General: Normal range of motion. Cervical back: Normal range of motion and neck supple. Skin:     General: Skin is warm. Findings: No rash. Neurological:      General: No focal deficit present. Mental Status: She is alert and oriented to person, place, and time. Cranial Nerves: No cranial nerve deficit. Sensory: No sensory deficit. Motor: No weakness.       Gait: Gait normal.           Diagnostic Study Results     Labs -  Recent Results (from the past 12 hour(s))   GLUCOSE, POC    Collection Time: 07/19/22 10:30 AM   Result Value Ref Range    Glucose (POC) 107 70 - 110 mg/dL   CBC WITH AUTOMATED DIFF    Collection Time: 07/19/22 10:36 AM   Result Value Ref Range    WBC 5.6 4.6 - 13.2 K/uL    RBC 4.83 4.20 - 5.30 M/uL    HGB 13.4 12.0 - 16.0 g/dL    HCT 41.1 35.0 - 45.0 %    MCV 85.1 78.0 - 100.0 FL    MCH 27.7 24.0 - 34.0 PG    MCHC 32.6 31.0 - 37.0 g/dL    RDW 14.4 11.6 - 14.5 % PLATELET 315 134 - 576 K/uL    MPV 10.0 9.2 - 11.8 FL    NRBC 0.0 0  WBC    ABSOLUTE NRBC 0.00 0.00 - 0.01 K/uL    NEUTROPHILS 51 40 - 73 %    LYMPHOCYTES 37 21 - 52 %    MONOCYTES 8 3 - 10 %    EOSINOPHILS 2 0 - 5 %    BASOPHILS 1 0 - 2 %    IMMATURE GRANULOCYTES 0 0.0 - 0.5 %    ABS. NEUTROPHILS 2.9 1.8 - 8.0 K/UL    ABS. LYMPHOCYTES 2.1 0.9 - 3.6 K/UL    ABS. MONOCYTES 0.5 0.05 - 1.2 K/UL    ABS. EOSINOPHILS 0.1 0.0 - 0.4 K/UL    ABS. BASOPHILS 0.1 0.0 - 0.1 K/UL    ABS. IMM. GRANS. 0.0 0.00 - 0.04 K/UL    DF AUTOMATED     METABOLIC PANEL, BASIC    Collection Time: 07/19/22 10:36 AM   Result Value Ref Range    Sodium 138 136 - 145 mmol/L    Potassium 4.0 3.5 - 5.5 mmol/L    Chloride 105 100 - 111 mmol/L    CO2 30 21 - 32 mmol/L    Anion gap 3 3.0 - 18 mmol/L    Glucose 106 (H) 74 - 99 mg/dL    BUN 12 7.0 - 18 MG/DL    Creatinine 0.68 0.6 - 1.3 MG/DL    BUN/Creatinine ratio 18 12 - 20      GFR est AA >60 >60 ml/min/1.73m2    GFR est non-AA >60 >60 ml/min/1.73m2    Calcium 8.9 8.5 - 10.1 MG/DL   PROTHROMBIN TIME + INR    Collection Time: 07/19/22 10:36 AM   Result Value Ref Range    Prothrombin time 13.1 11.5 - 15.2 sec    INR 1.0 0.8 - 1.2     TROPONIN-HIGH SENSITIVITY    Collection Time: 07/19/22 10:36 AM   Result Value Ref Range    Troponin-High Sensitivity 8 0 - 54 ng/L   NT-PRO BNP    Collection Time: 07/19/22 10:36 AM   Result Value Ref Range    NT pro-BNP 33 0 - 900 PG/ML       Radiologic Studies -   XR CHEST PORT   Final Result   :      1. No acute abnormalities. CTA HEAD NECK W CONT         CT HEAD WO CONT   Final Result      No acute findings. Mild sinus mucosal disease. Results discussed with JAMES Reardon on 7/19/2022 at 1049 hours. Medical Decision Making   I am the first provider for this patient. I reviewed the vital signs, available nursing notes, past medical history, past surgical history, family history and social history.     Vital Signs-Reviewed the patient's vital signs. Pulse Oximetry Analysis -   100% on room air       Records Reviewed: Nursing Notes, Old Medical Records and Previous electrocardiograms (Time of Review: 12:28 PM)    ED Course: Progress Notes, Reevaluation, and Consults:  10:32 AM: Initial evaluation of patient complete. Code S called. 10:40 AM: Discussed with  Dr. Johnathan Trevizo, tele-neurologist, will evaluate patient. Discussed with radiologist, CT head negative  Discussed with radiologist, CTA negative for evidence of LVO, does demonstrate 50 to 60% stenosis right cervical ICA. Discussed with  Dr. Johnathan Trevizo who evaluated patient. S/s consistent with vertigo, would recommend Meclizine, close outpatient follow-up with neurology and vestibular PT.   1:03 PM: Discussed with Dr. Boom Kirk, inpatient neurologist, who evaluated patient. Likely positional vertigo, continue Meclizine, outpatient follow-up   2:00 PM Reviewed results with patient and sister. Pt notes she is feeling much better, denies any symptoms at this time. Discussed need for close outpatient follow-up this week for reassessment. Discussed strict return precautions, including dizziness, slurred speech, facial droop, or any other medical concerns. Patient and sister in agreement with plan    Provider Notes (Medical Decision Making): 59-year-old female with history of CAD, hypertension, hyperlipidemia who presents the ED due to intermittent dizziness since this morning around 7 AM.  Code S called upon initial assessment. Patient is alert, oriented, no neurologic deficit on examination. Tele-neurologist and inpatient neurologist evaluated patient, feel signs and symptoms appear consistent with vertigo. CT head without evidence of acute process, CTA head and neck without evidence of LVO. Patient's symptoms resolved. Stable for discharge with close outpatient follow-up for further assessment. Strict return precautions provided. .    Diagnosis     Clinical Impression:   1.  Dizziness Disposition: home     Follow-up Information       Follow up With Specialties Details Why [facility]    [facility] EMERGENCY DEPT Emergency Medicine  If symptoms worsen [address] [place] [id number]  [phone]    JAMES Vargas Physician Assistant Schedule an appointment as soon as possible for a visit   [address] [place] [address] Road  [address] Cristopher Dunn MD Neurology Schedule an appointment as soon as possible for a visit   [facility]  [phone]               Patient's Medications   Start Taking    MECLIZINE (ANTIVERT) 25 MG TABLET    Take 1 tablet by mouth every 6 hours for the next 3 days. Then stop taking the meclizine. Restart the meclizine for 3 day intervals if vertigo/ dizziness returns. Continue Taking    AMLODIPINE (NORVASC) 2.5 MG TABLET    Take 1 Tab by mouth daily. ATORVASTATIN (LIPITOR) 80 MG TABLET    Take 1 Tab by mouth daily. CARVEDILOL (COREG) 6.25 MG TABLET    Take 1 Tab by mouth every twelve (12) hours. CLOPIDOGREL (PLAVIX) 75 MG TAB    Take 1 Tablet by mouth daily. CYCLOBENZAPRINE (FLEXERIL) 10 MG TABLET    Take 1 Tablet by mouth three (3) times daily as needed for Muscle Spasm(s). DESVENLAFAXINE SUCCINATE (PRISTIQ) 25 MG ER TABLET    Take 1 Tablet by mouth daily. EZETIMIBE (ZETIA) 10 MG TABLET    Take 1 Tablet by mouth daily. GABAPENTIN (NEURONTIN) 300 MG CAPSULE    Take 1 Capsule by mouth three (3) times daily. Max Daily Amount: 900 mg. LEVOTHYROXINE (SYNTHROID) 25 MCG TABLET    Take 75 mcg by mouth Daily (before breakfast). These Medications have changed    No medications on file   Stop Taking    POTASSIUM CHLORIDE SR (K-TAB) 20 MEQ TABLET    Take 20 mEq by mouth daily. 15 ml daily       Dictation disclaimer:  Please note that this dictation was completed with [employer] the [employer] voice recognition software.   Quite often unanticipated grammatical, syntax, homophones, and other interpretive errors are inadvertently transcribed by the computer software. Please disregard these errors. Please excuse any errors that have escaped final proofreading.

## 2022-08-11 DIAGNOSIS — E78.2 MIXED HYPERLIPIDEMIA: ICD-10-CM

## 2022-08-11 RX ORDER — EZETIMIBE 10 MG/1
10 TABLET ORAL DAILY
Qty: 90 TABLET | Refills: 1 | Status: SHIPPED | OUTPATIENT
Start: 2022-08-11

## 2022-08-25 ENCOUNTER — HOSPITAL ENCOUNTER (OUTPATIENT)
Dept: LAB | Age: 67
End: 2022-08-25
Payer: MEDICARE

## 2022-08-25 ENCOUNTER — HOSPITAL ENCOUNTER (OUTPATIENT)
Dept: LAB | Age: 67
Discharge: HOME | End: 2022-08-25
Payer: MEDICARE

## 2022-08-25 ENCOUNTER — TRANSCRIBE ORDER (OUTPATIENT)
Dept: REGISTRATION | Age: 67
End: 2022-08-25

## 2022-08-25 DIAGNOSIS — E03.9 HYPOTHYROID: ICD-10-CM

## 2022-08-25 DIAGNOSIS — E03.9 HYPOTHYROID: Primary | ICD-10-CM

## 2022-08-25 DIAGNOSIS — E78.00 HYPERCHOLESTEREMIA: ICD-10-CM

## 2022-08-25 DIAGNOSIS — I10 ESSENTIAL HYPERTENSION: ICD-10-CM

## 2022-08-25 LAB
ALBUMIN SERPL-MCNC: 3.8 G/DL (ref 3.4–5)
ALBUMIN/GLOB SERPL: 1.1 {RATIO} (ref 0.8–1.7)
ALP SERPL-CCNC: 122 U/L (ref 45–117)
ALT SERPL-CCNC: 20 U/L (ref 13–56)
ANION GAP SERPL CALC-SCNC: 5 MMOL/L (ref 3–18)
AST SERPL-CCNC: 15 U/L (ref 10–38)
BASOPHILS # BLD: 0 K/UL (ref 0–0.1)
BASOPHILS NFR BLD: 1 % (ref 0–2)
BILIRUB SERPL-MCNC: 0.8 MG/DL (ref 0.2–1)
BUN SERPL-MCNC: 11 MG/DL (ref 7–18)
BUN/CREAT SERPL: 14 (ref 12–20)
CALCIUM SERPL-MCNC: 9.3 MG/DL (ref 8.5–10.1)
CHLORIDE SERPL-SCNC: 106 MMOL/L (ref 100–111)
CHOLEST SERPL-MCNC: 174 MG/DL
CO2 SERPL-SCNC: 30 MMOL/L (ref 21–32)
CREAT SERPL-MCNC: 0.8 MG/DL (ref 0.6–1.3)
DIFFERENTIAL METHOD BLD: NORMAL
EOSINOPHIL # BLD: 0.2 K/UL (ref 0–0.4)
EOSINOPHIL NFR BLD: 3 % (ref 0–5)
ERYTHROCYTE [DISTWIDTH] IN BLOOD BY AUTOMATED COUNT: 14.3 % (ref 11.6–14.5)
GLOBULIN SER CALC-MCNC: 3.4 G/DL (ref 2–4)
GLUCOSE SERPL-MCNC: 94 MG/DL (ref 74–99)
HCT VFR BLD AUTO: 41.5 % (ref 35–45)
HDLC SERPL-MCNC: 51 MG/DL (ref 40–60)
HDLC SERPL: 3.4 {RATIO} (ref 0–5)
HGB BLD-MCNC: 13.5 G/DL (ref 12–16)
IMM GRANULOCYTES # BLD AUTO: 0 K/UL (ref 0–0.04)
IMM GRANULOCYTES NFR BLD AUTO: 0 % (ref 0–0.5)
LDLC SERPL CALC-MCNC: 91 MG/DL (ref 0–100)
LIPID PROFILE,FLP: ABNORMAL
LYMPHOCYTES # BLD: 2.5 K/UL (ref 0.9–3.6)
LYMPHOCYTES NFR BLD: 44 % (ref 21–52)
MCH RBC QN AUTO: 27.4 PG (ref 24–34)
MCHC RBC AUTO-ENTMCNC: 32.5 G/DL (ref 31–37)
MCV RBC AUTO: 84.2 FL (ref 78–100)
MONOCYTES # BLD: 0.5 K/UL (ref 0.05–1.2)
MONOCYTES NFR BLD: 8 % (ref 3–10)
NEUTS SEG # BLD: 2.6 K/UL (ref 1.8–8)
NEUTS SEG NFR BLD: 44 % (ref 40–73)
NRBC # BLD: 0 K/UL (ref 0–0.01)
NRBC BLD-RTO: 0 PER 100 WBC
PLATELET # BLD AUTO: 328 K/UL (ref 135–420)
PMV BLD AUTO: 10.1 FL (ref 9.2–11.8)
POTASSIUM SERPL-SCNC: 4.2 MMOL/L (ref 3.5–5.5)
PROT SERPL-MCNC: 7.2 G/DL (ref 6.4–8.2)
RBC # BLD AUTO: 4.93 M/UL (ref 4.2–5.3)
SODIUM SERPL-SCNC: 141 MMOL/L (ref 136–145)
T4 FREE SERPL-MCNC: 0.9 NG/DL (ref 0.7–1.5)
TRIGL SERPL-MCNC: 160 MG/DL (ref ?–150)
TSH SERPL DL<=0.05 MIU/L-ACNC: 3.49 UIU/ML (ref 0.36–3.74)
VLDLC SERPL CALC-MCNC: 32 MG/DL
WBC # BLD AUTO: 5.8 K/UL (ref 4.6–13.2)

## 2022-08-25 PROCEDURE — 80061 LIPID PANEL: CPT

## 2022-08-25 PROCEDURE — 85025 COMPLETE CBC W/AUTO DIFF WBC: CPT

## 2022-08-25 PROCEDURE — 84443 ASSAY THYROID STIM HORMONE: CPT

## 2022-08-25 PROCEDURE — 36415 COLL VENOUS BLD VENIPUNCTURE: CPT

## 2022-08-25 PROCEDURE — 80053 COMPREHEN METABOLIC PANEL: CPT

## 2022-08-25 PROCEDURE — 84439 ASSAY OF FREE THYROXINE: CPT

## 2022-08-31 NOTE — PERIOP NOTES
PRE-SURGICAL INSTRUCTIONS        Patient's Name:  Pablito Suazo Date:  8/31/2022            Covid Testing Date and Time:  will bring card    Surgery Date:  9/8/2022                Do NOT eat or drink anything, including candy, gum, or ice chips after midnight on 9/8, unless you have specific instructions from your surgeon or anesthesia provider to do so. You may brush your teeth before coming to the hospital.  No smoking 24 hours prior to the day of surgery. No alcohol 24 hours prior to the day of surgery. No recreational drugs for one week prior to the day of surgery. Leave all valuables, including money/purse, at home. Remove all jewelry, nail polish, acrylic nails, and makeup (including mascara); no lotions powders, deodorant, or perfume/cologne/after shave on the skin. Follow instruction for Hibiclens washes and CHG wipes from surgeon's office. Glasses/contact lenses and dentures may be worn to the hospital.  They will be removed prior to surgery. Call your doctor if symptoms of a cold or illness develop within 24-48 hours prior to your surgery. 11.  If you are having an outpatient procedure, please make arrangements for a responsible ADULT TO [address] [place] [address] and stay with you for 24 hours after your surgery. 12. ONE VISITOR in the hospital at this time for outpatient procedures. Exceptions may be made for surgical admissions, per nursing unit guidelines      Special Instructions:      Bring list of CURRENT medications. Bring any pertinent legal medical records. Take these medications the morning of surgery with a sip of water:  as directed by MD  Follow physician instructions about stopping anticoagulants. Complete bowel prep per MD instructions. On the day of surgery, come in the main entrance of [facility]. Let the  at the desk know you are there for surgery.   A staff member will come escort you to the surgical area on the second floor.    If you have any questions or concerns, please do not hesitate to call:     (Prior to the day of surgery) PAT department:  [phone]   (Day of surgery) Pre-Op department:  [phone]    These surgical instructions were reviewed with Max Sparks during the PAT phone call.

## 2022-09-07 ENCOUNTER — ANESTHESIA EVENT (OUTPATIENT)
Dept: ENDOSCOPY | Age: 67
End: 2022-09-07
Payer: MEDICARE

## 2022-09-08 ENCOUNTER — HOSPITAL ENCOUNTER (OUTPATIENT)
Age: 67
Setting detail: OUTPATIENT SURGERY
Discharge: HOME | End: 2022-09-08
Attending: INTERNAL MEDICINE | Admitting: INTERNAL MEDICINE
Payer: MEDICARE

## 2022-09-08 ENCOUNTER — ANESTHESIA (OUTPATIENT)
Dept: ENDOSCOPY | Age: 67
End: 2022-09-08
Payer: MEDICARE

## 2022-09-08 VITALS
RESPIRATION RATE: 15 BRPM | WEIGHT: 211.1 LBS | OXYGEN SATURATION: 98 % | BODY MASS INDEX: 31.27 KG/M2 | SYSTOLIC BLOOD PRESSURE: 123 MMHG | TEMPERATURE: 97.8 F | HEART RATE: 45 BPM | HEIGHT: 69 IN | DIASTOLIC BLOOD PRESSURE: 64 MMHG

## 2022-09-08 PROCEDURE — 76040000007: Performed by: INTERNAL MEDICINE

## 2022-09-08 PROCEDURE — 74011250636 HC RX REV CODE- 250/636: Performed by: NURSE ANESTHETIST, CERTIFIED REGISTERED

## 2022-09-08 PROCEDURE — 77030019988 HC FCPS ENDOSC DISP BSC -B: Performed by: INTERNAL MEDICINE

## 2022-09-08 PROCEDURE — 2709999900 HC NON-CHARGEABLE SUPPLY: Performed by: INTERNAL MEDICINE

## 2022-09-08 PROCEDURE — 76060000032 HC ANESTHESIA 0.5 TO 1 HR: Performed by: INTERNAL MEDICINE

## 2022-09-08 PROCEDURE — 00813 ANES UPR LWR GI NDSC PX: CPT | Performed by: STUDENT IN AN ORGANIZED HEALTH CARE EDUCATION/TRAINING PROGRAM

## 2022-09-08 PROCEDURE — 77030013992 HC SNR POLYP ENDOSC BSC -B: Performed by: INTERNAL MEDICINE

## 2022-09-08 PROCEDURE — 77030008565 HC TBNG SUC IRR ERBE -B: Performed by: INTERNAL MEDICINE

## 2022-09-08 PROCEDURE — 74011000250 HC RX REV CODE- 250: Performed by: NURSE ANESTHETIST, CERTIFIED REGISTERED

## 2022-09-08 PROCEDURE — 88305 TISSUE EXAM BY PATHOLOGIST: CPT

## 2022-09-08 PROCEDURE — 77030021593 HC FCPS BIOP ENDOSC BSC -A: Performed by: INTERNAL MEDICINE

## 2022-09-08 PROCEDURE — 00813 ANES UPR LWR GI NDSC PX: CPT | Performed by: NURSE ANESTHETIST, CERTIFIED REGISTERED

## 2022-09-08 RX ORDER — SODIUM CHLORIDE, SODIUM LACTATE, POTASSIUM CHLORIDE, CALCIUM CHLORIDE 600; 310; 30; 20 MG/100ML; MG/100ML; MG/100ML; MG/100ML
25 INJECTION, SOLUTION INTRAVENOUS CONTINUOUS
Status: DISCONTINUED | OUTPATIENT
Start: 2022-09-08 | End: 2022-09-08 | Stop reason: HOSPADM

## 2022-09-08 RX ORDER — PROPOFOL 10 MG/ML
INJECTION, EMULSION INTRAVENOUS AS NEEDED
Status: DISCONTINUED | OUTPATIENT
Start: 2022-09-08 | End: 2022-09-08 | Stop reason: HOSPADM

## 2022-09-08 RX ORDER — LIDOCAINE HYDROCHLORIDE 10 MG/ML
0.1 INJECTION, SOLUTION EPIDURAL; INFILTRATION; INTRACAUDAL; PERINEURAL AS NEEDED
Status: DISCONTINUED | OUTPATIENT
Start: 2022-09-08 | End: 2022-09-08 | Stop reason: HOSPADM

## 2022-09-08 RX ORDER — LIDOCAINE HYDROCHLORIDE 20 MG/ML
INJECTION, SOLUTION EPIDURAL; INFILTRATION; INTRACAUDAL; PERINEURAL AS NEEDED
Status: DISCONTINUED | OUTPATIENT
Start: 2022-09-08 | End: 2022-09-08 | Stop reason: HOSPADM

## 2022-09-08 RX ADMIN — LIDOCAINE HYDROCHLORIDE 40 MG: 20 INJECTION, SOLUTION EPIDURAL; INFILTRATION; INTRACAUDAL; PERINEURAL at 09:56

## 2022-09-08 RX ADMIN — PROPOFOL 60 MG: 10 INJECTION, EMULSION INTRAVENOUS at 09:56

## 2022-09-08 RX ADMIN — PROPOFOL 30 MG: 10 INJECTION, EMULSION INTRAVENOUS at 10:07

## 2022-09-08 RX ADMIN — SODIUM CHLORIDE, POTASSIUM CHLORIDE, SODIUM LACTATE AND CALCIUM CHLORIDE 25 ML/HR: 600; 310; 30; 20 INJECTION, SOLUTION INTRAVENOUS at 09:18

## 2022-09-08 RX ADMIN — PROPOFOL 40 MG: 10 INJECTION, EMULSION INTRAVENOUS at 09:57

## 2022-09-08 RX ADMIN — PROPOFOL 30 MG: 10 INJECTION, EMULSION INTRAVENOUS at 10:09

## 2022-09-08 RX ADMIN — FAMOTIDINE 20 MG: 10 INJECTION, SOLUTION INTRAVENOUS at 09:18

## 2022-09-08 RX ADMIN — PROPOFOL 40 MG: 10 INJECTION, EMULSION INTRAVENOUS at 10:03

## 2022-09-08 RX ADMIN — PROPOFOL 20 MG: 10 INJECTION, EMULSION INTRAVENOUS at 10:13

## 2022-09-08 NOTE — H&P
Chief Complaint: Epigastric pain with nausea vomiting. History of present illness: Symptoms are better on zofran. Has heartburn at times. Needs screening colonoscopy    PMH:   Past Medical History:   Diagnosis Date    Arthritis     CAD (coronary artery disease)     Calcific Achilles tendonitis     Right; insertional    Essential hypertension 07/13/2020    Hypercholesterolemia     Hypertension     Left knee pain     Low blood potassium     Menopause     Pain of right heel     S/P coronary artery stent placement 07/2020    Sprain of right ring finger     STEMI (ST elevation myocardial infarction) ([address] 07/2020    Thyroid disease     low    Vertigo     Wears glasses      Allergies: No Known Allergies  Medications:   Current Facility-Administered Medications:     lidocaine (PF) (XYLOCAINE) 10 mg/mL (1 %) injection 0.1 mL, 0.1 mL, SubCUTAneous, PRN, Brein, Yaima, CRNA    lactated Ringers infusion, 25 mL/hr, IntraVENous, CONTINUOUS, Brein, Oneida, CRNA, Last Rate: 25 mL/hr at 09/08/22 0918, 25 mL/hr at 09/08/22 0918  FH:   Family History   Problem Relation Age of Onset    Breast Cancer Other     OSTEOARTHRITIS Mother     Hypertension Mother     Heart Disease Mother     Hypertension Other         Grandmother    Other Other         Heart problems:  Mother, grandmother    Diabetes Other         Grandmother     Social:   Social History     Socioeconomic History    Marital status:    Tobacco Use    Smoking status: Former     Years: 15.00     Types: Cigarettes    Smokeless tobacco: Never   Substance and Sexual Activity    Alcohol use: Yes     Comment: Occasional    Drug use: No     Social Determinants of Health     Physical Activity: Unknown    Days of Exercise per Week: 1 day     Surgical H:   Past Surgical History:   Procedure Laterality Date    HX CHOLECYSTECTOMY  1990    HX HEENT      Eye surgery age 16, muscle    HX HERNIA REPAIR      HX KNEE REPLACEMENT Left     HX ORTHOPAEDIC Left 07/2020    left total knee arthroplasty    [facility] CARDIAC SURG PROCEDURE UNLIST      stent july 2020       ROS: positive for reflux symptoms, nausea, and vomiting    Physical Exam: Visit Vitals  BP (!) 151/79   Pulse (!) 56   Temp 98.5 °F (36.9 °C)   Resp 20   Ht 5' 9\" (1.753 m)   Wt 95.8 kg (211 lb 1.6 oz)   SpO2 98%   BMI 31.17 kg/m²     General appearance: alert, no distress  Eyes: pupils equal and reactive, extraocular eye movements intact  Nodes: No gross adenopathy in neck. Skin: no spider angiomata, jaundice, palmar erythema   Respiratory: clear to auscultation bilaterally  Cardiovascular: regular heart rate, no murmurs, no JVD, normal rate and regular rhythm  Abdomen: soft, non-tender, liver not enlarged, spleen not palpable, no obvious ascites  Extremities: no muscle wasting, no gross arthritic changes  Neurologic: alert and oriented, cranial nerves grossly intact, no asterixis    Labs: No results found for this or any previous visit (from the past 24 hour(s)). Imp/ Plan: Will proceed with upper endoscopy and colonoscopy for screening as planned. Risk benefits alternative including but not limited to infection, bleeding, perforation of viscous, allergic reaction and resultant morbidity and mortality was discussed. Chance of missing a significant lesion due to various reasons were discussed.       Sierra Gloria MD  [facility], [facility]

## 2022-09-08 NOTE — DISCHARGE INSTRUCTIONS
Upper GI Endoscopy: What to Expect at [address] had an upper GI endoscopy. Your doctor used a thin, lighted tube that bends to look at the inside of your esophagus, your stomach, and the first part of the small intestine, called the duodenum. After you have an endoscopy, you will stay at the hospital or clinic for 1 to 2 hours. This will allow the medicine to wear off. You will be able to go home after your doctor or nurse checks to make sure that you're not having any problems. You may have to stay overnight if you had treatment during the test. You may have a sore throat for a day or two after the test.  This care sheet gives you a general idea about what to expect after the test.  How can you care for yourself at home? Activity   Rest as much as you need to after you go home. You should be able to go back to your usual activities the day after the test.  Diet   Follow your doctor's directions for eating after the test.  Drink plenty of fluids (unless your doctor has told you not to). Medications   If you have a sore throat the day after the test, use an over-the-counter spray to numb your throat. Follow-up care is a key part of your treatment and safety. Be sure to make and go to all appointments, and call your doctor if you are having problems. It's also a good idea to know your test results and keep a list of the medicines you take. When should you call for help? Call 911 anytime you think you may need emergency care. For example, call if:    You passed out (lost consciousness). You have trouble breathing. You pass maroon or bloody stools. Call your doctor now or seek immediate medical care if:    You have pain that does not get better after your take pain medicine. You have new or worse belly pain. You have blood in your stools. You are sick to your stomach and cannot keep fluids down. You have a fever. You cannot pass stools or gas.    Watch closely for changes in your health, and be sure to contact your doctor if:    Your throat still hurts after a day or two. You do not get better as expected. Where can you learn more? Go to http://www.torres.com/  Enter J454 in the search box to learn more about \"Upper GI Endoscopy: What to Expect at Home. \"  Current as of: September 8, 2021               Content Version: 13.2  © 2006-2022 [facility]. Care instructions adapted under license by [employer] (which disclaims liability or warranty for this information). If you have questions about a medical condition or this instruction, always ask your healthcare professional. [place] [address] any warranty or liability for your use of this information. Hiatal Hernia: Care Instructions  Your Care Instructions  A hiatal hernia occurs when part of the stomach bulges into the chest cavity. A hiatal hernia may allow stomach acid and juices to back up into the esophagus (acid reflux). This can cause a feeling of burning, warmth, heat, or pain behind the breastbone. This feeling may often occur after you eat, soon after you lie down, or when you bend forward, and it may come and go. You also may have a sour taste in your mouth. These symptoms are commonly known as heartburn or reflux. But not all hiatal hernias cause symptoms. Follow-up care is a key part of your treatment and safety. Be sure to make and go to all appointments, and call your doctor if you are having problems. It's also a good idea to know your test results and keep a list of the medicines you take. How can you care for yourself at home? Take your medicines exactly as prescribed. Call your doctor if you think you are having a problem with your medicine. Do not take aspirin or other nonsteroidal anti-inflammatory drugs (NSAIDs), such as ibuprofen (Advil, Motrin) or naproxen (Aleve), unless your doctor says it is okay.  Ask your doctor what you can take for pain. Your doctor may recommend over-the-counter medicine. For mild or occasional indigestion, antacids such as Tums, Gaviscon, Maalox, or Mylanta may help. Your doctor also may recommend over-the-counter acid reducers, such as famotidine (Pepcid AC), cimetidine (Tagamet HB), or omeprazole (Prilosec). Read and follow all instructions on the label. If you use these medicines often, talk with your doctor. Change your eating habits. It's best to eat several small meals instead of two or three large meals. After you eat, wait 2 to 3 hours before you lie down. Late-night snacks aren't a good idea. Avoid foods that make your symptoms worse. These may include chocolate, mint, alcohol, pepper, spicy foods, high-fat foods, or drinks with caffeine in them, such as tea, coffee, vladimir, or energy drinks. If your symptoms are worse after you eat a certain food, you may want to stop eating it to see if your symptoms get better. Do not smoke or chew tobacco.  If you get heartburn at night, raise the head of your bed 6 to 8 inches by putting the frame on blocks or placing a foam wedge under the head of your mattress. (Adding extra pillows does not work.)  Do not wear tight clothing around your middle. Lose weight if you need to. Losing just 5 to 10 pounds can help. When should you call for help? Call your doctor now or seek immediate medical care if:    You have new or worse belly pain. You are vomiting. Watch closely for changes in your health, and be sure to contact your doctor if:    You have new or worse symptoms of indigestion. You have trouble or pain swallowing. You are losing weight. You do not get better as expected. Where can you learn more? Go to http://www.Appfrica.com/  Enter T074 in the search box to learn more about \"Hiatal Hernia: Care Instructions. \"  Current as of: September 8, 2021               Content Version: 13.2  © 0087-0782 [employer]. Care instructions adapted under license by [employer] (which disclaims liability or warranty for this information). If you have questions about a medical condition or this instruction, always ask your healthcare professional. [address] any warranty or liability for your use of this information. Gastritis: Care Instructions  Your Care Instructions     Gastritis is a sore and upset stomach. It happens when something irritates the stomach lining. Many things can cause it. These include an infection such as the flu or something you ate or drank. Medicines or a sore on the lining of the stomach (ulcer) also can cause it. Your belly may bloat and ache. You may belch, vomit, and feel sick to your stomach. You should be able to relieve the problem by taking medicine. And it may help to change your diet. If gastritis lasts, your doctor may prescribe medicine. Follow-up care is a key part of your treatment and safety. Be sure to make and go to all appointments, and call your doctor if you are having problems. It's also a good idea to know your test results and keep a list of the medicines you take. How can you care for yourself at home? If your doctor prescribed antibiotics, take them as directed. Do not stop taking them just because you feel better. You need to take the full course of antibiotics. Be safe with medicines. If your doctor prescribed medicine to decrease stomach acid, take it as directed. Call your doctor if you think you are having a problem with your medicine. Do not take any other medicine, including over-the-counter pain relievers, without talking to your doctor first.  If your doctor recommends over-the-counter medicine to reduce stomach acid, such as Pepcid AC (famotidine), Prilosec (omeprazole), or Tagamet HB (cimetidine) follow the directions on the label. Drink plenty of fluids to prevent dehydration. Choose water and other clear liquids.  If you have kidney, heart, or liver disease and have to limit fluids, talk with your doctor before you increase the amount of fluids you drink. Avoid foods that make your symptoms worse. These may include chocolate, mint, alcohol, pepper, spicy foods, high-fat foods, or drinks with caffeine in them, such as tea, coffee, vladimir, or energy drinks. If your symptoms are worse after you eat a certain food, you may want to stop eating it to see if your symptoms get better. When should you call for help? Call 911 anytime you think you may need emergency care. For example, call if:    You vomit blood or what looks like coffee grounds. You pass maroon or very bloody stools. Call your doctor now or seek immediate medical care if:    You start breathing fast and have not produced urine in the last 8 hours. You cannot keep fluids down. Watch closely for changes in your health, and be sure to contact your doctor if:    You do not get better as expected. Where can you learn more? Go to http://www.torres.com/  Enter Z536 in the search box to learn more about \"Gastritis: Care Instructions. \"  Current as of: September 8, 2021               Content Version: 13.2  © 5150-3367 [employer]. Care instructions adapted under license by [employer] (which disclaims liability or warranty for this information). If you have questions about a medical condition or this instruction, always ask your healthcare professional. [place] [address] any warranty or liability for your use of this information. Colonoscopy: What to Expect at [address] [place] [address]  After a colonoscopy, you'll stay at the clinic until you wake up. Then you can go home. But you'll need to arrange for a ride. Your doctor will tell you when you can eat and do your other usual activities. Your doctor will talk to you about when you'll need your next colonoscopy.  Your doctor can help you decide how often you need to be checked. This will depend on the results of your test and your risk for colorectal cancer. After the test, you may be bloated or have gas pains. You may need to pass gas. If a biopsy was done or a polyp was removed, you may have streaks of blood in your stool (feces) for a few days. Problems such as heavy rectal bleeding may not occur until several weeks after the test. This isn't common. But it can happen after polyps are removed. This care sheet gives you a general idea about how long it will take for you to recover. But each person recovers at a different pace. Follow the steps below to get better as quickly as possible. How can you care for yourself at home? Activity    Rest when you feel tired. You can do your normal activities when it feels okay to do so. Diet    Follow your doctor's directions for eating. Unless your doctor has told you not to, drink plenty of fluids. This helps to replace the fluids that were lost during the colon prep. Do not drink alcohol. Medicines    Your doctor will tell you if and when you can restart your medicines. You will also be given instructions about taking any new medicines. If you take aspirin or some other blood thinner, ask your doctor if and when to start taking it again. Make sure that you understand exactly what your doctor wants you to do. If polyps were removed or a biopsy was done during the test, your doctor may tell you not to take aspirin or other anti-inflammatory medicines for a few days. These include ibuprofen (Advil, Motrin) and naproxen (Aleve). Other instructions    For your safety, do not drive or operate machinery until the medicine wears off and you can think clearly. Your doctor may tell you not to drive or operate machinery until the day after your test.     Do not sign legal documents or make major decisions until the medicine wears off and you can think clearly.  The anesthesia can make it hard for you to fully understand what you are agreeing to. Follow-up care is a key part of your treatment and safety. Be sure to make and go to all appointments, and call your doctor if you are having problems. It's also a good idea to know your test results and keep a list of the medicines you take. When should you call for help? Call 911 anytime you think you may need emergency care. For example, call if:    You passed out (lost consciousness). You pass maroon or bloody stools. You have trouble breathing. Call your doctor now or seek immediate medical care if:    You have pain that does not get better after you take pain medicine. You are sick to your stomach or cannot drink fluids. You have new or worse belly pain. You have blood in your stools. You have a fever. You cannot pass stools or gas. Watch closely for changes in your health, and be sure to contact your doctor if you have any problems. Where can you learn more? Go to http://www.gray.com/  Enter E264 in the search box to learn more about \"Colonoscopy: What to Expect at Home. \"  Current as of: September 8, 2021               Content Version: 13.2  © 7944-9930 [employer]. Care instructions adapted under license by [employer] (which disclaims liability or warranty for this information). If you have questions about a medical condition or this instruction, always ask your healthcare professional. [place] [address] any warranty or liability for your use of this information. Diverticulosis: Care Instructions  Your Care Instructions  In diverticulosis, pouches called diverticula form in the wall of the large intestine (colon). The pouches do not cause any pain or other symptoms. Most people who have diverticulosis do not know they have it. But the pouches sometimes bleed, and if they become infected, they can cause pain and other symptoms.  When this happens, it is called diverticulitis. Diverticula form when pressure pushes the wall of the colon outward at certain weak points. A diet that is too low in fiber can cause diverticula. Follow-up care is a key part of your treatment and safety. Be sure to make and go to all appointments, and call your doctor if you are having problems. It's also a good idea to know your test results and keep a list of the medicines you take. How can you care for yourself at home? Include fruits, leafy green vegetables, beans, and whole grains in your diet each day. These foods are high in fiber. Take a fiber supplement, such as Citrucel or Metamucil, every day if needed. Read and follow all instructions on the label. Drink plenty of fluids. If you have kidney, heart, or liver disease and have to limit fluids, talk with your doctor before you increase the amount of fluids you drink. Get at least 30 minutes of exercise on most days of the week. Walking is a good choice. You also may want to do other activities, such as running, swimming, cycling, or playing tennis or team sports. Cut out foods that cause gas, pain, or other symptoms. When should you call for help? Call your doctor now or seek immediate medical care if:    You have belly pain. You pass maroon or very bloody stools. You have a fever. You have nausea and vomiting. You have unusual changes in your bowel movements or abdominal swelling. You have burning pain when you urinate. You have abnormal vaginal discharge. You have shoulder pain. You have cramping pain that does not get better when you have a bowel movement or pass gas. You pass gas or stool from your urethra while urinating. Watch closely for changes in your health, and be sure to contact your doctor if you have any problems. Where can you learn more?   Go to http://sam-yelena.info/  Enter O226 in the search box to learn more about \"Diverticulosis: Care Instructions. \"  Current as of: September 8, 2021               Content Version: 13.2  © 9565-5054 [employer]. Care instructions adapted under license by [employer] (which disclaims liability or warranty for this information). If you have questions about a medical condition or this instruction, always ask your healthcare professional. [address] any warranty or liability for your use of this information. Colon Polyps: Care Instructions  Your Care Instructions     Colon polyps are growths in the colon or the rectum. The cause of most colon polyps is not known, and most people who get them do not have any problems. But a certain kind can turn into cancer. For this reason, regular testing for colon polyps is important for people as they get older. It is also important for anyone who has an increased risk for colon cancer. Polyps are usually found through routine colon cancer screening tests. Although most colon polyps are not cancerous, they are usually removed and then tested for cancer. Screening for colon cancer saves lives because the cancer can usually be cured if it is caught early. If you have a polyp that is the type that can turn into cancer, you may need more tests to examine your entire colon. The doctor will remove any other polyps that he or she finds, and you will be tested more often. Follow-up care is a key part of your treatment and safety. Be sure to make and go to all appointments, and call your doctor if you are having problems. It's also a good idea to know your test results and keep a list of the medicines you take. How can you care for yourself at home? Regular exams to look for colon polyps are the best way to prevent polyps from turning into colon cancer. These can include stool tests, sigmoidoscopy, colonoscopy, and CT colonography. Talk with your doctor about a testing schedule that is right for you.   To prevent polyps  There is no home treatment that can prevent colon polyps. But these steps may help lower your risk for cancer. Stay active. Being active can help you get to and stay at a healthy weight. Try to exercise on most days of the week. Walking is a good choice. Eat well. Choose a variety of vegetables, fruits, legumes (such as peas and beans), fish, poultry, and whole grains. Do not smoke. If you need help quitting, talk to your doctor about stop-smoking programs and medicines. These can increase your chances of quitting for good. If you drink alcohol, limit how much you drink. Limit alcohol to 2 drinks a day for men and 1 drink a day for women. When should you call for help? Call your doctor now or seek immediate medical care if:    You have severe belly pain. Your stools are maroon or very bloody. Watch closely for changes in your health, and be sure to contact your doctor if:    You have a fever. You have nausea or vomiting. You have a change in bowel habits (new constipation or diarrhea). Your symptoms get worse or are not improving as expected. Where can you learn more? Go to http://www.torres.com/  Enter C571 in the search box to learn more about \"Colon Polyps: Care Instructions. \"  Current as of: September 8, 2021               Content Version: 13.2  © 2006-2022 [employer]. Care instructions adapted under license by [employer] (which disclaims liability or warranty for this information). If you have questions about a medical condition or this instruction, always ask your healthcare professional. [place] [address] any warranty or liability for your use of this information. DISCHARGE SUMMARY from Nurse     POST-PROCEDURE INSTRUCTIONS:    Call your Physician if you:  Observe any excess bleeding. Develop a temperature over 100.5o F. Experience abdominal, shoulder or chest pain.   Notice any signs of decreased circulation or nerve impairment to an extremity such as a change in color, persistent numbness, tingling, coldness or increase in pain. Vomit blood or you have nausea and vomiting lasting longer than 4 hours. Are unable to take medications. Are unable to urinate within 8 hours after discharge following general anesthesia or intravenous sedation. For the next 24 hours after receiving general anesthesia or intravenous sedation, or while taking prescription Narcotics, limit your activities:  Do NOT drive a motor vehicle, operate hazard machinery or power tools, or perform tasks that require coordination. The medication you received during your procedure may have some effect on your mental awareness. Do NOT make important personal or business decisions. The medication you received during your procedure may have some effect on your mental awareness. Do NOT drink alcoholic beverages. These drinks do not mix well with the medications that have been given to you. Upon discharge from the hospital, you must be accompanied by a responsible adult. Resume your diet as directed by your physician. Resume medications as your physician has prescribed. Please give a list of your current medications to your Primary Care Provider. Please update this list whenever your medications are discontinued, doses are changed, or new medications (including over-the-counter products) are added. Please carry medication information at all times in case of emergency situations. These are general instructions for a healthy lifestyle:    No smoking/ No tobacco products/ Avoid exposure to second hand smoke. Surgeon General's Warning:  Quitting smoking now greatly reduces serious risk to your health. Obesity, smoking, and a sedentary lifestyle greatly increase your risk for illness.   A healthy diet, regular physical exercise & weight monitoring are important for maintaining a healthy lifestyle  You may be retaining fluid if you have a history of heart failure or if you experience any of the following symptoms:  Weight gain of 3 pounds or more overnight or 5 pounds in a week, increased swelling in our hands or feet or shortness of breath while lying flat in bed. Please call your doctor as soon as you notice any of these symptoms; do not wait until your next office visit. Recognize signs and symptoms of STROKE:  F  -  Face looks uneven  A  -  Arms unable to move or move unevenly  S  -  Speech slurred or non-existent  T  -  Time to call 911 - as soon as signs and symptoms begin - DO NOT go back to bed or wait to see If you get better - TIME IS BRAIN. Colorectal Screening  Colorectal cancer almost always develops from precancerous polyps (abnormal growths) in the colon or rectum. Screening tests can find precancerous polyps, so that they can be removed before they turn into cancer. Screening tests can also find colorectal cancer early, when treatment works best.  Speak with your physician about when you should begin screening and how often you should be tested. [employer] Activation    Thank you for requesting access to [employer]. Please follow the instructions below to securely access and download your online medical record. [employer] allows you to send messages to your doctor, view your test results, renew your prescriptions, schedule appointments, and more. How Do I Sign Up? In your internet browser, go to [email or web address]. Click on the First Time User? Click Here link in the Sign In box. You will see the New Member Sign Up page. Enter your [employer] Access Code exactly as it appears below. You will not need to use this code after youve completed the sign-up process. If you do not sign up before the expiration date, you must request a new code. [employer] Access Code:  Activation code not generated  Current [employer] Status: Active (This is the date your [employer] access code will )    Enter the last four digits of your Social Security Number (xxxx) and Date of Birth (mm/dd/yyyy) as indicated and click Submit. You will be taken to the next sign-up page. Create a [employer] ID. This will be your [employer] login ID and cannot be changed, so think of one that is secure and easy to remember. Create a [employer] password. You can change your password at any time. Enter your Password Reset Question and Answer. This can be used at a later time if you forget your password. Enter your e-mail address. You will receive e-mail notification when new information is available in [address] Click Sign Up. You can now view and download portions of your medical record. Click the [employer] link to download a portable copy of your medical information. Additional Information    If you have questions, please call [phone]. Remember, [employer] is NOT to be used for urgent needs. For medical emergencies, dial 911. Educational references and/or instructions provided during this visit included:    See Attached      APPOINTMENTS:    Per MD Instruction    Discharge information has been reviewed with the patient. The patient verbalized understanding. DISCHARGE SUMMARY from Nurse    PATIENT INSTRUCTIONS:    After general anesthesia or intravenous sedation, for 24 hours or while taking prescription Narcotics:  Limit your activities  Do not drive and operate hazardous machinery  Do not make important personal or business decisions  Do  not drink alcoholic beverages  If you have not urinated within 8 hours after discharge, please contact your surgeon on call.     Report the following to your surgeon:  Excessive pain, swelling, redness or odor of or around the surgical area  Temperature over 100.5  Nausea and vomiting lasting longer than 4 hours or if unable to take medications  Any signs of decreased circulation or nerve impairment to extremity: change in color, persistent  numbness, tingling, coldness or increase pain  Any questions    What to do at Home:  Recommended activity: Activity as tolerated and no driving for today,. *  Please give a list of your current medications to your Primary Care Provider. *  Please update this list whenever your medications are discontinued, doses are      changed, or new medications (including over-the-counter products) are added. *  Please carry medication information at all times in case of emergency situations. These are general instructions for a healthy lifestyle:    No smoking/ No tobacco products/ Avoid exposure to second hand smoke  Surgeon General's Warning:  Quitting smoking now greatly reduces serious risk to your health. Obesity, smoking, and sedentary lifestyle greatly increases your risk for illness    A healthy diet, regular physical exercise & weight monitoring are important for maintaining a healthy lifestyle    You may be retaining fluid if you have a history of heart failure or if you experience any of the following symptoms:  Weight gain of 3 pounds or more overnight or 5 pounds in a week, increased swelling in our hands or feet or shortness of breath while lying flat in bed. Please call your doctor as soon as you notice any of these symptoms; do not wait until your next office visit. The discharge information has been reviewed with the patient and Sister. The patient and Sister   verbalized understanding. Discharge medications reviewed with the patient and sister   and appropriate educational materials and side effects teaching were provided. ___________________________________________________________________________________________________________________________________     Colonoscopy: What to Expect at [address] [place] [address]  After a colonoscopy, you'll stay at the clinic until you wake up. Then you can go home. But you'll need to arrange for a ride. Your doctor will tell you when you can eat and do your other usual activities.   Your doctor will talk to you about when you'll need your next colonoscopy. Your doctor can help you decide how often you need to be checked. This will depend on the results of your test and your risk for colorectal cancer. After the test, you may be bloated or have gas pains. You may need to pass gas. If a biopsy was done or a polyp was removed, you may have streaks of blood in your stool (feces) for a few days. Problems such as heavy rectal bleeding may not occur until several weeks after the test. This isn't common. But it can happen after polyps are removed. This care sheet gives you a general idea about how long it will take for you to recover. But each person recovers at a different pace. Follow the steps below to get better as quickly as possible. How can you care for yourself at home? Activity    Rest when you feel tired. You can do your normal activities when it feels okay to do so. Diet    Follow your doctor's directions for eating. Unless your doctor has told you not to, drink plenty of fluids. This helps to replace the fluids that were lost during the colon prep. Do not drink alcohol. Medicines    Your doctor will tell you if and when you can restart your medicines. You will also be given instructions about taking any new medicines. If you take aspirin or some other blood thinner, ask your doctor if and when to start taking it again. Make sure that you understand exactly what your doctor wants you to do. If polyps were removed or a biopsy was done during the test, your doctor may tell you not to take aspirin or other anti-inflammatory medicines for a few days. These include ibuprofen (Advil, Motrin) and naproxen (Aleve). Other instructions    For your safety, do not drive or operate machinery until the medicine wears off and you can think clearly.  Your doctor may tell you not to drive or operate machinery until the day after your test.     Do not sign legal documents or make major decisions until the medicine wears off and you can think clearly. The anesthesia can make it hard for you to fully understand what you are agreeing to. Follow-up care is a key part of your treatment and safety. Be sure to make and go to all appointments, and call your doctor if you are having problems. It's also a good idea to know your test results and keep a list of the medicines you take. When should you call for help? Call 911 anytime you think you may need emergency care. For example, call if:    You passed out (lost consciousness). You pass maroon or bloody stools. You have trouble breathing. Call your doctor now or seek immediate medical care if:    You have pain that does not get better after you take pain medicine. You are sick to your stomach or cannot drink fluids. You have new or worse belly pain. You have blood in your stools. You have a fever. You cannot pass stools or gas. Watch closely for changes in your health, and be sure to contact your doctor if you have any problems. Where can you learn more? Go to http://www.gray.com/  Enter E264 in the search box to learn more about \"Colonoscopy: What to Expect at Home. \"  Current as of: September 8, 2021               Content Version: 13.2  © 2006-2022 [employer]. Care instructions adapted under license by [employer] (which disclaims liability or warranty for this information). If you have questions about a medical condition or this instruction, always ask your healthcare professional. [place] [address] any warranty or liability for your use of this information.

## 2022-09-08 NOTE — PROCEDURES
[facility]  [facility], [address]      Brief Procedure Note    Tarun Bonds  1955  [id number]    Date of Procedure: 9/8/2022    Preoperative diagnosis: Abdominal pain, epigastric:  789.06 - R10.13  Nausea:  r11.0  Early satiety:  R68.81  Anticoagulation therapy (long term use) with clopidogrel:  V58.61 - Z79.01  Colorectal cancer screening:  V76.51 - Z12.11  BMI 30.0 - 30.9, adult:  Z68.30  Epigastric abdominal tenderness:  R10.816  COVID-19 vaccine series completed:  Z92.29    Postoperative diagnosis:  EGD: hiatal hernia, gastritis   [place]: diverticulosis, ascending colon polyp x1, transverse colon polyp x1, distal sigmoid polyp x1    Type of Anesthesia: MAC (monitered anesthesia care)    Description of Findings: same as post op dx    Procedure: Procedure(s):  UPPER ENDOSCOPY/ Biopsies  COLONOSCOPY/ polypectomies    :  Dr. Jordan Strong MD    Assistant(s): [unfilled]    Type of Anesthesia:MAC     EBL:None, no implants.     Specimens:   ID Type Source Tests Collected by Time Destination   1 : antral and body bxs Preservative Gastric  Chavo Rivera MD 9/8/2022 1003 Pathology   2 : ascending polyp Preservative Colon, Ascending  Chavo Rivera MD 9/8/2022 1017 Pathology   3 : transverse polyp  Preservative Colon, Transverse  Chavo Rivera MD 9/8/2022 1018 Pathology   4 : distal sigmoid polyp Preservative Sigmoid  Chavo Rivera MD 9/8/2022 1018 Pathology       Findings: See printed and scanned procedure note    Complications: None    Dr. Jordan Strong MD  9/8/2022  10:39 AM

## 2022-09-08 NOTE — ANESTHESIA POSTPROCEDURE EVALUATION
Procedure(s):  UPPER ENDOSCOPY/ Biopsies  COLONOSCOPY/ polypectomies. MAC    Anesthesia Post Evaluation      Multimodal analgesia: multimodal analgesia used between 6 hours prior to anesthesia start to PACU discharge  Patient location during evaluation: PACU  Patient participation: complete - patient participated  Level of consciousness: sleepy but conscious  Pain management: adequate  Airway patency: patent  Anesthetic complications: no  Cardiovascular status: acceptable  Respiratory status: acceptable  Hydration status: acceptable  Post anesthesia nausea and vomiting:  controlled  Final Post Anesthesia Temperature Assessment:  Normothermia (36.0-37.5 degrees C)      INITIAL Post-op Vital signs:   Vitals Value Taken Time   /65 09/08/22 1036   Temp 36.4 °C (97.5 °F) 09/08/22 1030   Pulse 46 09/08/22 1039   Resp 19 09/08/22 1039   SpO2 95 % 09/08/22 1039   Vitals shown include unvalidated device data.

## 2022-09-08 NOTE — ANESTHESIA PREPROCEDURE EVALUATION
Relevant Problems   CARDIOVASCULAR   (+) Coronary artery disease involving native coronary artery of native heart without angina pectoris   (+) Essential hypertension   (+) STEMI (ST elevation myocardial infarction) (HCC)      ENDOCRINE   (+) Arthritis of knee       Anesthetic History   No history of anesthetic complications            Review of Systems / Medical History  Patient summary reviewed, nursing notes reviewed and pertinent labs reviewed    Pulmonary  Within defined limits                 Neuro/Psych   Within defined limits           Cardiovascular    Hypertension: well controlled          CAD (stent 2020)         GI/Hepatic/Renal  Within defined limits              Endo/Other      Hypothyroidism: well controlled  Arthritis     Other Findings              Physical Exam    Airway  Mallampati: II  TM Distance: 4 - 6 cm  Neck ROM: normal range of motion   Mouth opening: Normal     Cardiovascular    Rhythm: regular  Rate: normal         Dental    Dentition: Poor dentition     Pulmonary  Breath sounds clear to auscultation               Abdominal  GI exam deferred       Other Findings            Anesthetic Plan    ASA: 3  Anesthesia type: MAC          Induction: Intravenous  Anesthetic plan and risks discussed with: Patient

## 2022-09-27 NOTE — Clinical Note
[address] [place] [address] [facility] EMERGENCY DEPT  [address] [address] [phone]    Work/School Note    Date: 8/28/2021    To Whom It May concern:      Carly Temple was seen and treated today in the emergency room by the following provider(s):  Attending Provider: Rebekah Owen MD.      Carly Temple is excused from work/school on 08/28/21. She is clear to return to work/school on 08/29/21.         Sincerely,          Lucia Galicia MD
(2) Male

## 2022-09-29 ENCOUNTER — OFFICE VISIT (OUTPATIENT)
Dept: ORTHOPEDIC SURGERY | Age: 67
End: 2022-09-29
Payer: MEDICARE

## 2022-09-29 VITALS
OXYGEN SATURATION: 98 % | WEIGHT: 211 LBS | TEMPERATURE: 97.1 F | BODY MASS INDEX: 31.25 KG/M2 | HEIGHT: 69 IN | HEART RATE: 66 BPM

## 2022-09-29 DIAGNOSIS — M48.02 CERVICAL SPINAL STENOSIS: ICD-10-CM

## 2022-09-29 DIAGNOSIS — M54.12 CERVICAL RADICULOPATHY: ICD-10-CM

## 2022-09-29 DIAGNOSIS — M54.2 NECK PAIN: Primary | ICD-10-CM

## 2022-09-29 PROCEDURE — G8432 DEP SCR NOT DOC, RNG: HCPCS | Performed by: PHYSICAL MEDICINE & REHABILITATION

## 2022-09-29 PROCEDURE — 99213 OFFICE O/P EST LOW 20 MIN: CPT | Performed by: PHYSICAL MEDICINE & REHABILITATION

## 2022-09-29 PROCEDURE — G8756 NO BP MEASURE DOC: HCPCS | Performed by: PHYSICAL MEDICINE & REHABILITATION

## 2022-09-29 PROCEDURE — 3017F COLORECTAL CA SCREEN DOC REV: CPT | Performed by: PHYSICAL MEDICINE & REHABILITATION

## 2022-09-29 PROCEDURE — 1123F ACP DISCUSS/DSCN MKR DOCD: CPT | Performed by: PHYSICAL MEDICINE & REHABILITATION

## 2022-09-29 PROCEDURE — G8427 DOCREV CUR MEDS BY ELIG CLIN: HCPCS | Performed by: PHYSICAL MEDICINE & REHABILITATION

## 2022-09-29 PROCEDURE — G9899 SCRN MAM PERF RSLTS DOC: HCPCS | Performed by: PHYSICAL MEDICINE & REHABILITATION

## 2022-09-29 PROCEDURE — G8417 CALC BMI ABV UP PARAM F/U: HCPCS | Performed by: PHYSICAL MEDICINE & REHABILITATION

## 2022-09-29 PROCEDURE — 1101F PT FALLS ASSESS-DOCD LE1/YR: CPT | Performed by: PHYSICAL MEDICINE & REHABILITATION

## 2022-09-29 PROCEDURE — G8399 PT W/DXA RESULTS DOCUMENT: HCPCS | Performed by: PHYSICAL MEDICINE & REHABILITATION

## 2022-09-29 PROCEDURE — G8536 NO DOC ELDER MAL SCRN: HCPCS | Performed by: PHYSICAL MEDICINE & REHABILITATION

## 2022-09-29 PROCEDURE — 1090F PRES/ABSN URINE INCON ASSESS: CPT | Performed by: PHYSICAL MEDICINE & REHABILITATION

## 2022-09-29 RX ORDER — ALPRAZOLAM 0.5 MG/1
TABLET ORAL
COMMUNITY

## 2022-09-29 RX ORDER — FLUTICASONE PROPIONATE 50 MCG
SPRAY, SUSPENSION (ML) NASAL
COMMUNITY
Start: 2022-07-29

## 2022-09-29 RX ORDER — KETOCONAZOLE 20 MG/ML
SHAMPOO TOPICAL
COMMUNITY
Start: 2022-07-06

## 2022-09-29 RX ORDER — FINASTERIDE 5 MG/1
2.5 TABLET, FILM COATED ORAL DAILY
COMMUNITY
Start: 2022-07-06

## 2022-09-29 RX ORDER — AMOXICILLIN 500 MG/1
CAPSULE ORAL
COMMUNITY
Start: 2022-08-11 | End: 2022-09-30

## 2022-09-29 RX ORDER — PANTOPRAZOLE SODIUM 40 MG/1
TABLET, DELAYED RELEASE ORAL
COMMUNITY
Start: 2022-07-01

## 2022-09-29 RX ORDER — POLYETHYLENE GLYCOL-3350 AND ELECTROLYTES 236; 6.74; 5.86; 2.97; 22.74 G/274.31G; G/274.31G; G/274.31G; G/274.31G; G/274.31G
POWDER, FOR SOLUTION ORAL
COMMUNITY
Start: 2022-08-31 | End: 2022-09-30

## 2022-09-29 RX ORDER — OMEPRAZOLE 20 MG/1
CAPSULE, DELAYED RELEASE ORAL
COMMUNITY
Start: 2022-09-08

## 2022-09-29 RX ORDER — MONTELUKAST SODIUM 10 MG/1
TABLET ORAL
COMMUNITY
Start: 2022-09-08

## 2022-09-29 RX ORDER — POTASSIUM CHLORIDE 1500 MG/1
20 TABLET, FILM COATED, EXTENDED RELEASE ORAL DAILY
COMMUNITY
Start: 2022-07-24

## 2022-09-29 NOTE — PROGRESS NOTES
Tomi Espinal presents today for   Chief Complaint   Patient presents with    Neck Pain       Is someone accompanying this pt? no    Is the patient using any DME equipment during [address]? no    Depression Screening:  3 most recent PHQ Screens 7/29/2021   PHQ Not Done -   Little interest or pleasure in doing things Not at all   Feeling down, depressed, irritable, or hopeless Not at all   Total Score PHQ 2 0   Trouble falling or staying asleep, or sleeping too much -   Feeling tired or having little energy -   Poor appetite, weight loss, or overeating -   Feeling bad about yourself - or that you are a failure or have let yourself or your family down -   Trouble concentrating on things such as school, work, reading, or watching TV -   Moving or speaking so slowly that other people could have noticed; or the opposite being so fidgety that others notice -   Thoughts of being better off dead, or hurting yourself in some way -   PHQ 9 Score -       Learning Assessment:  Learning Assessment 4/19/2022   PRIMARY LEARNER Patient   PRIMARY LANGUAGE ENGLISH   ANSWERED BY patient   RELATIONSHIP SELF       Abuse Screening:  Abuse Screening Questionnaire 4/19/2022   Do you ever feel afraid of your partner? N   Are you in a relationship with someone who physically or mentally threatens you? N   Is it safe for you to go home? Y       Fall Risk  Fall Risk Assessment, last 12 mths 7/29/2021   Able to walk? Yes   Fall in past 12 months? -   Do you feel unsteady? 0   Are you worried about falling 0   Number of falls in past 12 months -   Fall with injury? -       OPIOID RISK TOOL  No flowsheet data found. Coordination of Care:  1. Have you been to the ER, urgent care clinic since your last visit? no  Hospitalized since your last visit? no    2. Have you seen or consulted any other health care providers outside of the [address] [place] [address] since your last visit? no Include any pap smears or colon screening.  no

## 2022-09-29 NOTE — PROGRESS NOTES
[address]  [address], [address]  [place]  Phone: [phone]  Fax: [phone]  : 1955  PCP: JAMES Mclaughlin  2022    PROGRESS NOTE    HISTORY OF PRESENT ILLNESS  Ana Luisa Warren is a 79 y.o. F who was seen as a new pt on 22 c/o neck pain and tingling/itching radiating into the left shoulder and parascapular region. She initially thought she slept wrong. She sleeps on her left side. She denies weakness. She was seen by JAMES Gambino who prescribed a MDP with some benefit. She took Tylenol for pain. She has been taking Flexeril. She is unable to take NSAIDs because she is on Plavix. Cervical spine MRI dated 2/10/22 reviewed. Per report, Suboptimal exam due to low signal-to-noise ratio. Multilevel degenerative disc disease. Multilevel ventral cord compression and moderate spinal stenosis. No definite myelopathy. Multilevel high-grade severe foraminal stenosis. Pt denies change in bowel or bladder habits. Pt went to Dr. Amador Alexander for cervical MARGI. [employer] notes comes in today for follow up for pain at the back of her neck, but notes itching/discomfort of her neck and left shoulder. Pt notes that Gabapentin provides some relief, but not a lot. Denies drowsiness to the medication. Pt notes that MARGI injection (Dr. English Record) provided no benefit , and she felt she had a reaction from it. Pain Score: 0/10     Treatments patient has tried:  Treatments patient has tried:  Physical therapy:Unknown  Doing HEP: Unknown  Non-opioid medications: Yes  Spinal injections: Yes - Cervical MARGI (Dr. English Record)  Spinal surgery- No.   Last Cervical Spine MRI:  -  Multilevel ventral cord compression and moderate spinal stenosis. No definite myelopathy. Multilevel high-grade severe foraminal stenosis.      PmHx: h/o MI; coronary artery stent placement; s/p left TKR    ASSESSMENT  Ana Luisa Warren comes in to the office today for follow up of neck pain and itching/irritation of left shoulder. Pt reports no pain of neck today, but itching/irritation today is still present. We discussed further evaluation options, including referral to surgery if pt would like. We discussed treatment options including: lidocaine patches (advised pt not to use heat with this treatment), different Gabapentin treatment regimen. PLAN  Will increase dosage of Gabapentin treatment regimen, advising pt to take Gabapentin 2 x 300mg TID, to be sent to [facility]    Pt will f/u in 4 weeks through a telephone appointment or sooner if needed. Diagnoses and all orders for this visit:    1. Neck pain    2. Cervical spinal stenosis    3. Cervical radiculopathy       CHIEF COMPLAINT  Nancy Padilla is seen today in consultation at the request of [place], [place] for complaints of neck pain and itchiness of left shoulder.         PAST MEDICAL HISTORY   Past Medical History:   Diagnosis Date    Arthritis     CAD (coronary artery disease)     Calcific Achilles tendonitis     Right; insertional    Essential hypertension 07/13/2020    Hypercholesterolemia     Hypertension     Left knee pain     Low blood potassium     Menopause     Pain of right heel     S/P coronary artery stent placement 07/2020    Sprain of right ring finger     STEMI (ST elevation myocardial infarction) ([facility] [address] 07/2020    Thyroid disease     low    Vertigo     Wears glasses        Past Surgical History:   Procedure Laterality Date    COLONOSCOPY N/A 9/8/2022    COLONOSCOPY/ polypectomies performed by Louis Irving MD at [address] HEENT      Eye surgery age 16, muscle    HX HERNIA REPAIR      HX KNEE REPLACEMENT Left     HX ORTHOPAEDIC Left 07/2020    left total knee arthroplasty    [place] CARDIAC SURG PROCEDURE UNLIST      stent july 2020       MEDICATIONS      Current Outpatient Medications   Medication Sig Dispense Refill    ALPRAZolam (XANAX) 0.5 mg tablet 1 tab(s)      amoxicillin (AMOXIL) 500 mg capsule TAKE 4 CAPSULES (2000MG) ONE HOUR BEFORE DENTAL APPOINTMENT      finasteride (PROSCAR) 5 mg tablet Take 2.5 mg by mouth daily. fluticasone propionate (FLONASE) 50 mcg/actuation nasal spray       ketoconazole (NIZORAL) 2 % shampoo USE 1 (ONE) APPLICATION ONCE EVERY OTHER WEEK LATHER ON SCALP AND RINSE.      montelukast (SINGULAIR) 10 mg tablet       omeprazole (PRILOSEC) 20 mg capsule       pantoprazole (PROTONIX) 40 mg tablet       GaviLyte-G 236-22.74-6.74 -5.86 gram suspension       potassium chloride SR (K-TAB) 20 mEq tablet Take 20 mEq by mouth daily. ezetimibe (ZETIA) 10 mg tablet Take 1 Tablet by mouth in the morning. 90 Tablet 1    meclizine (ANTIVERT) 25 mg tablet Take 1 tablet by mouth every 6 hours for the next 3 days. Then stop taking the meclizine. Restart the meclizine for 3 day intervals if vertigo/ dizziness returns. (Patient not taking: No sig reported) 20 Tablet 0    clopidogreL (PLAVIX) 75 mg tab Take 1 Tablet by mouth daily. 90 Tablet 3    desvenlafaxine succinate (PRISTIQ) 25 mg ER tablet Take 1 Tablet by mouth daily. gabapentin (NEURONTIN) 300 mg capsule Take 1 Capsule by mouth three (3) times daily. Max Daily Amount: 900 mg. (Patient not taking: No sig reported) 90 Capsule 2    cyclobenzaprine (FLEXERIL) 10 mg tablet Take 1 Tablet by mouth three (3) times daily as needed for Muscle Spasm(s). 60 Tablet 1    carvediloL (COREG) 6.25 mg tablet Take 1 Tab by mouth every twelve (12) hours. 180 Tab 3    levothyroxine (SYNTHROID) 25 mcg tablet Take 75 mcg by mouth Daily (before breakfast). amLODIPine (NORVASC) 2.5 mg tablet Take 1 Tab by mouth daily. 30 Tab 2    atorvastatin (LIPITOR) 80 mg tablet Take 1 Tab by mouth daily.  30 Tab 2       ALLERGIES    No Known Allergies       SOCIAL HISTORY    Social History     Socioeconomic History    Marital status:    Tobacco Use    Smoking status: Former     Years: 15.00 Types: Cigarettes    Smokeless tobacco: Never   Substance and Sexual Activity    Alcohol use: Yes     Comment: Occasional    Drug use: No     Social Determinants of Health     Physical Activity: Unknown    Days of Exercise per Week: 1 day       FAMILY HISTORY    Family History   Problem Relation Age of Onset    Breast Cancer Other     OSTEOARTHRITIS Mother     Hypertension Mother     Heart Disease Mother     Hypertension Other         Grandmother    Other Other         Heart problems: Mother, grandmother    Diabetes Other         Grandmother       REVIEW OF SYSTEMS  Review of Systems   Constitutional:  Negative for chills, fever and weight loss. Respiratory:  Negative for shortness of breath. Cardiovascular:  Negative for chest pain. Gastrointestinal:  Negative for constipation. Negative for fecal incontinence    Genitourinary:  Negative for dysuria. Negative for urinary incontinence   Musculoskeletal:  Positive for neck pain. Skin:  Negative for rash. Neurological:  Positive for tingling ( neck and left shoulder). Negative for dizziness, tremors, focal weakness and headaches. Endo/Heme/Allergies:  Does not bruise/bleed easily. Psychiatric/Behavioral:  The patient does not have insomnia.       PHYSICAL EXAMINATION  Visit Vitals  Pulse 66   Temp 97.1 °F (36.2 °C) (Temporal)   Ht 5' 9\" (1.753 m)   Wt 211 lb (95.7 kg)   SpO2 98%   BMI 31.16 kg/m²       Pain Assessment  9/29/2022   Location of Pain Back   Location Modifiers -   Severity of Pain 0   Quality of Pain Other (Comment)   Quality of Pain Comment n/a   Duration of Pain Other (Comment)   Duration of Pain Comment n/a   Frequency of Pain Other (Comment)   Frequency of Pain Comment n/a   Date Pain First Started -   Date Pain First Started Comment -   Aggravating Factors Other (Comment)   Aggravating Factors Comment n/a   Limiting Behavior No   Relieving Factors Nothing   Relieving Factors Comment -   Result of Injury No Constitutional:  Well developed, well nourished, in no acute distress. Psychiatric: Affect and mood are appropriate. HEENT: Normocephalic, atraumatic. Extraocular movements intact. Integumentary: No rashes or abrasions noted on exposed areas. Cardiovascular: Regular rate and rhythm. Pulmonary: Clear to auscultation bilaterally. SPINE/MUSCULOSKELETAL EXAM                                                                                                       Decreased sensation to light touch in a left C4 distribution. MOTOR:      Biceps  Triceps Deltoids Wrist Ext Wrist Flex Hand Intrin   Right 5/5 5/5 5/5 5/5 5/5 5/5   Left 5/5 5/5 5/5 5/5 5/5 5/5             Hip Flex  Quads Hamstrings Ankle DF EHL Ankle PF   Right 5/5 5/5 5/5 5/5 5/5 5/5   Left 5/5 5/5 5/5 5/5 5/5 5/5     DTRs are 2+ biceps, triceps, brachioradialis, patella, and Achilles. Negative Straight Leg raise. Squat not tested. No difficulty with tandem gait. Ambulation without assistive device. FWB. RADIOGRAPHS  Cervical Spine MRI images taken on 2/10/22 personally reviewed with patient:  Mildly motion degraded examination and low signal-to-noise ratio. Alignment: Straightening of the cervical lordosis without subluxation  Vertebral body height: Normal  Marrow signal: No suspicious bone marrow lesion or infiltrative bone marrow  process. Minimal reactive discogenic edema changes at C3-4 and T1-2. Cervicomedullary Junction: Patent  Disc spaces: Moderately severe disc height loss throughout with associated  osteophytosis. Cervical cord: No cord expansion or atrophy. Suboptimal evaluation of the cord  signal due to low signal-to-noise ratio. Further discussed below where relevant. On axial imaging, findings at each level are as follows:     C2/C3: Disc osteophyte complex partially effacing ventral CSF space. Bilateral  mild facet hypertrophy. Mild canal stenosis and foraminal stenosis.      C3/C4: Disc osteophyte complex flattening ventral cord and partially effacing  dorsal CSF space. Bilateral facet hypertrophy and uncovertebral spurring. Moderate spinal stenosis and severe foraminal stenosis. C4/C5: Disc osteophyte complex flattening ventral cord. Uncovertebral spurring. Moderate spinal stenosis and severe foraminal stenosis. C5/C6: Disc osteophyte complex flattening ventral cord. Uncovertebral spurring. Moderate spinal stenosis and severe foraminal stenosis. C6/C7: Disc osteophyte complex flattening ventral cord. Uncovertebral spurring. Moderate spinal stenosis and severe foraminal stenosis. C7/T1: Disc osteophyte complex partially effacing ventral CSF space. Mild spinal  stenosis. Moderate foraminal stenosis. Other structures: Remainder of the upper thoracic levels T1-T4 demonstrate disc  osteophyte complexes with partial effacement of the ventral CSF space but no  high-grade spinal stenosis. High-grade left T1-T2 neural foramina stenosis. IMPRESSION  1. Suboptimal exam due to low signal-to-noise ratio. Multilevel degenerative  disc disease. Multilevel ventral cord compression and moderate spinal stenosis. No definite myelopathy. 2. Multilevel high-grade severe foraminal stenosis. 14 minutes of face-to-face contact were spent with the patient during today's visit extensively discussing symptoms and treatment plan. All questions were answered. More than half of this visit today was spent on counseling. Written by Jay Jay Stratton, as dictated by Dr. Madi Santa.

## 2022-09-30 RX ORDER — GABAPENTIN 300 MG/1
300 CAPSULE ORAL 3 TIMES DAILY
Qty: 90 CAPSULE | Refills: 2 | Status: SHIPPED
Start: 2022-09-30 | End: 2022-10-27 | Stop reason: DRUGHIGH

## 2022-10-26 NOTE — PROGRESS NOTES
[address]  [address], [address]  [address]  Phone: [phone]  Fax: [phone], [facility]   : 1955  PCP: JAMES Sifuentes  10/27/2022     PROGRESS NOTE     Consent: Chetan Ramos was evaluated through  a synchronous (real-time) audio-video encounter. The patient (or guardian if applicable) is aware that this is a billable service, which includes applicable co-pays. This Virtual Visit was conducted with patient's (and/or legal guardian's) consent. This visit was conducted pursuant to the emergency declaration under the [address] [place] [address] authority and the [employer] General Act. Patient identification was verified, and a caregiver was present when appropriate. The patient was located in a state where the provider was licensed to provide care. The visit was conducted in the office with the patient being in home through a (Telephone) platform. Visit video time start: 9:49AM end: 9:56 AM     HISTORY OF Jeffy Hanley is a 79 y.o. F was seen as a new pt on 22 c/o neck pain and tingling/itching radiating into the left shoulder and parascapular region. She initially thought she slept wrong. She sleeps on her left side. She denies weakness. She was seen by JAMES Jones who prescribed a MDP with some benefit. She took Tylenol for pain. She has been taking Flexeril. She is unable to take NSAIDs because she is on Plavix. Cervical spine MRI dated 2/10/22 reviewed. Per report, Suboptimal exam due to low signal-to-noise ratio. Multilevel degenerative disc disease. Multilevel ventral cord compression and moderate spinal stenosis. No definite myelopathy. Multilevel high-grade severe foraminal stenosis. Pt denies change in bowel or bladder habits. Pt notes that Gabapentin provides some relief, but not a lot.  Denies drowsiness to the medication. Pt notes that cervical MARGI injection (6/2022; Dr. Dulce Maria Cantrell) provided no benefit , and she felt she had a reaction from it. Lara Hardwick was seen virtually today for follow up. She continues with pain at back of neck and itching/discomfort around her neck and left shoulder. Denies neurological issues of numbness or tingling. Pt uses OTC lidocaine patches with benefit. Advised pt not to apply heat in conjunction with patches. Notes that she had not realized she should start new medication regimen of Gabapentin 2 x 300 mg TID. Pain Scale: 4/10     Treatments patient has tried:  Physical therapy: No  Doing HEP: No  Non-opioid medications: Yes - Gabapentin  Spinal injections: cervical MARGI (6/2022; no benefit)  Spinal surgery- None  Last Cervical MRI : 2/2022      reviewed. PMHx: h/o MI; coronary artery stent placement (she takes plavix); s/p left TKR      ASSESSMENT  Lara Hardwick is seen virtually today c/o neck pain and itching/irritation of left shoulder. His symptoms are consistent with cervical radiculopathy. Pt denies neurological issues, or deficit in strength and sensation. Pt had not started new Gabapentin regimen yet, and reports she will start tonight. PLAN  Discussed Gabapentin 2 x 300 mg TID ramp with pt    Pt will f/u in 6 months or sooner if needed. Diagnoses and all orders for this visit:    1. Cervical radiculopathy    2. Cervical spinal stenosis    3.  Neck pain           PAST MEDICAL HISTORY   Past Medical History:   Diagnosis Date    Arthritis     CAD (coronary artery disease)     Calcific Achilles tendonitis     Right; insertional    Essential hypertension 07/13/2020    Hypercholesterolemia     Hypertension     Left knee pain     Low blood potassium     Menopause     Pain of right heel     S/P coronary artery stent placement 07/2020    Sprain of right ring finger     STEMI (ST elevation myocardial infarction) ([facility] [address] 07/2020    Thyroid disease     low    Vertigo     Wears glasses        Past Surgical History:   Procedure Laterality Date    COLONOSCOPY N/A 9/8/2022    COLONOSCOPY/ polypectomies performed by Kiara Bailey MD at [address]    HX HEENT      Eye surgery age 16, muscle    HX HERNIA REPAIR      HX KNEE REPLACEMENT Left     HX ORTHOPAEDIC Left 07/2020    left total knee arthroplasty    CO CARDIAC SURG PROCEDURE UNLIST      stent july 2020       MEDICATIONS      Current Outpatient Medications   Medication Sig Dispense Refill    ALPRAZolam (XANAX) 0.5 mg tablet 1 tab(s)      fluticasone propionate (FLONASE) 50 mcg/actuation nasal spray       ketoconazole (NIZORAL) 2 % shampoo USE 1 (ONE) APPLICATION ONCE EVERY OTHER WEEK LATHER ON SCALP AND RINSE.      omeprazole (PRILOSEC) 20 mg capsule       potassium chloride SR (K-TAB) 20 mEq tablet Take 20 mEq by mouth daily. ezetimibe (ZETIA) 10 mg tablet Take 1 Tablet by mouth in the morning. 90 Tablet 1    clopidogreL (PLAVIX) 75 mg tab Take 1 Tablet by mouth daily. 90 Tablet 3    desvenlafaxine succinate (PRISTIQ) 25 mg ER tablet Take 1 Tablet by mouth daily. carvediloL (COREG) 6.25 mg tablet Take 1 Tab by mouth every twelve (12) hours. 180 Tab 3    levothyroxine (SYNTHROID) 25 mcg tablet Take 75 mcg by mouth Daily (before breakfast). amLODIPine (NORVASC) 2.5 mg tablet Take 1 Tab by mouth daily. 30 Tab 2    atorvastatin (LIPITOR) 80 mg tablet Take 1 Tab by mouth daily. 30 Tab 2    finasteride (PROSCAR) 5 mg tablet Take 2.5 mg by mouth daily. (Patient not taking: Reported on 10/27/2022)      montelukast (SINGULAIR) 10 mg tablet  (Patient not taking: Reported on 10/27/2022)      pantoprazole (PROTONIX) 40 mg tablet  (Patient not taking: Reported on 10/27/2022)      meclizine (ANTIVERT) 25 mg tablet Take 1 tablet by mouth every 6 hours for the next 3 days. Then stop taking the meclizine. Restart the meclizine for 3 day intervals if vertigo/ dizziness returns.  (Patient not taking: No sig reported) 20 Tablet 0       ALLERGIES    No Known Allergies       SOCIAL HISTORY    Social History     Socioeconomic History    Marital status:    Tobacco Use    Smoking status: Former     Years: 15.00     Types: Cigarettes    Smokeless tobacco: Never   Substance and Sexual Activity    Alcohol use: Yes     Comment: Occasional    Drug use: No     Social Determinants of Health     Physical Activity: Unknown    Days of Exercise per Week: 1 day       FAMILY HISTORY    Family History   Problem Relation Age of Onset    Breast Cancer Other     OSTEOARTHRITIS Mother     Hypertension Mother     Heart Disease Mother     Hypertension Other         Grandmother    Other Other         Heart problems: Mother, grandmother    Diabetes Other         Grandmother       REVIEW OF SYSTEMS  Review of Systems   Constitutional:  Negative for chills, fever and weight loss. Respiratory:  Negative for shortness of breath. Cardiovascular:  Negative for chest pain. Gastrointestinal:  Negative for constipation. Negative for fecal incontinence   Genitourinary:  Negative for dysuria. Negative for urinary incontinence   Skin:  Negative for rash. Neurological:  Negative for dizziness, tingling, tremors, focal weakness and headaches. Endo/Heme/Allergies:  Does not bruise/bleed easily. Psychiatric/Behavioral:  The patient does not have insomnia. PHYSICAL EXAMINATION  There were no vitals taken for this visit. Pain Assessment  10/27/2022   Location of Pain Neck; Shoulder   Location Modifiers Left   Severity of Pain 4   Quality of Pain Aching;Dull   Quality of Pain Comment -   Duration of Pain Persistent   Duration of Pain Comment -   Frequency of Pain Constant   Frequency of Pain Comment -   Date Pain First Started 10/6/2022   Date Pain First Started Comment -   Aggravating Factors (No Data)   Aggravating Factors Comment laying down   Limiting Behavior Yes   Relieving Factors Rest;Heat   Relieving Factors Comment -   Result of Injury -       Constitutional:  Well developed, well nourished, in no acute distress. Psychiatric: Affect and mood are appropriate. HEENT: Normocephalic, atraumatic. Extraocular movements intact. Integumentary: No rashes or abrasions noted on exposed areas. Cardiovascular: Regular rate and rhythm. Pulmonary: Clear to auscultation bilaterally. RADIOGRAPHS  Cervical Spine MRI images taken on 2/10/22 personally reviewed with patient:  Mildly motion degraded examination and low signal-to-noise ratio. Alignment: Straightening of the cervical lordosis without subluxation  Vertebral body height: Normal  Marrow signal: No suspicious bone marrow lesion or infiltrative bone marrow  process. Minimal reactive discogenic edema changes at C3-4 and T1-2. Cervicomedullary Junction: Patent  Disc spaces: Moderately severe disc height loss throughout with associated  osteophytosis. Cervical cord: No cord expansion or atrophy. Suboptimal evaluation of the cord  signal due to low signal-to-noise ratio. Further discussed below where relevant. On axial imaging, findings at each level are as follows:     C2/C3: Disc osteophyte complex partially effacing ventral CSF space. Bilateral  mild facet hypertrophy. Mild canal stenosis and foraminal stenosis. C3/C4: Disc osteophyte complex flattening ventral cord and partially effacing  dorsal CSF space. Bilateral facet hypertrophy and uncovertebral spurring. Moderate spinal stenosis and severe foraminal stenosis. C4/C5: Disc osteophyte complex flattening ventral cord. Uncovertebral spurring. Moderate spinal stenosis and severe foraminal stenosis. C5/C6: Disc osteophyte complex flattening ventral cord. Uncovertebral spurring. Moderate spinal stenosis and severe foraminal stenosis. C6/C7: Disc osteophyte complex flattening ventral cord. Uncovertebral spurring. Moderate spinal stenosis and severe foraminal stenosis.      C7/T1: Disc osteophyte complex partially effacing ventral CSF space. Mild spinal  stenosis. Moderate foraminal stenosis. Other structures: Remainder of the upper thoracic levels T1-T4 demonstrate disc  osteophyte complexes with partial effacement of the ventral CSF space but no  high-grade spinal stenosis. High-grade left T1-T2 neural foramina stenosis. IMPRESSION  1. Suboptimal exam due to low signal-to-noise ratio. Multilevel degenerative  disc disease. Multilevel ventral cord compression and moderate spinal stenosis. No definite myelopathy. 2. Multilevel high-grade severe foraminal stenosis. The limitations of a telemedicine visit including the inability to perform a detailed physical examination may miss significant findings was presented to the patient, and the patient still elected to proceed with the telemedicine visit.  reviewed     Ms. Bess Oviedo has a reminder for a \"due or due soon\" health maintenance. I have asked that she contact her primary care provider for follow-up on this health maintenance. Pursuant to the emergency declaration under the [address] [place] [address] waiver authority and the [employer] Act, this Virtual  Visit was conducted, with patient's consent, to reduce the patient's risk of exposure to COVID-19 and provide continuity of care for an established patient. Services were provided through a video synchronous discussion virtually to substitute for in-person clinic visit. CPT Codes 99231-06424 for Established Patients may apply to this Telehealth Visit  Time-based coding, delete if not needed: I spent at least 5 minutes with this established patient, and >50% of the time was spent counseling and/or coordinating care. Written by Wallace Can, as dictated by Dr. Tai Dial.

## 2022-10-27 ENCOUNTER — VIRTUAL VISIT (OUTPATIENT)
Dept: ORTHOPEDIC SURGERY | Age: 67
End: 2022-10-27
Payer: MEDICARE

## 2022-10-27 DIAGNOSIS — M54.12 CERVICAL RADICULOPATHY: Primary | ICD-10-CM

## 2022-10-27 DIAGNOSIS — M54.2 NECK PAIN: ICD-10-CM

## 2022-10-27 DIAGNOSIS — M48.02 CERVICAL SPINAL STENOSIS: ICD-10-CM

## 2022-10-27 PROCEDURE — 99441 PR PHYS/QHP TELEPHONE EVALUATION 5-10 MIN: CPT | Performed by: PHYSICAL MEDICINE & REHABILITATION

## 2022-10-27 RX ORDER — GABAPENTIN 300 MG/1
600 CAPSULE ORAL 3 TIMES DAILY
Qty: 180 CAPSULE | Refills: 5 | Status: SHIPPED | OUTPATIENT
Start: 2022-10-27 | End: 2022-11-26

## 2022-10-27 NOTE — PROGRESS NOTES
Chief Complaint   Patient presents with    Follow-up       Pt preferred language for health care discussion is english. Is someone accompanying this pt? no    Is the patient using any DME equipment during [address]? no    Depression Screening:  3 most recent [facility] [place] Screens 7/29/2021 6/8/2021 1/26/2021 10/28/2020 10/22/2020 9/4/2020 8/13/2020   PHQ Not Done - Patient Decline - - - - -   Little interest or pleasure in doing things Not at all - Not at all Not at all Several days Not at all Not at all   Feeling down, depressed, irritable, or hopeless Not at all - Not at all Several days Several days Several days Not at all   Total Score PHQ 2 0 - 0 1 2 1 0   Trouble falling or staying asleep, or sleeping too much - - - Not at all - Not at all -   Feeling tired or having little energy - - - Several days - Several days -   Poor appetite, weight loss, or overeating - - - Not at all - Not at all -   Feeling bad about yourself - or that you are a failure or have let yourself or your family down - - - Not at all - Not at all -   Trouble concentrating on things such as school, work, reading, or watching TV - - - Not at all - Not at all -   Moving or speaking so slowly that other people could have noticed; or the opposite being so fidgety that others notice - - - Not at all - Not at all -   Thoughts of being better off dead, or hurting yourself in some way - - - Not at all - Not at all -   PHQ 9 Score - - - 2 - 2 -       Learning Assessment:  Learning Assessment 4/19/2022   PRIMARY LEARNER Patient   PRIMARY LANGUAGE ENGLISH   ANSWERED BY patient   [place] Maintenance reviewed and discussed per provider. Yes        Advance Directive:  1. Do you have an advance directive in place? Patient Reply:no    2. If not, would you like material regarding how to put one in place? Patient Reply: no    Coordination of Care:  1. Have you been to the ER, urgent care clinic since your last visit?  Hospitalized since your last visit? no    2. Have you seen or consulted any other health care providers outside of the [address] [place] [address] since your last visit? Include any pap smears or colon screening.  no

## 2022-11-10 ENCOUNTER — OFFICE VISIT (OUTPATIENT)
Dept: CARDIOLOGY CLINIC | Age: 67
End: 2022-11-10
Payer: MEDICARE

## 2022-11-10 VITALS
WEIGHT: 213 LBS | HEART RATE: 68 BPM | HEIGHT: 69 IN | BODY MASS INDEX: 31.55 KG/M2 | DIASTOLIC BLOOD PRESSURE: 71 MMHG | OXYGEN SATURATION: 95 % | SYSTOLIC BLOOD PRESSURE: 148 MMHG

## 2022-11-10 DIAGNOSIS — E78.2 MIXED HYPERLIPIDEMIA: ICD-10-CM

## 2022-11-10 DIAGNOSIS — I25.10 CORONARY ARTERY DISEASE INVOLVING NATIVE CORONARY ARTERY OF NATIVE HEART WITHOUT ANGINA PECTORIS: Primary | ICD-10-CM

## 2022-11-10 DIAGNOSIS — Z95.5 HISTORY OF CORONARY ARTERY STENT PLACEMENT: ICD-10-CM

## 2022-11-10 DIAGNOSIS — I10 ESSENTIAL HYPERTENSION: ICD-10-CM

## 2022-11-10 PROCEDURE — G8536 NO DOC ELDER MAL SCRN: HCPCS | Performed by: INTERNAL MEDICINE

## 2022-11-10 PROCEDURE — 1090F PRES/ABSN URINE INCON ASSESS: CPT | Performed by: INTERNAL MEDICINE

## 2022-11-10 PROCEDURE — 99214 OFFICE O/P EST MOD 30 MIN: CPT | Performed by: INTERNAL MEDICINE

## 2022-11-10 PROCEDURE — 1123F ACP DISCUSS/DSCN MKR DOCD: CPT | Performed by: INTERNAL MEDICINE

## 2022-11-10 PROCEDURE — 3078F DIAST BP <80 MM HG: CPT | Performed by: INTERNAL MEDICINE

## 2022-11-10 PROCEDURE — G8417 CALC BMI ABV UP PARAM F/U: HCPCS | Performed by: INTERNAL MEDICINE

## 2022-11-10 PROCEDURE — 3074F SYST BP LT 130 MM HG: CPT | Performed by: INTERNAL MEDICINE

## 2022-11-10 PROCEDURE — G8399 PT W/DXA RESULTS DOCUMENT: HCPCS | Performed by: INTERNAL MEDICINE

## 2022-11-10 PROCEDURE — G8427 DOCREV CUR MEDS BY ELIG CLIN: HCPCS | Performed by: INTERNAL MEDICINE

## 2022-11-10 PROCEDURE — G8754 DIAS BP LESS 90: HCPCS | Performed by: INTERNAL MEDICINE

## 2022-11-10 PROCEDURE — G9899 SCRN MAM PERF RSLTS DOC: HCPCS | Performed by: INTERNAL MEDICINE

## 2022-11-10 PROCEDURE — 3017F COLORECTAL CA SCREEN DOC REV: CPT | Performed by: INTERNAL MEDICINE

## 2022-11-10 PROCEDURE — 1101F PT FALLS ASSESS-DOCD LE1/YR: CPT | Performed by: INTERNAL MEDICINE

## 2022-11-10 PROCEDURE — G8753 SYS BP > OR = 140: HCPCS | Performed by: INTERNAL MEDICINE

## 2022-11-10 PROCEDURE — G8510 SCR DEP NEG, NO PLAN REQD: HCPCS | Performed by: INTERNAL MEDICINE

## 2022-11-10 NOTE — PROGRESS NOTES
1. Have you been to the ER, urgent care clinic since your last visit? Hospitalized since your last visit? No    2. Have you seen or consulted any other health care providers outside of the [address] [place] [address] since your last visit? Include any pap smears or colon screening.  No

## 2022-11-10 NOTE — PROGRESS NOTES
HISTORY OF PRESENT ILLNESS  Mercedes Wilkins is a 79 y.o. female. Patient referred for pre-op evaluation due to abnormal EKG.    7/2020 Ms. Livia Carlos s/p left total knee replacement on 7/7/2020.  - S/P STEMI on 7/13/2020 - s/p cardiac cath with SAIGE to RCA  10/2020  Patient seen today for follow-up. She was in emergency room few days ago with episode of severe dizziness nausea and diarrhea. She had hypokalemia that was supplemented MI ruled out no acute EKG changes. She is feeling better since then. Diarrhea has resolved. Patient had gone out to eat on Friday night and diarrhea started on Saturday morning and stayed all day long like that. No anginal quality chest pain. Stable since then  1/2021  Patient is here for follow-up she was in emergency room last week with complaint of chest tightness described as right-sided tightness noted to activity exertion she felt like it was anxiety attack she was monitored in ER and discharged home she was given Xanax and after that she has felt better. She underwent stress test today in office  11/2021  Patient seen today for follow-up. Her symptoms are stable. She has occasional anxiety attack. Appears stable    Follow-up  Pertinent negatives include no chest pain, no abdominal pain, no headaches and no shortness of breath. Hypertension  The history is provided by the Patient and medical records. This is a chronic problem. The current episode started more than 1 week ago. Pertinent negatives include no chest pain, no abdominal pain, no headaches and no shortness of breath. The symptoms are relieved by medications. Hospital Follow Up  The history is provided by the Patient. Pertinent negatives include no chest pain, no abdominal pain, no headaches and no shortness of breath. Review of Systems   Constitutional:  Negative for chills, fever and malaise/fatigue. HENT:  Negative for congestion and nosebleeds.     Eyes:  Negative for blurred vision, double vision and redness. Respiratory:  Negative for cough, hemoptysis, sputum production, shortness of breath and wheezing. Cardiovascular:  Negative for chest pain, palpitations, orthopnea, claudication, leg swelling and PND. Gastrointestinal:  Positive for nausea. Negative for abdominal pain, heartburn and vomiting. Musculoskeletal:  Positive for joint pain. Negative for falls and myalgias. Skin:  Negative for rash. Neurological:  Negative for dizziness, weakness and headaches. Endo/Heme/Allergies:  Does not bruise/bleed easily. Psychiatric/Behavioral:  The patient is not nervous/anxious. Family History   Problem Relation Age of Onset    Breast Cancer Other     OSTEOARTHRITIS Mother     Hypertension Mother     Heart Disease Mother     Hypertension Other         Grandmother    Other Other         Heart problems:  Mother, grandmother    Diabetes Other         Grandmother       Past Medical History:   Diagnosis Date    Arthritis     CAD (coronary artery disease)     Calcific Achilles tendonitis     Right; insertional    Essential hypertension 07/13/2020    Hypercholesterolemia     Hypertension     Left knee pain     Low blood potassium     Menopause     Pain of right heel     S/P coronary artery stent placement 07/2020    Sprain of right ring finger     STEMI (ST elevation myocardial infarction) ([address] 07/2020    Thyroid disease     low    Vertigo     Wears glasses        Past Surgical History:   Procedure Laterality Date    COLONOSCOPY N/A 9/8/2022    COLONOSCOPY/ polypectomies performed by Jos Phan MD at [facility] surgery age 16, muscle    HX HERNIA REPAIR      HX KNEE REPLACEMENT Left     HX ORTHOPAEDIC Left 07/2020    left total knee arthroplasty    [place] CARDIAC SURG PROCEDURE UNLIST      stent july 2020       Social History     Tobacco Use    Smoking status: Former     Years: 15.00     Types: Cigarettes    Smokeless tobacco: Never   Substance Use Topics Alcohol use: Yes     Comment: Occasional       No Known Allergies    Prior to Admission medications    Medication Sig Start Date End Date Taking? Authorizing Provider   gabapentin (NEURONTIN) 300 mg capsule Take 2 Capsules by mouth three (3) times daily for 30 days. Max Daily Amount: 1,800 mg. 10/27/22 11/26/22  Tristan Arango MD   ALPRAZolam Mesfin Paez) 0.5 mg tablet 1 tab(s)    Provider, Historical   finasteride (PROSCAR) 5 mg tablet Take 2.5 mg by mouth daily. Patient not taking: Reported on 10/27/2022 7/6/22   Provider, Historical   fluticasone propionate (FLONASE) 50 mcg/actuation nasal spray  7/29/22   Provider, Historical   ketoconazole (NIZORAL) 2 % shampoo USE 1 (ONE) APPLICATION ONCE EVERY OTHER WEEK LATHER ON SCALP AND RINSE. 7/6/22   Provider, Historical   montelukast (SINGULAIR) 10 mg tablet  9/8/22   Provider, Historical   omeprazole (PRILOSEC) 20 mg capsule  9/8/22   Provider, Historical   pantoprazole (PROTONIX) 40 mg tablet  7/1/22   Provider, Historical   potassium chloride SR (K-TAB) 20 mEq tablet Take 20 mEq by mouth daily. 7/24/22   Provider, Historical   ezetimibe (ZETIA) 10 mg tablet Take 1 Tablet by mouth in the morning. 8/11/22   Jie Jaime NP   meclizine (ANTIVERT) 25 mg tablet Take 1 tablet by mouth every 6 hours for the next 3 days. Then stop taking the meclizine. Restart the meclizine for 3 day intervals if vertigo/ dizziness returns. Patient not taking: No sig reported 7/19/22   JAMES Roberts   clopidogreL (PLAVIX) 75 mg tab Take 1 Tablet by mouth daily. 7/5/22   Jie Jaime NP   desvenlafaxine succinate (PRISTIQ) 25 mg ER tablet Take 1 Tablet by mouth daily. 1/23/22   Provider, Historical   carvediloL (COREG) 6.25 mg tablet Take 1 Tab by mouth every twelve (12) hours. 9/30/20   Jie Jaime NP   levothyroxine (SYNTHROID) 25 mcg tablet Take 75 mcg by mouth Daily (before breakfast).     Provider, Historical   amLODIPine (NORVASC) 2.5 mg tablet Take 1 Tab by mouth daily. 7/15/20   Bhargavi Stewart MD   atorvastatin (LIPITOR) 80 mg tablet Take 1 Tab by mouth daily. 7/15/20   Bhargavi Stewart MD         There were no vitals taken for this visit. Physical Exam  Vitals and nursing note reviewed. Constitutional:       Appearance: She is well-developed. Neck:      Vascular: No JVD. Cardiovascular:      Rate and Rhythm: Normal rate and regular rhythm. Pulses: Intact distal pulses. Heart sounds: Normal heart sounds. No murmur heard. No friction rub. No gallop. Pulmonary:      Effort: Pulmonary effort is normal. No respiratory distress. Breath sounds: Normal breath sounds. No wheezing or rales. Chest:      Chest wall: No tenderness. Abdominal:      General: There is no distension. Palpations: Abdomen is soft. There is no mass. Tenderness: There is no abdominal tenderness. Musculoskeletal:         General: Normal range of motion. Comments: Dressing to let knee intact   Skin:     General: Skin is warm and dry. Neurological:      Mental Status: She is alert and oriented to person, place, and time. Echo 7/2020  Interpretation Summary   Tricuspid regurgitation is inadequate for estimation of right ventricular systolic pressure. Normal cavity size and systolic function (ejection fraction normal). Mildly increased wall thickness. Estimated left ventricular ejection fraction is 55 - 60%. Visually measured ejection fraction. Abnormal left ventricular wall motion. Age-appropriate left ventricular diastolic function. Mitral annular calcification. Trace mitral valve regurgitation is present. The following segments are hypokinetic: basal inferior and mid inferior. Nuclear Stress Test 7/2/2020  Nuclear Cardiac Spect Rest then Gated Stress study. [employer] was used as the stressing method and agent. ([employer] given via a 10 - 20 sec injection.)   One day myocardial perfusion study. Date: 7/2/2020.    Negative myocardial perfusion imaging. Myocardial perfusion imaging supports a low risk stress test.   There is no prior study available for comparison. .     Cardiac cath 7/13/2020  STEMI  Critical one-vessel coronary artery disease with 99% stenosis and interval coronary thrombus at proximal/mid RCA. Noncritical 50% mid LAD stenosis and diffuse luminal irregularities throughout the coronary vasculature with moderate proximal calcification  Successful PCI of proximal/mid RCA deploying a drug-eluting stent with a residual 0% stenosis and a very tortuous right coronary artery. Procedure done via right femoral artery and 6 Sami Angio-Seal at the end. I have personally reviewed patient's records available from hospital and other providers and incorporated findings in patient care. ER notes, lab, EKG, chest x-ray-1/2021 1/2021  Nuclear stress test  Negative stress test  Results for Paul Anderson (MRN [id number]) as of 5/26/2021 08:00   Ref. Range 3/25/2021 09:56   Triglyceride Latest Ref Range: <150 MG/DL 94   Cholesterol, total Latest Ref Range: <200 MG/   HDL Cholesterol Latest Ref Range: 40 - 60 MG/DL 59   CHOL/HDL Ratio Latest Ref Range: 0 - 5.0   3.0   VLDL, calculated Latest Units: MG/DL 18.8   LDL, calculated Latest Ref Range: 0 - 100 MG/DL 99.2     ASSESSMENT and PLAN    Ms. Lee Nunn has a reminder for a \"due or due soon\" health maintenance. I have asked that she contact her primary care provider for follow-up on this health maintenance. No flowsheet data found. I Have personally reviewed recent relevant labs available and discussed with patient  5/2022    Assessment     I have personally reviewed patients ekg done at other facility. I Have personally reviewed recent relevant labs available and discussed with patient      ICD-10-CM ICD-9-CM    1. Coronary artery disease involving native coronary artery of native heart without angina pectoris  I25.10 414.01     Stable continue treatment monitor      2.  History of coronary artery stent placement  Z95.5 V45.82     Stable monitor      3. Essential hypertension  I10 401.9     Stable continue current treatment      4. Mixed hyperlipidemia  E78.2 272.2     Continue treatment lab with PCP        7/2020   Patient referred for pre-op evaluation due to abnormal EKG. SR with T wave abnormalities. She has history of HTN, HLD, and has not been active due to   Left knee pain. Will evaluate with stress test in AM.    B/P is not controlled will increase lisinopril to 20 mg/ BID.  7/2020 - Pre-operative nuclear stress test was negative for ischemia. She underwent left knee replacement surgery on 7/7/20. She then presented to ER on 7/13 with nausea and generalized malaise, with STEMI. S/p cardiac cath with SAIGE to RCA. She has continued DAPT therapy with statin and BB. She has had multiple ER visits due to nausea - which resolved with Zofran. This is thought to be related to Percocet and was d/c this AM. She has been referred to cardiac rehab and is in PT for left knee. 10/2020  Cardiac status stable. Recent ER visit with nausea dizziness and diarrhea. Diarrhea likely related to food. Resolved since then. Mild hypokalemia being supplemented. Episode does not appear cardiac in nature. Continue current cardiac therapy including aspirin and Plavix  1/2021  Seen after recent ER visit with chest tightness which appears different than her anginal pain that she had with coronary disease prior to stent. Nuclear stress test today is negative her symptoms are better after using Xanax. We will continue to monitor clinically continue current therapy  5/2021  Stable cardiac status. Still has anxiety attacks treated by PCP and now supposed to see psychotherapist.  LDL 99 continue aggressive dieting and maximum dose of statin. If needed use PCSK9 inhibitor. Blood pressure elevated. My chart blood pressure links-her home readings are normal  11/2021  Stable cardiac status.   We will continue Plavix and discontinue aspirin as it is about 16-month after the stent. Continue dietary modification. Follow-up lipid profile  5/2022  Stable cardiac status continue current treatment. Labs reviewed. Followed by GI. Possible need for colonoscopy. Okay to hold Plavix during that time. Consider baby aspirin during that time. Okay for back injection as needed and interruption of Plavix for that.  11/2022  Blood pressure elevated continue monitoring. Continue to monitor recordings and decide on adjustment of medicine  Stable cardiac status continue current medical management monitor  There are no discontinued medications. No orders of the defined types were placed in this encounter.           Betzy Yousif MD

## 2022-11-15 ENCOUNTER — TRANSCRIBE ORDER (OUTPATIENT)
Dept: SCHEDULING | Age: 67
End: 2022-11-15

## 2022-11-15 DIAGNOSIS — Z12.31 VISIT FOR SCREENING MAMMOGRAM: Primary | ICD-10-CM

## 2022-11-22 ENCOUNTER — OFFICE VISIT (OUTPATIENT)
Dept: NEUROLOGY | Age: 67
End: 2022-11-22
Payer: MEDICARE

## 2022-11-22 VITALS
BODY MASS INDEX: 31.45 KG/M2 | HEART RATE: 60 BPM | RESPIRATION RATE: 18 BRPM | HEIGHT: 69 IN | SYSTOLIC BLOOD PRESSURE: 120 MMHG | OXYGEN SATURATION: 97 % | DIASTOLIC BLOOD PRESSURE: 80 MMHG

## 2022-11-22 DIAGNOSIS — I65.21 CAROTID ARTERY STENOSIS, ASYMPTOMATIC, RIGHT: ICD-10-CM

## 2022-11-22 DIAGNOSIS — Z87.898 HISTORY OF VERTIGO: Primary | ICD-10-CM

## 2022-11-22 PROCEDURE — 3017F COLORECTAL CA SCREEN DOC REV: CPT | Performed by: NURSE PRACTITIONER

## 2022-11-22 PROCEDURE — G8427 DOCREV CUR MEDS BY ELIG CLIN: HCPCS | Performed by: NURSE PRACTITIONER

## 2022-11-22 PROCEDURE — G9899 SCRN MAM PERF RSLTS DOC: HCPCS | Performed by: NURSE PRACTITIONER

## 2022-11-22 PROCEDURE — 3078F DIAST BP <80 MM HG: CPT | Performed by: NURSE PRACTITIONER

## 2022-11-22 PROCEDURE — G8399 PT W/DXA RESULTS DOCUMENT: HCPCS | Performed by: NURSE PRACTITIONER

## 2022-11-22 PROCEDURE — G0463 HOSPITAL OUTPT CLINIC VISIT: HCPCS | Performed by: NURSE PRACTITIONER

## 2022-11-22 PROCEDURE — 1123F ACP DISCUSS/DSCN MKR DOCD: CPT | Performed by: NURSE PRACTITIONER

## 2022-11-22 PROCEDURE — 1101F PT FALLS ASSESS-DOCD LE1/YR: CPT | Performed by: NURSE PRACTITIONER

## 2022-11-22 PROCEDURE — G8754 DIAS BP LESS 90: HCPCS | Performed by: NURSE PRACTITIONER

## 2022-11-22 PROCEDURE — G8536 NO DOC ELDER MAL SCRN: HCPCS | Performed by: NURSE PRACTITIONER

## 2022-11-22 PROCEDURE — 3074F SYST BP LT 130 MM HG: CPT | Performed by: NURSE PRACTITIONER

## 2022-11-22 PROCEDURE — G8752 SYS BP LESS 140: HCPCS | Performed by: NURSE PRACTITIONER

## 2022-11-22 PROCEDURE — G8432 DEP SCR NOT DOC, RNG: HCPCS | Performed by: NURSE PRACTITIONER

## 2022-11-22 PROCEDURE — 1090F PRES/ABSN URINE INCON ASSESS: CPT | Performed by: NURSE PRACTITIONER

## 2022-11-22 PROCEDURE — 99213 OFFICE O/P EST LOW 20 MIN: CPT | Performed by: NURSE PRACTITIONER

## 2022-11-22 PROCEDURE — G8417 CALC BMI ABV UP PARAM F/U: HCPCS | Performed by: NURSE PRACTITIONER

## 2022-11-22 RX ORDER — EZETIMIBE 10 MG/1
TABLET ORAL
COMMUNITY

## 2022-11-22 NOTE — PROGRESS NOTES
[facility] [address], [address]  Office:  [phone]  Fax: [phone]  Chief Complaint   Patient presents with    Hospital Follow Up       HPI: Juliana Subramanian presents in hospital follow-up for vertigo. She was seen in the hospital at [facility] in neurology consultation by Dr. Mike Rankin on 7/19/2022. History per note is as follows. She was being seen for dizziness which began at 630 that morning when she got up for the first time. She had never had that before. Denied diplopia, dysarthria, change in hearing, or any weakness in the extremities. She had very brief numbness or tingling in the toes of both feet that morning but no weakness. She noticed that if she turned her head to the left she felt dizzy. Cannot get a better definition of her symptoms other than dizziness. She endorsed the possibility of spinning. She had a CT head without contrast which was unremarkable. CTA showed 50-60% stenosis of the cervical portion of the right ICA. She was evaluated by teleneurology and the working diagnosis was positional vertigo. On physical exam the only significant abnormality noted was subtle horizontal jerk nystagmus with gaze to the far left. When laying supine and when she turns her head to the right, there was subtle complaints of dizziness, but when she turns her head to the left, she complained of sudden severe spinning. There was an increase in the horizontal jerk nystagmus to the left with a faint component of rotary nystagmus, clockwise when viewed from the front. Impression was most likely positional vertigo. She was continued with meclizine. If symptoms did not resolve within several days she should be seen by PT for vestibular therapy with canalith repositioning maneuver. She presents today in follow-up. She is doing well. She denies recurrence of vertigo. She has been to ENT after the hospitalization.   She saw Dr. Supriya De La Fuente in [address] [place] [address] on Shailesh Yobany. Reports what has been bothering her is her neck. She sees orthopedics for cervical spine. She had an injection then placed on gabapentin. She is trying not to have surgery but difficulty tolerating higher dose of gabapentin. Nighttime is the worse pain, daytime is more tolerable. Has posterior neck pain, now on the left. Plan is for surgery as last resort. Reports history of MI after left knee surgery. He has medical history to include hypertension, hyperlipidemia, and CAD. She sees cardiologist.  She has history of drug-eluding stent. She is off aspirin now and on Plavix for antiplatelet therapy. She is on atorvastatin and had Zetia added. BP controlled in office today, 120/80. Lipid panel was okay. She has family history of cardiac disease. Denies diabetes. She has history of surgery on her right eye, when she was 18 her eyes were crossed and she had surgery for it. She sees an eye doctor. She wears glasses. Denies pain today, just left neck pain intermittently.     Past Medical History:   Diagnosis Date    Arthritis     CAD (coronary artery disease)     Calcific Achilles tendonitis     Right; insertional    Essential hypertension 07/13/2020    Hypercholesterolemia     Hypertension     Left knee pain     Low blood potassium     Menopause     Pain of right heel     S/P coronary artery stent placement 07/2020    Sprain of right ring finger     STEMI (ST elevation myocardial infarction) ([address] 07/2020    Thyroid disease     low    Vertigo     Wears glasses        Past Surgical History:   Procedure Laterality Date    COLONOSCOPY N/A 9/8/2022    COLONOSCOPY/ polypectomies performed by Sedrick Juarez MD at [facility] surgery age 16, muscle    HX HERNIA REPAIR      HX KNEE REPLACEMENT Left     HX ORTHOPAEDIC Left 07/2020    left total knee arthroplasty    [place] CARDIAC SURG PROCEDURE UNLIST      stent july 2020       Current Outpatient Medications   Medication Sig Dispense Refill    ezetimibe (Zetia) 10 mg tablet Take  by mouth.      gabapentin (NEURONTIN) 300 mg capsule Take 2 Capsules by mouth three (3) times daily for 30 days. Max Daily Amount: 1,800 mg. 180 Capsule 5    ALPRAZolam (XANAX) 0.5 mg tablet 1 tab(s)      fluticasone propionate (FLONASE) 50 mcg/actuation nasal spray       ketoconazole (NIZORAL) 2 % shampoo USE 1 (ONE) APPLICATION ONCE EVERY OTHER WEEK LATHER ON SCALP AND RINSE.      potassium chloride SR (K-TAB) 20 mEq tablet Take 20 mEq by mouth daily. clopidogreL (PLAVIX) 75 mg tab Take 1 Tablet by mouth daily. 90 Tablet 3    desvenlafaxine succinate (PRISTIQ) 25 mg ER tablet Take 1 Tablet by mouth daily. carvediloL (COREG) 6.25 mg tablet Take 1 Tab by mouth every twelve (12) hours. 180 Tab 3    levothyroxine (SYNTHROID) 25 mcg tablet Take 75 mcg by mouth Daily (before breakfast). amLODIPine (NORVASC) 2.5 mg tablet Take 1 Tab by mouth daily. 30 Tab 2    atorvastatin (LIPITOR) 80 mg tablet Take 1 Tab by mouth daily. 30 Tab 2        No Known Allergies    Social History     Tobacco Use    Smoking status: Former     Years: 15.00     Types: Cigarettes    Smokeless tobacco: Never   Substance Use Topics    Alcohol use: Yes     Comment: Occasional    Drug use: No       Family History   Problem Relation Age of Onset    Breast Cancer Other     OSTEOARTHRITIS Mother     Hypertension Mother     Heart Disease Mother     Hypertension Other         Grandmother    Other Other         Heart problems: Mother, grandmother    Diabetes Other         Grandmother       Review of Systems:  GENERAL: Denies fever or fatigue  CARDIAC: No CP or SOB  PULMONARY: No cough or SOB  MUSCULOSKELETAL: No new joint pain. +posterior neck pain. NEURO: SEE HPI    Physical Examination:  Visit Vitals  /80   Pulse 60   Resp 18   Ht 5' 9\" (1.753 m)   SpO2 97%   BMI 31.45 kg/m²       Alert, in NAD. Right carotid bruit. Heart is regular. Oriented x3. Speech is fluent. Speech clear. EOMs are full, PERRL, VFFTC, no nystagmus. No facial asymmetry. Tongue is midline. Strength and tone are normal. No drift of the bilateral upper extremities. Fine finger movements symmetrical. FNF intact bilaterally. DTRs +2, gait symmetric and steady. Impression/Plan: This is a 28-year-old right-handed female who presents in hospital follow-up for dizziness; believed to be most likely positional vertigo. Her symptoms have resolved. She saw ENT. Her CT head without contrast in the hospital was unremarkable. CTA of the head and neck reported an estimated 50 to 60% atherosclerotic stenosis at the proximal right cervical ICA. Left ICA estimated less than 25% stenosis of proximal ICA. Right vertebral artery some atherosclerosis at its origin. Recommended monitoring carotid arteries with carotid ultrasound. Will message cardiologist or we can monitor or refer to vascular for monitoring. Discussed management of vascular risk factors. She is on Plavix. Follow up here as needed. Diagnoses and all orders for this visit:    1. History of vertigo    2. Carotid artery stenosis, asymptomatic, right    Total time 29 minutes with 15 minutes spent in counseling. Signed By: Nayeli Campos NP        PLEASE NOTE:   Portions of this document may have been produced using voice recognition software. Unrecognized errors in transcription may be present.

## 2022-11-22 NOTE — PROGRESS NOTES
Seeing dr Tomi Fernández. Had shot then gabapentin, trying not go have surgery. Still neck pain. Not 600 mg tid. So out of it. Night time is the worse pain . Day time more tolerale. Posterio neck now on left. Surgery last resort. Mi after left knee surgeyr. On plavix with cardiology dr Severo Leavell. Carvedilol. 120/80. Lipid panel ok, on atorvastatin, zetia added. Fmaily hisotry of cardiac disease. Denies DM. No more vertigo since being in the hosptial. Went ot ENT and had ears cleaned out. Can see cardiology or vascular for following carotid. No episode of one sided wekaness. Sees cardiology in may. On plavix and lipitor 80. Off baby aspirin now. R bruit. Had surgery on eyes , when 18 eyes were crossed and had surgery to straignhten it on , right eye surgery. Sees eye dr. Paula euceda. No pain today just left neck off and on. Saw ent mary lyles in [facility] on [address]. No recurrence.

## 2022-11-22 NOTE — Clinical Note
Dr. Richard Valerio, Mrs. Maicol Flores had a recent hospitalization for vertigo and found to have ISELA stenosis. I wanted to see if you would be able to follow her with carotid ultrasounds, otherwise we can obtain or refer to vascular for monitoring. Thank you!  Jesusita Rodriguez

## 2022-11-22 NOTE — PROGRESS NOTES
[facility] [place] [address]. [facility], [address]  Office:  [phone]  Fax: [phone]  Chief Complaint   Patient presents with    Dizziness    Neck Pain       HPI: Mitchell Barkley presents in hospital follow-up for vertigo. She was seen in the hospital at [facility] in neurology consultation by Dr. Elizabeth Parker on 7/19/2022. History per note is as follows. She was being seen for dizziness which began at 630 that morning when she got up for the first time. She had never had that before. Denied diplopia, dysarthria, change in hearing, or any weakness in the extremities. She had very brief numbness or tingling in the toes of both feet that morning but no weakness. She noticed that if she turned her head to the left she felt dizzy. Cannot get a better definition of her symptoms other than dizziness. She endorsed the possibility of spinning. She had a CT head without contrast which was unremarkable. CTA showed 50-60% stenosis of the cervical portion of the right ICA. She was evaluated by teleneurology and the working diagnosis was positional vertigo. On physical exam the only significant abnormality noted was subtle horizontal jerk nystagmus with gaze to the far left. When laying supine and when she turns her head to the right, there was subtle complaints of dizziness, but when she turns her head to the left, she complained of sudden severe spinning. There was an increase in the horizontal jerk nystagmus to the left with a faint component of rotary nystagmus, clockwise when viewed from the front. Impression was most likely positional vertigo. She was continued with meclizine. If symptoms did not resolve within several days she should be seen by PT for vestibular therapy with canalith repositioning maneuver. She presents today in follow-up. She has been to ENT. Medical history includes hypertension and hyperlipidemia. She sees cardiologist for CAD.   She has history of drug-eluting cardiac stent. She is on antiplatelet. She sees orthopedics for cervical spine. Seeing dr Clovis Mcnamara. Had shot then gabapentin, trying not go have surgery. Still neck pain. Not 600 mg tid. So out of it. Night time is the worse pain . Day time more tolerale. Posterio neck now on left. Surgery last resort. Mi after left knee surgeyr. On plavix with cardiology dr Ari Stephenson. Carvedilol. 120/80. Lipid panel ok, on atorvastatin, zetia added. Fmaily hisotry of cardiac disease. Denies DM. No more vertigo since being in the hosptial. Went ot ENT and had ears cleaned out. Can see cardiology or vascular for following carotid. No episode of one sided wekaness. Sees cardiology in may. On plavix and lipitor 80. Off baby aspirin now. R bruit. Had surgery on eyes , when 18 eyes were crossed and had surgery to straignhten it on , right eye surgery. [facility] No pain today just left neck off and on. Saw ent mary lyles in [facility] on [address]. No recurrence.     Monitor carotid    Past Medical History:   Diagnosis Date    Arthritis     CAD (coronary artery disease)     Calcific Achilles tendonitis     Right; insertional    Essential hypertension 07/13/2020    Hypercholesterolemia     Hypertension     Left knee pain     Low blood potassium     Menopause     Pain of right heel     S/P coronary artery stent placement 07/2020    Sprain of right ring finger     STEMI (ST elevation myocardial infarction) ([address] 07/2020    Thyroid disease     low    Vertigo     Wears glasses        Past Surgical History:   Procedure Laterality Date    COLONOSCOPY N/A 9/8/2022    COLONOSCOPY/ polypectomies performed by Lexie Thompson MD at [facility] surgery age 16, muscle    HX HERNIA REPAIR      HX KNEE REPLACEMENT Left     HX ORTHOPAEDIC Left 07/2020    left total knee arthroplasty    CO CARDIAC SURG PROCEDURE UNLIST      stent july 2020       Current Outpatient Medications   Medication Sig Dispense Refill    ezetimibe (Zetia) 10 mg tablet Take  by mouth.      gabapentin (NEURONTIN) 300 mg capsule Take 2 Capsules by mouth three (3) times daily for 30 days. Max Daily Amount: 1,800 mg. 180 Capsule 5    ALPRAZolam (XANAX) 0.5 mg tablet 1 tab(s)      fluticasone propionate (FLONASE) 50 mcg/actuation nasal spray       ketoconazole (NIZORAL) 2 % shampoo USE 1 (ONE) APPLICATION ONCE EVERY OTHER WEEK LATHER ON SCALP AND RINSE.      potassium chloride SR (K-TAB) 20 mEq tablet Take 20 mEq by mouth daily. clopidogreL (PLAVIX) 75 mg tab Take 1 Tablet by mouth daily. 90 Tablet 3    desvenlafaxine succinate (PRISTIQ) 25 mg ER tablet Take 1 Tablet by mouth daily. carvediloL (COREG) 6.25 mg tablet Take 1 Tab by mouth every twelve (12) hours. 180 Tab 3    levothyroxine (SYNTHROID) 25 mcg tablet Take 75 mcg by mouth Daily (before breakfast). amLODIPine (NORVASC) 2.5 mg tablet Take 1 Tab by mouth daily. 30 Tab 2    atorvastatin (LIPITOR) 80 mg tablet Take 1 Tab by mouth daily. 30 Tab 2        No Known Allergies    Social History     Tobacco Use    Smoking status: Former     Years: 15.00     Types: Cigarettes    Smokeless tobacco: Never   Substance Use Topics    Alcohol use: Yes     Comment: Occasional    Drug use: No       Family History   Problem Relation Age of Onset    Breast Cancer Other     OSTEOARTHRITIS Mother     Hypertension Mother     Heart Disease Mother     Hypertension Other         Grandmother    Other Other         Heart problems: Mother, grandmother    Diabetes Other         Grandmother       Review of Systems:  GENERAL: Denies fever or fatigue  CARDIAC: No CP or SOB  PULMONARY: No cough or SOB  MUSCULOSKELETAL: No new joint pain  NEURO: SEE HPI    Physical Examination:  Visit Vitals  /80   Pulse 60   Resp 18   Ht 5' 9\" (1.753 m)   SpO2 97%   BMI 31.45 kg/m²       Alert, in NAD. Heart is regular. Oriented x3. Speech is fluent. Speech clear.  EOMs are full, PERRL, VFFTC, no nystagmus. No facial asymmetry. Tongue is midline. Strength and tone are normal. No drift of the bilateral upper extremities. Fine finger movements symmetrical. FNF intact bilaterally. DTRs +2, gait symmetric. Impression/Plan:  Elif Ndiaye is a 79 y.o. female whose history and physical are consistent with ***. Hospital follow-up for positional dizziness. CT head without contrast unremarkable. CTA of the head and neck reported an estimated 50 to 60% atherosclerotic stenosis at the proximal right cervical ICA. Left ICA estimated less than 25% stenosis of proximal ICA. Right vertebral artery some atherosclerosis at its origin. Diagnoses and all orders for this visit:    1. History of vertigo    2. Carotid artery stenosis, asymptomatic, right    Total time *** minutes with *** minutes spent in counseling. Signed By: Scar Barrios NP        PLEASE NOTE:   Portions of this document may have been produced using voice recognition software. Unrecognized errors in transcription may be present.

## 2022-12-09 ENCOUNTER — HOSPITAL ENCOUNTER (OUTPATIENT)
Dept: MAMMOGRAPHY | Age: 67
End: 2022-12-09
Attending: PHYSICIAN ASSISTANT
Payer: MEDICARE

## 2022-12-09 DIAGNOSIS — Z12.31 VISIT FOR SCREENING MAMMOGRAM: ICD-10-CM

## 2022-12-09 PROCEDURE — 77063 BREAST TOMOSYNTHESIS BI: CPT

## 2023-01-21 ENCOUNTER — HOSPITAL ENCOUNTER (EMERGENCY)
Age: 68
Discharge: HOME | End: 2023-01-22
Attending: EMERGENCY MEDICINE
Payer: MEDICARE

## 2023-01-21 ENCOUNTER — APPOINTMENT (OUTPATIENT)
Dept: CT IMAGING | Age: 68
End: 2023-01-21
Attending: EMERGENCY MEDICINE
Payer: MEDICARE

## 2023-01-21 VITALS
TEMPERATURE: 98.1 F | DIASTOLIC BLOOD PRESSURE: 77 MMHG | HEART RATE: 65 BPM | OXYGEN SATURATION: 95 % | SYSTOLIC BLOOD PRESSURE: 155 MMHG | RESPIRATION RATE: 18 BRPM

## 2023-01-21 DIAGNOSIS — R11.0 NAUSEA WITHOUT VOMITING: ICD-10-CM

## 2023-01-21 DIAGNOSIS — N20.0 KIDNEY STONE ON LEFT SIDE: ICD-10-CM

## 2023-01-21 DIAGNOSIS — N39.0 URINARY TRACT INFECTION WITHOUT HEMATURIA, SITE UNSPECIFIED: ICD-10-CM

## 2023-01-21 DIAGNOSIS — K42.9 UMBILICAL HERNIA WITHOUT OBSTRUCTION AND WITHOUT GANGRENE: ICD-10-CM

## 2023-01-21 DIAGNOSIS — K57.90 DIVERTICULOSIS: ICD-10-CM

## 2023-01-21 DIAGNOSIS — R10.9 ABDOMINAL PAIN, UNSPECIFIED ABDOMINAL LOCATION: Primary | ICD-10-CM

## 2023-01-21 LAB
ALBUMIN SERPL-MCNC: 3.8 G/DL (ref 3.4–5)
ALBUMIN/GLOB SERPL: 1.1 (ref 0.8–1.7)
ALP SERPL-CCNC: 119 U/L (ref 45–117)
ALT SERPL-CCNC: 18 U/L (ref 13–56)
ANION GAP SERPL CALC-SCNC: 4 MMOL/L (ref 3–18)
APPEARANCE UR: CLEAR
AST SERPL-CCNC: 13 U/L (ref 10–38)
BACTERIA URNS QL MICRO: ABNORMAL /HPF
BASOPHILS # BLD: 0 K/UL (ref 0–0.1)
BASOPHILS NFR BLD: 1 % (ref 0–2)
BILIRUB SERPL-MCNC: 0.4 MG/DL (ref 0.2–1)
BILIRUB UR QL: NEGATIVE
BUN SERPL-MCNC: 8 MG/DL (ref 7–18)
BUN/CREAT SERPL: 11 (ref 12–20)
CALCIUM SERPL-MCNC: 8.9 MG/DL (ref 8.5–10.1)
CHLORIDE SERPL-SCNC: 104 MMOL/L (ref 100–111)
CO2 SERPL-SCNC: 31 MMOL/L (ref 21–32)
COLOR UR: YELLOW
CREAT SERPL-MCNC: 0.73 MG/DL (ref 0.6–1.3)
DIFFERENTIAL METHOD BLD: ABNORMAL
EOSINOPHIL # BLD: 0.1 K/UL (ref 0–0.4)
EOSINOPHIL NFR BLD: 2 % (ref 0–5)
EPITH CASTS URNS QL MICRO: ABNORMAL /LPF (ref 0–5)
ERYTHROCYTE [DISTWIDTH] IN BLOOD BY AUTOMATED COUNT: 14.6 % (ref 11.6–14.5)
GLOBULIN SER CALC-MCNC: 3.5 G/DL (ref 2–4)
GLUCOSE SERPL-MCNC: 104 MG/DL (ref 74–99)
GLUCOSE UR STRIP.AUTO-MCNC: NEGATIVE MG/DL
HCT VFR BLD AUTO: 41.1 % (ref 35–45)
HGB BLD-MCNC: 13.5 G/DL (ref 12–16)
HGB UR QL STRIP: NEGATIVE
IMM GRANULOCYTES # BLD AUTO: 0 K/UL (ref 0–0.04)
IMM GRANULOCYTES NFR BLD AUTO: 0 % (ref 0–0.5)
KETONES UR QL STRIP.AUTO: NEGATIVE MG/DL
LEUKOCYTE ESTERASE UR QL STRIP.AUTO: ABNORMAL
LIPASE SERPL-CCNC: 87 U/L (ref 73–393)
LYMPHOCYTES # BLD: 3 K/UL (ref 0.9–3.6)
LYMPHOCYTES NFR BLD: 43 % (ref 21–52)
MCH RBC QN AUTO: 27.4 PG (ref 24–34)
MCHC RBC AUTO-ENTMCNC: 32.8 G/DL (ref 31–37)
MCV RBC AUTO: 83.5 FL (ref 78–100)
MONOCYTES # BLD: 0.5 K/UL (ref 0.05–1.2)
MONOCYTES NFR BLD: 7 % (ref 3–10)
NEUTS SEG # BLD: 3.3 K/UL (ref 1.8–8)
NEUTS SEG NFR BLD: 47 % (ref 40–73)
NITRITE UR QL STRIP.AUTO: NEGATIVE
NRBC # BLD: 0 K/UL (ref 0–0.01)
NRBC BLD-RTO: 0 PER 100 WBC
PH UR STRIP: 7.5 (ref 5–8)
PLATELET # BLD AUTO: 319 K/UL (ref 135–420)
PMV BLD AUTO: 10.2 FL (ref 9.2–11.8)
POTASSIUM SERPL-SCNC: 3.9 MMOL/L (ref 3.5–5.5)
PROT SERPL-MCNC: 7.3 G/DL (ref 6.4–8.2)
PROT UR STRIP-MCNC: NEGATIVE MG/DL
RBC # BLD AUTO: 4.92 M/UL (ref 4.2–5.3)
RBC #/AREA URNS HPF: ABNORMAL /HPF (ref 0–5)
SODIUM SERPL-SCNC: 139 MMOL/L (ref 136–145)
SP GR UR REFRACTOMETRY: 1.01 (ref 1–1.03)
TROPONIN-HIGH SENSITIVITY: 10 NG/L (ref 0–54)
UROBILINOGEN UR QL STRIP.AUTO: 0.2 EU/DL (ref 0.2–1)
WBC # BLD AUTO: 6.9 K/UL (ref 4.6–13.2)
WBC URNS QL MICRO: ABNORMAL /HPF (ref 0–4)

## 2023-01-21 PROCEDURE — 74011250636 HC RX REV CODE- 250/636: Performed by: EMERGENCY MEDICINE

## 2023-01-21 PROCEDURE — 74011250637 HC RX REV CODE- 250/637: Performed by: EMERGENCY MEDICINE

## 2023-01-21 PROCEDURE — 83690 ASSAY OF LIPASE: CPT

## 2023-01-21 PROCEDURE — 80053 COMPREHEN METABOLIC PANEL: CPT

## 2023-01-21 PROCEDURE — 85025 COMPLETE CBC W/AUTO DIFF WBC: CPT

## 2023-01-21 PROCEDURE — 96375 TX/PRO/DX INJ NEW DRUG ADDON: CPT

## 2023-01-21 PROCEDURE — 99284 EMERGENCY DEPT VISIT MOD MDM: CPT

## 2023-01-21 PROCEDURE — 84484 ASSAY OF TROPONIN QUANT: CPT

## 2023-01-21 PROCEDURE — 74011000250 HC RX REV CODE- 250: Performed by: EMERGENCY MEDICINE

## 2023-01-21 PROCEDURE — 81001 URINALYSIS AUTO W/SCOPE: CPT

## 2023-01-21 PROCEDURE — 96374 THER/PROPH/DIAG INJ IV PUSH: CPT

## 2023-01-21 PROCEDURE — 93005 ELECTROCARDIOGRAM TRACING: CPT

## 2023-01-21 RX ORDER — MORPHINE SULFATE 4 MG/ML
4 INJECTION INTRAVENOUS ONCE
Status: COMPLETED | OUTPATIENT
Start: 2023-01-21 | End: 2023-01-21

## 2023-01-21 RX ORDER — ONDANSETRON 2 MG/ML
4 INJECTION INTRAMUSCULAR; INTRAVENOUS
Status: COMPLETED | OUTPATIENT
Start: 2023-01-21 | End: 2023-01-21

## 2023-01-21 RX ORDER — CEPHALEXIN 250 MG/1
500 CAPSULE ORAL
Status: COMPLETED | OUTPATIENT
Start: 2023-01-21 | End: 2023-01-21

## 2023-01-21 RX ADMIN — FAMOTIDINE 20 MG: 10 INJECTION, SOLUTION INTRAVENOUS at 22:16

## 2023-01-21 RX ADMIN — ONDANSETRON 4 MG: 2 INJECTION INTRAMUSCULAR; INTRAVENOUS at 22:16

## 2023-01-21 RX ADMIN — MORPHINE SULFATE 4 MG: 4 INJECTION, SOLUTION INTRAMUSCULAR; INTRAVENOUS at 23:33

## 2023-01-21 RX ADMIN — CEPHALEXIN 500 MG: 250 CAPSULE ORAL at 23:33

## 2023-01-22 ENCOUNTER — APPOINTMENT (OUTPATIENT)
Dept: CT IMAGING | Age: 68
End: 2023-01-22
Attending: EMERGENCY MEDICINE
Payer: MEDICARE

## 2023-01-22 PROCEDURE — 74176 CT ABD & PELVIS W/O CONTRAST: CPT

## 2023-01-22 RX ORDER — FAMOTIDINE 20 MG/1
20 TABLET, FILM COATED ORAL 2 TIMES DAILY
Qty: 20 TABLET | Refills: 0 | Status: SHIPPED | OUTPATIENT
Start: 2023-01-22 | End: 2023-02-01

## 2023-01-22 RX ORDER — ONDANSETRON 4 MG/1
4 TABLET, FILM COATED ORAL
Qty: 20 TABLET | Refills: 0 | Status: SHIPPED | OUTPATIENT
Start: 2023-01-22

## 2023-01-22 RX ORDER — CEPHALEXIN 500 MG/1
500 CAPSULE ORAL 4 TIMES DAILY
Qty: 40 CAPSULE | Refills: 0 | Status: SHIPPED | OUTPATIENT
Start: 2023-01-22 | End: 2023-02-01

## 2023-01-22 NOTE — ED PROVIDER NOTES
EMERGENCY DEPARTMENT HISTORY AND PHYSICAL EXAM      Date: 1/21/2023  Patient Name: Bee Murray      History of Presenting Illness     Chief Complaint   Patient presents with    Abdominal Pain       Location/Duration/Severity/Modifying factors   Chief Complaint   Patient presents with    Abdominal Pain       HPI:  Bee Murray is a 79 y.o. female with history as listed presents with a concern of with report that she had significant nausea without vomiting or diarrhea. She might have had mild constipation although she states she has had bowel movements. The patient stated that her symptoms started last Friday with abdominal discomfort. She has had intermittent spasm-like pain. It was not relieved with Pepto-Bismol. The Pepto-Bismol made her stools black. However after she stopped the medication her stools have returned to normal color. The denies chest pain or shortness of breath. She has been afebrile. Associated Symptoms:see ROS      There are no other complaints, changes, or physical findings at this time. PCP: JAMES Ferrara    Current Facility-Administered Medications   Medication Dose Route Frequency Provider Last Rate Last Admin    famotidine (PF) (PEPCID) 20 mg in 0.9% sodium chloride 10 mL injection  20 mg IntraVENous Q12H Karina Lund MD   20 mg at 01/21/23 3963     Current Outpatient Medications   Medication Sig Dispense Refill    cephALEXin (Keflex) 500 mg capsule Take 1 Capsule by mouth four (4) times daily for 10 days. 40 Capsule 0    famotidine (Pepcid) 20 mg tablet Take 1 Tablet by mouth two (2) times a day for 10 days. 20 Tablet 0    ondansetron hcl (Zofran) 4 mg tablet Take 1 Tablet by mouth every eight (8) hours as needed for Nausea or Vomiting. 20 Tablet 0    ezetimibe (Zetia) 10 mg tablet Take  by mouth.       ALPRAZolam (XANAX) 0.5 mg tablet 1 tab(s)      fluticasone propionate (FLONASE) 50 mcg/actuation nasal spray       ketoconazole (NIZORAL) 2 % shampoo USE 1 (ONE) APPLICATION ONCE EVERY OTHER WEEK LATHER ON SCALP AND RINSE.      potassium chloride SR (K-TAB) 20 mEq tablet Take 20 mEq by mouth daily. clopidogreL (PLAVIX) 75 mg tab Take 1 Tablet by mouth daily. 90 Tablet 3    desvenlafaxine succinate (PRISTIQ) 25 mg ER tablet Take 1 Tablet by mouth daily. carvediloL (COREG) 6.25 mg tablet Take 1 Tab by mouth every twelve (12) hours. 180 Tab 3    levothyroxine (SYNTHROID) 25 mcg tablet Take 75 mcg by mouth Daily (before breakfast). amLODIPine (NORVASC) 2.5 mg tablet Take 1 Tab by mouth daily. 30 Tab 2    atorvastatin (LIPITOR) 80 mg tablet Take 1 Tab by mouth daily. 27 Tab 2       Past History     Past Medical History:  Past Medical History:   Diagnosis Date    Arthritis     CAD (coronary artery disease)     Calcific Achilles tendonitis     Right; insertional    Essential hypertension 07/13/2020    Hypercholesterolemia     Hypertension     Left knee pain     Low blood potassium     Menopause     Pain of right heel     S/P coronary artery stent placement 07/2020    Sprain of right ring finger     STEMI (ST elevation myocardial infarction) ([facility] [address] 07/2020    Thyroid disease     low    Vertigo     Wears glasses        Past Surgical History:  Past Surgical History:   Procedure Laterality Date    COLONOSCOPY N/A 9/8/2022    COLONOSCOPY/ polypectomies performed by Terry Junior MD at [address]    HX HEENT      Eye surgery age 16, muscle    HX HERNIA REPAIR      HX KNEE REPLACEMENT Left     HX ORTHOPAEDIC Left 07/2020    left total knee arthroplasty    LA UNLISTED PROCEDURE CARDIAC SURGERY      stent july 2020       Family History:  Family History   Problem Relation Age of Onset    Breast Cancer Other     OSTEOARTHRITIS Mother     Hypertension Mother     Heart Disease Mother     Hypertension Other         Grandmother    Other Other         Heart problems:  Mother, grandmother    Diabetes Other         Grandmother       Social History:  Social History     Tobacco Use    Smoking status: Former     Years: 15.00     Types: Cigarettes    Smokeless tobacco: Never   Substance Use Topics    Alcohol use: Yes     Comment: Occasional    Drug use: No       Allergies:  No Known Allergies      Review of Systems     Review of Systems   All other systems reviewed and are negative. Physical Exam     Physical Exam  Vitals and nursing note reviewed. Constitutional:       General: She is awake. She is not in acute distress. Appearance: Normal appearance. She is well-developed and well-groomed. She is not ill-appearing, toxic-appearing or diaphoretic. HENT:      Head: Normocephalic and atraumatic. Right Ear: External ear normal.      Left Ear: External ear normal.      Mouth/Throat:      Mouth: Mucous membranes are moist.      Pharynx: Oropharynx is clear. Uvula midline. No pharyngeal swelling, oropharyngeal exudate, posterior oropharyngeal erythema or uvula swelling. Eyes:      General: Lids are normal. Vision grossly intact. No scleral icterus. Right eye: No discharge. Left eye: No discharge. Extraocular Movements: Extraocular movements intact. Conjunctiva/sclera: Conjunctivae normal.      Pupils: Pupils are equal, round, and reactive to light. Neck:      Trachea: Trachea and phonation normal.   Cardiovascular:      Rate and Rhythm: Normal rate and regular rhythm. Pulses: Normal pulses. Heart sounds: Normal heart sounds, S1 normal and S2 normal. No murmur heard. No friction rub. No gallop. Pulmonary:      Effort: Pulmonary effort is normal. No respiratory distress. Breath sounds: Normal breath sounds and air entry. No stridor. No wheezing, rhonchi or rales. Chest:      Chest wall: No tenderness. Abdominal:      General: Bowel sounds are normal. There is no distension. Palpations: Abdomen is soft. There is no mass. Tenderness:  There is abdominal tenderness in the periumbilical area and left lower quadrant. There is no guarding. Comments: Minimum to mild tenderness at the periumbilical area as well as left lower quadrant. Genitourinary:     Vagina: No vaginal discharge. Musculoskeletal:         General: No swelling, tenderness, deformity or signs of injury. Normal range of motion. Right shoulder: Normal.      Left shoulder: Normal.      Right upper arm: Normal.      Left upper arm: Normal.      Right elbow: Normal.      Left elbow: Normal.      Right forearm: Normal.      Left forearm: Normal.      Right wrist: Normal. Normal pulse. Left wrist: Normal. Normal pulse. Right hand: Normal. Normal capillary refill. Left hand: Normal. Normal capillary refill. Cervical back: Full passive range of motion without pain, normal range of motion and neck supple. No rigidity or tenderness. Right lower leg: Normal. No edema. Left lower leg: Normal. No edema. Right ankle: Normal.      Left ankle: Normal.   Skin:     General: Skin is warm and dry. Capillary Refill: Capillary refill takes less than 2 seconds. Coloration: Skin is not jaundiced or pale. Findings: No bruising, erythema, lesion or rash. Neurological:      General: No focal deficit present. Mental Status: She is alert and oriented to person, place, and time. Mental status is at baseline. GCS: GCS eye subscore is 4. GCS verbal subscore is 5. GCS motor subscore is 6. Cranial Nerves: Cranial nerves 2-12 are intact. No cranial nerve deficit or facial asymmetry. Sensory: Sensation is intact. No sensory deficit. Motor: Motor function is intact. No weakness. Coordination: Coordination is intact. Coordination normal.      Gait: Gait is intact. Psychiatric:         Attention and Perception: Attention and perception normal.         Mood and Affect: Mood and affect normal.         Speech: Speech normal.         Behavior: Behavior normal. Behavior is cooperative.          Thought Content: Thought content normal.         Cognition and Memory: Cognition and memory normal.         Judgment: Judgment normal.       Lab and Diagnostic Study Results     Labs -  Recent Results (from the past 24 hour(s))   CBC WITH AUTOMATED DIFF    Collection Time: 01/21/23  9:35 PM   Result Value Ref Range    WBC 6.9 4.6 - 13.2 K/uL    RBC 4.92 4.20 - 5.30 M/uL    HGB 13.5 12.0 - 16.0 g/dL    HCT 41.1 35.0 - 45.0 %    MCV 83.5 78.0 - 100.0 FL    MCH 27.4 24.0 - 34.0 PG    MCHC 32.8 31.0 - 37.0 g/dL    RDW 14.6 (H) 11.6 - 14.5 %    PLATELET 107 061 - 988 K/uL    MPV 10.2 9.2 - 11.8 FL    NRBC 0.0 0  WBC    ABSOLUTE NRBC 0.00 0.00 - 0.01 K/uL    NEUTROPHILS 47 40 - 73 %    LYMPHOCYTES 43 21 - 52 %    MONOCYTES 7 3 - 10 %    EOSINOPHILS 2 0 - 5 %    BASOPHILS 1 0 - 2 %    IMMATURE GRANULOCYTES 0 0.0 - 0.5 %    ABS. NEUTROPHILS 3.3 1.8 - 8.0 K/UL    ABS. LYMPHOCYTES 3.0 0.9 - 3.6 K/UL    ABS. MONOCYTES 0.5 0.05 - 1.2 K/UL    ABS. EOSINOPHILS 0.1 0.0 - 0.4 K/UL    ABS. BASOPHILS 0.0 0.0 - 0.1 K/UL    ABS. IMM. GRANS. 0.0 0.00 - 0.04 K/UL    DF AUTOMATED     METABOLIC PANEL, COMPREHENSIVE    Collection Time: 01/21/23  9:35 PM   Result Value Ref Range    Sodium 139 136 - 145 mmol/L    Potassium 3.9 3.5 - 5.5 mmol/L    Chloride 104 100 - 111 mmol/L    CO2 31 21 - 32 mmol/L    Anion gap 4 3.0 - 18 mmol/L    Glucose 104 (H) 74 - 99 mg/dL    BUN 8 7.0 - 18 MG/DL    Creatinine 0.73 0.6 - 1.3 MG/DL    BUN/Creatinine ratio 11 (L) 12 - 20      eGFR >60 >60 ml/min/1.73m2    Calcium 8.9 8.5 - 10.1 MG/DL    Bilirubin, total 0.4 0.2 - 1.0 MG/DL    ALT (SGPT) 18 13 - 56 U/L    AST (SGOT) 13 10 - 38 U/L    Alk.  phosphatase 119 (H) 45 - 117 U/L    Protein, total 7.3 6.4 - 8.2 g/dL    Albumin 3.8 3.4 - 5.0 g/dL    Globulin 3.5 2.0 - 4.0 g/dL    A-G Ratio 1.1 0.8 - 1.7     TROPONIN-HIGH SENSITIVITY    Collection Time: 01/21/23  9:35 PM   Result Value Ref Range    Troponin-High Sensitivity 10 0 - 54 ng/L   LIPASE    Collection Time: 01/21/23  9:35 PM   Result Value Ref Range    Lipase 87 73 - 393 U/L   URINALYSIS W/ RFLX MICROSCOPIC    Collection Time: 01/21/23  9:35 PM   Result Value Ref Range    Color YELLOW      Appearance CLEAR      Specific gravity 1.007 1.005 - 1.030      pH (UA) 7.5 5.0 - 8.0      Protein Negative NEG mg/dL    Glucose Negative NEG mg/dL    Ketone Negative NEG mg/dL    Bilirubin Negative NEG      Blood Negative NEG      Urobilinogen 0.2 0.2 - 1.0 EU/dL    Nitrites Negative NEG      Leukocyte Esterase MODERATE (A) NEG     URINE MICROSCOPIC ONLY    Collection Time: 01/21/23  9:35 PM   Result Value Ref Range    WBC 5 to 9 0 - 4 /hpf    RBC 1 to 4 0 - 5 /hpf    Epithelial cells FEW 0 - 5 /lpf    Bacteria FEW (A) NEG /hpf         Radiologic Studies -   CT ABD PELV WO CONT   Final Result      No acute inflammation. Colonic diverticulosis without acute diverticulitis. Fat-containing periumbilical hernia. Nonobstructing left renal stone. Additional findings as above. Procedures and Critical Care       Performed by: Ivy Davalos MD    Procedures     EKG: unchanged from previous tracings, normal sinus rhythm, nonspecific ST and T waves changes, 62 bpm with no acute ST segment elevation. Nonspecific ST segment T wave changes with inverted T waves in leads III, aVF, V5, and V6. Normal axis and intervals. Ivy Davalos MD    Medical Decision Making and ED Course   - I am the first and primary provider for this patient AND AM THE PRIMARY PROVIDER OF RECORD. - I reviewed the vital signs, available nursing notes, past medical history, past surgical history, family history and social history. - Initial assessment performed. The patients presenting problems have been discussed, and the staff are in agreement with the care plan formulated and outlined with them. I have encouraged them to ask questions as they arise throughout their visit.     Vital Signs-Reviewed the patient's vital signs. Patient Vitals for the past 12 hrs:   Temp Pulse Resp BP SpO2   01/21/23 2057 98.1 °F (36.7 °C) 65 18 (!) 155/77 95 %         Provider Notes (Medical Decision Making):     MDM  Number of Diagnoses or Management Options  Abdominal pain, unspecified abdominal location  Diverticulosis  Kidney stone on left side  Nausea without vomiting  Umbilical hernia without obstruction and without gangrene  Urinary tract infection without hematuria, site unspecified  Diagnosis management comments: Presented to the emergency department with intermittent abdominal pain since last Friday. The patient has not had fever. She has had nausea without vomiting or diarrhea. CT abdomen pelvis demonstrated findings of diverticulosis without evidence of diverticulitis. In addition the patient had a kidney stone on the left as nonobstructive. She had a fat-containing umbilical hernia. Urinalysis was concerning for urinary tract infection. The patient will be started on Keflex. She have a prescription for Zofran for nausea. Patient also have a prescription for Pepcid for treatment of GERD. The discomfort might be related to GERD and possibly the urinary tract infection. Patient is to follow-up with her primary care provider. She is encouraged to return to the emergency department for any worsening or concerning symptoms. At time of disposition she is stable, improved, and no acute distress or obvious discomfort. ED Course:       ED Course as of 01/22/23 0208   Sat Jan 21, 2023   2323 Reassessment, patient states nausea is improving. She has discomfort to left lower quadrant at this time. She agrees with a CT scan to evaluate for possible diverticulosis/diverticulitis.  Marge Parada      ED Course User Kelton Jameson MD     ------------------------------------------------------------------------------------------------------------        Consultations:       Consultations:       Disposition Discharged      Diagnosis     Clinical Impression:   1. Abdominal pain, unspecified abdominal location    2. Nausea without vomiting    3. Umbilical hernia without obstruction and without gangrene    4. Urinary tract infection without hematuria, site unspecified    5. Diverticulosis    6.  Kidney stone on left side        Attestations:    Darlyn Hwang MD

## 2023-01-22 NOTE — ED NOTES
2: 23 AM  01/22/23     Discharge instructions given to patient (name) with verbalization of understanding. Patient accompanied by self. Patient discharged with the following prescriptions yes. Patient discharged to home (destination).       Alexandru Knight RN

## 2023-01-22 NOTE — DISCHARGE INSTRUCTIONS
1.  Take medications as prescribed. 2.  Eat a bland diet. Avoid alcohol, sodas, and acidic foods and drinks. 3.  Make appointment follow-up with your PCP. 4.  Drink plenty of water. 5.  Return to the emergency department for any worsening or concerning symptoms.

## 2023-01-23 LAB
ATRIAL RATE: 62 BPM
CALCULATED P AXIS, ECG09: 63 DEGREES
CALCULATED R AXIS, ECG10: 34 DEGREES
CALCULATED T AXIS, ECG11: -34 DEGREES
DIAGNOSIS, 93000: NORMAL
P-R INTERVAL, ECG05: 206 MS
Q-T INTERVAL, ECG07: 394 MS
QRS DURATION, ECG06: 78 MS
QTC CALCULATION (BEZET), ECG08: 399 MS
VENTRICULAR RATE, ECG03: 62 BPM

## 2023-02-02 ENCOUNTER — OFFICE VISIT (OUTPATIENT)
Dept: SURGERY | Age: 68
End: 2023-02-02
Payer: MEDICARE

## 2023-02-02 VITALS
HEART RATE: 58 BPM | HEIGHT: 69 IN | BODY MASS INDEX: 30.81 KG/M2 | OXYGEN SATURATION: 95 % | WEIGHT: 208 LBS | SYSTOLIC BLOOD PRESSURE: 120 MMHG | DIASTOLIC BLOOD PRESSURE: 66 MMHG

## 2023-02-02 DIAGNOSIS — K43.0 INCARCERATED INCISIONAL HERNIA: Primary | ICD-10-CM

## 2023-02-02 NOTE — PROGRESS NOTES
General Surgery Consult      Hershel Curling  Admit date: (Not on file)    MRN: [id number]     : 1955     Age: 79 y.o. Attending Physician: Jeff Seymour MD [facility]      History of Present Illness:     Hershel Curling is a 79 y.o. female who was referred to me by Constance Rooney for evaluation of a symptomatic hernia at the umbilicus. The patient stated that she had abdominoplasty in the past and laparoscopic cholecystectomy and she had developed this bulge at the site of the scar from her previous laparoscopic ostectomy located around the umbilicus. She stated that she been having periumbilical pain on and off but she thought it is like not the hernia itself and she did not pay too much attention for it because it was more discomfort than pain. However recently there has been a bulge that is nonreducible and the pain is getting worse so she went to the emergency room where a CT scan showed a fat-containing periumbilical hernia. It also showed a kidney stone but her pain was not on the flank area. The patient stated that her pain is localized exactly at the bulge that is at the umbilicus and above the umbilicus as well.      Patient Active Problem List    Diagnosis Date Noted    History of coronary artery stent placement 10/16/2020    Mixed hyperlipidemia 10/16/2020    Coronary artery disease involving native coronary artery of native heart without angina pectoris 10/16/2020    STEMI (ST elevation myocardial infarction) ([address] 2020    Essential hypertension 2020    Arthritis of knee 2020     Past Medical History:   Diagnosis Date    Arthritis     CAD (coronary artery disease)     Calcific Achilles tendonitis     Right; insertional    Essential hypertension 2020    Hypercholesterolemia     Hypertension     Left knee pain     Low blood potassium     Menopause     Pain of right heel     S/P coronary artery stent placement 2020    Sprain of right ring finger     STEMI (ST elevation myocardial infarction) ([facility] [address] 07/2020    Thyroid disease     low    Vertigo     Wears glasses       Past Surgical History:   Procedure Laterality Date    COLONOSCOPY N/A 9/8/2022    COLONOSCOPY/ polypectomies performed by Royal Marin MD at [address]    HX HEENT      Eye surgery age 16, muscle    HX HERNIA REPAIR      HX KNEE REPLACEMENT Left     HX ORTHOPAEDIC Left 07/2020    left total knee arthroplasty    HI UNLISTED PROCEDURE CARDIAC SURGERY      stent july 2020      Social History     Tobacco Use    Smoking status: Former     Years: 15.00     Types: Cigarettes    Smokeless tobacco: Never   Substance Use Topics    Alcohol use: Not Currently     Comment: Occasional      Social History     Tobacco Use   Smoking Status Former    Years: 15.00    Types: Cigarettes   Smokeless Tobacco Never     Family History   Problem Relation Age of Onset    Breast Cancer Other     OSTEOARTHRITIS Mother     Hypertension Mother     Heart Disease Mother     Hypertension Other         Grandmother    Other Other         Heart problems: Mother, grandmother    Diabetes Other         Grandmother      Current Outpatient Medications   Medication Sig    ondansetron hcl (Zofran) 4 mg tablet Take 1 Tablet by mouth every eight (8) hours as needed for Nausea or Vomiting.    ezetimibe (ZETIA) 10 mg tablet Take  by mouth. ALPRAZolam (XANAX) 0.5 mg tablet 0.5 mg nightly as needed. fluticasone propionate (FLONASE) 50 mcg/actuation nasal spray     ketoconazole (NIZORAL) 2 % shampoo USE 1 (ONE) APPLICATION ONCE EVERY OTHER WEEK LATHER ON SCALP AND RINSE.    potassium chloride SR (K-TAB) 20 mEq tablet Take 20 mEq by mouth daily. clopidogreL (PLAVIX) 75 mg tab Take 1 Tablet by mouth daily. desvenlafaxine succinate (PRISTIQ) 25 mg ER tablet Take 1 Tablet by mouth daily. carvediloL (COREG) 6.25 mg tablet Take 1 Tab by mouth every twelve (12) hours.     levothyroxine (SYNTHROID) 25 mcg tablet Take 75 mcg by mouth Daily (before breakfast). amLODIPine (NORVASC) 2.5 mg tablet Take 1 Tab by mouth daily. atorvastatin (LIPITOR) 80 mg tablet Take 1 Tab by mouth daily. No current facility-administered medications for this visit. Allergies   Allergen Reactions    Percocet [Oxycodone-Acetaminophen] Itching    Vicodin [Hydrocodone-Acetaminophen] Itching        Review of Systems:  Constitutional: negative  Eyes: negative  Ears, Nose, Mouth, Throat, and Face: negative  Respiratory: negative  Cardiovascular: negative  Gastrointestinal: positive for abdominal pain  Genitourinary:negative  Integument/Breast: negative  Hematologic/Lymphatic: negative  Musculoskeletal:negative  Neurological: negative  Behavioral/Psychiatric: negative  Endocrine: negative  Allergic/Immunologic: negative    Objective:     Visit Vitals  /66 (BP 1 Location: Left lower arm, BP Patient Position: Sitting, BP Cuff Size: Small adult)   Pulse (!) 58   Ht 5' 9\" (1.753 m)   Wt 94.3 kg (208 lb)   SpO2 95%   BMI 30.72 kg/m²       Physical Exam:      General:  in no apparent distress, alert, oriented times 3, and afebrile   Eyes:  conjunctivae and sclerae normal, pupils equal, round, reactive to light   Throat & Neck: no erythema or exudates noted and neck supple and symmetrical; no palpable masses   Lungs:   clear to auscultation bilaterally   Heart:  Regular rate and rhythm   Abdomen:   rounded, soft, nontender, nondistended, no masses or organomegaly. There is an incarcerated hernia that is very tender to palpation located at the umbilicus and supraumbilical area as well consistent with an incisional hernia from most likely previous cholecystectomy.     Extremities: extremities normal, atraumatic, no cyanosis or edema   Skin: Normal.       Imaging and Lab Review:     CBC:   Lab Results   Component Value Date/Time    WBC 6.9 01/21/2023 09:35 PM    RBC 4.92 01/21/2023 09:35 PM    HGB 13.5 01/21/2023 09:35 PM    HCT 41.1 01/21/2023 09:35 PM PLATELET 346 28/26/4937 09:35 PM     BMP:   Lab Results   Component Value Date/Time    Glucose 104 (H) 01/21/2023 09:35 PM    Sodium 139 01/21/2023 09:35 PM    Potassium 3.9 01/21/2023 09:35 PM    Chloride 104 01/21/2023 09:35 PM    CO2 31 01/21/2023 09:35 PM    BUN 8 01/21/2023 09:35 PM    Creatinine 0.73 01/21/2023 09:35 PM    Calcium 8.9 01/21/2023 09:35 PM     CMP:  Lab Results   Component Value Date/Time    Glucose 104 (H) 01/21/2023 09:35 PM    Sodium 139 01/21/2023 09:35 PM    Potassium 3.9 01/21/2023 09:35 PM    Chloride 104 01/21/2023 09:35 PM    CO2 31 01/21/2023 09:35 PM    BUN 8 01/21/2023 09:35 PM    Creatinine 0.73 01/21/2023 09:35 PM    Calcium 8.9 01/21/2023 09:35 PM    Anion gap 4 01/21/2023 09:35 PM    BUN/Creatinine ratio 11 (L) 01/21/2023 09:35 PM    Alk. phosphatase 119 (H) 01/21/2023 09:35 PM    Protein, total 7.3 01/21/2023 09:35 PM    Albumin 3.8 01/21/2023 09:35 PM    Globulin 3.5 01/21/2023 09:35 PM    A-G Ratio 1.1 01/21/2023 09:35 PM       No results found for this or any previous visit (from the past 24 hour(s)). images and reports reviewed    Assessment:   Halima Ramos is a 79 y.o. female who is presenting with a symptomatic incarcerated incisional hernia. I Discussed the possibility of incarceration, strangulation, enlargement in size over time, and the risk of emergency surgery in the face of strangulation. I also discussed the use of prosthetic materials (mesh), including the risk of infection. Also discussed the risk of surgery including recurrence and the possible need for reoperation and removal of mesh if used, possibility of postoperative small bowel injury, obstruction or ileus, and the risks of general anesthetic. I explained to the the patient about the robotic hernia repair procedure. I told the patient that Mk Henao will call her to schedule her surgery. Plan:     1. Schedule for robotic incarcerated incisional hernia repair with placement of mesh.   2. No heavy lifting for 2 weeks after the surgery (More than 15 pounds)  3. Avoid constipation by taking stool softener.     Please call me if you have any questions (cell phone: [phone])     Signed By: Freddie Cowden, MD     February 2, 2023

## 2023-02-02 NOTE — PROGRESS NOTES
Melo Orta is a 79 y.o. female (: 1955) presenting to address:    Chief Complaint   Patient presents with    New Patient     Umbilical hernia/ referred by [facility]       Medication list and allergies have been reviewed with Melocollette Orta and updated as of today's date. I have gone over all Medical, Surgical and Social History with Melocollette Orta and updated/added the information accordingly.

## 2023-02-06 RX ORDER — EZETIMIBE 10 MG/1
TABLET ORAL
Qty: 90 TABLET | Refills: 2 | Status: SHIPPED | OUTPATIENT
Start: 2023-02-06

## 2023-02-08 ENCOUNTER — HOSPITAL ENCOUNTER (OUTPATIENT)
Dept: PREADMISSION TESTING | Age: 68
Discharge: HOME | End: 2023-02-08
Payer: MEDICARE

## 2023-02-08 ENCOUNTER — HOSPITAL ENCOUNTER (OUTPATIENT)
Dept: GENERAL RADIOLOGY | Age: 68
Discharge: HOME | End: 2023-02-08
Payer: MEDICARE

## 2023-02-08 DIAGNOSIS — K43.0 INCARCERATED INCISIONAL HERNIA: ICD-10-CM

## 2023-02-08 DIAGNOSIS — K43.0 INCARCERATED INCISIONAL HERNIA: Primary | ICD-10-CM

## 2023-02-08 LAB
ANION GAP SERPL CALC-SCNC: 3 MMOL/L (ref 3–18)
BASOPHILS # BLD: 0.1 K/UL (ref 0–0.1)
BASOPHILS NFR BLD: 1 % (ref 0–2)
BUN SERPL-MCNC: 9 MG/DL (ref 7–18)
BUN/CREAT SERPL: 12 (ref 12–20)
CALCIUM SERPL-MCNC: 9.2 MG/DL (ref 8.5–10.1)
CHLORIDE SERPL-SCNC: 105 MMOL/L (ref 100–111)
CO2 SERPL-SCNC: 29 MMOL/L (ref 21–32)
CREAT SERPL-MCNC: 0.75 MG/DL (ref 0.6–1.3)
DIFFERENTIAL METHOD BLD: ABNORMAL
EOSINOPHIL # BLD: 0.1 K/UL (ref 0–0.4)
EOSINOPHIL NFR BLD: 3 % (ref 0–5)
ERYTHROCYTE [DISTWIDTH] IN BLOOD BY AUTOMATED COUNT: 14.7 % (ref 11.6–14.5)
GLUCOSE SERPL-MCNC: 144 MG/DL (ref 74–99)
HCT VFR BLD AUTO: 39.5 % (ref 35–45)
HGB BLD-MCNC: 12.8 G/DL (ref 12–16)
IMM GRANULOCYTES # BLD AUTO: 0 K/UL (ref 0–0.04)
IMM GRANULOCYTES NFR BLD AUTO: 0 % (ref 0–0.5)
LYMPHOCYTES # BLD: 2.2 K/UL (ref 0.9–3.6)
LYMPHOCYTES NFR BLD: 43 % (ref 21–52)
MCH RBC QN AUTO: 27.5 PG (ref 24–34)
MCHC RBC AUTO-ENTMCNC: 32.4 G/DL (ref 31–37)
MCV RBC AUTO: 84.9 FL (ref 78–100)
MONOCYTES # BLD: 0.3 K/UL (ref 0.05–1.2)
MONOCYTES NFR BLD: 5 % (ref 3–10)
NEUTS SEG # BLD: 2.5 K/UL (ref 1.8–8)
NEUTS SEG NFR BLD: 48 % (ref 40–73)
NRBC # BLD: 0 K/UL (ref 0–0.01)
NRBC BLD-RTO: 0 PER 100 WBC
PLATELET # BLD AUTO: 309 K/UL (ref 135–420)
PMV BLD AUTO: 10.5 FL (ref 9.2–11.8)
POTASSIUM SERPL-SCNC: 4.3 MMOL/L (ref 3.5–5.5)
RBC # BLD AUTO: 4.65 M/UL (ref 4.2–5.3)
SODIUM SERPL-SCNC: 137 MMOL/L (ref 136–145)
WBC # BLD AUTO: 5.2 K/UL (ref 4.6–13.2)

## 2023-02-08 PROCEDURE — 93005 ELECTROCARDIOGRAM TRACING: CPT

## 2023-02-08 PROCEDURE — 80048 BASIC METABOLIC PNL TOTAL CA: CPT

## 2023-02-08 PROCEDURE — 71046 X-RAY EXAM CHEST 2 VIEWS: CPT

## 2023-02-08 PROCEDURE — 36415 COLL VENOUS BLD VENIPUNCTURE: CPT

## 2023-02-08 PROCEDURE — 85025 COMPLETE CBC W/AUTO DIFF WBC: CPT

## 2023-02-09 LAB
ATRIAL RATE: 57 BPM
CALCULATED P AXIS, ECG09: -3 DEGREES
CALCULATED R AXIS, ECG10: 2 DEGREES
CALCULATED T AXIS, ECG11: -60 DEGREES
DIAGNOSIS, 93000: NORMAL
P-R INTERVAL, ECG05: 204 MS
Q-T INTERVAL, ECG07: 374 MS
QRS DURATION, ECG06: 82 MS
QTC CALCULATION (BEZET), ECG08: 364 MS
VENTRICULAR RATE, ECG03: 57 BPM

## 2023-02-15 ENCOUNTER — TELEPHONE (OUTPATIENT)
Age: 68
End: 2023-02-15

## 2023-02-15 RX ORDER — FLUCONAZOLE 150 MG/1
TABLET ORAL
COMMUNITY
Start: 2023-01-31

## 2023-02-15 RX ORDER — ALPRAZOLAM 0.5 MG/1
TABLET ORAL
COMMUNITY
Start: 2023-01-13

## 2023-02-15 RX ORDER — ATORVASTATIN CALCIUM 80 MG/1
TABLET, FILM COATED ORAL
COMMUNITY
Start: 2022-12-06

## 2023-02-15 RX ORDER — POTASSIUM CHLORIDE 20 MEQ/1
20 TABLET, EXTENDED RELEASE ORAL DAILY
COMMUNITY
Start: 2022-07-24

## 2023-02-15 RX ORDER — EZETIMIBE 10 MG/1
TABLET ORAL
COMMUNITY

## 2023-02-15 RX ORDER — CLOPIDOGREL BISULFATE 75 MG/1
TABLET ORAL
COMMUNITY
Start: 2022-12-05

## 2023-02-15 RX ORDER — GABAPENTIN 100 MG/1
CAPSULE ORAL
COMMUNITY
Start: 2022-12-30

## 2023-02-15 RX ORDER — CARVEDILOL 6.25 MG/1
TABLET ORAL
COMMUNITY
Start: 2023-02-02

## 2023-02-15 RX ORDER — LEVOTHYROXINE SODIUM 0.03 MG/1
75 TABLET ORAL
COMMUNITY

## 2023-02-15 RX ORDER — AMLODIPINE BESYLATE 2.5 MG/1
TABLET ORAL
COMMUNITY
Start: 2022-12-19

## 2023-02-15 RX ORDER — FAMOTIDINE 20 MG/1
TABLET, FILM COATED ORAL
COMMUNITY
Start: 2023-01-22

## 2023-02-15 NOTE — TELEPHONE ENCOUNTER
Spoke to Ms. Saunders to inform cardiology clearance received/recommend to stop the plavix for 5 days prior to scheduled surgery on Friday, February 24, 2023 with Dr. Sho Chun. Ms. Paula Silveira verbalized understanding. Ms. Paula Silveira inquired if she'll need to continue aspirin. Informed will need to contact Dr. Yamini Lucas office because clearance received didn't reference aspirin instructions.

## 2023-02-15 NOTE — TELEPHONE ENCOUNTER
Patient called and would like to know since she is holding plavix for her surgery, should she continue to take her aspirin please advise.

## 2023-02-16 ENCOUNTER — TELEPHONE (OUTPATIENT)
Age: 68
End: 2023-02-16

## 2023-02-16 NOTE — TELEPHONE ENCOUNTER
Ms. Antolin Faustin called stating she talked to her cardiologist, Dr. Herson Snider, office regarding her cardiology clearance and was instructed to stop the plavix 5 days before surgery scheduled on Friday, February 24, 2023. Additionally she asked Dr. Nolberto Nava office if she should continue taking her aspirin daily and per Dr. Lexy Motta if it's okay with Dr. Bettie Cooper she may continue with aspirin. I told Ms. Saunders I would relay the message to Dr. Bettie Cooper and call her with his recommendation.

## 2023-02-16 NOTE — TELEPHONE ENCOUNTER
Patient called was returned and was notified Per Dr. Shyanne Wiley if okay with surgeon continue with aspirin.

## 2023-02-22 RX ORDER — ASPIRIN 81 MG/1
81 TABLET, CHEWABLE ORAL DAILY
COMMUNITY

## 2023-02-22 RX ORDER — DESVENLAFAXINE 50 MG/1
25 TABLET, EXTENDED RELEASE ORAL DAILY
COMMUNITY

## 2023-02-22 NOTE — PROGRESS NOTES
PRE-SURGICAL INSTRUCTIONS        Patient's Name:  Nia Dupree Date:  2/22/2023                Surgery Date: 02/24/2023                Do NOT eat or drink anything, including candy, gum, or ice chips after midnight on 02/24/2023, unless you have specific instructions from your surgeon or anesthesia provider to do so. You may brush your teeth before coming to the hospital.  No smoking 24 hours prior to the day of surgery. No alcohol 24 hours prior to the day of surgery. No recreational drugs for one week prior to the day of surgery. Leave all valuables, including money/purse, at home. Remove all jewelry, nail polish, acrylic nails, and makeup (including mascara); no lotions powders, deodorant, or perfume/cologne/after shave on the skin. Follow instruction for Hibiclens washes and CHG wipes from surgeon's office. Glasses/contact lenses and dentures may be worn to the hospital.  They will be removed prior to surgery. Call your doctor if symptoms of a cold or illness develop within 24-48 hours prior to your surgery. 11.  If you are having an outpatient procedure, please make arrangements for a responsible ADULT TO [address] [place] [address] and stay with you for 24 hours after your surgery. 12. ONE VISITOR in the hospital at this time for outpatient procedures. Exceptions may be made for surgical admissions, per nursing unit guidelines      Special Instructions:      Bring list of CURRENT medications. Bring any pertinent legal medical records. Take these medications the morning of surgery with a sip of water as instructed by office. Follow physician instructions about stopping anticoagulants. On the day of surgery, come in the main entrance of [facility]. Let the  at the desk know you are there for surgery. A staff member will come escort you to the surgical area on the second floor.     If you have any questions or concerns, please do not hesitate to call:     (Prior to the day of surgery) [facility] department:  [phone]   (Day of surgery) Pre-Op department:  [phone]    These surgical instructions were reviewed with [place] Pop during the [facility] phone call.

## 2023-02-23 ENCOUNTER — ANESTHESIA EVENT (OUTPATIENT)
Facility: HOSPITAL | Age: 68
End: 2023-02-23
Payer: MEDICARE

## 2023-02-24 ENCOUNTER — ANESTHESIA (OUTPATIENT)
Facility: HOSPITAL | Age: 68
End: 2023-02-24
Payer: MEDICARE

## 2023-02-24 ENCOUNTER — HOSPITAL ENCOUNTER (OUTPATIENT)
Facility: HOSPITAL | Age: 68
Setting detail: OUTPATIENT SURGERY
Discharge: HOME | End: 2023-02-24
Attending: SURGERY | Admitting: SURGERY
Payer: MEDICARE

## 2023-02-24 VITALS
RESPIRATION RATE: 16 BRPM | SYSTOLIC BLOOD PRESSURE: 117 MMHG | OXYGEN SATURATION: 97 % | DIASTOLIC BLOOD PRESSURE: 77 MMHG | HEIGHT: 69 IN | WEIGHT: 209.8 LBS | BODY MASS INDEX: 31.07 KG/M2 | TEMPERATURE: 97 F | HEART RATE: 54 BPM

## 2023-02-24 DIAGNOSIS — Z87.19 S/P HERNIA REPAIR: Primary | ICD-10-CM

## 2023-02-24 DIAGNOSIS — Z98.890 S/P HERNIA REPAIR: Primary | ICD-10-CM

## 2023-02-24 PROCEDURE — 7100000001 HC PACU RECOVERY - ADDTL 15 MIN: Performed by: SURGERY

## 2023-02-24 PROCEDURE — 3700000000 HC ANESTHESIA ATTENDED CARE: Performed by: SURGERY

## 2023-02-24 PROCEDURE — 6370000000 HC RX 637 (ALT 250 FOR IP): Performed by: NURSE ANESTHETIST, CERTIFIED REGISTERED

## 2023-02-24 PROCEDURE — 2580000003 HC RX 258: Performed by: SURGERY

## 2023-02-24 PROCEDURE — 2500000003 HC RX 250 WO HCPCS: Performed by: NURSE ANESTHETIST, CERTIFIED REGISTERED

## 2023-02-24 PROCEDURE — 6360000002 HC RX W HCPCS: Performed by: NURSE ANESTHETIST, CERTIFIED REGISTERED

## 2023-02-24 PROCEDURE — 7100000011 HC PHASE II RECOVERY - ADDTL 15 MIN: Performed by: SURGERY

## 2023-02-24 PROCEDURE — 6360000002 HC RX W HCPCS: Performed by: SURGERY

## 2023-02-24 PROCEDURE — 3600000009 HC SURGERY ROBOT BASE: Performed by: SURGERY

## 2023-02-24 PROCEDURE — 2500000003 HC RX 250 WO HCPCS: Performed by: SURGERY

## 2023-02-24 PROCEDURE — 2580000003 HC RX 258: Performed by: NURSE ANESTHETIST, CERTIFIED REGISTERED

## 2023-02-24 PROCEDURE — A4217 STERILE WATER/SALINE, 500 ML: HCPCS | Performed by: SURGERY

## 2023-02-24 PROCEDURE — 3700000001 HC ADD 15 MINUTES (ANESTHESIA): Performed by: SURGERY

## 2023-02-24 PROCEDURE — 3600000019 HC SURGERY ROBOT ADDTL 15MIN: Performed by: SURGERY

## 2023-02-24 PROCEDURE — 7100000010 HC PHASE II RECOVERY - FIRST 15 MIN: Performed by: SURGERY

## 2023-02-24 PROCEDURE — 2709999900 HC NON-CHARGEABLE SUPPLY: Performed by: SURGERY

## 2023-02-24 PROCEDURE — 6370000000 HC RX 637 (ALT 250 FOR IP): Performed by: SURGERY

## 2023-02-24 PROCEDURE — 49614 RPR AA HRN RCR < 3 NCR/STRN: CPT | Performed by: SURGERY

## 2023-02-24 PROCEDURE — 00840 ANES IPER PX LOWER ABD NOS: CPT | Performed by: ANESTHESIOLOGY

## 2023-02-24 PROCEDURE — S2900 ROBOTIC SURGICAL SYSTEM: HCPCS | Performed by: SURGERY

## 2023-02-24 PROCEDURE — C1781 MESH (IMPLANTABLE): HCPCS | Performed by: SURGERY

## 2023-02-24 PROCEDURE — 7100000000 HC PACU RECOVERY - FIRST 15 MIN: Performed by: SURGERY

## 2023-02-24 DEVICE — MESH SURG DIA4.5IN CIR SEPRA TECHNOLOGY VENTRALIGHT: Type: IMPLANTABLE DEVICE | Site: ABDOMEN | Status: FUNCTIONAL

## 2023-02-24 RX ORDER — ROCURONIUM BROMIDE 10 MG/ML
INJECTION, SOLUTION INTRAVENOUS PRN
Status: DISCONTINUED | OUTPATIENT
Start: 2023-02-24 | End: 2023-02-24 | Stop reason: SDUPTHER

## 2023-02-24 RX ORDER — ONDANSETRON 2 MG/ML
4 INJECTION INTRAMUSCULAR; INTRAVENOUS
Status: COMPLETED | OUTPATIENT
Start: 2023-02-24 | End: 2023-02-24

## 2023-02-24 RX ORDER — EPHEDRINE SULFATE/0.9% NACL/PF 50 MG/5 ML
SYRINGE (ML) INTRAVENOUS PRN
Status: DISCONTINUED | OUTPATIENT
Start: 2023-02-24 | End: 2023-02-24 | Stop reason: SDUPTHER

## 2023-02-24 RX ORDER — ONDANSETRON 2 MG/ML
INJECTION INTRAMUSCULAR; INTRAVENOUS PRN
Status: DISCONTINUED | OUTPATIENT
Start: 2023-02-24 | End: 2023-02-24 | Stop reason: SDUPTHER

## 2023-02-24 RX ORDER — OXYCODONE HYDROCHLORIDE AND ACETAMINOPHEN 5; 325 MG/1; MG/1
1 TABLET ORAL EVERY 6 HOURS PRN
Qty: 12 TABLET | Refills: 0 | Status: SHIPPED | OUTPATIENT
Start: 2023-02-24 | End: 2023-02-27

## 2023-02-24 RX ORDER — DEXAMETHASONE SODIUM PHOSPHATE 4 MG/ML
INJECTION, SOLUTION INTRA-ARTICULAR; INTRALESIONAL; INTRAMUSCULAR; INTRAVENOUS; SOFT TISSUE PRN
Status: DISCONTINUED | OUTPATIENT
Start: 2023-02-24 | End: 2023-02-24 | Stop reason: SDUPTHER

## 2023-02-24 RX ORDER — OXYCODONE HYDROCHLORIDE AND ACETAMINOPHEN 5; 325 MG/1; MG/1
1 TABLET ORAL ONCE
Status: COMPLETED | OUTPATIENT
Start: 2023-02-24 | End: 2023-02-24

## 2023-02-24 RX ORDER — FENTANYL CITRATE 50 UG/ML
50 INJECTION, SOLUTION INTRAMUSCULAR; INTRAVENOUS EVERY 5 MIN PRN
Status: DISCONTINUED | OUTPATIENT
Start: 2023-02-24 | End: 2023-02-27 | Stop reason: HOSPADM

## 2023-02-24 RX ORDER — SODIUM CHLORIDE 0.9 % (FLUSH) 0.9 %
5-40 SYRINGE (ML) INJECTION PRN
Status: DISCONTINUED | OUTPATIENT
Start: 2023-02-24 | End: 2023-02-27 | Stop reason: HOSPADM

## 2023-02-24 RX ORDER — LIDOCAINE HYDROCHLORIDE 20 MG/ML
INJECTION, SOLUTION EPIDURAL; INFILTRATION; INTRACAUDAL; PERINEURAL PRN
Status: DISCONTINUED | OUTPATIENT
Start: 2023-02-24 | End: 2023-02-24 | Stop reason: SDUPTHER

## 2023-02-24 RX ORDER — GLYCOPYRROLATE 0.2 MG/ML
INJECTION INTRAMUSCULAR; INTRAVENOUS PRN
Status: DISCONTINUED | OUTPATIENT
Start: 2023-02-24 | End: 2023-02-24 | Stop reason: SDUPTHER

## 2023-02-24 RX ORDER — SODIUM CHLORIDE, SODIUM LACTATE, POTASSIUM CHLORIDE, CALCIUM CHLORIDE 600; 310; 30; 20 MG/100ML; MG/100ML; MG/100ML; MG/100ML
INJECTION, SOLUTION INTRAVENOUS CONTINUOUS
Status: DISCONTINUED | OUTPATIENT
Start: 2023-02-24 | End: 2023-02-24 | Stop reason: HOSPADM

## 2023-02-24 RX ORDER — SODIUM CHLORIDE 9 MG/ML
INJECTION, SOLUTION INTRAVENOUS PRN
Status: DISCONTINUED | OUTPATIENT
Start: 2023-02-24 | End: 2023-02-27 | Stop reason: HOSPADM

## 2023-02-24 RX ORDER — FAMOTIDINE 20 MG/1
20 TABLET, FILM COATED ORAL ONCE
Status: COMPLETED | OUTPATIENT
Start: 2023-02-24 | End: 2023-02-24

## 2023-02-24 RX ORDER — LIDOCAINE HYDROCHLORIDE 10 MG/ML
1 INJECTION, SOLUTION EPIDURAL; INFILTRATION; INTRACAUDAL; PERINEURAL
Status: DISCONTINUED | OUTPATIENT
Start: 2023-02-24 | End: 2023-02-24 | Stop reason: HOSPADM

## 2023-02-24 RX ORDER — PROPOFOL 10 MG/ML
INJECTION, EMULSION INTRAVENOUS PRN
Status: DISCONTINUED | OUTPATIENT
Start: 2023-02-24 | End: 2023-02-24 | Stop reason: SDUPTHER

## 2023-02-24 RX ORDER — HYDROMORPHONE HYDROCHLORIDE 1 MG/ML
0.5 INJECTION, SOLUTION INTRAMUSCULAR; INTRAVENOUS; SUBCUTANEOUS EVERY 10 MIN PRN
Status: DISCONTINUED | OUTPATIENT
Start: 2023-02-24 | End: 2023-02-27 | Stop reason: HOSPADM

## 2023-02-24 RX ORDER — FENTANYL CITRATE 50 UG/ML
INJECTION, SOLUTION INTRAMUSCULAR; INTRAVENOUS PRN
Status: DISCONTINUED | OUTPATIENT
Start: 2023-02-24 | End: 2023-02-24 | Stop reason: SDUPTHER

## 2023-02-24 RX ORDER — PROCHLORPERAZINE EDISYLATE 5 MG/ML
10 INJECTION INTRAMUSCULAR; INTRAVENOUS
Status: DISCONTINUED | OUTPATIENT
Start: 2023-02-24 | End: 2023-02-25 | Stop reason: HOSPADM

## 2023-02-24 RX ORDER — MIDAZOLAM HYDROCHLORIDE 1 MG/ML
INJECTION INTRAMUSCULAR; INTRAVENOUS PRN
Status: DISCONTINUED | OUTPATIENT
Start: 2023-02-24 | End: 2023-02-24 | Stop reason: SDUPTHER

## 2023-02-24 RX ORDER — SODIUM CHLORIDE, SODIUM LACTATE, POTASSIUM CHLORIDE, CALCIUM CHLORIDE 600; 310; 30; 20 MG/100ML; MG/100ML; MG/100ML; MG/100ML
INJECTION, SOLUTION INTRAVENOUS CONTINUOUS
Status: DISCONTINUED | OUTPATIENT
Start: 2023-02-24 | End: 2023-02-27 | Stop reason: HOSPADM

## 2023-02-24 RX ORDER — SODIUM CHLORIDE 0.9 % (FLUSH) 0.9 %
5-40 SYRINGE (ML) INJECTION EVERY 12 HOURS SCHEDULED
Status: DISCONTINUED | OUTPATIENT
Start: 2023-02-24 | End: 2023-02-27 | Stop reason: HOSPADM

## 2023-02-24 RX ADMIN — FENTANYL CITRATE 50 MCG: 50 INJECTION, SOLUTION INTRAMUSCULAR; INTRAVENOUS at 10:50

## 2023-02-24 RX ADMIN — ROCURONIUM BROMIDE 50 MG: 10 INJECTION, SOLUTION INTRAVENOUS at 08:54

## 2023-02-24 RX ADMIN — DEXAMETHASONE SODIUM PHOSPHATE: 10 INJECTION, SOLUTION INTRAMUSCULAR; INTRAVENOUS at 09:08

## 2023-02-24 RX ADMIN — SODIUM CHLORIDE, POTASSIUM CHLORIDE, SODIUM LACTATE AND CALCIUM CHLORIDE: 600; 310; 30; 20 INJECTION, SOLUTION INTRAVENOUS at 08:35

## 2023-02-24 RX ADMIN — OXYCODONE HYDROCHLORIDE AND ACETAMINOPHEN 1 TABLET: 5; 325 TABLET ORAL at 12:03

## 2023-02-24 RX ADMIN — LIDOCAINE HYDROCHLORIDE 80 MG: 20 INJECTION, SOLUTION EPIDURAL; INFILTRATION; INTRACAUDAL; PERINEURAL at 08:54

## 2023-02-24 RX ADMIN — FAMOTIDINE 20 MG: 20 TABLET ORAL at 08:35

## 2023-02-24 RX ADMIN — ONDANSETRON 4 MG: 2 INJECTION INTRAMUSCULAR; INTRAVENOUS at 10:07

## 2023-02-24 RX ADMIN — PROPOFOL 150 MG: 10 INJECTION, EMULSION INTRAVENOUS at 08:54

## 2023-02-24 RX ADMIN — MIDAZOLAM HYDROCHLORIDE 2 MG: 2 INJECTION, SOLUTION INTRAMUSCULAR; INTRAVENOUS at 08:49

## 2023-02-24 RX ADMIN — SUGAMMADEX 200 MG: 100 INJECTION, SOLUTION INTRAVENOUS at 09:29

## 2023-02-24 RX ADMIN — GLYCOPYRROLATE 0.2 MG: 0.2 INJECTION INTRAMUSCULAR; INTRAVENOUS at 09:11

## 2023-02-24 RX ADMIN — ONDANSETRON 4 MG: 2 INJECTION INTRAMUSCULAR; INTRAVENOUS at 08:49

## 2023-02-24 RX ADMIN — Medication 5 MG: at 09:01

## 2023-02-24 RX ADMIN — WATER 2000 MG: 1 INJECTION, SOLUTION INTRAMUSCULAR; INTRAVENOUS; SUBCUTANEOUS at 09:02

## 2023-02-24 RX ADMIN — DEXAMETHASONE SODIUM PHOSPHATE 4 MG: 4 INJECTION, SOLUTION INTRAMUSCULAR; INTRAVENOUS at 09:00

## 2023-02-24 RX ADMIN — SODIUM CHLORIDE, POTASSIUM CHLORIDE, SODIUM LACTATE AND CALCIUM CHLORIDE: 600; 310; 30; 20 INJECTION, SOLUTION INTRAVENOUS at 10:30

## 2023-02-24 RX ADMIN — FENTANYL CITRATE 100 MCG: 50 INJECTION, SOLUTION INTRAMUSCULAR; INTRAVENOUS at 08:49

## 2023-02-24 RX ADMIN — SUGAMMADEX 100 MG: 100 INJECTION, SOLUTION INTRAVENOUS at 09:40

## 2023-02-24 ASSESSMENT — PAIN DESCRIPTION - ORIENTATION
ORIENTATION: RIGHT
ORIENTATION: LEFT
ORIENTATION: MID

## 2023-02-24 ASSESSMENT — PAIN - FUNCTIONAL ASSESSMENT: PAIN_FUNCTIONAL_ASSESSMENT: 0-10

## 2023-02-24 ASSESSMENT — PAIN DESCRIPTION - DESCRIPTORS
DESCRIPTORS: PRESSURE;SHARP
DESCRIPTORS: ACHING;PRESSURE
DESCRIPTORS: ACHING
DESCRIPTORS: ACHING

## 2023-02-24 ASSESSMENT — PAIN SCALES - GENERAL
PAINLEVEL_OUTOF10: 5
PAINLEVEL_OUTOF10: 4
PAINLEVEL_OUTOF10: 5
PAINLEVEL_OUTOF10: 6
PAINLEVEL_OUTOF10: 5

## 2023-02-24 ASSESSMENT — PAIN DESCRIPTION - PAIN TYPE
TYPE: SURGICAL PAIN

## 2023-02-24 ASSESSMENT — PAIN DESCRIPTION - LOCATION
LOCATION: ABDOMEN

## 2023-02-24 NOTE — BRIEF OP NOTE
Brief Postoperative Note      Patient: Aric Mckeon  YOB: 1955  MRN: [id number]    Date of Procedure: 2/24/2023    Pre-Op Diagnosis: Incarcerated incisional hernia [K43.0]    Post-Op Diagnosis: Same       Procedure(s):  ROBOTIC INCARCERATED INCISIONAL HERNIA REPAIR WITH PLACEMENT OF MESH    Surgeon(s): Kofi Costa MD    Assistant:  Surgical Assistant: Blanche Morrissey    Anesthesia: General    Estimated Blood Loss (mL): Minimal    Complications: None    Specimens:   * No specimens in log *    Implants:  Implant Name Type Inv. Item Serial No.  Lot No. LRB No. Used Action   MESH SURG DIA4. Camella Grates CIR SEPRA [facility] - [id number]  MESH SURG DIA4. 5IN [facility] [id number] N/A 1 Implanted         Drains: * No LDAs found *    Findings: As above    Electronically signed by Kofi Costa MD on 2/24/2023 at 9:37 AM

## 2023-02-24 NOTE — ANESTHESIA POSTPROCEDURE EVALUATION
Department of Anesthesiology  Postprocedure Note    Patient: Ruby Gonzalez  MRN: [id number]  YOB: 1955  Date of evaluation: 2/24/2023      Procedure Summary     Date: 02/24/23 Room / Location: [facility] / [facility] OR    Anesthesia Start: 7118 Anesthesia Stop: 2965    Procedure: ROBOTIC INCARCERATED INCISIONAL HERNIA REPAIR WITH PLACEMENT OF MESH (Abdomen) Diagnosis:       Incarcerated incisional hernia      (Incarcerated incisional hernia [K43.0])    Surgeons:  Sheralyn Romberg, MD Responsible Provider: Misael Sweeney MD    Anesthesia Type: General ASA Status: 3          Anesthesia Type: General    Lake Phase I: Lake Score: 10    Lake Phase II: Lake Score: 10      Anesthesia Post Evaluation    Patient location during evaluation: bedside  Patient participation: complete - patient participated  Airway patency: patent  Complications: no  Cardiovascular status: hemodynamically stable  Respiratory status: acceptable  Hydration status: stable

## 2023-02-24 NOTE — PROGRESS NOTES
Update History & Physical    The patient's History and Physical was reviewed with the patient and I examined the patient. There was no change. The surgical site was confirmed by the patient and me. Plan: The risks, benefits, expected outcome, and alternative to the recommended procedure have been discussed with the patient. Patient understands and wants to proceed with the procedure.   Will proceed with robotic incarcerated incisional hernia repair with placement of mesh    Electronically signed by Anna Han MD on 2/24/2023 at 8:23 AM

## 2023-02-24 NOTE — ANESTHESIA PRE PROCEDURE
Department of Anesthesiology  Preprocedure Note       Name:  Dwayne Billy   Age:  79 y.o.  :  1955                                          MRN:  [id number]         Date:  2023      Surgeon: Austyn Dos Santos): Catarina Billy MD    Procedure: Procedure(s):  ROBOTIC INCARCERATED INCISIONAL HERNIA REPAIR WITH PLACEMENT OF MESH    Medications prior to admission:   Prior to Admission medications    Medication Sig Start Date End Date Taking? Authorizing Provider   aspirin 81 MG chewable tablet Take 81 mg by mouth daily   Yes Historical Provider, MD   desvenlafaxine succinate (PRISTIQ) 50 MG TB24 extended release tablet Take 25 mg by mouth daily   Yes Historical Provider, MD   ALPRAZolam (XANAX) 0.5 MG tablet 0.5 mg as needed. 23   Historical Provider, MD   amLODIPine (NORVASC) 2.5 MG tablet Take 2.5 mg by mouth daily 22   Historical Provider, MD   atorvastatin (LIPITOR) 80 MG tablet Take 80 mg by mouth daily 22   Historical Provider, MD   carvedilol (COREG) 6.25 MG tablet Take 6.25 mg by mouth 2 times daily 23   Historical Provider, MD   clopidogrel (PLAVIX) 75 MG tablet Take 75 mg by mouth daily 22   Historical Provider, MD   ezetimibe (ZETIA) 10 MG tablet Take 10 mg by mouth daily    Historical Provider, MD   gabapentin (NEURONTIN) 100 MG capsule Take 100 mg by mouth daily.  22   Historical Provider, MD   levothyroxine (SYNTHROID) 25 MCG tablet Take 75 mcg by mouth every morning (before breakfast)    Historical Provider, MD   potassium chloride (KLOR-CON M) 20 MEQ extended release tablet Take 20 mEq by mouth daily 22   Historical Provider, MD       Current medications:    Current Facility-Administered Medications   Medication Dose Route Frequency Provider Last Rate Last Admin    ceFAZolin (ANCEF) 2,000 mg in sterile water 20 mL IV syringe  2,000 mg IntraVENous Q8H Catarina Billy MD        ropivacaine (NAROPIN) 0.2% 40 mg, dexamethasone (DECADRON) 5 mg, dexmedetomidine (PRECEDEX) 25 mcg   Nerve block catheter Once Sheralyn Romberg, MD        lidocaine PF 1 % injection 1 mL  1 mL IntraDERmal Once PRN Cassie IVY MckayN - VLAD        famotidine (PEPCID) tablet 20 mg  20 mg Oral Once Cassie Mckay APRN - CRNA        lactated ringers IV soln infusion   IntraVENous Continuous EdgarMARY ANN Mims CRNA           Allergies:  No Known Allergies    Problem List:    Patient Active Problem List   Diagnosis Code    Arthritis of knee M17.10    History of coronary artery stent placement Z95.5    Essential hypertension I10    STEMI (ST elevation myocardial infarction) ([facility] [address] I21.3    Coronary artery disease involving native coronary artery of native heart without angina pectoris I25.10    Mixed hyperlipidemia E78.2       Past Medical History:        Diagnosis Date    Arthritis     CAD (coronary artery disease)     Calcific Achilles tendonitis     Right; insertional    Essential hypertension 07/13/2020    Hypercholesterolemia     Hypertension     Left knee pain     Low blood potassium     Menopause     Pain of right heel     S/P coronary artery stent placement 07/2020    Sprain of right ring finger     STEMI (ST elevation myocardial infarction) ([facility] [address] 07/2020    Thyroid disease     low    Vertigo     Wears glasses        Past Surgical History:        Procedure Laterality Date    CHOLECYSTECTOMY  1990    COLONOSCOPY N/A 9/8/2022    COLONOSCOPY/ polypectomies performed by Sherri Restrepo MD at [facility] surgery age 16, muscle   [de-identified] HERNIA REPAIR      ORTHOPEDIC SURGERY Left 07/2020    left total knee arthroplasty    AR UNLISTED PROCEDURE CARDIAC SURGERY      stent july 2020    TOTAL KNEE ARTHROPLASTY Left     TUBAL LIGATION  1990       Social History:    Social History     Tobacco Use    Smoking status: Former    Smokeless tobacco: Never   Substance Use Topics    Alcohol use: Not Currently                                Counseling given: Not Answered      Vital Signs (Current):   Vitals:    02/22/23 1139   Weight: 205 lb (93 kg)   Height: 5' 9\" (1.753 m)                                              BP Readings from Last 3 Encounters:   02/02/23 120/66   11/22/22 120/80   11/10/22 (!) 148/71       NPO Status:                                                                                 BMI:   Wt Readings from Last 3 Encounters:   02/22/23 205 lb (93 kg)   02/02/23 208 lb (94.3 kg)   01/27/23 213 lb (96.6 kg)     Body mass index is 30.27 kg/m². CBC:   Lab Results   Component Value Date/Time    WBC 5.2 02/08/2023 11:16 AM    RBC 4.65 02/08/2023 11:16 AM    HGB 12.8 02/08/2023 11:16 AM    HCT 39.5 02/08/2023 11:16 AM    MCV 84.9 02/08/2023 11:16 AM    RDW 14.7 02/08/2023 11:16 AM     02/08/2023 11:16 AM       CMP:   Lab Results   Component Value Date/Time     02/08/2023 11:16 AM    K 4.3 02/08/2023 11:16 AM     02/08/2023 11:16 AM    CO2 29 02/08/2023 11:16 AM    BUN 9 02/08/2023 11:16 AM    CREATININE 0.75 02/08/2023 11:16 AM    GFRAA >60 08/25/2022 09:35 AM    AGRATIO 1.1 01/21/2023 09:35 PM    GLUCOSE 144 02/08/2023 11:16 AM    PROT 7.3 01/21/2023 09:35 PM    CALCIUM 9.2 02/08/2023 11:16 AM    BILITOT 0.4 01/21/2023 09:35 PM    ALKPHOS 119 01/21/2023 09:35 PM    AST 13 01/21/2023 09:35 PM    ALT 18 01/21/2023 09:35 PM       POC Tests: No results for input(s): POCGLU, POCNA, POCK, POCCL, POCBUN, POCHEMO, POCHCT in the last 72 hours.     Coags:   Lab Results   Component Value Date/Time    PROTIME 13.1 07/19/2022 10:36 AM    INR 1.0 07/19/2022 10:36 AM    APTT 32.0 06/26/2020 12:32 PM       HCG (If Applicable): No results found for: PREGTESTUR, PREGSERUM, HCG, HCGQUANT     ABGs: No results found for: PHART, PO2ART, OAK1MXV, JIL9CIZ, BEART, B0ZAYBXP     Type & Screen (If Applicable):  No results found for: LABABO, LABRH    Drug/Infectious Status (If Applicable):  No results found for: HIV, HEPCAB    COVID-19 Screening (If Applicable): Lab Results   Component Value Date/Time    COVID19 Not Detected 09/10/2020 01:25 PM           Anesthesia Evaluation  Patient summary reviewed  Airway: Mallampati: II     Neck ROM: full  Mouth opening: > = 3 FB   Dental:    (+) poor dentition      Pulmonary: breath sounds clear to auscultation                             Cardiovascular:    (+) hypertension:, past MI:, CAD:, CABG/stent: no interval change,         Rhythm: regular  Rate: normal                    Neuro/Psych:               GI/Hepatic/Renal:             Endo/Other:                     Abdominal:             Vascular: Other Findings:           Anesthesia Plan      general     ASA 3       Induction: intravenous. MIPS: Postoperative opioids intended. Anesthetic plan and risks discussed with patient.                         Katalina Rodriguez MD   2/24/2023

## 2023-02-24 NOTE — DISCHARGE INSTRUCTIONS
Discharge Instructions Following Surgery    Patient: Jacnito Langley MRN: [id number]  SSN: [id number]    YOB: 1955  Age: 79 y.o. Sex: female      Activity  As tolerated, walking encourage, stairs are okay. Avoid strenuous activities - no lifting anything heavier than 15 pounds till seen in the clinic. You may shower at home after 24 hours. Diet  Regular diet after nausea from the anesthetic has passed. Pain  Take pain medication as directed by your doctor. Call your doctor if pain is NOT relieved by medication. Wound and Dressing Care  There is glue on the wounds. No need for any dressing care. Apply ice packs to the area of the surgery for the first 1 to 2 days  Apply warm compresses after 2 days for pain relieve if needed    After Anesthesia  For the first 24 hours: DO NOT Drive, Drink alcoholic beverages, or Make important decisions. Be aware of dizziness following anesthesia and while taking pain medication. Call your doctor if  Excessive bleeding that does not stop after holding mild pressure over the area. Temperature of 101 degrees F or above. Redness,excessive swelling or bruising, and/or green or yellow, smelly discharge from incision. If nausea and vomiting continues. Appointment date/time Follow-Up Phone Calls    Call the office at [phone] to make your follow-up appointment in 2 weeks after the surgery (if not already set up) . Dr. Nichelle Carter cell phone number is [phone]. Please call me if you have any concerns or questions. DISCHARGE SUMMARY from Nurse    PATIENT INSTRUCTIONS:    After general anesthesia or intravenous sedation, for 24 hours or while taking prescription Narcotics:  Limit your activities  Do not drive and operate hazardous machinery  Do not make important personal or business decisions  Do  not drink alcoholic beverages  If you have not urinated within 8 hours after discharge, please contact your surgeon on call.     Report the following to your surgeon:  Excessive pain, swelling, redness or odor of or around the surgical area  Temperature over 100.5  Nausea and vomiting lasting longer than 4 hours or if unable to take medications  Any signs of decreased circulation or nerve impairment to extremity: change in color, persistent  numbness, tingling, coldness or increase pain  Any questions      These are general instructions for a healthy lifestyle:    No smoking/ No tobacco products/ Avoid exposure to second hand smoke  Surgeon General's Warning:  Quitting smoking now greatly reduces serious risk to your health. Obesity, smoking, and sedentary lifestyle greatly increases your risk for illness    A healthy diet, regular physical exercise & weight monitoring are important for maintaining a healthy lifestyle    You may be retaining fluid if you have a history of heart failure or if you experience any of the following symptoms:  Weight gain of 3 pounds or more overnight or 5 pounds in a week, increased swelling in our hands or feet or shortness of breath while lying flat in bed. Please call your doctor as soon as you notice any of these symptoms; do not wait until your next office visit. The discharge information has been reviewed with the patient. The patient verbalized understanding. Discharge medications reviewed with the patient and appropriate educational materials and side effects teaching were provided.   ___________________________________________________________________________________________________________________________________

## 2023-02-25 NOTE — OP NOTE
[address] [place] [address]   OPERATIVE REPORT    Name:  Shari Draper  MR#:   [id number]  :  1955  ACCOUNT #:  [de-identified]  DATE OF SERVICE:  2023    PREOPERATIVE DIAGNOSIS:  Incarcerated incisional hernia. POSTOPERATIVE DIAGNOSIS:  Incarcerated incisional hernia. PROCEDURE PERFORMED:  Robotic repair of incarcerated incisional hernia with placement of mesh. SURGEON:  Mayank Cee MD.    ASSISTANT:  Manoj Alejo CSA. ANESTHESIA:  General.    COMPLICATIONS:  None. SPECIMENS REMOVED:  None. IMPLANTS:  A 11.4 cm Ventralight Bard mesh. ESTIMATED BLOOD LOSS:  Minimal.    DETAILS OF PROCEDURE:  The patient was brought to the operating room. Anesthesia was induced. Scrubbing and draping of the abdomen were done in the usual manner. A time-out was performed. A skin incision in the left upper quadrant was performed. A Veress needle was inserted. Saline drop test was performed. Abdomen was insufflated. An 8-mm port was inserted. Abdomen was explored. There was no injury from the Veress needle or port placement. Under direct visualization, two other 8-mm ports were placed on the left side of the abdominal wall and TAP block was performed. The robot was docked. There was incarcerated falciform ligament inside an umbilical hernia, which was recurrent. The peritoneum was opened, the sac was reduced, and the falciform ligament was dissected. The hernia defect was identified. It was only about 1-1.5-cm. It was closed with a #0 V-Loc suture in a running fashion and then a 11.4-cm round Bard Ventralight mesh was placed inside the abdomen with the rough side toward the abdominal wall and smooth side toward the bowel. This was fixed with a 2-0 V-Loc 12-inch suture, two of them, to completely affix the mesh to the anterior abdominal wall. Hemostasis was secured. Needles were taken out.   The abdomen was desufflated and the skin incisions were closed with 4-0 Monocryl and Emily Loyola MD      [id number]  D:  02/24/2023 9:39  T:  02/24/2023 19:13  JOB #:  [id number]

## 2023-03-01 ENCOUNTER — TELEPHONE (OUTPATIENT)
Age: 68
End: 2023-03-01

## 2023-03-06 ENCOUNTER — OFFICE VISIT (OUTPATIENT)
Age: 68
End: 2023-03-06

## 2023-03-06 VITALS
OXYGEN SATURATION: 99 % | BODY MASS INDEX: 30.81 KG/M2 | SYSTOLIC BLOOD PRESSURE: 125 MMHG | WEIGHT: 208 LBS | HEART RATE: 59 BPM | DIASTOLIC BLOOD PRESSURE: 75 MMHG | HEIGHT: 69 IN

## 2023-03-06 DIAGNOSIS — Z09 POSTOPERATIVE FOLLOW-UP: Primary | ICD-10-CM

## 2023-03-06 PROCEDURE — 99024 POSTOP FOLLOW-UP VISIT: CPT | Performed by: SURGERY

## 2023-03-06 NOTE — PROGRESS NOTES
Sean Jean is a 79 y.o. female (: 1955) presenting to address:    Chief Complaint   Patient presents with    Post-Op Check     Repair of incarcerated incisional hernia with 11.4 ventralight bard mesh 23       Medication list and allergies have been reviewed with Sean Jean and updated as of today's date. I have gone over all Medical, Surgical and Social History with Sean Jean and updated/added the information accordingly. 1. Have you been to the ER, Urgent Care or Hospitalized since your last visit? No          2. Have you followed up with your PCP or any other Physicians since your procedure/ last office visit?    No

## 2023-03-06 NOTE — PROGRESS NOTES
Patient seen and examined. She called me last night and today stating that she has been having pain when she stands up and she wants to make sure that she did not overdo it and has recurrence of the hernia. She is tolerating regular diet and having bowel movements. She denies any pain when she is standing or laying down. On exam her abdomen is soft and nontender and her wounds are healing well. I reassured the patient that there is no evidence of recurrence of the hernia and she will follow-up with me next week on her scheduled postoperative visit.

## 2023-05-11 ENCOUNTER — OFFICE VISIT (OUTPATIENT)
Age: 68
End: 2023-05-11
Payer: MEDICARE

## 2023-05-11 VITALS
DIASTOLIC BLOOD PRESSURE: 75 MMHG | HEIGHT: 69 IN | WEIGHT: 209 LBS | OXYGEN SATURATION: 98 % | BODY MASS INDEX: 30.96 KG/M2 | SYSTOLIC BLOOD PRESSURE: 131 MMHG | HEART RATE: 60 BPM

## 2023-05-11 DIAGNOSIS — I10 ESSENTIAL (PRIMARY) HYPERTENSION: ICD-10-CM

## 2023-05-11 DIAGNOSIS — E78.2 MIXED HYPERLIPIDEMIA: ICD-10-CM

## 2023-05-11 DIAGNOSIS — Z95.5 PRESENCE OF CORONARY ANGIOPLASTY IMPLANT AND GRAFT: ICD-10-CM

## 2023-05-11 DIAGNOSIS — I25.10 ATHEROSCLEROSIS OF NATIVE CORONARY ARTERY OF NATIVE HEART WITHOUT ANGINA PECTORIS: Primary | ICD-10-CM

## 2023-05-11 PROCEDURE — 3075F SYST BP GE 130 - 139MM HG: CPT | Performed by: INTERNAL MEDICINE

## 2023-05-11 PROCEDURE — 1036F TOBACCO NON-USER: CPT | Performed by: INTERNAL MEDICINE

## 2023-05-11 PROCEDURE — G8399 PT W/DXA RESULTS DOCUMENT: HCPCS | Performed by: INTERNAL MEDICINE

## 2023-05-11 PROCEDURE — 1090F PRES/ABSN URINE INCON ASSESS: CPT | Performed by: INTERNAL MEDICINE

## 2023-05-11 PROCEDURE — 3078F DIAST BP <80 MM HG: CPT | Performed by: INTERNAL MEDICINE

## 2023-05-11 PROCEDURE — G8427 DOCREV CUR MEDS BY ELIG CLIN: HCPCS | Performed by: INTERNAL MEDICINE

## 2023-05-11 PROCEDURE — G8417 CALC BMI ABV UP PARAM F/U: HCPCS | Performed by: INTERNAL MEDICINE

## 2023-05-11 PROCEDURE — 99214 OFFICE O/P EST MOD 30 MIN: CPT | Performed by: INTERNAL MEDICINE

## 2023-05-11 PROCEDURE — 3017F COLORECTAL CA SCREEN DOC REV: CPT | Performed by: INTERNAL MEDICINE

## 2023-05-11 PROCEDURE — 1123F ACP DISCUSS/DSCN MKR DOCD: CPT | Performed by: INTERNAL MEDICINE

## 2023-05-11 NOTE — PROGRESS NOTES
1. Have you been to the ER, urgent care clinic since your last visit? Hospitalized since your last visit? Yes [facility] for hernia surgery    2. Have you seen or consulted any other health care providers outside of the [address] [place] [address] since your last visit? Include any pap smears or colon screening.         Yes PCP and gastro  and Urology 
TUBAL LIGATION  1990       Social History     Tobacco Use    Smoking status: Former    Smokeless tobacco: Never   Substance Use Topics    Alcohol use: Not Currently       Allergies   Allergen Reactions    Hydrocodone-Acetaminophen Itching    Oxycodone-Acetaminophen Itching       Prior to Admission medications    Medication Sig Start Date End Date Taking? Authorizing Provider   carvedilol (COREG) 6.25 MG tablet Take 1 tablet by mouth in the morning and 1 tablet in the evening. 9/30/20  Yes Ar Automatic Reconciliation   fluticasone (FLONASE) 50 MCG/ACT nasal spray ceived the following from [facility]: Outside name: fluticasone propionate (FLONASE) 50 mcg/actuation nasal spray 7/29/22  Yes Ar Automatic Reconciliation   ondansetron (ZOFRAN) 4 MG tablet Take 1 tablet by mouth every 8 hours as needed 1/22/23  Yes Ar Automatic Reconciliation   desvenlafaxine succinate (PRISTIQ) 50 MG TB24 extended release tablet Take 25 mg by mouth daily   Yes Historical Provider, MD   ALPRAZolam (XANAX) 0.5 MG tablet 1 tablet as needed.  1/13/23  Yes Historical Provider, MD   amLODIPine (NORVASC) 2.5 MG tablet Take 1 tablet by mouth daily 12/19/22  Yes Historical Provider, MD   atorvastatin (LIPITOR) 80 MG tablet Take 1 tablet by mouth daily 12/6/22  Yes Historical Provider, MD   clopidogrel (PLAVIX) 75 MG tablet Take 1 tablet by mouth daily 12/5/22  Yes Historical Provider, MD   ezetimibe (ZETIA) 10 MG tablet Take 1 tablet by mouth daily   Yes Historical Provider, MD   levothyroxine (SYNTHROID) 25 MCG tablet Take 3 tablets by mouth every morning (before breakfast)   Yes Historical Provider, MD   potassium chloride (KLOR-CON M) 20 MEQ extended release tablet Take 1 tablet by mouth daily 7/24/22  Yes Historical Provider, MD         /75 (Site: Left Upper Arm, Position: Sitting, Cuff Size: Medium Adult)   Pulse 60   Ht 5' 9\" (1.753 m)   Wt 209 lb (94.8 kg)   SpO2 98%   BMI 30.86 kg/m²     Physical Exam  Vitals

## 2023-09-11 RX ORDER — CLOPIDOGREL BISULFATE 75 MG/1
75 TABLET ORAL DAILY
Qty: 90 TABLET | Refills: 1 | Status: SHIPPED | OUTPATIENT
Start: 2023-09-11

## 2023-10-04 ENCOUNTER — HOSPITAL ENCOUNTER (EMERGENCY)
Facility: HOSPITAL | Age: 68
Discharge: HOME | End: 2023-10-04
Payer: MEDICARE

## 2023-10-04 ENCOUNTER — APPOINTMENT (OUTPATIENT)
Facility: HOSPITAL | Age: 68
End: 2023-10-04
Payer: MEDICARE

## 2023-10-04 VITALS
TEMPERATURE: 98.1 F | DIASTOLIC BLOOD PRESSURE: 77 MMHG | WEIGHT: 210 LBS | HEART RATE: 69 BPM | RESPIRATION RATE: 15 BRPM | BODY MASS INDEX: 31.1 KG/M2 | OXYGEN SATURATION: 98 % | HEIGHT: 69 IN | SYSTOLIC BLOOD PRESSURE: 138 MMHG

## 2023-10-04 DIAGNOSIS — R10.32 ABDOMINAL PAIN, LEFT LOWER QUADRANT: Primary | ICD-10-CM

## 2023-10-04 DIAGNOSIS — N39.0 URINARY TRACT INFECTION WITHOUT HEMATURIA, SITE UNSPECIFIED: ICD-10-CM

## 2023-10-04 LAB
ALBUMIN SERPL-MCNC: 3.8 G/DL (ref 3.4–5)
ALBUMIN/GLOB SERPL: 0.9 (ref 0.8–1.7)
ALP SERPL-CCNC: 128 U/L (ref 45–117)
ALT SERPL-CCNC: 34 U/L (ref 13–56)
ANION GAP SERPL CALC-SCNC: 3 MMOL/L (ref 3–18)
APPEARANCE UR: ABNORMAL
AST SERPL-CCNC: 26 U/L (ref 10–38)
BACTERIA URNS QL MICRO: NEGATIVE /HPF
BASOPHILS # BLD: 0 K/UL (ref 0–0.1)
BASOPHILS NFR BLD: 1 % (ref 0–2)
BILIRUB SERPL-MCNC: 0.4 MG/DL (ref 0.2–1)
BILIRUB UR QL: NEGATIVE
BUN SERPL-MCNC: 13 MG/DL (ref 7–18)
BUN/CREAT SERPL: 16 (ref 12–20)
CALCIUM SERPL-MCNC: 8.8 MG/DL (ref 8.5–10.1)
CAOX CRY URNS QL MICRO: ABNORMAL
CHLORIDE SERPL-SCNC: 107 MMOL/L (ref 100–111)
CO2 SERPL-SCNC: 29 MMOL/L (ref 21–32)
COLOR UR: ABNORMAL
CREAT SERPL-MCNC: 0.79 MG/DL (ref 0.6–1.3)
DIFFERENTIAL METHOD BLD: ABNORMAL
EOSINOPHIL # BLD: 0.2 K/UL (ref 0–0.4)
EOSINOPHIL NFR BLD: 3 % (ref 0–5)
EPITH CASTS URNS QL MICRO: ABNORMAL /LPF (ref 0–5)
ERYTHROCYTE [DISTWIDTH] IN BLOOD BY AUTOMATED COUNT: 14.9 % (ref 11.6–14.5)
GLOBULIN SER CALC-MCNC: 4.1 G/DL (ref 2–4)
GLUCOSE SERPL-MCNC: 93 MG/DL (ref 74–99)
GLUCOSE UR STRIP.AUTO-MCNC: NEGATIVE MG/DL
HCT VFR BLD AUTO: 42.8 % (ref 35–45)
HGB BLD-MCNC: 13.9 G/DL (ref 12–16)
HGB UR QL STRIP: NEGATIVE
IMM GRANULOCYTES # BLD AUTO: 0 K/UL (ref 0–0.04)
IMM GRANULOCYTES NFR BLD AUTO: 0 % (ref 0–0.5)
KETONES UR QL STRIP.AUTO: ABNORMAL MG/DL
LEUKOCYTE ESTERASE UR QL STRIP.AUTO: ABNORMAL
LIPASE SERPL-CCNC: 84 U/L (ref 73–393)
LYMPHOCYTES # BLD: 2.7 K/UL (ref 0.9–3.6)
LYMPHOCYTES NFR BLD: 39 % (ref 21–52)
MAGNESIUM SERPL-MCNC: 2.1 MG/DL (ref 1.6–2.6)
MCH RBC QN AUTO: 28 PG (ref 24–34)
MCHC RBC AUTO-ENTMCNC: 32.5 G/DL (ref 31–37)
MCV RBC AUTO: 86.3 FL (ref 78–100)
MONOCYTES # BLD: 0.6 K/UL (ref 0.05–1.2)
MONOCYTES NFR BLD: 8 % (ref 3–10)
NEUTS SEG # BLD: 3.4 K/UL (ref 1.8–8)
NEUTS SEG NFR BLD: 50 % (ref 40–73)
NITRITE UR QL STRIP.AUTO: NEGATIVE
NRBC # BLD: 0 K/UL (ref 0–0.01)
NRBC BLD-RTO: 0 PER 100 WBC
PH UR STRIP: 5.5 (ref 5–8)
PLATELET # BLD AUTO: 322 K/UL (ref 135–420)
PMV BLD AUTO: 9.8 FL (ref 9.2–11.8)
POTASSIUM SERPL-SCNC: 3.7 MMOL/L (ref 3.5–5.5)
PROT SERPL-MCNC: 7.9 G/DL (ref 6.4–8.2)
PROT UR STRIP-MCNC: ABNORMAL MG/DL
RBC # BLD AUTO: 4.96 M/UL (ref 4.2–5.3)
RBC #/AREA URNS HPF: NEGATIVE /HPF (ref 0–5)
SODIUM SERPL-SCNC: 139 MMOL/L (ref 136–145)
SP GR UR REFRACTOMETRY: 1.02 (ref 1–1.03)
UROBILINOGEN UR QL STRIP.AUTO: 1 EU/DL (ref 0.2–1)
WBC # BLD AUTO: 6.9 K/UL (ref 4.6–13.2)
WBC URNS QL MICRO: ABNORMAL /HPF (ref 0–4)

## 2023-10-04 PROCEDURE — 6360000004 HC RX CONTRAST MEDICATION: Performed by: PHYSICIAN ASSISTANT

## 2023-10-04 PROCEDURE — 99285 EMERGENCY DEPT VISIT HI MDM: CPT

## 2023-10-04 PROCEDURE — 83690 ASSAY OF LIPASE: CPT

## 2023-10-04 PROCEDURE — 96374 THER/PROPH/DIAG INJ IV PUSH: CPT

## 2023-10-04 PROCEDURE — 81001 URINALYSIS AUTO W/SCOPE: CPT

## 2023-10-04 PROCEDURE — 80053 COMPREHEN METABOLIC PANEL: CPT

## 2023-10-04 PROCEDURE — 85025 COMPLETE CBC W/AUTO DIFF WBC: CPT

## 2023-10-04 PROCEDURE — 83735 ASSAY OF MAGNESIUM: CPT

## 2023-10-04 PROCEDURE — 74177 CT ABD & PELVIS W/CONTRAST: CPT

## 2023-10-04 PROCEDURE — 6360000002 HC RX W HCPCS: Performed by: PHYSICIAN ASSISTANT

## 2023-10-04 RX ORDER — ONDANSETRON 4 MG/1
4 TABLET, FILM COATED ORAL 3 TIMES DAILY PRN
Qty: 15 TABLET | Refills: 0 | Status: SHIPPED | OUTPATIENT
Start: 2023-10-04

## 2023-10-04 RX ORDER — ONDANSETRON 2 MG/ML
4 INJECTION INTRAMUSCULAR; INTRAVENOUS
Status: COMPLETED | OUTPATIENT
Start: 2023-10-04 | End: 2023-10-04

## 2023-10-04 RX ORDER — NITROFURANTOIN 25; 75 MG/1; MG/1
100 CAPSULE ORAL 2 TIMES DAILY
Qty: 10 CAPSULE | Refills: 0 | Status: SHIPPED | OUTPATIENT
Start: 2023-10-04 | End: 2023-10-09

## 2023-10-04 RX ADMIN — ONDANSETRON 4 MG: 2 INJECTION INTRAMUSCULAR; INTRAVENOUS at 16:54

## 2023-10-04 RX ADMIN — ONDANSETRON 4 MG: 2 INJECTION INTRAMUSCULAR; INTRAVENOUS at 20:04

## 2023-10-04 RX ADMIN — IOPAMIDOL 100 ML: 612 INJECTION, SOLUTION INTRAVENOUS at 18:24

## 2023-10-04 ASSESSMENT — ENCOUNTER SYMPTOMS
VOMITING: 0
EYE DISCHARGE: 0
WHEEZING: 0
ABDOMINAL DISTENTION: 0
DIARRHEA: 0
SORE THROAT: 0
SHORTNESS OF BREATH: 0
ABDOMINAL PAIN: 1
NAUSEA: 1

## 2023-10-04 NOTE — ED PROVIDER NOTES
succinate 50 MG Tb24 extended release tablet  Commonly known as: PRISTIQ     ezetimibe 10 MG tablet  Commonly known as: ZETIA     fluticasone 50 MCG/ACT nasal spray  Commonly known as: FLONASE     levothyroxine 25 MCG tablet  Commonly known as: SYNTHROID     potassium chloride 20 MEQ extended release tablet  Commonly known as: KLOR-CON M               Where to Get Your Medications        These medications were sent to [facility] [id number] [facility], [address] [place] [address],[id number]      Phone: [phone]   nitrofurantoin (macrocrystal-monohydrate) 100 MG capsule  ondansetron 4 MG tablet             Diagnosis     Clinical Impression:   1. Abdominal pain, left lower quadrant    2. Urinary tract infection without hematuria, site unspecified          \"Please note that this dictation was completed with Youtego, the computer voice recognition software. Quite often unanticipated grammatical, syntax, homophones, and other interpretive errors are inadvertently transcribed by the computer software. Please disregard these errors. Please excuse any errors that have escaped final proofreading. \"     Alma Flores  10/04/23 1958

## 2023-10-04 NOTE — ED TRIAGE NOTES
Patient states that she was evaluated at [facility] urgent care and was told she has the flu. Pt states that ten after this she was evaluated by her PCP and was told she has severe Diverticulosis and she has been prescribed antibiotics. Pt states that she is experiencing severe nausea that doesn't seem to improve.

## 2023-11-03 RX ORDER — EZETIMIBE 10 MG/1
10 TABLET ORAL EVERY MORNING
Qty: 90 TABLET | Refills: 1 | Status: SHIPPED | OUTPATIENT
Start: 2023-11-03

## 2023-12-22 ENCOUNTER — HOSPITAL ENCOUNTER (OUTPATIENT)
Facility: HOSPITAL | Age: 68
Discharge: HOME | End: 2023-12-25
Payer: MEDICARE

## 2023-12-22 VITALS — WEIGHT: 210 LBS | HEIGHT: 69 IN | BODY MASS INDEX: 31.1 KG/M2

## 2023-12-22 DIAGNOSIS — Z12.31 VISIT FOR SCREENING MAMMOGRAM: ICD-10-CM

## 2023-12-22 PROCEDURE — 77063 BREAST TOMOSYNTHESIS BI: CPT

## 2024-01-02 ENCOUNTER — OFFICE VISIT (OUTPATIENT)
Age: 69
End: 2024-01-02
Payer: MEDICARE

## 2024-01-02 VITALS
DIASTOLIC BLOOD PRESSURE: 81 MMHG | HEIGHT: 69 IN | HEART RATE: 56 BPM | SYSTOLIC BLOOD PRESSURE: 128 MMHG | WEIGHT: 209 LBS | BODY MASS INDEX: 30.96 KG/M2 | OXYGEN SATURATION: 97 %

## 2024-01-02 DIAGNOSIS — E78.2 MIXED HYPERLIPIDEMIA: ICD-10-CM

## 2024-01-02 DIAGNOSIS — Z95.5 PRESENCE OF CORONARY ANGIOPLASTY IMPLANT AND GRAFT: ICD-10-CM

## 2024-01-02 DIAGNOSIS — I10 ESSENTIAL (PRIMARY) HYPERTENSION: ICD-10-CM

## 2024-01-02 DIAGNOSIS — I25.10 ATHEROSCLEROSIS OF NATIVE CORONARY ARTERY OF NATIVE HEART WITHOUT ANGINA PECTORIS: Primary | ICD-10-CM

## 2024-01-02 PROCEDURE — 1123F ACP DISCUSS/DSCN MKR DOCD: CPT | Performed by: NURSE PRACTITIONER

## 2024-01-02 PROCEDURE — 3074F SYST BP LT 130 MM HG: CPT | Performed by: NURSE PRACTITIONER

## 2024-01-02 PROCEDURE — G8484 FLU IMMUNIZE NO ADMIN: HCPCS | Performed by: NURSE PRACTITIONER

## 2024-01-02 PROCEDURE — 3079F DIAST BP 80-89 MM HG: CPT | Performed by: NURSE PRACTITIONER

## 2024-01-02 PROCEDURE — G8428 CUR MEDS NOT DOCUMENT: HCPCS | Performed by: NURSE PRACTITIONER

## 2024-01-02 PROCEDURE — 1036F TOBACCO NON-USER: CPT | Performed by: NURSE PRACTITIONER

## 2024-01-02 PROCEDURE — 1090F PRES/ABSN URINE INCON ASSESS: CPT | Performed by: NURSE PRACTITIONER

## 2024-01-02 PROCEDURE — 99214 OFFICE O/P EST MOD 30 MIN: CPT | Performed by: NURSE PRACTITIONER

## 2024-01-02 PROCEDURE — G8399 PT W/DXA RESULTS DOCUMENT: HCPCS | Performed by: NURSE PRACTITIONER

## 2024-01-02 PROCEDURE — G8417 CALC BMI ABV UP PARAM F/U: HCPCS | Performed by: NURSE PRACTITIONER

## 2024-01-02 PROCEDURE — 3017F COLORECTAL CA SCREEN DOC REV: CPT | Performed by: NURSE PRACTITIONER

## 2024-01-02 NOTE — PROGRESS NOTES
1. Have you been to the ER, urgent care clinic since your last visit?  Hospitalized since your last visit?Yes When: 10/4/23 Where: [facility] Reason for visit: Abdominal Pain    2. Have you seen or consulted any other health care providers outside of the [facility] since your last visit?  Include any pap smears or colon screening. No  
maximum dose of statin.  If needed use PCSK9 inhibitor.  Blood pressure elevated.  My chart blood pressure links-her home readings are normal  11/2021  Stable cardiac status.  We will continue Plavix and discontinue aspirin as it is about 16-month after the stent.  Continue dietary modification.  Follow-up lipid profile  5/2022  Stable cardiac status continue current treatment.  Labs reviewed.  Followed by GI.  Possible need for colonoscopy.  Okay to hold Plavix during that time.  Consider baby aspirin during that time.  Okay for back injection as needed and interruption of Plavix for that.  11/2022  Blood pressure elevated continue monitoring.  Continue to monitor recordings and decide on adjustment of medicine  Stable cardiac status continue current medical management monitor  5/2023  Patient admission with renal stone at that time also had hernia surgery.  Cardiac status stable.  Blood pressure controlled continue current medical management also has carotid artery disease for which she is following with Dr. Maldonado  1/2024  Cardiac status is stable.  Now following with vascular for carotid artery disease.  B/P is controlled, will continue current medications.  Will request copy of labs from PCP.

## 2024-01-10 ENCOUNTER — OFFICE VISIT (OUTPATIENT)
Age: 69
End: 2024-01-10
Payer: MEDICARE

## 2024-01-10 DIAGNOSIS — Z96.652 HISTORY OF LEFT KNEE REPLACEMENT: ICD-10-CM

## 2024-01-10 DIAGNOSIS — M17.12 UNILATERAL PRIMARY OSTEOARTHRITIS, LEFT KNEE: Primary | ICD-10-CM

## 2024-01-10 DIAGNOSIS — M89.8X9 HETEROTOPIC OSSIFICATION: ICD-10-CM

## 2024-01-10 PROCEDURE — G8417 CALC BMI ABV UP PARAM F/U: HCPCS | Performed by: PHYSICIAN ASSISTANT

## 2024-01-10 PROCEDURE — 3017F COLORECTAL CA SCREEN DOC REV: CPT | Performed by: PHYSICIAN ASSISTANT

## 2024-01-10 PROCEDURE — 73562 X-RAY EXAM OF KNEE 3: CPT | Performed by: PHYSICIAN ASSISTANT

## 2024-01-10 PROCEDURE — 99214 OFFICE O/P EST MOD 30 MIN: CPT | Performed by: PHYSICIAN ASSISTANT

## 2024-01-10 PROCEDURE — 1123F ACP DISCUSS/DSCN MKR DOCD: CPT | Performed by: PHYSICIAN ASSISTANT

## 2024-01-10 PROCEDURE — 1036F TOBACCO NON-USER: CPT | Performed by: PHYSICIAN ASSISTANT

## 2024-01-10 PROCEDURE — 1090F PRES/ABSN URINE INCON ASSESS: CPT | Performed by: PHYSICIAN ASSISTANT

## 2024-01-10 PROCEDURE — G8428 CUR MEDS NOT DOCUMENT: HCPCS | Performed by: PHYSICIAN ASSISTANT

## 2024-01-10 PROCEDURE — G8399 PT W/DXA RESULTS DOCUMENT: HCPCS | Performed by: PHYSICIAN ASSISTANT

## 2024-01-10 PROCEDURE — G8484 FLU IMMUNIZE NO ADMIN: HCPCS | Performed by: PHYSICIAN ASSISTANT

## 2024-01-10 NOTE — PROGRESS NOTES
Patient: Nicki Saunders                MRN: [id number]       SSN: [id number]  YOB: 1955        AGE: 68 y.o.        SEX: female  There is no height or weight on file to calculate BMI.    PCP: Alondra Balderrama PA  01/10/24      This office note has been dictated.      REVIEW OF SYSTEMS:  Constitutional: Negative for fever, chills, weight loss and malaise/fatigue.   HENT: Negative.    Eyes: Negative.    Respiratory: Negative.   Cardiovascular: Negative.   Gastrointestinal: No bowel incontinence or constipation.  Genitourinary: No bladder incontinence or saddle anesthesia.  Skin: Negative.   Neurological: Negative.    Endo/Heme/Allergies: Negative.    Psychiatric/Behavioral: Negative.  Musculoskeletal: As per HPI above.     Past Medical History:   Diagnosis Date    Arthritis     CAD (coronary artery disease)     Calcific Achilles tendonitis     Right; insertional    Essential hypertension 07/13/2020    Hypercholesterolemia     Hypertension     Left knee pain     Low blood potassium     Menopause     Pain of right heel     S/P coronary artery stent placement 07/2020    Sprain of right ring finger     STEMI (ST elevation myocardial infarction) ([facility]) 07/2020    Thyroid disease     low    Vertigo     Wears glasses          Current Outpatient Medications:     ezetimibe (ZETIA) 10 MG tablet, TAKE ONE TABLET BY MOUTH EVERY MORNING, Disp: 90 tablet, Rfl: 1    ondansetron (ZOFRAN) 4 MG tablet, Take 1 tablet by mouth 3 times daily as needed for Nausea or Vomiting, Disp: 15 tablet, Rfl: 0    clopidogrel (PLAVIX) 75 MG tablet, TAKE ONE TABLET BY MOUTH DAILY, Disp: 90 tablet, Rfl: 1    carvedilol (COREG) 6.25 MG tablet, Take 1 tablet by mouth in the morning and 1 tablet in the evening., Disp: , Rfl:     fluticasone (FLONASE) 50 MCG/ACT nasal spray, ceived the following from [facility]: Outside name: fluticasone propionate (FLONASE) 50 mcg/actuation nasal spray, Disp: , Rfl:     ondansetron

## 2024-01-11 ENCOUNTER — HOSPITAL ENCOUNTER (OUTPATIENT)
Facility: HOSPITAL | Age: 69
Discharge: HOME | End: 2024-01-11
Payer: MEDICARE

## 2024-01-11 DIAGNOSIS — R92.8 ABNORMAL MAMMOGRAM OF LEFT BREAST: ICD-10-CM

## 2024-01-11 PROCEDURE — 76642 ULTRASOUND BREAST LIMITED: CPT

## 2024-02-29 ENCOUNTER — HOSPITAL ENCOUNTER (OUTPATIENT)
Facility: HOSPITAL | Age: 69
Discharge: HOME | End: 2024-02-29
Payer: MEDICARE

## 2024-02-29 DIAGNOSIS — Z96.652 HISTORY OF LEFT KNEE REPLACEMENT: ICD-10-CM

## 2024-02-29 LAB
BASOPHILS # BLD: 0 K/UL (ref 0–0.1)
BASOPHILS NFR BLD: 1 % (ref 0–2)
CRP SERPL-MCNC: <0.3 MG/DL (ref 0–0.3)
DIFFERENTIAL METHOD BLD: ABNORMAL
EOSINOPHIL # BLD: 0.2 K/UL (ref 0–0.4)
EOSINOPHIL NFR BLD: 3 % (ref 0–5)
ERYTHROCYTE [DISTWIDTH] IN BLOOD BY AUTOMATED COUNT: 15 % (ref 11.6–14.5)
ERYTHROCYTE [SEDIMENTATION RATE] IN BLOOD: 26 MM/HR (ref 0–30)
HCT VFR BLD AUTO: 38.9 % (ref 35–45)
HGB BLD-MCNC: 12.7 G/DL (ref 12–16)
IMM GRANULOCYTES # BLD AUTO: 0 K/UL (ref 0–0.04)
IMM GRANULOCYTES NFR BLD AUTO: 0 % (ref 0–0.5)
LYMPHOCYTES # BLD: 2.5 K/UL (ref 0.9–3.6)
LYMPHOCYTES NFR BLD: 43 % (ref 21–52)
MCH RBC QN AUTO: 28.7 PG (ref 24–34)
MCHC RBC AUTO-ENTMCNC: 32.6 G/DL (ref 31–37)
MCV RBC AUTO: 87.8 FL (ref 78–100)
MONOCYTES # BLD: 0.5 K/UL (ref 0.05–1.2)
MONOCYTES NFR BLD: 8 % (ref 3–10)
NEUTS SEG # BLD: 2.6 K/UL (ref 1.8–8)
NEUTS SEG NFR BLD: 44 % (ref 40–73)
NRBC # BLD: 0 K/UL (ref 0–0.01)
NRBC BLD-RTO: 0 PER 100 WBC
PLATELET # BLD AUTO: 330 K/UL (ref 135–420)
PMV BLD AUTO: 10.7 FL (ref 9.2–11.8)
RBC # BLD AUTO: 4.43 M/UL (ref 4.2–5.3)
URATE SERPL-MCNC: 5 MG/DL (ref 2.6–7.2)
WBC # BLD AUTO: 5.9 K/UL (ref 4.6–13.2)

## 2024-02-29 PROCEDURE — 86200 CCP ANTIBODY: CPT

## 2024-02-29 PROCEDURE — 84550 ASSAY OF BLOOD/URIC ACID: CPT

## 2024-02-29 PROCEDURE — 85652 RBC SED RATE AUTOMATED: CPT

## 2024-02-29 PROCEDURE — 36415 COLL VENOUS BLD VENIPUNCTURE: CPT

## 2024-02-29 PROCEDURE — 83529 ASAY OF INTERLEUKIN-6 (IL-6): CPT

## 2024-02-29 PROCEDURE — 85025 COMPLETE CBC W/AUTO DIFF WBC: CPT

## 2024-02-29 PROCEDURE — 86140 C-REACTIVE PROTEIN: CPT

## 2024-03-01 LAB
CCP IGA+IGG SERPL IA-ACNC: 5 UNITS (ref 0–19)
IL6 SERPL-MCNC: 6.3 PG/ML (ref 0–13)

## 2024-03-05 ENCOUNTER — HOSPITAL ENCOUNTER (OUTPATIENT)
Facility: HOSPITAL | Age: 69
Discharge: HOME | End: 2024-03-08
Payer: MEDICARE

## 2024-03-05 DIAGNOSIS — E03.9 HYPOTHYROIDISM, UNSPECIFIED TYPE: ICD-10-CM

## 2024-03-05 DIAGNOSIS — I10 HYPERTENSION, ESSENTIAL: ICD-10-CM

## 2024-03-05 DIAGNOSIS — E55.9 VITAMIN D DEFICIENCY: ICD-10-CM

## 2024-03-05 DIAGNOSIS — E78.00 HYPERCHOLESTEREMIA: ICD-10-CM

## 2024-03-05 LAB
25(OH)D3 SERPL-MCNC: 7.2 NG/ML (ref 30–100)
ALBUMIN SERPL-MCNC: 3.8 G/DL (ref 3.4–5)
ALBUMIN/GLOB SERPL: 1.1 (ref 0.8–1.7)
ALP SERPL-CCNC: 123 U/L (ref 45–117)
ALT SERPL-CCNC: 19 U/L (ref 13–56)
ANION GAP SERPL CALC-SCNC: 2 MMOL/L (ref 3–18)
AST SERPL-CCNC: 15 U/L (ref 10–38)
BASOPHILS # BLD: 0.1 K/UL (ref 0–0.1)
BASOPHILS NFR BLD: 1 % (ref 0–2)
BILIRUB SERPL-MCNC: 0.8 MG/DL (ref 0.2–1)
BUN SERPL-MCNC: 13 MG/DL (ref 7–18)
BUN/CREAT SERPL: 21 (ref 12–20)
CALCIUM SERPL-MCNC: 9.1 MG/DL (ref 8.5–10.1)
CHLORIDE SERPL-SCNC: 107 MMOL/L (ref 100–111)
CHOLEST SERPL-MCNC: 195 MG/DL
CO2 SERPL-SCNC: 30 MMOL/L (ref 21–32)
CREAT SERPL-MCNC: 0.61 MG/DL (ref 0.6–1.3)
DIFFERENTIAL METHOD BLD: ABNORMAL
EOSINOPHIL # BLD: 0.2 K/UL (ref 0–0.4)
EOSINOPHIL NFR BLD: 3 % (ref 0–5)
ERYTHROCYTE [DISTWIDTH] IN BLOOD BY AUTOMATED COUNT: 14.6 % (ref 11.6–14.5)
GLOBULIN SER CALC-MCNC: 3.4 G/DL (ref 2–4)
GLUCOSE SERPL-MCNC: 91 MG/DL (ref 74–99)
HCT VFR BLD AUTO: 40.2 % (ref 35–45)
HDLC SERPL-MCNC: 59 MG/DL (ref 40–60)
HDLC SERPL: 3.3 (ref 0–5)
HGB BLD-MCNC: 13.3 G/DL (ref 12–16)
IMM GRANULOCYTES # BLD AUTO: 0 K/UL (ref 0–0.04)
IMM GRANULOCYTES NFR BLD AUTO: 0 % (ref 0–0.5)
LDLC SERPL CALC-MCNC: 117 MG/DL (ref 0–100)
LIPID PANEL: ABNORMAL
LYMPHOCYTES # BLD: 2.1 K/UL (ref 0.9–3.6)
LYMPHOCYTES NFR BLD: 42 % (ref 21–52)
MCH RBC QN AUTO: 28.6 PG (ref 24–34)
MCHC RBC AUTO-ENTMCNC: 33.1 G/DL (ref 31–37)
MCV RBC AUTO: 86.5 FL (ref 78–100)
MONOCYTES # BLD: 0.4 K/UL (ref 0.05–1.2)
MONOCYTES NFR BLD: 7 % (ref 3–10)
NEUTS SEG # BLD: 2.3 K/UL (ref 1.8–8)
NEUTS SEG NFR BLD: 46 % (ref 40–73)
NRBC # BLD: 0 K/UL (ref 0–0.01)
NRBC BLD-RTO: 0 PER 100 WBC
PLATELET # BLD AUTO: 338 K/UL (ref 135–420)
PMV BLD AUTO: 10.4 FL (ref 9.2–11.8)
POTASSIUM SERPL-SCNC: 4.2 MMOL/L (ref 3.5–5.5)
PROT SERPL-MCNC: 7.2 G/DL (ref 6.4–8.2)
RBC # BLD AUTO: 4.65 M/UL (ref 4.2–5.3)
SODIUM SERPL-SCNC: 139 MMOL/L (ref 136–145)
T4 FREE SERPL-MCNC: 1 NG/DL (ref 0.7–1.5)
TRIGL SERPL-MCNC: 95 MG/DL
TSH SERPL DL<=0.05 MIU/L-ACNC: 1.8 UIU/ML (ref 0.36–3.74)
VLDLC SERPL CALC-MCNC: 19 MG/DL
WBC # BLD AUTO: 5 K/UL (ref 4.6–13.2)

## 2024-03-05 PROCEDURE — 82306 VITAMIN D 25 HYDROXY: CPT

## 2024-03-05 PROCEDURE — 80061 LIPID PANEL: CPT

## 2024-03-05 PROCEDURE — 80053 COMPREHEN METABOLIC PANEL: CPT

## 2024-03-05 PROCEDURE — 84443 ASSAY THYROID STIM HORMONE: CPT

## 2024-03-05 PROCEDURE — 85025 COMPLETE CBC W/AUTO DIFF WBC: CPT

## 2024-03-05 PROCEDURE — 84439 ASSAY OF FREE THYROXINE: CPT

## 2024-03-05 PROCEDURE — 36415 COLL VENOUS BLD VENIPUNCTURE: CPT

## 2024-03-06 RX ORDER — CLOPIDOGREL BISULFATE 75 MG/1
75 TABLET ORAL DAILY
Qty: 90 TABLET | Refills: 1 | Status: SHIPPED | OUTPATIENT
Start: 2024-03-06

## 2024-05-01 RX ORDER — EZETIMIBE 10 MG/1
10 TABLET ORAL EVERY MORNING
Qty: 90 TABLET | Refills: 1 | Status: SHIPPED | OUTPATIENT
Start: 2024-05-01

## 2024-05-30 ENCOUNTER — HOSPITAL ENCOUNTER (EMERGENCY)
Facility: HOSPITAL | Age: 69
Discharge: HOME | End: 2024-05-30
Attending: EMERGENCY MEDICINE
Payer: MEDICARE

## 2024-05-30 ENCOUNTER — APPOINTMENT (OUTPATIENT)
Facility: HOSPITAL | Age: 69
End: 2024-05-30
Payer: MEDICARE

## 2024-05-30 VITALS
OXYGEN SATURATION: 92 % | TEMPERATURE: 97.8 F | HEIGHT: 69 IN | DIASTOLIC BLOOD PRESSURE: 75 MMHG | RESPIRATION RATE: 16 BRPM | SYSTOLIC BLOOD PRESSURE: 157 MMHG | WEIGHT: 207 LBS | BODY MASS INDEX: 30.66 KG/M2 | HEART RATE: 51 BPM

## 2024-05-30 DIAGNOSIS — M25.512 ACUTE PAIN OF LEFT SHOULDER: Primary | ICD-10-CM

## 2024-05-30 LAB
ANION GAP SERPL CALC-SCNC: 3 MMOL/L (ref 3–18)
BASOPHILS # BLD: 0 K/UL (ref 0–0.1)
BASOPHILS NFR BLD: 1 % (ref 0–2)
BUN SERPL-MCNC: 17 MG/DL (ref 7–18)
BUN/CREAT SERPL: 23 (ref 12–20)
CALCIUM SERPL-MCNC: 8.7 MG/DL (ref 8.5–10.1)
CHLORIDE SERPL-SCNC: 106 MMOL/L (ref 100–111)
CO2 SERPL-SCNC: 27 MMOL/L (ref 21–32)
CREAT SERPL-MCNC: 0.74 MG/DL (ref 0.6–1.3)
DIFFERENTIAL METHOD BLD: NORMAL
EKG ATRIAL RATE: 54 BPM
EKG DIAGNOSIS: NORMAL
EKG P AXIS: 6 DEGREES
EKG P-R INTERVAL: 228 MS
EKG Q-T INTERVAL: 440 MS
EKG QRS DURATION: 78 MS
EKG QTC CALCULATION (BAZETT): 417 MS
EKG R AXIS: 8 DEGREES
EKG T AXIS: 7 DEGREES
EKG VENTRICULAR RATE: 54 BPM
EOSINOPHIL # BLD: 0.2 K/UL (ref 0–0.4)
EOSINOPHIL NFR BLD: 3 % (ref 0–5)
ERYTHROCYTE [DISTWIDTH] IN BLOOD BY AUTOMATED COUNT: 14.3 % (ref 11.6–14.5)
GLUCOSE SERPL-MCNC: 87 MG/DL (ref 74–99)
HCT VFR BLD AUTO: 38.5 % (ref 35–45)
HGB BLD-MCNC: 12.5 G/DL (ref 12–16)
IMM GRANULOCYTES # BLD AUTO: 0 K/UL (ref 0–0.04)
IMM GRANULOCYTES NFR BLD AUTO: 0 % (ref 0–0.5)
LYMPHOCYTES # BLD: 2.3 K/UL (ref 0.9–3.6)
LYMPHOCYTES NFR BLD: 48 % (ref 21–52)
MCH RBC QN AUTO: 28 PG (ref 24–34)
MCHC RBC AUTO-ENTMCNC: 32.5 G/DL (ref 31–37)
MCV RBC AUTO: 86.3 FL (ref 78–100)
MONOCYTES # BLD: 0.4 K/UL (ref 0.05–1.2)
MONOCYTES NFR BLD: 9 % (ref 3–10)
NEUTS SEG # BLD: 1.9 K/UL (ref 1.8–8)
NEUTS SEG NFR BLD: 40 % (ref 40–73)
NRBC # BLD: 0 K/UL (ref 0–0.01)
NRBC BLD-RTO: 0 PER 100 WBC
PLATELET # BLD AUTO: 266 K/UL (ref 135–420)
PMV BLD AUTO: 10.3 FL (ref 9.2–11.8)
POTASSIUM SERPL-SCNC: 4.1 MMOL/L (ref 3.5–5.5)
RBC # BLD AUTO: 4.46 M/UL (ref 4.2–5.3)
SODIUM SERPL-SCNC: 136 MMOL/L (ref 136–145)
TROPONIN I SERPL HS-MCNC: 9 NG/L (ref 0–54)
WBC # BLD AUTO: 4.8 K/UL (ref 4.6–13.2)

## 2024-05-30 PROCEDURE — 94761 N-INVAS EAR/PLS OXIMETRY MLT: CPT

## 2024-05-30 PROCEDURE — 93010 ELECTROCARDIOGRAM REPORT: CPT | Performed by: INTERNAL MEDICINE

## 2024-05-30 PROCEDURE — 84484 ASSAY OF TROPONIN QUANT: CPT

## 2024-05-30 PROCEDURE — 99285 EMERGENCY DEPT VISIT HI MDM: CPT

## 2024-05-30 PROCEDURE — 73030 X-RAY EXAM OF SHOULDER: CPT

## 2024-05-30 PROCEDURE — 93005 ELECTROCARDIOGRAM TRACING: CPT | Performed by: EMERGENCY MEDICINE

## 2024-05-30 PROCEDURE — 6370000000 HC RX 637 (ALT 250 FOR IP)

## 2024-05-30 PROCEDURE — 80048 BASIC METABOLIC PNL TOTAL CA: CPT

## 2024-05-30 PROCEDURE — 85025 COMPLETE CBC W/AUTO DIFF WBC: CPT

## 2024-05-30 RX ORDER — METHYLPREDNISOLONE 4 MG/1
TABLET ORAL
Qty: 1 KIT | Refills: 0 | Status: SHIPPED | OUTPATIENT
Start: 2024-05-30 | End: 2024-06-05

## 2024-05-30 RX ORDER — LIDOCAINE 50 MG/G
1 PATCH TOPICAL DAILY
Qty: 7 PATCH | Refills: 0 | Status: SHIPPED | OUTPATIENT
Start: 2024-05-30 | End: 2024-06-06

## 2024-05-30 RX ORDER — HYDROCODONE BITARTRATE AND ACETAMINOPHEN 5; 325 MG/1; MG/1
1 TABLET ORAL EVERY 8 HOURS PRN
Qty: 6 TABLET | Refills: 0 | Status: SHIPPED | OUTPATIENT
Start: 2024-05-30 | End: 2024-06-02

## 2024-05-30 RX ADMIN — PREDNISONE 50 MG: 20 TABLET ORAL at 10:33

## 2024-05-30 ASSESSMENT — ENCOUNTER SYMPTOMS
DIARRHEA: 0
CHEST TIGHTNESS: 0
VOMITING: 0
SHORTNESS OF BREATH: 0
ABDOMINAL PAIN: 0
CONSTIPATION: 0
COUGH: 0
NAUSEA: 0

## 2024-05-30 ASSESSMENT — PAIN SCALES - GENERAL: PAINLEVEL_OUTOF10: 7

## 2024-05-30 ASSESSMENT — PAIN DESCRIPTION - ORIENTATION: ORIENTATION: LEFT

## 2024-05-30 ASSESSMENT — PAIN DESCRIPTION - LOCATION: LOCATION: SHOULDER

## 2024-05-30 ASSESSMENT — PAIN - FUNCTIONAL ASSESSMENT: PAIN_FUNCTIONAL_ASSESSMENT: 0-10

## 2024-05-30 NOTE — ED NOTES
Pt provided AVS and d/c instructions. Pt v/u and had no further questions. Pt will f/u with ortho. . Pt is ambulatory and d/c home in no distress

## 2024-05-30 NOTE — ED NOTES
Recvd bedside report from ANGELY Lozano    Pt requesting pain rx, L sided shoulder pain x 2 weeks.

## 2024-05-30 NOTE — ED TRIAGE NOTES
Pt arrived via triage from home, ambulatory. Pt c/o left shoulder pain. Denies any injury pt has been taking Tylenol and flexeril and using a heating pad with no relief.

## 2024-05-30 NOTE — ED PROVIDER NOTES
EMERGENCY DEPARTMENT HISTORY AND PHYSICAL EXAM    4:15 PM      Date: 5/30/2024  Patient Name: Nicki Saunders    History of Presenting Illness     Chief Complaint   Patient presents with    Shoulder Pain         History Provided By: the patient.     Additional History (Context): Nicki Saunders is a 68 y.o. female with a history of arthritis, CAD, hypertension, prior STEMI presenting to the emergency department due to left shoulder pain.  Patient reports that symptoms have been going on for the last 2 weeks.  States that it is only painful with movement.  Reports when she is just sitting here it feels fine.  Reports that she has been taking Flexeril, Tylenol, and using topical patches with no relief.  States that she scheduled an orthopedic appointment, but cannot get in until June 18.  Denies any chest pain or shortness of breath.  Denies fever or chills.  Denies any known injury, however states that she is frequently lifting heavy objects due to working in the .    PCP: Adam Roman, NPNedC    No current facility-administered medications for this encounter.     Current Outpatient Medications   Medication Sig Dispense Refill    methylPREDNISolone (MEDROL, MACKENZIE,) 4 MG tablet Take by mouth. 1 kit 0    lidocaine (LIDODERM) 5 % Place 1 patch onto the skin daily for 7 days 12 hours on, 12 hours off. 7 patch 0    HYDROcodone-acetaminophen (NORCO) 5-325 MG per tablet Take 1 tablet by mouth every 8 hours as needed for Pain for up to 3 days. Intended supply: 3 days. Take lowest dose possible to manage pain Max Daily Amount: 3 tablets 6 tablet 0    ezetimibe (ZETIA) 10 MG tablet TAKE ONE TABLET BY MOUTH EVERY MORNING 90 tablet 1    clopidogrel (PLAVIX) 75 MG tablet TAKE 1 TABLET BY MOUTH DAILY 90 tablet 1    ondansetron (ZOFRAN) 4 MG tablet Take 1 tablet by mouth 3 times daily as needed for Nausea or Vomiting 15 tablet 0    carvedilol (COREG) 6.25 MG tablet Take 1 tablet by mouth in the morning and 1 tablet in the

## 2024-05-30 NOTE — DISCHARGE INSTRUCTIONS
Begin taking Medrol Dosepak as prescribed.  Begin placing Lidoderm patch to the area for 7 days.  Alternate ice and heat.  Only take Norco as needed for pain.  This medication will make you drowsy so do not take prior to driving or operating heavy machinery.  Follow-up with orthopedics within the next 2 days in order to be reevaluated.  Return to the ED with any new or worsening symptoms.

## 2024-06-18 ENCOUNTER — OFFICE VISIT (OUTPATIENT)
Age: 69
End: 2024-06-18
Payer: MEDICARE

## 2024-06-18 VITALS — RESPIRATION RATE: 16 BRPM | HEIGHT: 69 IN | BODY MASS INDEX: 30.57 KG/M2

## 2024-06-18 DIAGNOSIS — M75.102 ROTATOR CUFF SYNDROME, LEFT: Primary | ICD-10-CM

## 2024-06-18 PROCEDURE — G8417 CALC BMI ABV UP PARAM F/U: HCPCS | Performed by: ORTHOPAEDIC SURGERY

## 2024-06-18 PROCEDURE — 1123F ACP DISCUSS/DSCN MKR DOCD: CPT | Performed by: ORTHOPAEDIC SURGERY

## 2024-06-18 PROCEDURE — G8427 DOCREV CUR MEDS BY ELIG CLIN: HCPCS | Performed by: ORTHOPAEDIC SURGERY

## 2024-06-18 PROCEDURE — 99214 OFFICE O/P EST MOD 30 MIN: CPT | Performed by: ORTHOPAEDIC SURGERY

## 2024-06-18 PROCEDURE — 20610 DRAIN/INJ JOINT/BURSA W/O US: CPT | Performed by: ORTHOPAEDIC SURGERY

## 2024-06-18 PROCEDURE — 3017F COLORECTAL CA SCREEN DOC REV: CPT | Performed by: ORTHOPAEDIC SURGERY

## 2024-06-18 PROCEDURE — 1036F TOBACCO NON-USER: CPT | Performed by: ORTHOPAEDIC SURGERY

## 2024-06-18 PROCEDURE — 1090F PRES/ABSN URINE INCON ASSESS: CPT | Performed by: ORTHOPAEDIC SURGERY

## 2024-06-18 PROCEDURE — G8399 PT W/DXA RESULTS DOCUMENT: HCPCS | Performed by: ORTHOPAEDIC SURGERY

## 2024-06-18 RX ORDER — LIDOCAINE HYDROCHLORIDE 10 MG/ML
3 INJECTION, SOLUTION INFILTRATION; PERINEURAL ONCE
Status: COMPLETED | OUTPATIENT
Start: 2024-06-18 | End: 2024-06-18

## 2024-06-18 RX ORDER — TRIAMCINOLONE ACETONIDE 40 MG/ML
40 INJECTION, SUSPENSION INTRA-ARTICULAR; INTRAMUSCULAR ONCE
Status: COMPLETED | OUTPATIENT
Start: 2024-06-18 | End: 2024-06-18

## 2024-06-18 RX ADMIN — LIDOCAINE HYDROCHLORIDE 3 ML: 10 INJECTION, SOLUTION INFILTRATION; PERINEURAL at 14:31

## 2024-06-18 RX ADMIN — TRIAMCINOLONE ACETONIDE 40 MG: 40 INJECTION, SUSPENSION INTRA-ARTICULAR; INTRAMUSCULAR at 14:31

## 2024-06-18 NOTE — PROGRESS NOTES
Dosepak without complete resolution of her symptoms.  No significant weakness on exam only pain with rotator cuff testing.  X-rays are normal.  I recommend treating this conservatively with a corticosteroid injection.  Follow-up in 6 to 8 weeks if symptoms persist.    Prescription medication management discussed. In the interim prior to the next visit should symptoms worsen I recommend Tylenol or OTC NSAIDs to be taken as needed as long as these medications are not medically contraindicated.     Plan was discussed in detail with patient, all questions were answered, and patient voiced understanding of plan.    [facility]  OFFICE PROCEDURE PROGRESS NOTE        Chart reviewed for the following:   Paresh OSMAN MD, have reviewed the History, Physical and updated the Allergic reactions for Nicki B Sartor     TIME OUT performed immediately prior to start of procedure:   Paresh OSMAN MD, have performed the following reviews on Nicki B Sartor prior to the start of the procedure:            * Patient was identified by name and date of birth   * Agreement on procedure being performed was verified  * Risks and Benefits explained to the patient  * Procedure site verified and marked as necessary  * Patient was positioned for comfort  * Consent was signed and verified    Time: 2:28 PM    Location: Left shoulder subacromial space injection    Kenalog 40mg & 3cc Lidocaine    Date of procedure: 6/18/2024    Procedure performed by:  Paresh Fish MD    Provider assisted by: Office     Patient assisted by: self    How tolerated by patient: tolerated the procedure well with no complications    Post Procedural Pain Scale: 0 - No Hurt    Comments: none      Electronically signed by: Paresh Fish MD    Note: This note was completed using voice recognition software.  Any typographical/name errors or mistakes are unintentional.

## 2024-07-08 ENCOUNTER — OFFICE VISIT (OUTPATIENT)
Age: 69
End: 2024-07-08
Payer: MEDICARE

## 2024-07-08 VITALS
WEIGHT: 208 LBS | BODY MASS INDEX: 30.72 KG/M2 | DIASTOLIC BLOOD PRESSURE: 78 MMHG | OXYGEN SATURATION: 97 % | SYSTOLIC BLOOD PRESSURE: 128 MMHG | HEART RATE: 62 BPM

## 2024-07-08 DIAGNOSIS — Z95.5 HISTORY OF CORONARY ARTERY STENT PLACEMENT: ICD-10-CM

## 2024-07-08 DIAGNOSIS — I10 ESSENTIAL HYPERTENSION: ICD-10-CM

## 2024-07-08 DIAGNOSIS — E78.2 MIXED HYPERLIPIDEMIA: ICD-10-CM

## 2024-07-08 DIAGNOSIS — I25.10 CORONARY ARTERY DISEASE INVOLVING NATIVE CORONARY ARTERY OF NATIVE HEART WITHOUT ANGINA PECTORIS: Primary | ICD-10-CM

## 2024-07-08 PROCEDURE — 3017F COLORECTAL CA SCREEN DOC REV: CPT | Performed by: INTERNAL MEDICINE

## 2024-07-08 PROCEDURE — G8417 CALC BMI ABV UP PARAM F/U: HCPCS | Performed by: INTERNAL MEDICINE

## 2024-07-08 PROCEDURE — 1036F TOBACCO NON-USER: CPT | Performed by: INTERNAL MEDICINE

## 2024-07-08 PROCEDURE — 1123F ACP DISCUSS/DSCN MKR DOCD: CPT | Performed by: INTERNAL MEDICINE

## 2024-07-08 PROCEDURE — 1090F PRES/ABSN URINE INCON ASSESS: CPT | Performed by: INTERNAL MEDICINE

## 2024-07-08 PROCEDURE — G8427 DOCREV CUR MEDS BY ELIG CLIN: HCPCS | Performed by: INTERNAL MEDICINE

## 2024-07-08 PROCEDURE — 3074F SYST BP LT 130 MM HG: CPT | Performed by: INTERNAL MEDICINE

## 2024-07-08 PROCEDURE — 99214 OFFICE O/P EST MOD 30 MIN: CPT | Performed by: INTERNAL MEDICINE

## 2024-07-08 PROCEDURE — 3078F DIAST BP <80 MM HG: CPT | Performed by: INTERNAL MEDICINE

## 2024-07-08 PROCEDURE — G8399 PT W/DXA RESULTS DOCUMENT: HCPCS | Performed by: INTERNAL MEDICINE

## 2024-07-08 ASSESSMENT — ENCOUNTER SYMPTOMS
EYES NEGATIVE: 1
RESPIRATORY NEGATIVE: 1
GASTROINTESTINAL NEGATIVE: 1

## 2024-07-08 NOTE — PROGRESS NOTES
1. Have you been to the ER, urgent care clinic since your last visit?  Hospitalized since your last visit?     Yes When: 5/3/24 Where: [facility]  Reason for visit: Lt. Shoulder Pain      2.  Where do you normally have your labs drawn?  [facility]    3. Do you need any refills today?  No    4. Which local pharmacy do you use (enter pharmacy)?   [facility]    5. Which mail order pharmacy do you use (enter pharmacy)?   N/A     6. Are you here for surgical clearance and if so who will be doing your     procedure/surgery (care team)?   No

## 2024-07-08 NOTE — PROGRESS NOTES
Nicki Saunders is a 68 y.o. year old female.    Follow-up of CAD, history of MI, history of PCI, hypertension, hyperlipidemia      Patient referred for pre-op evaluation due to abnormal EKG.    7/2020 Ms. Ortiz s/p left total knee replacement on 7/7/2020.  - S/P STEMI on 7/13/2020 - s/p cardiac cath with RIVAS to RCA  10/2020  Patient seen today for follow-up.  She was in emergency room few days ago with episode of severe dizziness nausea and diarrhea.  She had hypokalemia that was supplemented MI ruled out no acute EKG changes.  She is feeling better since then.  Diarrhea has resolved.  Patient had gone out to eat on Friday night and diarrhea started on Saturday morning and stayed all day long like that.  No anginal quality chest pain.  Stable since then  1/2021  Patient is here for follow-up she was in emergency room last week with complaint of chest tightness described as right-sided tightness noted to activity exertion she felt like it was anxiety attack she was monitored in ER and discharged home she was given Xanax and after that she has felt better. She underwent stress test today in office  11/2021  Patient seen today for follow-up.  Her symptoms are stable.  She has occasional anxiety attack.  Appears stable  1/2024  Patient with h/o CAD.  She denies symptoms Except occasional anxiety. She denies chest pain, shortness of breath or palpitations.   7/8/2024 No significant chest pain, shortness of breath, edema, dizziness, palpitations or loss of consciousness.            Review of Systems   Constitutional: Negative.    HENT: Negative.     Eyes: Negative.    Respiratory: Negative.     Cardiovascular: Negative.    Gastrointestinal: Negative.    Endocrine: Negative.    Genitourinary: Negative.    Musculoskeletal: Negative.    Neurological: Negative.    Psychiatric/Behavioral: Negative.     All other systems reviewed and are negative.        Physical Exam  Vitals and nursing note reviewed.   Constitutional:

## 2024-07-12 ENCOUNTER — HOSPITAL ENCOUNTER (OUTPATIENT)
Facility: HOSPITAL | Age: 69
End: 2024-07-12
Payer: MEDICARE

## 2024-07-12 DIAGNOSIS — R31.9 HEMATURIA, UNSPECIFIED TYPE: ICD-10-CM

## 2024-07-12 PROCEDURE — 76770 US EXAM ABDO BACK WALL COMP: CPT

## 2024-07-29 ENCOUNTER — HOSPITAL ENCOUNTER (OUTPATIENT)
Facility: HOSPITAL | Age: 69
Discharge: HOME | End: 2024-08-01
Payer: MEDICARE

## 2024-07-29 DIAGNOSIS — N95.0 POSTMENOPAUSAL BLEEDING: ICD-10-CM

## 2024-07-29 PROCEDURE — 76830 TRANSVAGINAL US NON-OB: CPT

## 2024-08-06 ENCOUNTER — OFFICE VISIT (OUTPATIENT)
Age: 69
End: 2024-08-06
Payer: MEDICARE

## 2024-08-06 VITALS — HEIGHT: 69 IN | WEIGHT: 207 LBS | BODY MASS INDEX: 30.66 KG/M2

## 2024-08-06 DIAGNOSIS — S46.012D TRAUMATIC TEAR OF LEFT ROTATOR CUFF, UNSPECIFIED TEAR EXTENT, SUBSEQUENT ENCOUNTER: Primary | ICD-10-CM

## 2024-08-06 PROCEDURE — G8399 PT W/DXA RESULTS DOCUMENT: HCPCS | Performed by: ORTHOPAEDIC SURGERY

## 2024-08-06 PROCEDURE — 1123F ACP DISCUSS/DSCN MKR DOCD: CPT | Performed by: ORTHOPAEDIC SURGERY

## 2024-08-06 PROCEDURE — 1090F PRES/ABSN URINE INCON ASSESS: CPT | Performed by: ORTHOPAEDIC SURGERY

## 2024-08-06 PROCEDURE — G8428 CUR MEDS NOT DOCUMENT: HCPCS | Performed by: ORTHOPAEDIC SURGERY

## 2024-08-06 PROCEDURE — 99214 OFFICE O/P EST MOD 30 MIN: CPT | Performed by: ORTHOPAEDIC SURGERY

## 2024-08-06 PROCEDURE — G8417 CALC BMI ABV UP PARAM F/U: HCPCS | Performed by: ORTHOPAEDIC SURGERY

## 2024-08-06 PROCEDURE — 1036F TOBACCO NON-USER: CPT | Performed by: ORTHOPAEDIC SURGERY

## 2024-08-06 PROCEDURE — 3017F COLORECTAL CA SCREEN DOC REV: CPT | Performed by: ORTHOPAEDIC SURGERY

## 2024-08-06 RX ORDER — TRAMADOL HYDROCHLORIDE 50 MG/1
50 TABLET ORAL EVERY 4 HOURS PRN
Qty: 42 TABLET | Refills: 0 | Status: SHIPPED | OUTPATIENT
Start: 2024-08-06 | End: 2024-08-13

## 2024-08-06 NOTE — PROGRESS NOTES
[facility] AMBULATORY OFFICE NOTE    Patient: Nicki Saunders                MRN: [id number]       SSN: [id number]  YOB: 1955        AGE: 69 y.o.        SEX: female  Body mass index is 30.57 kg/m².    PCP: Alondra Balderrama PA  08/06/24    Chief Complaint: Left shoulder pain    HPI: Nicki Saunders is a 69 y.o. female patient who returns to the office today for her left shoulder.  She was doing well after the injection.  Unfortunately when reaching for a seatbelt she felt a sharp pain in her left shoulder.  She has been having pain ever since.  She has difficulty with raising her arm due to the pain.  This was an abrupt change.    Past Medical History:   Diagnosis Date    Arthritis     CAD (coronary artery disease)     Calcific Achilles tendonitis     Right; insertional    Essential hypertension 07/13/2020    Hypercholesterolemia     Hypertension     Left knee pain     Low blood potassium     Menopause     Pain of right heel     S/P coronary artery stent placement 07/2020    Sprain of right ring finger     STEMI (ST elevation myocardial infarction) ([facility]) 07/2020    Thyroid disease     low    Vertigo     Wears glasses        Family History   Problem Relation Age of Onset    Diabetes Other         Grandmother    Hypertension Mother     Other Other         Heart problems: Mother, grandmother    Heart Disease Mother     Hypertension Other         Grandmother    Breast Cancer Other     Osteoarthritis Mother        Current Outpatient Medications   Medication Sig Dispense Refill    Bempedoic Acid-Ezetimibe 180-10 MG TABS Take 1 tablet by mouth daily 90 tablet 1    clopidogrel (PLAVIX) 75 MG tablet TAKE 1 TABLET BY MOUTH DAILY 90 tablet 1    carvedilol (COREG) 6.25 MG tablet Take 1 tablet by mouth in the morning and 1 tablet in the evening.      fluticasone (FLONASE) 50 MCG/ACT nasal spray ceived the following from [facility]: Outside name: fluticasone propionate

## 2024-08-16 ENCOUNTER — TELEPHONE (OUTPATIENT)
Age: 69
End: 2024-08-16

## 2024-08-16 NOTE — TELEPHONE ENCOUNTER
Left voicemail to inform pt. of status for prior auth: Bempedoic Acid - Ezetimibe 180-10mg      Spoke to [facility] prior auth representative with [facility] [phone]  Member ID # [id number]  Prior auth initiated.   Will receive determination via fax within 72 hours   Call reference # [id number]     MD Notification    Notified Person: MD    Notified Person Name:    Notification Date/Time:1835    Notification Interaction:paged    Purpose of Notification:Hgb-6.5    Orders Received:no new orders    Comments: have orders to transfuse if < 6

## 2024-08-23 ENCOUNTER — TELEPHONE (OUTPATIENT)
Age: 69
End: 2024-08-23

## 2024-08-23 NOTE — TELEPHONE ENCOUNTER
Patient made aware of: Prior auth status for Nexlizet 180/10mg  ~ APPROVED      Approval letter to be scanned in chart.

## 2024-08-26 ENCOUNTER — HOSPITAL ENCOUNTER (OUTPATIENT)
Facility: HOSPITAL | Age: 69
Discharge: HOME | End: 2024-08-29
Attending: ORTHOPAEDIC SURGERY
Payer: MEDICARE

## 2024-08-26 DIAGNOSIS — S46.012D TRAUMATIC TEAR OF LEFT ROTATOR CUFF, UNSPECIFIED TEAR EXTENT, SUBSEQUENT ENCOUNTER: ICD-10-CM

## 2024-08-26 PROCEDURE — 73221 MRI JOINT UPR EXTREM W/O DYE: CPT

## 2024-08-28 ENCOUNTER — TELEPHONE (OUTPATIENT)
Age: 69
End: 2024-08-28

## 2024-08-28 NOTE — TELEPHONE ENCOUNTER
Jose had done paperwork for pt to get Nexlizet. Pt states it is a tier 4 and she can't afford it. Needed some guidance as to what to do if you could call her

## 2024-08-28 NOTE — TELEPHONE ENCOUNTER
Called and spoke to patient assistance program and it wasn't anything they could do to help patient d/t her insurance was medicare

## 2024-08-29 NOTE — TELEPHONE ENCOUNTER
Spoke to patient regarding medication, I call company for patient assistance and they game me number for her to call d/t need ask patient more questions. Patient was given the number [phone]. Patient will come by and  samples on Tuesday. She voices understanding and acceptance of this advice and will call back if any further questions or concerns. She voices understanding and acceptance of this advice and will call back if any further questions or concerns.

## 2024-09-03 ENCOUNTER — OFFICE VISIT (OUTPATIENT)
Age: 69
End: 2024-09-03
Payer: MEDICARE

## 2024-09-03 VITALS — BODY MASS INDEX: 30.57 KG/M2 | HEIGHT: 69 IN

## 2024-09-03 DIAGNOSIS — M75.102 ROTATOR CUFF SYNDROME, LEFT: Primary | ICD-10-CM

## 2024-09-03 PROCEDURE — 1090F PRES/ABSN URINE INCON ASSESS: CPT | Performed by: ORTHOPAEDIC SURGERY

## 2024-09-03 PROCEDURE — G8399 PT W/DXA RESULTS DOCUMENT: HCPCS | Performed by: ORTHOPAEDIC SURGERY

## 2024-09-03 PROCEDURE — 99214 OFFICE O/P EST MOD 30 MIN: CPT | Performed by: ORTHOPAEDIC SURGERY

## 2024-09-03 PROCEDURE — 1036F TOBACCO NON-USER: CPT | Performed by: ORTHOPAEDIC SURGERY

## 2024-09-03 PROCEDURE — 1123F ACP DISCUSS/DSCN MKR DOCD: CPT | Performed by: ORTHOPAEDIC SURGERY

## 2024-09-03 PROCEDURE — G8427 DOCREV CUR MEDS BY ELIG CLIN: HCPCS | Performed by: ORTHOPAEDIC SURGERY

## 2024-09-03 PROCEDURE — 3017F COLORECTAL CA SCREEN DOC REV: CPT | Performed by: ORTHOPAEDIC SURGERY

## 2024-09-03 PROCEDURE — G8417 CALC BMI ABV UP PARAM F/U: HCPCS | Performed by: ORTHOPAEDIC SURGERY

## 2024-09-03 PROCEDURE — 20610 DRAIN/INJ JOINT/BURSA W/O US: CPT | Performed by: ORTHOPAEDIC SURGERY

## 2024-09-03 RX ORDER — LIDOCAINE HYDROCHLORIDE 10 MG/ML
3 INJECTION, SOLUTION INFILTRATION; PERINEURAL ONCE
Status: COMPLETED | OUTPATIENT
Start: 2024-09-03 | End: 2024-09-03

## 2024-09-03 RX ORDER — TRIAMCINOLONE ACETONIDE 40 MG/ML
40 INJECTION, SUSPENSION INTRA-ARTICULAR; INTRAMUSCULAR ONCE
Status: COMPLETED | OUTPATIENT
Start: 2024-09-03 | End: 2024-09-03

## 2024-09-03 RX ADMIN — LIDOCAINE HYDROCHLORIDE 3 ML: 10 INJECTION, SOLUTION INFILTRATION; PERINEURAL at 15:46

## 2024-09-03 RX ADMIN — TRIAMCINOLONE ACETONIDE 40 MG: 40 INJECTION, SUSPENSION INTRA-ARTICULAR; INTRAMUSCULAR at 15:46

## 2024-09-03 NOTE — PROGRESS NOTES
recommend Tylenol or OTC NSAIDs to be taken as needed as long as these medications are not medically contraindicated.     Plan was discussed in detail with patient, all questions were answered, and patient voiced understanding of plan.    [facility]  OFFICE PROCEDURE PROGRESS NOTE        Chart reviewed for the following:   Paresh OSMAN MD, have reviewed the History, Physical and updated the Allergic reactions for Nicki B Sartor     TIME OUT performed immediately prior to start of procedure:   Paresh OSMAN MD, have performed the following reviews on Nicki B Sartor prior to the start of the procedure:            * Patient was identified by name and date of birth   * Agreement on procedure being performed was verified  * Risks and Benefits explained to the patient  * Procedure site verified and marked as necessary  * Patient was positioned for comfort  * Consent was signed and verified    Time: 3:44 PM    Location: Left shoulder subacromial space injection    Kenalog 40mg & 3cc Lidocaine    Date of procedure: 9/3/2024    Procedure performed by:  Paresh Fish MD    Provider assisted by: Office     Patient assisted by: self    How tolerated by patient: tolerated the procedure well with no complications    Post Procedural Pain Scale: 0 - No Hurt    Comments: none      Electronically signed by: Paresh Fish MD     Note: This note was completed using voice recognition software.  Any typographical/name errors or mistakes are unintentional.

## 2024-09-05 DIAGNOSIS — I10 ESSENTIAL HYPERTENSION: ICD-10-CM

## 2024-09-05 DIAGNOSIS — E78.2 MIXED HYPERLIPIDEMIA: ICD-10-CM

## 2024-09-05 NOTE — TELEPHONE ENCOUNTER
Samples of this drug  Nexlizet 180-10 mg were given to the patient, quantity 84 tablets total 12 boxes, Lot Number #[id number] exp 6/2025 and Lot #[id number] exp 10/2025.

## 2024-09-09 RX ORDER — CLOPIDOGREL BISULFATE 75 MG/1
75 TABLET ORAL DAILY
Qty: 90 TABLET | Refills: 1 | Status: SHIPPED | OUTPATIENT
Start: 2024-09-09

## 2024-10-07 ENCOUNTER — HOSPITAL ENCOUNTER (OUTPATIENT)
Facility: HOSPITAL | Age: 69
Setting detail: RECURRING SERIES
Discharge: HOME | End: 2024-10-10
Payer: MEDICARE

## 2024-10-07 PROCEDURE — 97110 THERAPEUTIC EXERCISES: CPT

## 2024-10-07 PROCEDURE — 97161 PT EVAL LOW COMPLEX 20 MIN: CPT

## 2024-10-07 NOTE — PROGRESS NOTES
Signature:_________________________   DATE:_________   TIME:________                           Paresh Fish MD    ** Signature, Date and Time must be completed for valid certification **  Please sign and return to [facility]  [address]  [place] [address] [phone] fax    
movement through LUE to prevent adhesive capsulitis; pt verbalized understanding. Patient will benefit from skilled PT to address the above deficits and improve functional mobility so that she can perform ADLs, IADLs, and reaching with minimal pain or limitation.       Patient will continue to benefit from skilled PT services to modify and progress therapeutic interventions, analyze and address functional mobility deficits, analyze and address ROM deficits, analyze and address strength deficits, analyze and address soft tissue restrictions, analyze and cue for proper movement patterns, analyze and modify for postural abnormalities, and instruct in home and community integration to address functional deficits and attain remaining goals.     [x]  See POC for goals and assessment     PLAN  [x]  Upgrade activities as tolerated     [x]  Continue plan of care  [x]  Update interventions per flow sheet       []  Other:_      Sayra Black, PT 10/7/2024  2:54 PM    If an interpreting service is utilized for treatment of this patient, the contents of this document represent the material reviewed with the patient via the .

## 2024-10-14 ENCOUNTER — HOSPITAL ENCOUNTER (OUTPATIENT)
Facility: HOSPITAL | Age: 69
Setting detail: RECURRING SERIES
End: 2024-10-14
Payer: MEDICARE

## 2024-10-16 ENCOUNTER — HOSPITAL ENCOUNTER (OUTPATIENT)
Facility: HOSPITAL | Age: 69
Setting detail: RECURRING SERIES
Discharge: HOME | End: 2024-10-19
Payer: MEDICARE

## 2024-10-16 PROCEDURE — 97140 MANUAL THERAPY 1/> REGIONS: CPT

## 2024-10-16 PROCEDURE — 97110 THERAPEUTIC EXERCISES: CPT

## 2024-10-16 PROCEDURE — 97112 NEUROMUSCULAR REEDUCATION: CPT

## 2024-10-16 NOTE — PROGRESS NOTES
PHYSICAL / OCCUPATIONAL THERAPY - DAILY TREATMENT NOTE    Patient Name: Nicki Saunders    Date: 10/16/2024    : 1955  Insurance: Payor: MEDICARE / Plan: MEDICARE PART A AND B / Product Type: *No Product type* /      Patient  verified YES   Visit #   Current / Total 2 10   Time   In / Out 4:10 4:50   Pain   In / Out 0 0   Subjective Functional Status/Changes: Pt reports no pain at rest only during moving it. Pt reports \"babying\" her L shoulder and mostly using her R arm for all activities at home and at work. Has been doing her wall slides with no pain     TREATMENT AREA =  Pain in left shoulder [M25.512]     OBJECTIVE        Therapeutic Procedures:    Tx Min Billable or 1:1 Min (if diff from Tx Min) Procedure, Rationale, Specifics   15  29968 Therapeutic Exercise (timed):  increase ROM, strength, coordination, balance, and proprioception to improve patient's ability to progress to PLOF and address remaining functional goals. (see flow sheet as applicable)     Details if applicable:       15  43831 Neuromuscular Re-Education (timed):  improve balance, coordination, kinesthetic sense, posture, core stability and proprioception to improve patient's ability to develop conscious control of individual muscles and awareness of position of extremities in order to progress to PLOF and address remaining functional goals. (see flow sheet as applicable)     Details if applicable:     10  94214 Manual Therapy (timed):  decrease pain, increase ROM, increase tissue extensibility, and increase postural awareness to improve patient's ability to progress to PLOF and address remaining functional goals.  The manual therapy interventions were performed at a separate and distinct time from the therapeutic activities interventions . (see flow sheet as applicable)      Details if applicable:    L GH gentle PROM in all directions and oscillations             Details if applicable:            Details if applicable:     40  [facility] BC

## 2024-10-18 ENCOUNTER — APPOINTMENT (OUTPATIENT)
Facility: HOSPITAL | Age: 69
End: 2024-10-18
Attending: EMERGENCY MEDICINE
Payer: MEDICARE

## 2024-10-18 ENCOUNTER — HOSPITAL ENCOUNTER (EMERGENCY)
Facility: HOSPITAL | Age: 69
Discharge: HOME | End: 2024-10-18
Attending: EMERGENCY MEDICINE
Payer: MEDICARE

## 2024-10-18 VITALS
SYSTOLIC BLOOD PRESSURE: 144 MMHG | RESPIRATION RATE: 16 BRPM | WEIGHT: 205 LBS | BODY MASS INDEX: 30.36 KG/M2 | OXYGEN SATURATION: 98 % | HEIGHT: 69 IN | TEMPERATURE: 98.5 F | DIASTOLIC BLOOD PRESSURE: 76 MMHG | HEART RATE: 62 BPM

## 2024-10-18 DIAGNOSIS — M25.561 ACUTE PAIN OF RIGHT KNEE: ICD-10-CM

## 2024-10-18 DIAGNOSIS — M17.11 PRIMARY OSTEOARTHRITIS OF RIGHT KNEE: Primary | ICD-10-CM

## 2024-10-18 LAB
APPEARANCE FLD: CLEAR
BODY FLD TYPE: NORMAL
COLOR FLD: YELLOW
CRYSTALS FLD MICRO: NORMAL
EOSINOPHIL NFR FLD MANUAL: 0 %
LYMPHOCYTES NFR FLD: 42 %
MACROPHAGES NFR FLD: 12 %
MONOCYTES NFR FLD: 18 %
NEUTROPHILS NFR FLD: 28 % (ref 0–25)
NEUTS BAND # FLD: 0 %
NUC CELL # FLD: 451 /CU MM
RBC # FLD: 3000 /CU MM
SPECIMEN SOURCE FLD: ABNORMAL
TOTAL CELLS COUNTED SPEC: 50

## 2024-10-18 PROCEDURE — 20610 DRAIN/INJ JOINT/BURSA W/O US: CPT

## 2024-10-18 PROCEDURE — 94761 N-INVAS EAR/PLS OXIMETRY MLT: CPT

## 2024-10-18 PROCEDURE — 87205 SMEAR GRAM STAIN: CPT

## 2024-10-18 PROCEDURE — 89060 EXAM SYNOVIAL FLUID CRYSTALS: CPT

## 2024-10-18 PROCEDURE — 73562 X-RAY EXAM OF KNEE 3: CPT

## 2024-10-18 PROCEDURE — 89051 BODY FLUID CELL COUNT: CPT

## 2024-10-18 PROCEDURE — 87070 CULTURE OTHR SPECIMN AEROBIC: CPT

## 2024-10-18 PROCEDURE — 6370000000 HC RX 637 (ALT 250 FOR IP): Performed by: EMERGENCY MEDICINE

## 2024-10-18 PROCEDURE — 99284 EMERGENCY DEPT VISIT MOD MDM: CPT

## 2024-10-18 RX ORDER — TRAMADOL HYDROCHLORIDE 50 MG/1
50 TABLET ORAL ONCE
Status: COMPLETED | OUTPATIENT
Start: 2024-10-18 | End: 2024-10-18

## 2024-10-18 RX ORDER — TRAMADOL HYDROCHLORIDE 50 MG/1
50 TABLET ORAL EVERY 6 HOURS PRN
Qty: 12 TABLET | Refills: 0 | Status: SHIPPED | OUTPATIENT
Start: 2024-10-18 | End: 2024-10-21

## 2024-10-18 RX ORDER — LIDOCAINE HYDROCHLORIDE 10 MG/ML
5 INJECTION, SOLUTION EPIDURAL; INFILTRATION; INTRACAUDAL; PERINEURAL
Status: DISCONTINUED | OUTPATIENT
Start: 2024-10-18 | End: 2024-10-18 | Stop reason: HOSPADM

## 2024-10-18 RX ORDER — ACETAMINOPHEN 325 MG/1
650 TABLET ORAL
Status: COMPLETED | OUTPATIENT
Start: 2024-10-18 | End: 2024-10-18

## 2024-10-18 RX ADMIN — TRAMADOL HYDROCHLORIDE 50 MG: 50 TABLET ORAL at 09:10

## 2024-10-18 RX ADMIN — ACETAMINOPHEN 650 MG: 325 TABLET ORAL at 07:45

## 2024-10-18 ASSESSMENT — PAIN DESCRIPTION - ORIENTATION
ORIENTATION: RIGHT

## 2024-10-18 ASSESSMENT — PAIN SCALES - GENERAL
PAINLEVEL_OUTOF10: 5
PAINLEVEL_OUTOF10: 6
PAINLEVEL_OUTOF10: 3
PAINLEVEL_OUTOF10: 6

## 2024-10-18 ASSESSMENT — PAIN DESCRIPTION - LOCATION
LOCATION: KNEE

## 2024-10-18 ASSESSMENT — PAIN DESCRIPTION - DESCRIPTORS
DESCRIPTORS: OTHER (COMMENT);ACHING
DESCRIPTORS: ACHING
DESCRIPTORS: ACHING;OTHER (COMMENT)

## 2024-10-18 ASSESSMENT — PAIN - FUNCTIONAL ASSESSMENT
PAIN_FUNCTIONAL_ASSESSMENT: 0-10
PAIN_FUNCTIONAL_ASSESSMENT: NONE - DENIES PAIN

## 2024-10-18 NOTE — ED NOTES
The patient, Nicki Hall, identity was verified by name and MRN  written order received from MD Dereje.  Medication Tylenol administered PO 650mg.  No known medication allergies.   Patient tolerated well: Yes

## 2024-10-18 NOTE — DISCHARGE INSTRUCTIONS
Return to the ER for any new, persistent, or worsening symptoms or concerns including but not limited to increased pain, numbness or tingling, weakness, swelling, skin color changes, or any concerns.  Follow up with your doctor.    A copy of all your test results have been given to you.  Please follow up with the doctor in 1-2 days to review all the results to add to your record and to pursue any abnormal findings.

## 2024-10-18 NOTE — ED NOTES
PT presents to the emergency department c/o right knee pain. PT denies any trauma to the extremity. PT states she was attempting to step down onto that knee and it began hurting. PT with previous pmhx of knee replacement to left knee.

## 2024-10-18 NOTE — ED TRIAGE NOTES
Pt arrived via Triage ambulatory with a walker c/o right knee pain. Pt denies injury. States she is having a hard time putting weight on it.

## 2024-10-19 LAB
BACTERIA SPEC CULT: NORMAL
GRAM STN SPEC: NORMAL
GRAM STN SPEC: NORMAL
SERVICE CMNT-IMP: NORMAL

## 2024-10-22 LAB
BACTERIA SPEC CULT: NORMAL
BACTERIA SPEC CULT: NORMAL
GRAM STN SPEC: NORMAL
GRAM STN SPEC: NORMAL
SERVICE CMNT-IMP: NORMAL

## 2024-10-24 ENCOUNTER — OFFICE VISIT (OUTPATIENT)
Age: 69
End: 2024-10-24

## 2024-10-24 VITALS — BODY MASS INDEX: 30.54 KG/M2 | WEIGHT: 206.2 LBS | HEIGHT: 69 IN

## 2024-10-24 DIAGNOSIS — M17.11 PRIMARY OSTEOARTHRITIS OF RIGHT KNEE: Primary | ICD-10-CM

## 2024-10-24 RX ORDER — LIDOCAINE HYDROCHLORIDE 10 MG/ML
3 INJECTION, SOLUTION INFILTRATION; PERINEURAL ONCE
Status: COMPLETED | OUTPATIENT
Start: 2024-10-24 | End: 2024-10-24

## 2024-10-24 RX ORDER — BETAMETHASONE SODIUM PHOSPHATE AND BETAMETHASONE ACETATE 3; 3 MG/ML; MG/ML
6 INJECTION, SUSPENSION INTRA-ARTICULAR; INTRALESIONAL; INTRAMUSCULAR; SOFT TISSUE ONCE
Status: COMPLETED | OUTPATIENT
Start: 2024-10-24 | End: 2024-10-24

## 2024-10-24 RX ADMIN — BETAMETHASONE SODIUM PHOSPHATE AND BETAMETHASONE ACETATE 6 MG: 3; 3 INJECTION, SUSPENSION INTRA-ARTICULAR; INTRALESIONAL; INTRAMUSCULAR; SOFT TISSUE at 15:49

## 2024-10-24 RX ADMIN — LIDOCAINE HYDROCHLORIDE 3 ML: 10 INJECTION, SOLUTION INFILTRATION; PERINEURAL at 15:50

## 2024-10-24 NOTE — PROGRESS NOTES
Patient: Nicki Saunders                MRN: [id number]       SSN: [id number]  YOB: 1955        AGE: 69 y.o.        SEX: female  Body mass index is 30.45 kg/m².    PCP: Alondra Balderrama PA  10/24/24            REVIEW OF SYSTEMS:  Constitutional: Negative for fever, chills, weight loss and malaise/fatigue.   HENT: Negative.    Eyes: Negative.    Respiratory: Negative.   Cardiovascular: Negative.   Gastrointestinal: No bowel incontinence or constipation.  Genitourinary: No bladder incontinence or saddle anesthesia.  Skin: Negative.   Neurological: Negative.    Endo/Heme/Allergies: Negative.    Psychiatric/Behavioral: Negative.  Musculoskeletal: As per HPI above.     Past Medical History:   Diagnosis Date    Arthritis     CAD (coronary artery disease)     Calcific Achilles tendonitis     Right; insertional    Essential hypertension 07/13/2020    Hypercholesterolemia     Hypertension     Left knee pain     Low blood potassium     Menopause     Pain of right heel     S/P coronary artery stent placement 07/2020    Sprain of right ring finger     STEMI (ST elevation myocardial infarction) ([facility]) 07/2020    Thyroid disease     low    Vertigo     Wears glasses          Current Outpatient Medications:     clopidogrel (PLAVIX) 75 MG tablet, TAKE 1 TABLET BY MOUTH DAILY, Disp: 90 tablet, Rfl: 1    Bempedoic Acid-Ezetimibe 180-10 MG TABS, Take 1 tablet by mouth daily, Disp: 84 tablet, Rfl: 0    carvedilol (COREG) 6.25 MG tablet, Take 1 tablet by mouth in the morning and 1 tablet in the evening., Disp: , Rfl:     fluticasone (FLONASE) 50 MCG/ACT nasal spray, ceived the following from [facility]: Outside name: fluticasone propionate (FLONASE) 50 mcg/actuation nasal spray, Disp: , Rfl:     desvenlafaxine succinate (PRISTIQ) 50 MG TB24 extended release tablet, Take 25 mg by mouth daily, Disp: , Rfl:     ALPRAZolam (XANAX) 0.5 MG tablet, 1 tablet as needed., Disp: , Rfl:     amLODIPine (NORVASC)

## 2024-10-24 NOTE — ED PROVIDER NOTES
Currently    Drug use: No       Allergies:  Allergies   Allergen Reactions    Hydrocodone-Acetaminophen Itching    Oxycodone-Acetaminophen Itching         Focused Physical Exam   BP (!) 144/76   Pulse 62   Temp 98.5 °F (36.9 °C) (Oral)   Resp 16   Ht 1.753 m (5' 9\")   Wt 93 kg (205 lb)   SpO2 98%   BMI 30.27 kg/m²     General: Well appearing, nontoxic, not distressed  Focused Exam: Patient is alert and interactive, heart is regular without tachycardia, lungs are clear to ascultation anteriorly, abdomen is soft and non-tender, right knee effusion with pain rom without erythema or warmth,Neurovascularly intact distally.  .      Diagnostic Study Results     Labs ordered to better evaluate patient's complaints and exam findings -  No results found for this or any previous visit (from the past 12 hour(s)).    Radiologic Studies ordered to better evaluate patient's complaints and exam findings-   XR KNEE RIGHT (3 VIEWS)   Final Result   No acute osseous findings.      Disproportionate patellofemoral arthropathy. Nonspecific. Can be seen in the   setting of CPPD arthropathy.      Moderate knee joint effusion.      Electronically signed by Augusto Hope            Medical Decision Making   I am the first provider for this patient.    I reviewed the vital signs, available nursing notes, past medical history, past surgical history, family history and social history.    ED Course: Progress Notes, Reevaluation, Reviewed Results, and Consults:    None    Provider Notes (Medical Decision Making):     MINA GOMEZ Chip is a 69 y.o. female who  has a past medical history of Arthritis, CAD (coronary artery disease), Calcific Achilles tendonitis, Essential hypertension, Hypercholesterolemia, Hypertension, Left knee pain, Low blood potassium, Menopause, Pain of right heel, S/P coronary artery stent placement, Sprain of right ring finger, STEMI (ST elevation myocardial infarction) ([facility]), Thyroid disease, Vertigo, and

## 2024-10-31 ENCOUNTER — APPOINTMENT (OUTPATIENT)
Facility: HOSPITAL | Age: 69
End: 2024-10-31
Payer: MEDICARE

## 2024-11-04 ENCOUNTER — HOSPITAL ENCOUNTER (OUTPATIENT)
Facility: HOSPITAL | Age: 69
Setting detail: RECURRING SERIES
Discharge: HOME | End: 2024-11-07
Payer: MEDICARE

## 2024-11-04 PROCEDURE — 97110 THERAPEUTIC EXERCISES: CPT

## 2024-11-04 PROCEDURE — 97530 THERAPEUTIC ACTIVITIES: CPT

## 2024-11-04 PROCEDURE — 97112 NEUROMUSCULAR REEDUCATION: CPT

## 2024-11-04 NOTE — PROGRESS NOTES
PHYSICAL / OCCUPATIONAL THERAPY - DAILY TREATMENT NOTE    Patient Name: Nicki Saunders    Date: 2024    : 1955  Insurance: Payor: MEDICARE / Plan: MEDICARE PART A AND B / Product Type: *No Product type* /      Patient  verified Yes     Visit #   Current / Total 3 10   Time   In / Out 252 330   Pain   In / Out 2 0   Subjective Functional Status/Changes: Patient reports the pain isn't bad today because she hasn't been doing much with her left shoulder.     TREATMENT AREA =  Pain in left shoulder [M25.512]     OBJECTIVE    Therapeutic Procedures:    Tx Min Billable or 1:1 Min (if diff from Tx Min) Procedure, Rationale, Specifics   13  58116 Therapeutic Exercise (timed):  increase ROM, strength, coordination, balance, and proprioception to improve patient's ability to progress to PLOF and address remaining functional goals. (see flow sheet as applicable)     Details if applicable:       10  60126 Neuromuscular Re-Education (timed):  improve balance, coordination, kinesthetic sense, posture, core stability and proprioception to improve patient's ability to develop conscious control of individual muscles and awareness of position of extremities in order to progress to PLOF and address remaining functional goals. (see flow sheet as applicable)     Details if applicable:  scapular re-ed   15  06952 Therapeutic Activity (timed):  use of dynamic activities replicating functional movements to increase ROM, strength, coordination, balance, and proprioception in order to improve patient's ability to progress to PLOF and address remaining functional goals.  (see flow sheet as applicable)     Details if applicable:  functional reaching, patient ed   38  [facility] Totals Reminder: bill using total billable min of TIMED therapeutic procedures (example: do not include dry needle or estim unattended, both untimed codes, in totals to left)  8-22 min = 1 unit; 23-37 min = 2 units; 38-52 min = 3 units; 53-67 min = 4 units;

## 2024-11-11 ENCOUNTER — APPOINTMENT (OUTPATIENT)
Facility: HOSPITAL | Age: 69
End: 2024-11-11
Payer: MEDICARE

## 2024-11-14 ENCOUNTER — APPOINTMENT (OUTPATIENT)
Facility: HOSPITAL | Age: 69
End: 2024-11-14
Payer: MEDICARE

## 2024-11-18 ENCOUNTER — APPOINTMENT (OUTPATIENT)
Facility: HOSPITAL | Age: 69
End: 2024-11-18
Payer: MEDICARE

## 2024-11-21 ENCOUNTER — APPOINTMENT (OUTPATIENT)
Facility: HOSPITAL | Age: 69
End: 2024-11-21
Payer: MEDICARE

## 2024-12-02 ENCOUNTER — TELEPHONE (OUTPATIENT)
Age: 69
End: 2024-12-02

## 2024-12-02 DIAGNOSIS — E78.2 MIXED HYPERLIPIDEMIA: Primary | ICD-10-CM

## 2024-12-02 RX ORDER — EZETIMIBE 10 MG/1
10 TABLET ORAL DAILY
Qty: 90 TABLET | Refills: 3 | Status: SHIPPED | OUTPATIENT
Start: 2024-12-02

## 2024-12-02 NOTE — TELEPHONE ENCOUNTER
Patient can't afford Nexlizet and wants to know if there anything else see can take. Patient last boxes were samples. Please advise   none

## 2024-12-02 NOTE — TELEPHONE ENCOUNTER
See if she can afford only Zetia 10 mg a day.  Repeat lipids and LFTs in 6 weeks.  I have signed the orders

## 2024-12-03 NOTE — TELEPHONE ENCOUNTER
Called and left detail message with patient per Dr. Helms regarding medicine.   See if she can afford only Zetia 10 mg a day.  Repeat lipids and LFTs in 6 weeks.  I have signed the orders. If any questions to call office.

## 2025-01-15 ENCOUNTER — HOSPITAL ENCOUNTER (OUTPATIENT)
Facility: HOSPITAL | Age: 70
Discharge: HOME | End: 2025-01-18
Payer: MEDICARE

## 2025-01-15 DIAGNOSIS — E78.2 MIXED HYPERLIPIDEMIA: ICD-10-CM

## 2025-01-15 LAB
ALBUMIN SERPL-MCNC: 3.7 G/DL (ref 3.4–5)
ALBUMIN/GLOB SERPL: 1.1 (ref 0.8–1.7)
ALP SERPL-CCNC: 98 U/L (ref 45–117)
ALT SERPL-CCNC: 36 U/L (ref 13–56)
AST SERPL-CCNC: 29 U/L (ref 10–38)
BILIRUB DIRECT SERPL-MCNC: 0.3 MG/DL (ref 0–0.2)
BILIRUB SERPL-MCNC: 1 MG/DL (ref 0.2–1)
CHOLEST SERPL-MCNC: 181 MG/DL
GLOBULIN SER CALC-MCNC: 3.4 G/DL (ref 2–4)
HDLC SERPL-MCNC: 59 MG/DL (ref 40–60)
HDLC SERPL: 3.1 (ref 0–5)
LDLC SERPL CALC-MCNC: 104.4 MG/DL (ref 0–100)
LIPID PANEL: ABNORMAL
PROT SERPL-MCNC: 7.1 G/DL (ref 6.4–8.2)
TRIGL SERPL-MCNC: 88 MG/DL
VLDLC SERPL CALC-MCNC: 17.6 MG/DL

## 2025-01-15 PROCEDURE — 36415 COLL VENOUS BLD VENIPUNCTURE: CPT

## 2025-01-15 PROCEDURE — 80061 LIPID PANEL: CPT

## 2025-01-15 PROCEDURE — 80076 HEPATIC FUNCTION PANEL: CPT

## 2025-01-21 ENCOUNTER — TELEPHONE (OUTPATIENT)
Age: 70
End: 2025-01-21

## 2025-01-21 NOTE — TELEPHONE ENCOUNTER
Patient is schedule to have Hysteroscopy with dilation and curettage on 1/29/25. Please advise when to hold plavix.

## 2025-01-21 NOTE — TELEPHONE ENCOUNTER
May discontinue Plavix 5 days prior to procedure but I would prefer if she takes aspirin during that time 81 mg a day which should start on the day she starts holding the Plavix if okay with GYN

## 2025-01-21 NOTE — TELEPHONE ENCOUNTER
Spoke with patient per Dr. Helms regarding holding Plavix.  May discontinue Plavix 5 days prior to procedure but I would prefer if she takes aspirin during that time 81 mg a day which should start on the day she starts holding the Plavix if okay with GYN. She voices understanding and acceptance of this advice and will call back if any further questions or concerns.

## 2025-01-30 ENCOUNTER — TELEPHONE (OUTPATIENT)
Age: 70
End: 2025-01-30

## 2025-01-30 RX ORDER — AMOXICILLIN 500 MG/1
CAPSULE ORAL
Qty: 4 CAPSULE | Refills: 3 | Status: SHIPPED | OUTPATIENT
Start: 2025-01-30

## 2025-01-30 NOTE — TELEPHONE ENCOUNTER
Yes, continue abx prior to dental procedure    Amoxil 500mg  4 po one hour prior to appt    Rx done and sent to pharmacy

## 2025-01-30 NOTE — TELEPHONE ENCOUNTER
PT came in today, pt is currently seeing a new dentist. The dentist and pt would like to inquire if the antibiotics should continue to be taken 4 days before her upcoming dentistry procedure. She has been doing so since the knee replacement back in 2020. If she should continue, she will need a prescription sent and afterwards her dentist offered to  any necessary refills for future procedures.    Please advise patient, number to reach is [phone]

## 2025-02-04 ENCOUNTER — OFFICE VISIT (OUTPATIENT)
Age: 70
End: 2025-02-04
Payer: MEDICARE

## 2025-02-04 VITALS
WEIGHT: 213 LBS | DIASTOLIC BLOOD PRESSURE: 82 MMHG | BODY MASS INDEX: 31.55 KG/M2 | HEART RATE: 62 BPM | HEIGHT: 69 IN | OXYGEN SATURATION: 95 % | SYSTOLIC BLOOD PRESSURE: 133 MMHG

## 2025-02-04 DIAGNOSIS — E66.811 OBESITY (BMI 30.0-34.9): ICD-10-CM

## 2025-02-04 DIAGNOSIS — I25.10 CORONARY ARTERY DISEASE INVOLVING NATIVE CORONARY ARTERY OF NATIVE HEART WITHOUT ANGINA PECTORIS: Primary | ICD-10-CM

## 2025-02-04 DIAGNOSIS — E78.2 MIXED HYPERLIPIDEMIA: ICD-10-CM

## 2025-02-04 DIAGNOSIS — I25.2 HISTORY OF MI (MYOCARDIAL INFARCTION): ICD-10-CM

## 2025-02-04 DIAGNOSIS — I10 ESSENTIAL HYPERTENSION: ICD-10-CM

## 2025-02-04 DIAGNOSIS — Z95.5 HISTORY OF CORONARY ARTERY STENT PLACEMENT: ICD-10-CM

## 2025-02-04 PROCEDURE — 3079F DIAST BP 80-89 MM HG: CPT | Performed by: INTERNAL MEDICINE

## 2025-02-04 PROCEDURE — 99214 OFFICE O/P EST MOD 30 MIN: CPT | Performed by: INTERNAL MEDICINE

## 2025-02-04 PROCEDURE — 1160F RVW MEDS BY RX/DR IN RCRD: CPT | Performed by: INTERNAL MEDICINE

## 2025-02-04 PROCEDURE — 3075F SYST BP GE 130 - 139MM HG: CPT | Performed by: INTERNAL MEDICINE

## 2025-02-04 PROCEDURE — G8427 DOCREV CUR MEDS BY ELIG CLIN: HCPCS | Performed by: INTERNAL MEDICINE

## 2025-02-04 PROCEDURE — 1036F TOBACCO NON-USER: CPT | Performed by: INTERNAL MEDICINE

## 2025-02-04 PROCEDURE — 1126F AMNT PAIN NOTED NONE PRSNT: CPT | Performed by: INTERNAL MEDICINE

## 2025-02-04 PROCEDURE — G8399 PT W/DXA RESULTS DOCUMENT: HCPCS | Performed by: INTERNAL MEDICINE

## 2025-02-04 PROCEDURE — G8417 CALC BMI ABV UP PARAM F/U: HCPCS | Performed by: INTERNAL MEDICINE

## 2025-02-04 PROCEDURE — 1123F ACP DISCUSS/DSCN MKR DOCD: CPT | Performed by: INTERNAL MEDICINE

## 2025-02-04 PROCEDURE — 1159F MED LIST DOCD IN RCRD: CPT | Performed by: INTERNAL MEDICINE

## 2025-02-04 PROCEDURE — 3017F COLORECTAL CA SCREEN DOC REV: CPT | Performed by: INTERNAL MEDICINE

## 2025-02-04 PROCEDURE — 1090F PRES/ABSN URINE INCON ASSESS: CPT | Performed by: INTERNAL MEDICINE

## 2025-02-04 RX ORDER — NITROGLYCERIN 0.4 MG/1
0.4 TABLET SUBLINGUAL EVERY 5 MIN PRN
Qty: 25 TABLET | Refills: 0 | Status: SHIPPED | OUTPATIENT
Start: 2025-02-04

## 2025-02-04 RX ORDER — ASPIRIN 81 MG/1
81 TABLET, CHEWABLE ORAL DAILY
COMMUNITY

## 2025-02-04 ASSESSMENT — ENCOUNTER SYMPTOMS
RESPIRATORY NEGATIVE: 1
GASTROINTESTINAL NEGATIVE: 1
EYES NEGATIVE: 1

## 2025-02-04 NOTE — PROGRESS NOTES
1. Have you been to the ER, urgent care clinic since your last visit?  Hospitalized since your last visit? No     2.  Where do you normally have your labs drawn?  [facility]    3. Do you need any refills today? NO    4. Which local pharmacy do you use (enter pharmacy)?   [facility]    5. Which mail order pharmacy do you use (enter pharmacy)?   N/A     6. Are you here for surgical clearance and if so who will be doing your     procedure/surgery (care team)?   NO

## 2025-02-04 NOTE — PROGRESS NOTES
Nicki Saunders is a 69 y.o. year old female.    Follow-up of CAD, history of MI, history of PCI, hypertension, hyperlipidemia      Patient referred for pre-op evaluation due to abnormal EKG.    7/2020 Ms. Ortiz s/p left total knee replacement on 7/7/2020.  - S/P STEMI on 7/13/2020 - s/p cardiac cath with RIVAS to RCA  10/2020  Patient seen today for follow-up.  She was in emergency room few days ago with episode of severe dizziness nausea and diarrhea.  She had hypokalemia that was supplemented MI ruled out no acute EKG changes.  She is feeling better since then.  Diarrhea has resolved.  Patient had gone out to eat on Friday night and diarrhea started on Saturday morning and stayed all day long like that.  No anginal quality chest pain.  Stable since then  1/2021  Patient is here for follow-up she was in emergency room last week with complaint of chest tightness described as right-sided tightness noted to activity exertion she felt like it was anxiety attack she was monitored in ER and discharged home she was given Xanax and after that she has felt better. She underwent stress test today in office  11/2021  Patient seen today for follow-up.  Her symptoms are stable.  She has occasional anxiety attack.  Appears stable  1/2024  Patient with h/o CAD.  She denies symptoms Except occasional anxiety. She denies chest pain, shortness of breath or palpitations.   7/8/2024 No significant chest pain, shortness of breath, edema, dizziness, palpitations or loss of consciousness.  2/4/2025 status post uterine surgery for polyp removal.  No chest pain dyspnea edema dizziness palpitations.  Trying to walk regularly and getting 6-10,000 steps a day.            Review of Systems   Constitutional: Negative.    HENT: Negative.     Eyes: Negative.    Respiratory: Negative.     Cardiovascular: Negative.    Gastrointestinal: Negative.    Endocrine: Negative.    Genitourinary: Negative.    Musculoskeletal: Negative.    Neurological:

## 2025-02-06 ENCOUNTER — OFFICE VISIT (OUTPATIENT)
Age: 70
End: 2025-02-06
Payer: MEDICARE

## 2025-02-06 VITALS — WEIGHT: 213 LBS | HEIGHT: 69 IN | BODY MASS INDEX: 31.55 KG/M2

## 2025-02-06 DIAGNOSIS — M17.11 PRIMARY OSTEOARTHRITIS OF RIGHT KNEE: Primary | ICD-10-CM

## 2025-02-06 PROCEDURE — G8427 DOCREV CUR MEDS BY ELIG CLIN: HCPCS | Performed by: PHYSICIAN ASSISTANT

## 2025-02-06 PROCEDURE — 1036F TOBACCO NON-USER: CPT | Performed by: PHYSICIAN ASSISTANT

## 2025-02-06 PROCEDURE — 1123F ACP DISCUSS/DSCN MKR DOCD: CPT | Performed by: PHYSICIAN ASSISTANT

## 2025-02-06 PROCEDURE — 99214 OFFICE O/P EST MOD 30 MIN: CPT | Performed by: PHYSICIAN ASSISTANT

## 2025-02-06 PROCEDURE — 1159F MED LIST DOCD IN RCRD: CPT | Performed by: PHYSICIAN ASSISTANT

## 2025-02-06 PROCEDURE — G8399 PT W/DXA RESULTS DOCUMENT: HCPCS | Performed by: PHYSICIAN ASSISTANT

## 2025-02-06 PROCEDURE — G8417 CALC BMI ABV UP PARAM F/U: HCPCS | Performed by: PHYSICIAN ASSISTANT

## 2025-02-06 PROCEDURE — 1125F AMNT PAIN NOTED PAIN PRSNT: CPT | Performed by: PHYSICIAN ASSISTANT

## 2025-02-06 PROCEDURE — 3017F COLORECTAL CA SCREEN DOC REV: CPT | Performed by: PHYSICIAN ASSISTANT

## 2025-02-06 PROCEDURE — 1160F RVW MEDS BY RX/DR IN RCRD: CPT | Performed by: PHYSICIAN ASSISTANT

## 2025-02-06 PROCEDURE — 20611 DRAIN/INJ JOINT/BURSA W/US: CPT | Performed by: PHYSICIAN ASSISTANT

## 2025-02-06 PROCEDURE — 1090F PRES/ABSN URINE INCON ASSESS: CPT | Performed by: PHYSICIAN ASSISTANT

## 2025-02-06 RX ORDER — BETAMETHASONE SODIUM PHOSPHATE AND BETAMETHASONE ACETATE 3; 3 MG/ML; MG/ML
6 INJECTION, SUSPENSION INTRA-ARTICULAR; INTRALESIONAL; INTRAMUSCULAR; SOFT TISSUE ONCE
Status: COMPLETED | OUTPATIENT
Start: 2025-02-06 | End: 2025-02-06

## 2025-02-06 RX ADMIN — BETAMETHASONE SODIUM PHOSPHATE AND BETAMETHASONE ACETATE 6 MG: 3; 3 INJECTION, SUSPENSION INTRA-ARTICULAR; INTRALESIONAL; INTRAMUSCULAR; SOFT TISSUE at 10:56

## 2025-02-06 NOTE — PROGRESS NOTES
Patient: Nicki Saunders                MRN: [id number]       SSN: [id number]  YOB: 1955        AGE: 69 y.o.        SEX: female  Body mass index is 31.45 kg/m².    PCP: Alondra Balderrama PA  02/06/25            REVIEW OF SYSTEMS:  Constitutional: Negative for fever, chills, weight loss and malaise/fatigue.   HENT: Negative.    Eyes: Negative.    Respiratory: Negative.   Cardiovascular: Negative.   Gastrointestinal: No bowel incontinence or constipation.  Genitourinary: No bladder incontinence or saddle anesthesia.  Skin: Negative.   Neurological: Negative.    Endo/Heme/Allergies: Negative.    Psychiatric/Behavioral: Negative.  Musculoskeletal: As per HPI above.     Past Medical History:   Diagnosis Date    Arthritis     CAD (coronary artery disease)     Calcific Achilles tendonitis     Right; insertional    Essential hypertension 07/13/2020    Hypercholesterolemia     Hypertension     Left knee pain     Low blood potassium     Menopause     Pain of right heel     Postmenopausal bleeding     S/P coronary artery stent placement 07/2020    Sprain of right ring finger     STEMI (ST elevation myocardial infarction) ([facility]) 07/2020    Thyroid disease     low    Vertigo     Wears glasses          Current Outpatient Medications:     aspirin 81 MG EC tablet, Take 1 tablet by mouth daily, Disp: , Rfl:     nitroGLYCERIN (NITROSTAT) 0.4 MG SL tablet, Place 1 tablet under the tongue every 5 minutes as needed for Chest pain up to max of 3 total doses. If no relief after 1 dose, call 911., Disp: 25 tablet, Rfl: 0    Evolocumab 140 MG/ML SOAJ, Inject 140 mg into the skin every 14 days, Disp: 6 Adjustable Dose Pre-filled Pen Syringe, Rfl: 3    acetaminophen (TYLENOL) 500 MG tablet, Take 2 tablets by mouth 3 times daily as needed for Pain, Disp: 30 tablet, Rfl: 0    ezetimibe (ZETIA) 10 MG tablet, Take 1 tablet by mouth daily, Disp: 90 tablet, Rfl: 3    carvedilol (COREG) 6.25 MG tablet, Take 1 tablet by mouth in the  EXAM NOTE      HISTORY    Michael Ignacio is a 57 year old male who has a history of hypertension, renal insufficiency, and diabetes presents to the office for further evaluation of his hypertension. Since his last visit on 05/10/2022, the patient states that he uses CPAP at night. He states that he sometimes wakes up in the middle of night with heart racing and sweating. He has some pressure in his chest at that time. No other complaints at this time.    Per the initial cardiology consultation in 01/2021. He has been treated for hypertension with clonidine and losartan.  He has renal insufficiency, his clonidine was gradually weaned off for 3 months however it has been restarted due to persistent elevated blood pressure readings.  I saw the patient in 2011 for hypertension, at that time he was on propranolol for treatment of both hypertension and essential tremors.  He denies any chest pain but does have exertional dyspnea.  He is being treated for obstructive sleep apnea and admits he has trouble wearing the mask but is now using a nasal apparatus.  He has a history of diabetes.  He has never smoked cigarettes. He is a retired surgical nurse.  His mother had a history of heart disease and mitral valve prolapse.  His father had a history of polycystic kidney disease, stroke and transient ischemic attack.      CTA Neck 02/23/2022:  1.  No flow-limiting stenosis, vascular lesion or anomaly in the arteries  of the neck.  2.  Postsurgical changes in the cervical spine are incompletely evaluated.  The C7 screws slightly extend into the neural foramina. Minimal lucency  along the right C2 ventral screw. These are similar to 2014.     Stress Test 06/16/2021:  1. Negative EKG portion of regadenoson nuclear stress test.   2. Nuclear study results to follow and reported separately.  Nuclear Portion:  1. No evidence of regadenosen induced ischemia.  2. No evidence of previous myocardial injury pattern.  3. Normal left  ventricular systolic function.    Echo M-Mode/2D/Doppler 02/24/2021:EF 61.0%  Normal left ventricular size, systolic function and wall thickness, with no regional wall motion abnormalities.  Trace to mild tricuspid valve regurgitation.    MEDICAL HISTORY    Past Medical History:   Diagnosis Date   • Allergic rhinitis    • Anxiety    • Anxiety and depression    • Keys esophagus     repeat EGD due 2022 needs daily PPI   • CKD (chronic kidney disease) stage 2, GFR 60-89 ml/min    • Depression    • Diabetes (CMD)    • Diverticular disease    • Diverticulitis 08/16/2016   • Esophageal reflux    • Fainting     ? due to cervical stenosis   • GI bleed     chronic   • HTN (hypertension)    • Malignant neoplasm (CMD)     Tumor on adrenal gland-fluid filled   • JACQUELIN (obstructive sleep apnea)     wears CPAP   • Prolapse of mitral valve    • Right otitis media 05/14/2020    Last Assessment & Plan:  Formatting of this note might be different from the original. Augmentin 875mg/125 mg twice daily for 10 days.   Tylenol or ibuprofen as needed for pain (follow package directions)   Supportive care instructions provided   • Sigmoid stricture (CMD)     Stenosis post colon resection   • Ulcerative colitis, unspecified, without complications (CMD) 02/20/2009   • Urethral stricture     causes urinary urgerncy   • Weakness of left side of body 09/2013    ?TIA - resolved   • Wears glasses        SURGICAL HISTORY    Past Surgical History:   Procedure Laterality Date   • Adrenal gland surgery  2010    Ablation (benign)   • Anterior cervical discectomy w/ fusion  11/09/2013    ACDF C3-4 & C7-T1 with nuvasive monitoring, xray, microscope,  Mandy allograft, Hallmark plating, Orthofix screws.   • Back surgery     • Bowel resection  02/08/2016    Laparoscopic sigmoid colectomy with colocolostomy    • Cervical fusion  07/07/2011    excision intervertebral disc, insertion of interbody spinal device   • Cervical fusion  11/15/2012    Bilateral  posterior cervical arthrodesis C6-7 with left hemilaminectomy; kira screws; fluoro   • Colonoscopy  06/11/2016    Colonoscopy with balloon dilatation and cold biopsy   • Colonoscopy  10/27/2022    tubular adenoma repeat 7 years   • Colonoscopy diagnostic  11/01/2011    Internal Hemorrhoids   • Colonoscopy diagnostic  02/24/2009   • Esophagogastroduodenoscopy  08/08/2019   • Esophagogastroduodenoscopy  08/08/2019   • Esophagogastroduodenoscopy transoral flex diag N/A 08/08/2019    ESOPHAGOGASTRODUODENOSCOPY WITH MAC performed by Tatianna Hope MD at [facility]   • Esophagogastroduodenoscopy transoral flex w/bx single or mult  11/01/2011    EGD with Bx   • Esophagogastroduodenoscopy transoral flex w/bx single or mult  02/24/2009    EGD with Bx   • Esophagogastroduodenoscopy transoral flex w/bx single or mult  10/27/2022   • Fl myelogram cervical spine  06/13/2011   • Fl myelogram cervical spine  09/14/2012   • Flexible sigmoidoscopy  09/15/2016    Sigmoidoscopy with balloon dilatation, 12-15 mm CRE balloon dilatation   • Flexible sigmoidoscopy diagnostic include specimens  08/18/2016    Colonoscopy with 12-15 mm CRE balloon dilatation of his colonic anastomosis at 20 cm   • Hb cystoscopy  03/21/2011    Bladder Outlet Obstruction   • Incision and drainage  02/10/2016    Left flank abscess incision and drainage   • Left colectomy  10/06/2016    open revision   • Left heart cath,percutaneous      X 2   • Lumbar fusion  12/15/2017   • Tonsillectomy and adenoidectomy  ~1972       SOCIAL HISTORY    Social History     Tobacco Use   • Smoking status: Never   • Smokeless tobacco: Never   Substance Use Topics   • Alcohol use: Yes     Alcohol/week: 0.0 standard drinks of alcohol     Comment: social   • Drug use: No       FAMILY HISTORY    Family History   Problem Relation Age of Onset   • Hypertension Mother    • Heart disease Mother         prolapse mitral valve   • Kidney disease Father         polycystic kidney    • Stroke Father         tia   • Other Brother         gerd   • Other Brother         gerd       MEDICATIONS    Current Outpatient Medications   Medication Sig   • tiZANidine (ZANAFLEX) 4 MG tablet TAKE 1 TABLET BY MOUTH THREE TIMES A DAY   • ALPRAZolam (XANAX) 0.5 MG tablet Take 1/2 to 1 tablet nightly PRN anxiety.   • KLS AllerClear D-24HR  MG per 24 hr tablet TAKE ONE TABLET BY MOUTH ONE TIME DAILY   • tamsulosin (FLOMAX) 0.4 MG Cap TAKE 1 CAPSULE BY MOUTH TWICE A DAY   • pantoprazole (PROTONIX) 40 MG tablet TAKE 1 TABLET BY MOUTH EVERY DAY   • meloxicam (MOBIC) 15 MG tablet Take 15 mg by mouth daily.   • amLODIPine (NORVASC) 5 MG tablet TAKE 1 TABLET BY MOUTH EVERY DAY   • losartan-hydrochlorothiazide (HYZAAR) 100-12.5 MG per tablet TAKE 1 TABLET BY MOUTH EVERY DAY   • methylPREDNISolone (MEDROL DOSEPAK) 4 MG tablet TAKE 6 TABLETS ON DAY 1 AS DIRECTED ON PACKAGE AND DECREASE BY 1 TAB EACH DAY FOR A TOTAL OF 6 DAYS   • B Complex Vitamins (VITAMIN B COMPLEX PO)    • Probiotic Product (PROBIOTIC DAILY PO)    • metFORMIN (GLUCOPHAGE) 500 MG tablet TAKE 1 TABLET BY MOUTH FOUR TIMES A DAY   • celecoxib (CeleBREX) 200 MG capsule    • Melatonin 5 MG Chew Tab    • cholestyramine (QUESTRAN) 4 g packet TAKE 1 PACKET BY MOUTH IN THE MORNING AND 1 PACKET IN THE EVENING. TAKE WITH MEALS.   • fluticasone (FLONASE) 50 MCG/ACT nasal spray SPRAY 2 SPRAYS INTO EACH NOSTRIL EVERY DAY   • carvedilol (COREG) 25 MG tablet Take 1 tablet by mouth 2 times daily (with meals).   • cloNIDine (CATAPRES) 0.1 MG tablet TAKE 1 TABLET BY MOUTH 3 TIMES DAILY AS NEEDED (SBP> 160).   • polyethylene glycol (MIRALAX) powder Take 17 g by mouth daily as needed (CONSTIPATION).   • acetaminophen (TYLENOL) 500 MG tablet Take 500 mg by mouth every 6 hours as needed.    • famotidine (PEPCID) 20 MG tablet Take 20 mg by mouth nightly as needed.     No current facility-administered medications for this visit.       ALLERGIES    ALLERGIES:   Allergen  Reactions   • Lactose   (Food Or Med) GI UPSET     Can take cheese  No milk to drink     PHYSICAL EXAM    Vital Signs:    Vitals:    04/27/23 1012   BP: (!) 158/88   Pulse: 80   Resp: 20   Weight: 95.5 kg (210 lb 6.9 oz)   Height: 5' 6\" (1.676 m)     HENT:  Normocephalic.  Sclerae nonicteric.  No xanthomas.   Neck:  No JVD (jugular venous distension).  No carotid bruits.  No thyromegaly.   Cardiovascular:  Regular rate.  Normal S1-S2.  No murmurs.  No rubs.  No gallops.  PMI (point of maximal impulse) is nondisplaced.   Respiratory:  Lungs clear to auscultation bilaterally.  Normal respiratory effort.   GI:  Bowel sounds normal.  Soft.  No tenderness.  No masses.  No pulsatile masses.  No hepatosplenomegaly.  Neurologic/Psych:  Alert and oriented x3.  Normal affect.   Extremities:  No clubbing or edema.  Symmetric dorsalis pedis/posterior tibialis pulses.      Glucose (mg/dL)   Date Value   12/12/2022 163 (H)       Cholesterol (mg/dL)   Date Value   10/17/2022 180     HDL (mg/dL)   Date Value   10/17/2022 45     Cholesterol/ HDL Ratio (no units)   Date Value   10/17/2022 4.0     Triglycerides (mg/dL)   Date Value   10/17/2022 85     LDL (mg/dL)   Date Value   10/17/2022 118       Creatinine (mg/dL)   Date Value   12/12/2022 1.21 (H)     BUN (mg/dL)   Date Value   12/12/2022 19     Hemoglobin A1C (%)   Date Value   10/17/2022 6.4 (H)     INR (no units)   Date Value   12/12/2022 1.1     EKG 08/20/2020:  Results for orders placed or performed during the hospital encounter of 10/17/22   Electrocardiogram 12-Lead   Result Value Ref Range    Ventricular Rate EKG/Min (BPM) 64     Atrial Rate (BPM) 64     NE-Interval (MSEC) 156     QRS-Interval (MSEC) 92     QT-Interval (MSEC) 394     QTc 406     P Axis (Degrees) 56     R Axis (Degrees) 56     T Axis (Degrees) 62     REPORT TEXT       Normal sinus rhythm  Normal ECG  When compared with ECG of  20-AUG-2020 13:59,  premature ventricular complexes  are no  longer  present  Confirmed by ELBA COONEY, JACKI PAZ ([id number]) on 10/22/2022 7:52:27 PM         ASSESSMENT  Hypertension  Diabetes mellitus type 2  Obstructive sleep apnea on CPAP  Possible TIA, transient left hemiparesis   Chronic renal insufficiency    PLAN  Patient complains heart racing, sweating, and chest pressure in the middle of night. I have ordered Lexiscan Stress Test. Patient instructed to hold off caffeine 24 hours prior to the test.  Echocardiogram on 02/24/2021 showed normal LV function with an EF of 61.0%.  Continue present cardiac medications.   Follow up with me in 6 months. He will contact me sooner for any problems.    Thank you for allowing me to participate in the care of this patient.    On 4/27/2023, I Soon Immanuel White, personally transcribed this document on behalf of  Allan Lopez MD.    The documentation recorded by the scribe accurately and completely reflects the service(s) I personally performed and the decisions made by me.       Allan Lopez MD, FACC, FACP   [facility]

## 2025-02-25 ENCOUNTER — TELEPHONE (OUTPATIENT)
Age: 70
End: 2025-02-25

## 2025-02-25 NOTE — TELEPHONE ENCOUNTER
Patient made aware medication Repatha ~Denied.   Praluent ordered.     Should Praluent require prior auth will submit to insurance plan for determination.

## 2025-02-25 NOTE — TELEPHONE ENCOUNTER
Please note Dr. Helms prescribed Repatha on last visit 2/4/25    Repatha denied under Medicare part D. Insurance plan requesting patient to try Praluent subcutaneous Solution Auto injector 75mg/ml or Praluent subcutaneous solution Auto-Injector 150mg/ml.    Please advise.

## 2025-05-07 ENCOUNTER — OFFICE VISIT (OUTPATIENT)
Age: 70
End: 2025-05-07

## 2025-05-07 VITALS — WEIGHT: 213 LBS | BODY MASS INDEX: 31.55 KG/M2 | HEIGHT: 69 IN

## 2025-05-07 DIAGNOSIS — M17.11 PRIMARY OSTEOARTHRITIS OF RIGHT KNEE: Primary | ICD-10-CM

## 2025-05-07 RX ORDER — LIDOCAINE 50 MG/G
1 PATCH TOPICAL DAILY
Qty: 30 PATCH | Refills: 0 | Status: SHIPPED | OUTPATIENT
Start: 2025-05-07 | End: 2025-06-06

## 2025-05-07 RX ORDER — DICLOFENAC EPOLAMINE 0.01 G/1
1 SYSTEM TOPICAL 2 TIMES DAILY
Qty: 30 PATCH | Refills: 1 | Status: SHIPPED | OUTPATIENT
Start: 2025-05-07

## 2025-05-07 RX ORDER — LIDOCAINE HYDROCHLORIDE 10 MG/ML
3 INJECTION, SOLUTION INFILTRATION; PERINEURAL ONCE
Status: COMPLETED | OUTPATIENT
Start: 2025-05-07 | End: 2025-05-07

## 2025-05-07 RX ORDER — BETAMETHASONE SODIUM PHOSPHATE AND BETAMETHASONE ACETATE 3; 3 MG/ML; MG/ML
6 INJECTION, SUSPENSION INTRA-ARTICULAR; INTRALESIONAL; INTRAMUSCULAR; SOFT TISSUE ONCE
Status: COMPLETED | OUTPATIENT
Start: 2025-05-07 | End: 2025-05-07

## 2025-05-07 RX ADMIN — LIDOCAINE HYDROCHLORIDE 3 ML: 10 INJECTION, SOLUTION INFILTRATION; PERINEURAL at 13:57

## 2025-05-07 RX ADMIN — BETAMETHASONE SODIUM PHOSPHATE AND BETAMETHASONE ACETATE 6 MG: 3; 3 INJECTION, SUSPENSION INTRA-ARTICULAR; INTRALESIONAL; INTRAMUSCULAR; SOFT TISSUE at 13:57

## 2025-05-07 NOTE — PROGRESS NOTES
Patient: Nicki Saunders                MRN: [id number]       SSN: [id number]  YOB: 1955        AGE: 69 y.o.        SEX: female  Body mass index is 31.45 kg/m².    PCP: Alondra Balderrama PA  05/07/25            REVIEW OF SYSTEMS:  Constitutional: Negative for fever, chills, weight loss and malaise/fatigue.   HENT: Negative.    Eyes: Negative.    Respiratory: Negative.   Cardiovascular: Negative.   Gastrointestinal: No bowel incontinence or constipation.  Genitourinary: No bladder incontinence or saddle anesthesia.  Skin: Negative.   Neurological: Negative.    Endo/Heme/Allergies: Negative.    Psychiatric/Behavioral: Negative.  Musculoskeletal: As per HPI above.     Past Medical History:   Diagnosis Date    Arthritis     CAD (coronary artery disease)     Calcific Achilles tendonitis     Right; insertional    Essential hypertension 07/13/2020    Hypercholesterolemia     Hypertension     Left knee pain     Low blood potassium     Menopause     Pain of right heel     Postmenopausal bleeding     S/P coronary artery stent placement 07/2020    Sprain of right ring finger     STEMI (ST elevation myocardial infarction) ([facility]) 07/2020    Thyroid disease     low    Vertigo     Wears glasses          Current Outpatient Medications:     lidocaine (LIDODERM) 5 %, Place 1 patch onto the skin daily 12 hours on, 12 hours off., Disp: 30 patch, Rfl: 0    alirocumab (PRALUENT) 75 MG/ML SOAJ injection pen, Inject 1 mL into the skin every 14 days, Disp: 2.24 mL, Rfl: 5    aspirin 81 MG EC tablet, Take 1 tablet by mouth daily, Disp: , Rfl:     nitroGLYCERIN (NITROSTAT) 0.4 MG SL tablet, Place 1 tablet under the tongue every 5 minutes as needed for Chest pain up to max of 3 total doses. If no relief after 1 dose, call 911., Disp: 25 tablet, Rfl: 0    Evolocumab 140 MG/ML SOAJ, Inject 140 mg into the skin every 14 days, Disp: 6 Adjustable Dose Pre-filled Pen Syringe, Rfl: 3    acetaminophen (TYLENOL) 500 MG tablet, Take 2

## 2025-08-05 ENCOUNTER — HOSPITAL ENCOUNTER (OUTPATIENT)
Facility: HOSPITAL | Age: 70
Discharge: HOME | End: 2025-08-08
Payer: MEDICARE

## 2025-08-05 DIAGNOSIS — E78.2 MIXED HYPERLIPIDEMIA: ICD-10-CM

## 2025-08-05 LAB
ALBUMIN SERPL-MCNC: 3.5 G/DL (ref 3.4–5)
ALBUMIN/GLOB SERPL: 1.2 (ref 0.8–1.7)
ALP SERPL-CCNC: 112 U/L (ref 45–117)
ALT SERPL-CCNC: 12 U/L (ref 10–35)
AST SERPL-CCNC: 19 U/L (ref 10–38)
BILIRUB DIRECT SERPL-MCNC: 0.3 MG/DL (ref 0–0.2)
BILIRUB SERPL-MCNC: 0.7 MG/DL (ref 0.2–1)
CHOLEST SERPL-MCNC: 104 MG/DL
GLOBULIN SER CALC-MCNC: 3 G/DL (ref 2–4)
HDLC SERPL-MCNC: 51 MG/DL (ref 40–60)
HDLC SERPL: 2 (ref 0–5)
LDLC SERPL CALC-MCNC: 32 MG/DL (ref 0–100)
PROT SERPL-MCNC: 6.5 G/DL (ref 6.4–8.2)
TRIGL SERPL-MCNC: 103 MG/DL (ref 0–150)
VLDLC SERPL CALC-MCNC: 21 MG/DL

## 2025-08-05 PROCEDURE — 36415 COLL VENOUS BLD VENIPUNCTURE: CPT

## 2025-08-05 PROCEDURE — 80076 HEPATIC FUNCTION PANEL: CPT

## 2025-08-05 PROCEDURE — 80061 LIPID PANEL: CPT

## 2025-08-13 ENCOUNTER — OFFICE VISIT (OUTPATIENT)
Age: 70
End: 2025-08-13

## 2025-08-13 VITALS — BODY MASS INDEX: 31.45 KG/M2 | HEIGHT: 69 IN

## 2025-08-13 DIAGNOSIS — M17.11 PRIMARY OSTEOARTHRITIS OF RIGHT KNEE: Primary | ICD-10-CM

## 2025-08-13 RX ORDER — BETAMETHASONE SODIUM PHOSPHATE AND BETAMETHASONE ACETATE 3; 3 MG/ML; MG/ML
6 INJECTION, SUSPENSION INTRA-ARTICULAR; INTRALESIONAL; INTRAMUSCULAR; SOFT TISSUE ONCE
Status: COMPLETED | OUTPATIENT
Start: 2025-08-13 | End: 2025-08-13

## 2025-08-13 RX ADMIN — BETAMETHASONE SODIUM PHOSPHATE AND BETAMETHASONE ACETATE 6 MG: 3; 3 INJECTION, SUSPENSION INTRA-ARTICULAR; INTRALESIONAL; INTRAMUSCULAR; SOFT TISSUE at 14:46

## 2025-08-15 ENCOUNTER — OFFICE VISIT (OUTPATIENT)
Dept: CARDIOTHORACIC SURGERY | Age: 70
End: 2025-08-15
Payer: MEDICARE

## 2025-08-15 VITALS
HEART RATE: 56 BPM | HEIGHT: 69 IN | WEIGHT: 220.6 LBS | DIASTOLIC BLOOD PRESSURE: 80 MMHG | OXYGEN SATURATION: 100 % | BODY MASS INDEX: 32.67 KG/M2 | SYSTOLIC BLOOD PRESSURE: 125 MMHG

## 2025-08-15 DIAGNOSIS — I25.10 CORONARY ARTERY DISEASE INVOLVING NATIVE CORONARY ARTERY OF NATIVE HEART WITHOUT ANGINA PECTORIS: ICD-10-CM

## 2025-08-15 DIAGNOSIS — I25.2 HISTORY OF MI (MYOCARDIAL INFARCTION): ICD-10-CM

## 2025-08-15 DIAGNOSIS — Z95.5 HISTORY OF CORONARY ARTERY STENT PLACEMENT: ICD-10-CM

## 2025-08-15 DIAGNOSIS — I10 ESSENTIAL HYPERTENSION: Primary | ICD-10-CM

## 2025-08-15 DIAGNOSIS — E78.2 MIXED HYPERLIPIDEMIA: ICD-10-CM

## 2025-08-15 DIAGNOSIS — R00.1 BRADYCARDIA: ICD-10-CM

## 2025-08-15 PROBLEM — I21.3 STEMI (ST ELEVATION MYOCARDIAL INFARCTION) (HCC): Status: RESOLVED | Noted: 2020-07-13 | Resolved: 2025-08-15

## 2025-08-15 PROCEDURE — G8417 CALC BMI ABV UP PARAM F/U: HCPCS

## 2025-08-15 PROCEDURE — 1159F MED LIST DOCD IN RCRD: CPT

## 2025-08-15 PROCEDURE — 1160F RVW MEDS BY RX/DR IN RCRD: CPT

## 2025-08-15 PROCEDURE — 3017F COLORECTAL CA SCREEN DOC REV: CPT

## 2025-08-15 PROCEDURE — G8427 DOCREV CUR MEDS BY ELIG CLIN: HCPCS

## 2025-08-15 PROCEDURE — G8399 PT W/DXA RESULTS DOCUMENT: HCPCS

## 2025-08-15 PROCEDURE — 3078F DIAST BP <80 MM HG: CPT

## 2025-08-15 PROCEDURE — 1090F PRES/ABSN URINE INCON ASSESS: CPT

## 2025-08-15 PROCEDURE — 99214 OFFICE O/P EST MOD 30 MIN: CPT

## 2025-08-15 PROCEDURE — 1123F ACP DISCUSS/DSCN MKR DOCD: CPT

## 2025-08-15 PROCEDURE — 1036F TOBACCO NON-USER: CPT

## 2025-08-15 PROCEDURE — 1126F AMNT PAIN NOTED NONE PRSNT: CPT

## 2025-08-15 PROCEDURE — 3074F SYST BP LT 130 MM HG: CPT

## 2025-08-15 ASSESSMENT — PATIENT HEALTH QUESTIONNAIRE - PHQ9
SUM OF ALL RESPONSES TO PHQ QUESTIONS 1-9: 0
2. FEELING DOWN, DEPRESSED OR HOPELESS: NOT AT ALL
SUM OF ALL RESPONSES TO PHQ QUESTIONS 1-9: 0
SUM OF ALL RESPONSES TO PHQ QUESTIONS 1-9: 0
1. LITTLE INTEREST OR PLEASURE IN DOING THINGS: NOT AT ALL
SUM OF ALL RESPONSES TO PHQ QUESTIONS 1-9: 0

## (undated) DEVICE — SYRINGE MED 25GA 3ML L5/8IN SUBQ PLAS W/ DETACH NDL SFTY

## (undated) DEVICE — SUTURE V-LOC 180 SZ 0 L9IN ABSRB GRN GS-21 L37MM 1/2 CIR VLOCL0346

## (undated) DEVICE — Z DISCONTINUED USE 2744636  DRESSING AQUACEL 14 IN ALG W3.5XL14IN POLYUR FLM CVR W/ HYDRCOLL

## (undated) DEVICE — PROCEDURE KIT FLUID MGMT 10 FR CUST MAINFOLD

## (undated) DEVICE — BLANKET WRM AD W50XL85.8IN PACU FULL BODY FORC AIR

## (undated) DEVICE — SKIN MARKER,REGULAR TIP WITH RULER AND LABELS: Brand: DEVON

## (undated) DEVICE — GARMENT,MEDLINE,DVT,SEQUENTIAL,CALF,MD: Brand: MEDLINE

## (undated) DEVICE — GOWN ISOL IMPERV UNIV, DISP, OPEN BACK, BLUE --

## (undated) DEVICE — COVER US PRB W15XL120CM W/ GEL RUBBERBAND TAPE STRP FLD GEN

## (undated) DEVICE — CATHETER SUCT TR FL TIP 14FR W/ O CTRL

## (undated) DEVICE — T5 HOOD WITH PEEL AWAY FACE SHIELD

## (undated) DEVICE — NC TREK CORONARY DILATATION CATHETER 3.5 MM X 15 MM / RAPID-EXCHANGE: Brand: NC TREK

## (undated) DEVICE — SUTURE FIBERWIRE SZ 2 W/ TAPERED NEEDLE BLUE L38IN NONABSORB BLU L26.5MM 1/2 CIRCLE AR7200

## (undated) DEVICE — SYR 10ML LUER LOK 1/5ML GRAD --

## (undated) DEVICE — Device

## (undated) DEVICE — CANNULA SEAL

## (undated) DEVICE — NEEDLE HYPO 18GA L1.5IN PNK S STL HUB POLYPR SHLD REG BVL

## (undated) DEVICE — STOCKING COMPR L L16-18IN LNG 19MMHG ANK 9-10IN CALF

## (undated) DEVICE — ANGIO-SEAL VIP VASCULAR CLOSURE DEVICE: Brand: ANGIO-SEAL

## (undated) DEVICE — SYR LR LCK 1ML GRAD NSAF 30ML --

## (undated) DEVICE — SOFT SILICONE HYDROCELLULAR SACRUM DRESSING WITH LOCK AWAY LAYER: Brand: ALLEVYN LIFE SACRUM (LARGE) PACK OF 10

## (undated) DEVICE — SUTURE MCRYL SZ 2-0 L36IN ABSRB UD L36MM CT-1 1/2 CIR Y945H

## (undated) DEVICE — FCPS RAD JAW 4LC 240CM W/NDL -- BX/20 RADIAL JAW 4

## (undated) DEVICE — COLUMN DRAPE

## (undated) DEVICE — STERILE POLYISOPRENE POWDER-FREE SURGICAL GLOVES: Brand: PROTEXIS

## (undated) DEVICE — CATHETER GUID 6FR L100CM GRN PTFE JR4 TRUELUMEN HYBRID

## (undated) DEVICE — SMARTSLEEVE SURGICAL GOWN, 3XL LONG: Brand: CONVERTORS

## (undated) DEVICE — FORCEPS BX L240CM JAW DIA2.8MM L CAP W/ NDL MIC MESH TOOTH

## (undated) DEVICE — NEEDLE SYR 18GA L1.5IN RED PLAS HUB S STL BLNT FILL W/O

## (undated) DEVICE — PACK PROCEDURE SURG VASC CATH 161 MMC LF

## (undated) DEVICE — INTENDED FOR TISSUE SEPARATION, AND OTHER PROCEDURES THAT REQUIRE A SHARP SURGICAL BLADE TO PUNCTURE OR CUT.: Brand: BARD-PARKER ® STAINLESS STEEL BLADES

## (undated) DEVICE — SUTURE ABSRB L12IN L12MM SZ 2-0 GS-22 VLT GLYCOLIDE VLOCM2115

## (undated) DEVICE — SOLUTION IRRIG 1000ML H2O STRL BLT

## (undated) DEVICE — CATHETER ANGIO JR4 STD 0.038 IN 6 FRX100 CM SUPER TORQUE +

## (undated) DEVICE — ENDOSCOPY PUMP TUBING/ CAP SET: Brand: ERBE

## (undated) DEVICE — SUTURE VCRL SZ 2-0 L27IN ABSRB UD L26MM SH 1/2 CIR J417H

## (undated) DEVICE — BLADELESS OBTURATOR: Brand: WECK VISTA

## (undated) DEVICE — ELECTRO LUBE IS A SINGLE PATIENT USE DEVICE THAT IS INTENDED TO BE USED ON ELECTROSURGICAL ELECTRODES TO REDUCE STICKING.: Brand: KEY SURGICAL ELECTRO LUBE

## (undated) DEVICE — PREP SKN CHLRAPRP 26ML TNT -- CONVERT TO ITEM 373320

## (undated) DEVICE — BOWL AND CEMENT CARTRIDGE WITH BREAKAWAY FEMORAL NOZZLE: Brand: ACM

## (undated) DEVICE — TREK CORONARY DILATATION CATHETER 2.50 MM X 12 MM / RAPID-EXCHANGE: Brand: TREK

## (undated) DEVICE — ZIMMER® STERILE DISPOSABLE TOURNIQUET CUFF WITH PLC, DUAL PORT, SINGLE BLADDER, 34 IN. (86 CM)

## (undated) DEVICE — COVER MPLR TIP CRV SCIS ACC DA VINCI

## (undated) DEVICE — TAPE,CLOTH/SILK,CURAD,3"X10YD,LF,40/CS: Brand: CURAD

## (undated) DEVICE — SUTURE VCRL SZ 2 L27IN ABSRB VLT L65MM TP-1 1/2 CIR J649G

## (undated) DEVICE — CATHETER DIAG AD L100CM DIA6FR STD JUDKINS L 4 POLYUR COR

## (undated) DEVICE — SNARE POLYP M W27MMXL240CM OVL STIFF DISP CAPTIVATOR

## (undated) DEVICE — INTRODUCER SHTH 6FR CANN L11CM DIL TIP 35MM GRN TUNGSTEN

## (undated) DEVICE — LINER SUCT CANSTR 3000CC PLAS SFT PRE ASSEMB W/OUT TBNG W/

## (undated) DEVICE — DEVICE INFL W ACCS + HEMSTAS VLV INSRT TOOL AND TORQ BASIX

## (undated) DEVICE — SOLUTION IRRIGATION SODIUM CHL 0.9% 100 ML BTL

## (undated) DEVICE — ARM DRAPE

## (undated) DEVICE — STRYKER PERFORMANCE SERIES SAGITTAL BLADE: Brand: STRYKER PERFORMANCE SERIES

## (undated) DEVICE — HANDPIECE SET WITH HIGH FLOW TIP AND SUCTION TUBE: Brand: INTERPULSE

## (undated) DEVICE — PACK SURG BSHR TOT KNEE LF

## (undated) DEVICE — SPIROMETER INCENT 2500ML W ONE W VLV

## (undated) DEVICE — SET FLD ADMIN 3 W STPCOCK FIX FEM L BOR 1IN

## (undated) DEVICE — INTENDED FOR TISSUE SEPARATION, AND OTHER PROCEDURES THAT REQUIRE A SHARP SURGICAL BLADE TO PUNCTURE OR CUT.: Brand: BARD-PARKER SAFETY BLADES SIZE 10, STERILE

## (undated) DEVICE — YANKAUER,SMOOTH HANDLE,HIGH CAPACITY: Brand: MEDLINE INDUSTRIES, INC.

## (undated) DEVICE — NEEDLE SPNL 22GA L3.5IN BLK HUB S STL REG WALL FIT STYL W/

## (undated) DEVICE — ELECTRODE PT RET AD L9FT HI MOIST COND ADH HYDRGEL CORDED

## (undated) DEVICE — BIPOLAR SEALER 23-112-1 AQM 6.0: Brand: AQUAMANTYS ®

## (undated) DEVICE — BITE BLOCK ENDOSCP UNIV AD 6 TO 9.4 MM

## (undated) DEVICE — TRAP SPEC COLL POLYP POLYSTYR --

## (undated) DEVICE — COPILOT BLEEDBACK CONTROL VALVE: Brand: COPILOT

## (undated) DEVICE — BASIN EMSIS 16OZ GRAPHITE PLAS KID SHP MOLD GRAD FOR ORAL

## (undated) DEVICE — FLUFF AND POLYMER UNDERPAD,EXTRA HEAVY: Brand: WINGS

## (undated) DEVICE — SYRINGE MED 30ML STD CLR PLAS LUERLOCK TIP N CTRL DISP

## (undated) DEVICE — GAUZE,SPONGE,4"X4",16PLY,STRL,LF,10/TRAY: Brand: MEDLINE

## (undated) DEVICE — APPLICATOR MEDICATED 26 CC SOLUTION HI LT ORNG CHLORAPREP

## (undated) DEVICE — NEEDLE HYPO 21GA L1.5IN INTRAMUSCULAR S STL LATCH BVL UP

## (undated) DEVICE — CANNULA NSL AD TBNG L14FT STD PVC O2 CRV CONN NONFLARED NSL

## (undated) DEVICE — SOLUTION IRRIG 3000ML 0.9% SOD CHL FLX CONT 0797208] ICU MEDICAL INC]

## (undated) DEVICE — DRAPE TOWEL: Brand: CONVERTORS

## (undated) DEVICE — SUTURE VCRL SZ 0 L36IN ABSRB UD L36MM CT-1 1/2 CIR J946H

## (undated) DEVICE — CANNULA ORIG TL CLR W FOAM CUSHIONS AND 14FT SUPL TB 3 CHN

## (undated) DEVICE — Z DISCONTINUED BY MEDLINE USE 2711682 TRAY SKIN PREP DRY W/ PREM GLV

## (undated) DEVICE — GOWN,REINFORCED,POLY,AURORA,XXLARGE,STR: Brand: MEDLINE

## (undated) DEVICE — PRESSURE MONITORING SET: Brand: TRUWAVE

## (undated) DEVICE — REM POLYHESIVE ADULT PATIENT RETURN ELECTRODE: Brand: VALLEYLAB

## (undated) DEVICE — SYR 50ML SLIP TIP NSAF LF STRL --

## (undated) DEVICE — 3M™ STERI-DRAPE™ INSTRUMENT POUCH 1018: Brand: STERI-DRAPE™

## (undated) DEVICE — RUNTHROUGH NS EXTRA FLOPPY PTCA GUIDEWIRE: Brand: RUNTHROUGH

## (undated) DEVICE — ANGIOGRAPHY KIT CUST VASC

## (undated) DEVICE — 4-PORT MANIFOLD: Brand: NEPTUNE 2

## (undated) DEVICE — BLADE RMFG DBL RECIP DBL SD --

## (undated) DEVICE — ADHESIVE SKIN CLSR 0.7ML TOP DERMBND ADV

## (undated) DEVICE — SYR 20ML LL STRL LF --

## (undated) DEVICE — MEDI-VAC NON-CONDUCTIVE SUCTION TUBING: Brand: CARDINAL HEALTH

## (undated) DEVICE — Device: Brand: OLYMPUS

## (undated) DEVICE — KIT CLN UP BON SECOURS MARYV

## (undated) DEVICE — HOOK LOCK LATEX FREE ELASTIC BANDAGE 6INX5YD

## (undated) DEVICE — SOLUTION IV 1000ML 0.9% SOD CHL

## (undated) DEVICE — SUTURE MCRYL SZ 4-0 L18IN ABSRB UD L19MM PS-2 3/8 CIR PRIM Y496G

## (undated) DEVICE — SYRINGE MED 3ML NDL 22GA L1 1/2IN REG BVL SFGLDE